# Patient Record
Sex: FEMALE | Race: WHITE | URBAN - METROPOLITAN AREA
[De-identification: names, ages, dates, MRNs, and addresses within clinical notes are randomized per-mention and may not be internally consistent; named-entity substitution may affect disease eponyms.]

---

## 2017-05-15 ENCOUNTER — EMERGENCY (EMERGENCY)
Facility: HOSPITAL | Age: 77
LOS: 0 days | Discharge: HOME | End: 2017-05-15
Attending: EMERGENCY MEDICINE | Admitting: EMERGENCY MEDICINE
Payer: MEDICARE

## 2017-05-15 VITALS
DIASTOLIC BLOOD PRESSURE: 81 MMHG | TEMPERATURE: 98 F | WEIGHT: 132.06 LBS | SYSTOLIC BLOOD PRESSURE: 143 MMHG | RESPIRATION RATE: 18 BRPM | OXYGEN SATURATION: 98 % | HEART RATE: 110 BPM

## 2017-05-15 VITALS
RESPIRATION RATE: 19 BRPM | DIASTOLIC BLOOD PRESSURE: 86 MMHG | OXYGEN SATURATION: 99 % | HEART RATE: 78 BPM | SYSTOLIC BLOOD PRESSURE: 127 MMHG

## 2017-05-15 DIAGNOSIS — M48.00 SPINAL STENOSIS, SITE UNSPECIFIED: ICD-10-CM

## 2017-05-15 DIAGNOSIS — R51 HEADACHE: ICD-10-CM

## 2017-05-15 DIAGNOSIS — K14.6 GLOSSODYNIA: ICD-10-CM

## 2017-05-15 DIAGNOSIS — G50.0 TRIGEMINAL NEURALGIA: ICD-10-CM

## 2017-05-15 DIAGNOSIS — E78.5 HYPERLIPIDEMIA, UNSPECIFIED: ICD-10-CM

## 2017-05-15 LAB — ERYTHROCYTE [SEDIMENTATION RATE] IN BLOOD: 12 MM/HR — SIGNIFICANT CHANGE UP (ref 0–20)

## 2017-05-15 PROCEDURE — 99284 EMERGENCY DEPT VISIT MOD MDM: CPT | Mod: 25

## 2017-05-15 PROCEDURE — 70450 CT HEAD/BRAIN W/O DYE: CPT | Mod: 26

## 2017-05-15 RX ORDER — HYDROMORPHONE HYDROCHLORIDE 2 MG/ML
1 INJECTION INTRAMUSCULAR; INTRAVENOUS; SUBCUTANEOUS ONCE
Qty: 0 | Refills: 0 | Status: DISCONTINUED | OUTPATIENT
Start: 2017-05-15 | End: 2017-05-15

## 2017-05-15 RX ORDER — OXYCODONE HYDROCHLORIDE 5 MG/1
1 TABLET ORAL
Qty: 9 | Refills: 0 | OUTPATIENT
Start: 2017-05-15

## 2017-05-15 RX ORDER — HYDROMORPHONE HYDROCHLORIDE 2 MG/ML
0.5 INJECTION INTRAMUSCULAR; INTRAVENOUS; SUBCUTANEOUS ONCE
Qty: 0 | Refills: 0 | Status: DISCONTINUED | OUTPATIENT
Start: 2017-05-15 | End: 2017-05-15

## 2017-05-15 RX ORDER — DEXAMETHASONE 0.5 MG/5ML
10 ELIXIR ORAL ONCE
Qty: 0 | Refills: 0 | Status: COMPLETED | OUTPATIENT
Start: 2017-05-15 | End: 2017-05-15

## 2017-05-15 RX ADMIN — Medication 10 MILLIGRAM(S): at 04:53

## 2017-05-15 RX ADMIN — HYDROMORPHONE HYDROCHLORIDE 0.5 MILLIGRAM(S): 2 INJECTION INTRAMUSCULAR; INTRAVENOUS; SUBCUTANEOUS at 05:23

## 2017-05-15 RX ADMIN — HYDROMORPHONE HYDROCHLORIDE 1 MILLIGRAM(S): 2 INJECTION INTRAMUSCULAR; INTRAVENOUS; SUBCUTANEOUS at 02:45

## 2017-05-15 RX ADMIN — HYDROMORPHONE HYDROCHLORIDE 0.5 MILLIGRAM(S): 2 INJECTION INTRAMUSCULAR; INTRAVENOUS; SUBCUTANEOUS at 04:53

## 2017-05-15 RX ADMIN — HYDROMORPHONE HYDROCHLORIDE 1 MILLIGRAM(S): 2 INJECTION INTRAMUSCULAR; INTRAVENOUS; SUBCUTANEOUS at 03:15

## 2017-05-15 NOTE — ED PROVIDER NOTE - OBJECTIVE STATEMENT
77 y/o female with PMHx of trigeminal neuralgia (on Tryptanol) spinal stenosis, HLD, burning mouth syndrome s/p AICD presents to the ED c/o sudden onset left sided HA x1 hour. Woke pt up from sleep. Denies vision changes. Always on left side, does not radiate to face. Describes the pain as a "shock", 11/10. On Gabapentin, Klonopin. Takes tramadol PRN for back, did not take it tonight.  states pt used to have this issue intermittently years ago, have become more frequent. Saw a neurologist who diagnosed her with trigeminal neuralgia and prescribed tryptanol, which relieved sx for 1 week. No recent CT scan. Is awaiting MRI. Allergic to PCN, Latex, sulfa.

## 2017-05-15 NOTE — ED PROVIDER NOTE - HEAD, MLM
Head is atraumatic. Head shape is symmetrical. Head is atraumatic. Head shape is symmetrical.  Non tender temporal artery

## 2017-05-15 NOTE — ED ADULT NURSE NOTE - OBJECTIVE STATEMENT
76 year old female presenting to the ED complaining of a 10/10 headache. Patient reports that she woke up this am with a sharp, shooting pain to the left temporal aspect of her skull. Patient states that she has a history of trigeminal neuralgia, but that this is the "worst headache of my life."   Negative: change in mentation/LOC, dizziness, weakness, visual changes, auditory issues, numbness/loss of sensation to the face, pain upon mastication, facial droop, arm drift, or difficultly ambulating.

## 2017-05-15 NOTE — ED ADULT NURSE NOTE - CHPI ED SYMPTOMS NEG
no change in level of consciousness/no dizziness/no vomiting/no confusion/no blurred vision/no weakness/no nausea/no numbness/no loss of consciousness/no fever

## 2017-05-15 NOTE — ED PROVIDER NOTE - DETAILS:
Rosa Koch MD - The scribe's documentation has been prepared under my direction and personally reviewed by me in its entirety. I confirm that the note above accurately reflects all work, treatment, procedures, and medical decision making performed by me.

## 2017-05-15 NOTE — ED PROVIDER NOTE - CONSTITUTIONAL, MLM
normal... Well appearing, well nourished, awake, alert, oriented to person, place, time/situation and in no apparent distress. Well appearing, well nourished, awake, alert, oriented to person, place, time/situation; crying in pain

## 2017-05-15 NOTE — ED PROVIDER NOTE - MEDICAL DECISION MAKING DETAILS
77 yo with h/o trigeminal neuralgia with exacerbation of symptoms, will check head CT, ESR and offer symptomatic relief

## 2017-05-15 NOTE — ED PROVIDER NOTE - NS ED MD SCRIBE ATTENDING SCRIBE SECTIONS
VITAL SIGNS( Pullset)/PROGRESS NOTE/HISTORY OF PRESENT ILLNESS/PHYSICAL EXAM/PAST MEDICAL/SURGICAL/SOCIAL HISTORY/REVIEW OF SYSTEMS/RESULTS/DISPOSITION

## 2017-08-30 ENCOUNTER — EMERGENCY (EMERGENCY)
Facility: HOSPITAL | Age: 77
LOS: 0 days | Discharge: HOME | End: 2017-08-30
Attending: EMERGENCY MEDICINE | Admitting: EMERGENCY MEDICINE
Payer: MEDICARE

## 2017-08-30 VITALS
TEMPERATURE: 98 F | SYSTOLIC BLOOD PRESSURE: 122 MMHG | OXYGEN SATURATION: 99 % | DIASTOLIC BLOOD PRESSURE: 75 MMHG | HEART RATE: 78 BPM | RESPIRATION RATE: 16 BRPM

## 2017-08-30 VITALS
DIASTOLIC BLOOD PRESSURE: 72 MMHG | HEART RATE: 105 BPM | RESPIRATION RATE: 18 BRPM | WEIGHT: 134.92 LBS | SYSTOLIC BLOOD PRESSURE: 122 MMHG | TEMPERATURE: 98 F | OXYGEN SATURATION: 99 %

## 2017-08-30 DIAGNOSIS — Z95.0 PRESENCE OF CARDIAC PACEMAKER: ICD-10-CM

## 2017-08-30 DIAGNOSIS — E78.5 HYPERLIPIDEMIA, UNSPECIFIED: ICD-10-CM

## 2017-08-30 DIAGNOSIS — S09.90XA UNSPECIFIED INJURY OF HEAD, INITIAL ENCOUNTER: ICD-10-CM
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
## 2017-08-30 DIAGNOSIS — S42.035A NONDISPLACED FRACTURE OF LATERAL END OF LEFT CLAVICLE, INITIAL ENCOUNTER FOR CLOSED FRACTURE: ICD-10-CM
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
## 2017-08-30 PROCEDURE — 99285 EMERGENCY DEPT VISIT HI MDM: CPT

## 2017-08-30 PROCEDURE — 73030 X-RAY EXAM OF SHOULDER: CPT | Mod: 26,LT

## 2017-08-30 PROCEDURE — 70450 CT HEAD/BRAIN W/O DYE: CPT | Mod: 26

## 2017-08-30 PROCEDURE — 71010: CPT | Mod: 26

## 2017-08-30 PROCEDURE — 73060 X-RAY EXAM OF HUMERUS: CPT | Mod: 26,LT

## 2017-08-30 PROCEDURE — 93010 ELECTROCARDIOGRAM REPORT: CPT

## 2017-08-30 PROCEDURE — 72190 X-RAY EXAM OF PELVIS: CPT | Mod: 26

## 2017-08-30 RX ORDER — OXYCODONE AND ACETAMINOPHEN 5; 325 MG/1; MG/1
1 TABLET ORAL ONCE
Qty: 0 | Refills: 0 | Status: DISCONTINUED | OUTPATIENT
Start: 2017-08-30 | End: 2017-08-30

## 2017-08-30 RX ADMIN — OXYCODONE AND ACETAMINOPHEN 1 TABLET(S): 5; 325 TABLET ORAL at 16:48

## 2017-08-30 RX ADMIN — OXYCODONE AND ACETAMINOPHEN 1 TABLET(S): 5; 325 TABLET ORAL at 15:32

## 2017-08-30 RX ADMIN — OXYCODONE AND ACETAMINOPHEN 1 TABLET(S): 5; 325 TABLET ORAL at 16:02

## 2017-08-30 NOTE — ED PROVIDER NOTE - CARE PLAN
Principal Discharge DX:	Closed nondisplaced fracture of acromial end of left clavicle, initial encounter

## 2017-08-30 NOTE — ED ADULT TRIAGE NOTE - CHIEF COMPLAINT QUOTE
pt states she was wa;lomg up the steps and fell pt c/op head pain and left shoulder pain, pt on ASA 81mg, no LOC

## 2017-08-30 NOTE — ED ADULT NURSE NOTE - CHPI ED SYMPTOMS NEG
no fever/no numbness/no abrasion/no loss of consciousness/no vomiting/no weakness/no tingling/no bleeding

## 2017-08-30 NOTE — ED ADULT NURSE NOTE - CAS EDN DISCHARGE ASSESSMENT
No adverse reaction to first time med in ED/Neuro vascular intact post splinting/Alert and oriented to person, place and time/Symptoms improved/Patient baseline mental status/Awake

## 2017-08-30 NOTE — ED PROVIDER NOTE - OBJECTIVE STATEMENT
75 y/o F w/ PMHx of pacemaker, presents to ED c/o head pain and left shoulder pain. pt was walking up steps when she fell, head struck. Pt denies LOC, blood thinners, HA, n/v, or any other acute complaints. Pt is on ASA 81 mg.

## 2018-08-22 ENCOUNTER — EMERGENCY (EMERGENCY)
Facility: HOSPITAL | Age: 78
LOS: 0 days | Discharge: HOME | End: 2018-08-22
Attending: EMERGENCY MEDICINE | Admitting: EMERGENCY MEDICINE
Payer: COMMERCIAL

## 2018-08-22 VITALS
OXYGEN SATURATION: 97 % | SYSTOLIC BLOOD PRESSURE: 132 MMHG | HEIGHT: 64 IN | WEIGHT: 126.1 LBS | HEART RATE: 81 BPM | TEMPERATURE: 98 F | DIASTOLIC BLOOD PRESSURE: 74 MMHG | RESPIRATION RATE: 18 BRPM

## 2018-08-22 DIAGNOSIS — R07.9 CHEST PAIN, UNSPECIFIED: ICD-10-CM

## 2018-08-22 DIAGNOSIS — M48.00 SPINAL STENOSIS, SITE UNSPECIFIED: ICD-10-CM
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
## 2018-08-22 DIAGNOSIS — S22.42XA MULTIPLE FRACTURES OF RIBS, LEFT SIDE, INITIAL ENCOUNTER FOR CLOSED FRACTURE: ICD-10-CM

## 2018-08-22 DIAGNOSIS — G50.0 TRIGEMINAL NEURALGIA: ICD-10-CM

## 2018-08-22 PROBLEM — K14.6 GLOSSODYNIA: Chronic | Status: ACTIVE | Noted: 2017-05-15

## 2018-08-22 PROBLEM — E78.5 HYPERLIPIDEMIA, UNSPECIFIED: Chronic | Status: ACTIVE | Noted: 2017-05-15

## 2018-08-22 PROCEDURE — 71250 CT THORAX DX C-: CPT | Mod: 26

## 2018-08-22 PROCEDURE — 93010 ELECTROCARDIOGRAM REPORT: CPT

## 2018-08-22 PROCEDURE — 70486 CT MAXILLOFACIAL W/O DYE: CPT | Mod: 26

## 2018-08-22 PROCEDURE — 72125 CT NECK SPINE W/O DYE: CPT | Mod: 26

## 2018-08-22 PROCEDURE — 99284 EMERGENCY DEPT VISIT MOD MDM: CPT | Mod: 25

## 2018-08-22 PROCEDURE — 70450 CT HEAD/BRAIN W/O DYE: CPT | Mod: 26

## 2018-08-22 PROCEDURE — 76377 3D RENDER W/INTRP POSTPROCES: CPT | Mod: 26

## 2018-08-22 RX ORDER — OXYCODONE AND ACETAMINOPHEN 5; 325 MG/1; MG/1
1 TABLET ORAL ONCE
Qty: 0 | Refills: 0 | Status: DISCONTINUED | OUTPATIENT
Start: 2018-08-22 | End: 2018-08-22

## 2018-08-22 RX ORDER — OXYCODONE HYDROCHLORIDE 5 MG/1
1 TABLET ORAL
Qty: 9 | Refills: 0
Start: 2018-08-22

## 2018-08-22 RX ADMIN — OXYCODONE AND ACETAMINOPHEN 1 TABLET(S): 5; 325 TABLET ORAL at 05:07

## 2018-08-22 NOTE — ED ADULT TRIAGE NOTE - CHIEF COMPLAINT QUOTE
tripped over and fell. Hit head on wall. denies loc. on asa daily. c/o left sided headache, left rib pain. Trauma alert called at 0330

## 2018-08-22 NOTE — ED PROVIDER NOTE - OBJECTIVE STATEMENT
76 yo female bib  from home s/p mechanical fall while getting up to use the bathroom tonight. no loc. pt c/o left sided rib pain.

## 2018-08-22 NOTE — ED ADULT NURSE NOTE - NSIMPLEMENTINTERV_GEN_ALL_ED
Implemented All Fall with Harm Risk Interventions:  [place] to call system. Call bell, personal items and telephone within reach. Instruct patient to call for assistance. Room bathroom lighting operational. Non-slip footwear when patient is off stretcher. Physically safe environment: no spills, clutter or unnecessary equipment. Stretcher in lowest position, wheels locked, appropriate side rails in place. Provide visual cue, wrist band, yellow gown, etc. Monitor gait and stability. Monitor for mental status changes and reorient to person, place, and time. Review medications for side effects contributing to fall risk. Reinforce activity limits and safety measures with patient and family. Provide visual clues: red socks.

## 2021-05-22 NOTE — ED PROVIDER NOTE - TEMPLATE, MLM
[facility]  Urology  [address] [place] [address]  Phone: [phone]  Fax: [phone]  Follow Up Time: 1-3 Days

## 2021-06-11 ENCOUNTER — INPATIENT (INPATIENT)
Facility: HOSPITAL | Age: 81
LOS: 6 days | Discharge: NURSING, REHAB OR LONG-TERM CARE | DRG: 884 | End: 2021-06-18
Attending: HOSPITALIST | Admitting: FAMILY MEDICINE
Payer: MEDICARE

## 2021-06-11 VITALS
TEMPERATURE: 98 F | HEART RATE: 79 BPM | RESPIRATION RATE: 18 BRPM | DIASTOLIC BLOOD PRESSURE: 55 MMHG | OXYGEN SATURATION: 100 % | SYSTOLIC BLOOD PRESSURE: 122 MMHG

## 2021-06-11 DIAGNOSIS — R26.81 UNSTEADINESS ON FEET: ICD-10-CM

## 2021-06-11 LAB
ALBUMIN SERPL ELPH-MCNC: 3.4 G/DL — SIGNIFICANT CHANGE UP (ref 3.3–5)
ALP SERPL-CCNC: 104 U/L — SIGNIFICANT CHANGE UP (ref 40–120)
ALT FLD-CCNC: 35 U/L — SIGNIFICANT CHANGE UP (ref 12–78)
ANION GAP SERPL CALC-SCNC: 7 MMOL/L — SIGNIFICANT CHANGE UP (ref 5–17)
APTT BLD: 26.9 SEC — LOW (ref 27.5–35.5)
AST SERPL-CCNC: 44 U/L — HIGH (ref 15–37)
BASOPHILS # BLD AUTO: 0.04 K/UL — SIGNIFICANT CHANGE UP (ref 0–0.2)
BASOPHILS NFR BLD AUTO: 0.5 % — SIGNIFICANT CHANGE UP (ref 0–2)
BILIRUB SERPL-MCNC: 0.5 MG/DL — SIGNIFICANT CHANGE UP (ref 0.2–1.2)
BUN SERPL-MCNC: 20 MG/DL — SIGNIFICANT CHANGE UP (ref 7–23)
CALCIUM SERPL-MCNC: 9.2 MG/DL — SIGNIFICANT CHANGE UP (ref 8.5–10.1)
CHLORIDE SERPL-SCNC: 108 MMOL/L — SIGNIFICANT CHANGE UP (ref 96–108)
CO2 SERPL-SCNC: 25 MMOL/L — SIGNIFICANT CHANGE UP (ref 22–31)
CREAT SERPL-MCNC: 0.94 MG/DL — SIGNIFICANT CHANGE UP (ref 0.5–1.3)
EOSINOPHIL # BLD AUTO: 0.1 K/UL — SIGNIFICANT CHANGE UP (ref 0–0.5)
EOSINOPHIL NFR BLD AUTO: 1.3 % — SIGNIFICANT CHANGE UP (ref 0–6)
GLUCOSE SERPL-MCNC: 77 MG/DL — SIGNIFICANT CHANGE UP (ref 70–99)
HCT VFR BLD CALC: 38.8 % — SIGNIFICANT CHANGE UP (ref 34.5–45)
HGB BLD-MCNC: 12.8 G/DL — SIGNIFICANT CHANGE UP (ref 11.5–15.5)
IMM GRANULOCYTES NFR BLD AUTO: 0.3 % — SIGNIFICANT CHANGE UP (ref 0–1.5)
INR BLD: 1.03 RATIO — SIGNIFICANT CHANGE UP (ref 0.88–1.16)
LYMPHOCYTES # BLD AUTO: 2 K/UL — SIGNIFICANT CHANGE UP (ref 1–3.3)
LYMPHOCYTES # BLD AUTO: 26.6 % — SIGNIFICANT CHANGE UP (ref 13–44)
MCHC RBC-ENTMCNC: 31.5 PG — SIGNIFICANT CHANGE UP (ref 27–34)
MCHC RBC-ENTMCNC: 33 GM/DL — SIGNIFICANT CHANGE UP (ref 32–36)
MCV RBC AUTO: 95.6 FL — SIGNIFICANT CHANGE UP (ref 80–100)
MONOCYTES # BLD AUTO: 0.63 K/UL — SIGNIFICANT CHANGE UP (ref 0–0.9)
MONOCYTES NFR BLD AUTO: 8.4 % — SIGNIFICANT CHANGE UP (ref 2–14)
NEUTROPHILS # BLD AUTO: 4.73 K/UL — SIGNIFICANT CHANGE UP (ref 1.8–7.4)
NEUTROPHILS NFR BLD AUTO: 62.9 % — SIGNIFICANT CHANGE UP (ref 43–77)
PLATELET # BLD AUTO: 190 K/UL — SIGNIFICANT CHANGE UP (ref 150–400)
POTASSIUM SERPL-MCNC: 4.3 MMOL/L — SIGNIFICANT CHANGE UP (ref 3.5–5.3)
POTASSIUM SERPL-SCNC: 4.3 MMOL/L — SIGNIFICANT CHANGE UP (ref 3.5–5.3)
PROT SERPL-MCNC: 6.7 GM/DL — SIGNIFICANT CHANGE UP (ref 6–8.3)
PROTHROM AB SERPL-ACNC: 11.9 SEC — SIGNIFICANT CHANGE UP (ref 10.6–13.6)
RAPID RVP RESULT: SIGNIFICANT CHANGE UP
RBC # BLD: 4.06 M/UL — SIGNIFICANT CHANGE UP (ref 3.8–5.2)
RBC # FLD: 13.6 % — SIGNIFICANT CHANGE UP (ref 10.3–14.5)
SARS-COV-2 RNA SPEC QL NAA+PROBE: SIGNIFICANT CHANGE UP
SODIUM SERPL-SCNC: 140 MMOL/L — SIGNIFICANT CHANGE UP (ref 135–145)
TROPONIN I SERPL-MCNC: <0.015 NG/ML — SIGNIFICANT CHANGE UP (ref 0.01–0.04)
WBC # BLD: 7.52 K/UL — SIGNIFICANT CHANGE UP (ref 3.8–10.5)
WBC # FLD AUTO: 7.52 K/UL — SIGNIFICANT CHANGE UP (ref 3.8–10.5)

## 2021-06-11 PROCEDURE — U0005: CPT

## 2021-06-11 PROCEDURE — G1004: CPT

## 2021-06-11 PROCEDURE — 97163 PT EVAL HIGH COMPLEX 45 MIN: CPT | Mod: GP

## 2021-06-11 PROCEDURE — 93010 ELECTROCARDIOGRAM REPORT: CPT

## 2021-06-11 PROCEDURE — 72192 CT PELVIS W/O DYE: CPT

## 2021-06-11 PROCEDURE — 74177 CT ABD & PELVIS W/CONTRAST: CPT | Mod: 26,ME

## 2021-06-11 PROCEDURE — 72125 CT NECK SPINE W/O DYE: CPT | Mod: 26,ME

## 2021-06-11 PROCEDURE — 73501 X-RAY EXAM HIP UNI 1 VIEW: CPT | Mod: RT

## 2021-06-11 PROCEDURE — 86769 SARS-COV-2 COVID-19 ANTIBODY: CPT

## 2021-06-11 PROCEDURE — 71260 CT THORAX DX C+: CPT | Mod: 26,MG

## 2021-06-11 PROCEDURE — U0003: CPT

## 2021-06-11 PROCEDURE — 36415 COLL VENOUS BLD VENIPUNCTURE: CPT

## 2021-06-11 PROCEDURE — 81001 URINALYSIS AUTO W/SCOPE: CPT

## 2021-06-11 PROCEDURE — 97116 GAIT TRAINING THERAPY: CPT | Mod: GP

## 2021-06-11 PROCEDURE — 70450 CT HEAD/BRAIN W/O DYE: CPT | Mod: 26,ME

## 2021-06-11 PROCEDURE — 76376 3D RENDER W/INTRP POSTPROCES: CPT

## 2021-06-11 PROCEDURE — 85027 COMPLETE CBC AUTOMATED: CPT

## 2021-06-11 PROCEDURE — 99285 EMERGENCY DEPT VISIT HI MDM: CPT

## 2021-06-11 PROCEDURE — 0225U NFCT DS DNA&RNA 21 SARSCOV2: CPT

## 2021-06-11 PROCEDURE — 80048 BASIC METABOLIC PNL TOTAL CA: CPT

## 2021-06-11 PROCEDURE — 97530 THERAPEUTIC ACTIVITIES: CPT | Mod: GP

## 2021-06-11 RX ORDER — TRAMADOL HYDROCHLORIDE 50 MG/1
25 TABLET ORAL ONCE
Refills: 0 | Status: DISCONTINUED | OUTPATIENT
Start: 2021-06-11 | End: 2021-06-11

## 2021-06-11 RX ORDER — DULOXETINE HYDROCHLORIDE 30 MG/1
0 CAPSULE, DELAYED RELEASE ORAL
Qty: 0 | Refills: 0 | DISCHARGE

## 2021-06-11 RX ORDER — CLONAZEPAM 1 MG
0 TABLET ORAL
Qty: 0 | Refills: 0 | DISCHARGE

## 2021-06-11 RX ORDER — TRAMADOL HYDROCHLORIDE 50 MG/1
0 TABLET ORAL
Qty: 0 | Refills: 0 | DISCHARGE

## 2021-06-11 RX ORDER — SODIUM CHLORIDE 9 MG/ML
1000 INJECTION INTRAMUSCULAR; INTRAVENOUS; SUBCUTANEOUS ONCE
Refills: 0 | Status: COMPLETED | OUTPATIENT
Start: 2021-06-11 | End: 2021-06-11

## 2021-06-11 RX ORDER — GABAPENTIN 400 MG/1
0 CAPSULE ORAL
Qty: 0 | Refills: 0 | DISCHARGE

## 2021-06-11 RX ADMIN — TRAMADOL HYDROCHLORIDE 25 MILLIGRAM(S): 50 TABLET ORAL at 23:29

## 2021-06-11 RX ADMIN — SODIUM CHLORIDE 1000 MILLILITER(S): 9 INJECTION INTRAMUSCULAR; INTRAVENOUS; SUBCUTANEOUS at 14:15

## 2021-06-11 NOTE — ED ADULT NURSE NOTE - OBJECTIVE STATEMENT
Pt c/o fall two days ago. denies LOC. per pts daughter Pt c/o fall two days ago. denies LOC. per pts daughter pt has a bruise to right ribs.

## 2021-06-11 NOTE — ED PROVIDER NOTE - CARE PLAN
Principal Discharge DX:	Unsteady gait  Secondary Diagnosis:	Frequent falls  Secondary Diagnosis:	Weakness

## 2021-06-11 NOTE — ED PROVIDER NOTE - OBJECTIVE STATEMENT
79 yo female with a PMH of HLD, trigeminal neuralgia, dementia presents with back pain s/p fall. Pt is a poor historian so history was provided by daughter. PE daughter, 2 days ago apt had 2 witnessed fall, the first after standing up and hit her head and the 2nd fallign backwards against a coffee table. Pt has been c/o of soreness to the R back since the fall. Daughter states, pt has been progressively getting weaker, decreased appetite. Was taken to urgent care who suggested she come to the ER 81 yo female with a PMH of HLD, trigeminal neuralgia, dementia presents with back pain s/p fall. Pt is a poor historian so history was provided by daughter. PE daughter, 2 days ago apt had 2 witnessed fall, the first after standing up and hit her head and the 2nd fallign backwards against a coffee table. Pt has been c/o of soreness to the R back since the fall. Daughter states, pt has been progressively getting weaker, decreased appetite. Was taken to urgent care who suggested she come to the ER.  PMD= Arabic

## 2021-06-11 NOTE — ED PROVIDER NOTE - CLINICAL SUMMARY MEDICAL DECISION MAKING FREE TEXT BOX
81 yo female presents with R sided back pain s/p fall. Will check CTs, labs, UA, SW consult due to weakness and multiple falls. Reeval. -Tyrel Bronson PA-C

## 2021-06-11 NOTE — ED PROVIDER NOTE - ATTENDING CONTRIBUTION TO CARE
I, Luda Tatum DO,  performed the initial face to face bedside interview with this patient regarding history of present illness, review of symptoms and relevant past medical, social and family history.  I completed an independent physical examination.  I was the initial provider who evaluated this patient. I have signed out the follow up of any pending tests (i.e. labs, radiological studies) to the ACP.  I have communicated the patient’s plan of care and disposition with the ACP.  The history, relevant review of systems, past medical and surgical history, medical decision making, and physical examination was documented by the scribe in my presence and I attest to the accuracy of the documentation.

## 2021-06-11 NOTE — ED PROVIDER NOTE - PROGRESS NOTE DETAILS
Pt progressively weker and lives alone with . Daughter would like to speak with SW. KRISS pritchard. -Tyrel Bronson PA-C Rc DO: 81 yo female s/p mechanical fall 2 days ago, unwitnessed; with back pain; generalized weakness; dec po intake; brought in by daughter; ct scans ordered r/o traumatic injury; labs wnl at this time; ua pending; endorsed to Dr. Alanis pending CT scans and likely admission.

## 2021-06-12 LAB
APPEARANCE UR: ABNORMAL
BILIRUB UR-MCNC: NEGATIVE — SIGNIFICANT CHANGE UP
COLOR SPEC: YELLOW — SIGNIFICANT CHANGE UP
COVID-19 SPIKE DOMAIN AB INTERP: POSITIVE
COVID-19 SPIKE DOMAIN ANTIBODY RESULT: >250 U/ML — HIGH
DIFF PNL FLD: ABNORMAL
GLUCOSE UR QL: NEGATIVE MG/DL — SIGNIFICANT CHANGE UP
KETONES UR-MCNC: ABNORMAL
LEUKOCYTE ESTERASE UR-ACNC: ABNORMAL
NITRITE UR-MCNC: NEGATIVE — SIGNIFICANT CHANGE UP
PH UR: 5 — SIGNIFICANT CHANGE UP (ref 5–8)
PROT UR-MCNC: 100 MG/DL
SARS-COV-2 IGG+IGM SERPL QL IA: >250 U/ML — HIGH
SARS-COV-2 IGG+IGM SERPL QL IA: POSITIVE
SP GR SPEC: 1.01 — SIGNIFICANT CHANGE UP (ref 1.01–1.02)
UROBILINOGEN FLD QL: NEGATIVE MG/DL — SIGNIFICANT CHANGE UP

## 2021-06-12 PROCEDURE — 12345: CPT | Mod: NC

## 2021-06-12 PROCEDURE — 99222 1ST HOSP IP/OBS MODERATE 55: CPT

## 2021-06-12 RX ORDER — TRAMADOL HYDROCHLORIDE 50 MG/1
50 TABLET ORAL EVERY 6 HOURS
Refills: 0 | Status: DISCONTINUED | OUTPATIENT
Start: 2021-06-12 | End: 2021-06-18

## 2021-06-12 RX ORDER — CLONAZEPAM 1 MG
0.25 TABLET ORAL THREE TIMES A DAY
Refills: 0 | Status: DISCONTINUED | OUTPATIENT
Start: 2021-06-12 | End: 2021-06-18

## 2021-06-12 RX ORDER — DULOXETINE HYDROCHLORIDE 30 MG/1
40 CAPSULE, DELAYED RELEASE ORAL DAILY
Refills: 0 | Status: DISCONTINUED | OUTPATIENT
Start: 2021-06-12 | End: 2021-06-18

## 2021-06-12 RX ORDER — GABAPENTIN 400 MG/1
300 CAPSULE ORAL THREE TIMES A DAY
Refills: 0 | Status: DISCONTINUED | OUTPATIENT
Start: 2021-06-12 | End: 2021-06-18

## 2021-06-12 RX ORDER — QUETIAPINE FUMARATE 200 MG/1
50 TABLET, FILM COATED ORAL AT BEDTIME
Refills: 0 | Status: DISCONTINUED | OUTPATIENT
Start: 2021-06-12 | End: 2021-06-18

## 2021-06-12 RX ORDER — DONEPEZIL HYDROCHLORIDE 10 MG/1
10 TABLET, FILM COATED ORAL AT BEDTIME
Refills: 0 | Status: DISCONTINUED | OUTPATIENT
Start: 2021-06-12 | End: 2021-06-18

## 2021-06-12 RX ADMIN — Medication 0.5 MILLIGRAM(S): at 02:52

## 2021-06-12 RX ADMIN — Medication 10 MILLIGRAM(S): at 16:05

## 2021-06-12 RX ADMIN — QUETIAPINE FUMARATE 50 MILLIGRAM(S): 200 TABLET, FILM COATED ORAL at 21:48

## 2021-06-12 RX ADMIN — Medication 10 MILLIGRAM(S): at 08:26

## 2021-06-12 RX ADMIN — Medication 0.5 MILLIGRAM(S): at 13:34

## 2021-06-12 RX ADMIN — GABAPENTIN 300 MILLIGRAM(S): 400 CAPSULE ORAL at 13:33

## 2021-06-12 RX ADMIN — DULOXETINE HYDROCHLORIDE 40 MILLIGRAM(S): 30 CAPSULE, DELAYED RELEASE ORAL at 08:26

## 2021-06-12 RX ADMIN — GABAPENTIN 300 MILLIGRAM(S): 400 CAPSULE ORAL at 06:22

## 2021-06-12 RX ADMIN — DONEPEZIL HYDROCHLORIDE 10 MILLIGRAM(S): 10 TABLET, FILM COATED ORAL at 21:48

## 2021-06-12 RX ADMIN — Medication 0.5 MILLIGRAM(S): at 21:48

## 2021-06-12 RX ADMIN — GABAPENTIN 300 MILLIGRAM(S): 400 CAPSULE ORAL at 21:48

## 2021-06-12 NOTE — DIETITIAN INITIAL EVALUATION ADULT. - OTHER INFO
79yo female with PMH significant for HLD, trigeminal neuralgia, dementia p/w back pain s/p fall.  Pt admitted with unsteady gait.  full code.

## 2021-06-12 NOTE — H&P ADULT - ASSESSMENT
A/P:    1.  Unsteady Gait  Frequent Falls  -keep on fall precaution  -follow PT eval  -pain meds as needed  -may need Rehab placement     2.  Dementia  -continue Klonopin, Seroquel, Cymbalta    3.  SCD for DVT ppx    4.  Code status: Patient is in Full code status.

## 2021-06-12 NOTE — H&P ADULT - NSICDXPASTMEDICALHX_GEN_ALL_CORE_FT
PAST MEDICAL HISTORY:  Burning mouth syndrome     HLD (hyperlipidemia)     Spinal stenosis     Trigeminal neuralgia

## 2021-06-12 NOTE — DIETITIAN INITIAL EVALUATION ADULT. - ORAL INTAKE PTA/DIET HISTORY
pt minimally eating x 3 wks; 1 meal and ensure per day; meeting <75% of estimated nutr needs; per husbands report.

## 2021-06-12 NOTE — DIETITIAN INITIAL EVALUATION ADULT. - ADD RECOMMEND
1) consider adding appetite stimulant 2) add ensure enlive vanilla TID 3) add MVI with minerals daily to ensure 100% RDI met 4) consider checking vitamin D level and supplement prn 5) daily wt checks to track/trend changes 1) consider adding appetite stimulant 2) liberalize diet to regular and add ensure enlive vanilla TID 3) add MVI with minerals daily to ensure 100% RDI met 4) consider checking vitamin D level and supplement prn 5) daily wt checks to track/trend changes

## 2021-06-12 NOTE — H&P ADULT - NSHPPHYSICALEXAM_GEN_ALL_CORE
Vital Signs Last 24 Hrs  T(C): 36.4 (11 Jun 2021 22:38), Max: 37 (11 Jun 2021 21:35)  T(F): 97.6 (11 Jun 2021 22:38), Max: 98.6 (11 Jun 2021 21:35)  HR: 99 (11 Jun 2021 22:38) (79 - 99)  BP: 158/72 (11 Jun 2021 22:38) (122/55 - 158/72)  RR: 18 (11 Jun 2021 22:38) (18 - 18)  SpO2: 100% (11 Jun 2021 22:38) (100% - 100%)

## 2021-06-12 NOTE — DIETITIAN INITIAL EVALUATION ADULT. - PERTINENT MEDS FT
MEDICATIONS  (STANDING):  clonazePAM  Tablet 0.5 milliGRAM(s) Oral three times a day  dicyclomine 10 milliGRAM(s) Oral two times a day before meals  donepezil 10 milliGRAM(s) Oral at bedtime  DULoxetine 40 milliGRAM(s) Oral daily  gabapentin 300 milliGRAM(s) Oral three times a day  QUEtiapine 50 milliGRAM(s) Oral at bedtime    MEDICATIONS  (PRN):  traMADol 50 milliGRAM(s) Oral every 6 hours PRN for moderate pain

## 2021-06-12 NOTE — DIETITIAN INITIAL EVALUATION ADULT. - PERTINENT LABORATORY DATA
06-11    140  |  108  |  20  ----------------------------<  77  4.3   |  25  |  0.94    Ca    9.2      11 Jun 2021 14:06    TPro  6.7  /  Alb  3.4  /  TBili  0.5  /  DBili  x   /  AST  44<H>  /  ALT  35  /  AlkPhos  104  06-11

## 2021-06-12 NOTE — H&P ADULT - HISTORY OF PRESENT ILLNESS
81 yo Female with a PMH of HLD, trigeminal neuralgia, dementia presents with back pain after fall. Patient is a poor historian so history was provided by daughter. Per daughter, 2 days ago patient had 2 witnessed fall, the first after standing up and hit her head and the 2nd falling backwards against a coffee table. Patient has been complaining of soreness to the R back since the fall. Daughter states, patient has been progressively getting weaker, decreased appetite. Was taken to urgent care who suggested she come to the ER. 81 yo Female with a PMH of HLD, trigeminal neuralgia, dementia presents with back pain after fall. Patient is a poor historian so history was provided by daughter. Per daughter, 2 days ago patient had 2 witnessed fall, the first after standing up and hit her head and the 2nd falling backwards against a coffee table. Patient has been complaining of soreness to the R back since the fall. Daughter states, patient has been progressively getting weaker, decreased appetite. Was taken to urgent care who suggested she come to the ER. Patient is a poor historian due to Dementia.     Family Hx:  Unable to get detail family history due to patient's dementia,

## 2021-06-12 NOTE — DIETITIAN INITIAL EVALUATION ADULT. - MALNUTRITION
severe malnutrition severe malnutrition in acute on chronic illness r/t decreased PO intake 2/2 dementia and increased needs (PU) AEB 7.7% wt loss x 2 wks; meeting <50% of ENN x 1 mo severe malnutrition in acute on chronic illness

## 2021-06-13 PROCEDURE — 99232 SBSQ HOSP IP/OBS MODERATE 35: CPT

## 2021-06-13 RX ORDER — QUETIAPINE FUMARATE 200 MG/1
12.5 TABLET, FILM COATED ORAL ONCE
Refills: 0 | Status: COMPLETED | OUTPATIENT
Start: 2021-06-13 | End: 2021-06-13

## 2021-06-13 RX ADMIN — Medication 0.5 MILLIGRAM(S): at 13:22

## 2021-06-13 RX ADMIN — Medication 0.5 MILLIGRAM(S): at 21:32

## 2021-06-13 RX ADMIN — QUETIAPINE FUMARATE 50 MILLIGRAM(S): 200 TABLET, FILM COATED ORAL at 21:33

## 2021-06-13 RX ADMIN — DONEPEZIL HYDROCHLORIDE 10 MILLIGRAM(S): 10 TABLET, FILM COATED ORAL at 21:33

## 2021-06-13 RX ADMIN — GABAPENTIN 300 MILLIGRAM(S): 400 CAPSULE ORAL at 05:31

## 2021-06-13 RX ADMIN — Medication 10 MILLIGRAM(S): at 17:47

## 2021-06-13 RX ADMIN — GABAPENTIN 300 MILLIGRAM(S): 400 CAPSULE ORAL at 21:33

## 2021-06-13 RX ADMIN — DULOXETINE HYDROCHLORIDE 40 MILLIGRAM(S): 30 CAPSULE, DELAYED RELEASE ORAL at 11:27

## 2021-06-13 RX ADMIN — QUETIAPINE FUMARATE 12.5 MILLIGRAM(S): 200 TABLET, FILM COATED ORAL at 17:47

## 2021-06-13 RX ADMIN — GABAPENTIN 300 MILLIGRAM(S): 400 CAPSULE ORAL at 13:22

## 2021-06-13 RX ADMIN — Medication 0.5 MILLIGRAM(S): at 05:31

## 2021-06-14 PROCEDURE — 99232 SBSQ HOSP IP/OBS MODERATE 35: CPT

## 2021-06-14 RX ORDER — CEFTRIAXONE 500 MG/1
1000 INJECTION, POWDER, FOR SOLUTION INTRAMUSCULAR; INTRAVENOUS EVERY 24 HOURS
Refills: 0 | Status: COMPLETED | OUTPATIENT
Start: 2021-06-14 | End: 2021-06-16

## 2021-06-14 RX ORDER — CEFTRIAXONE 500 MG/1
1000 INJECTION, POWDER, FOR SOLUTION INTRAMUSCULAR; INTRAVENOUS EVERY 24 HOURS
Refills: 0 | Status: DISCONTINUED | OUTPATIENT
Start: 2021-06-14 | End: 2021-06-14

## 2021-06-14 RX ORDER — HEPARIN SODIUM 5000 [USP'U]/ML
5000 INJECTION INTRAVENOUS; SUBCUTANEOUS EVERY 12 HOURS
Refills: 0 | Status: DISCONTINUED | OUTPATIENT
Start: 2021-06-14 | End: 2021-06-18

## 2021-06-14 RX ADMIN — Medication 0.5 MILLIGRAM(S): at 05:54

## 2021-06-14 RX ADMIN — DONEPEZIL HYDROCHLORIDE 10 MILLIGRAM(S): 10 TABLET, FILM COATED ORAL at 21:48

## 2021-06-14 RX ADMIN — DULOXETINE HYDROCHLORIDE 40 MILLIGRAM(S): 30 CAPSULE, DELAYED RELEASE ORAL at 08:51

## 2021-06-14 RX ADMIN — HEPARIN SODIUM 5000 UNIT(S): 5000 INJECTION INTRAVENOUS; SUBCUTANEOUS at 21:48

## 2021-06-14 RX ADMIN — GABAPENTIN 300 MILLIGRAM(S): 400 CAPSULE ORAL at 05:54

## 2021-06-14 RX ADMIN — QUETIAPINE FUMARATE 50 MILLIGRAM(S): 200 TABLET, FILM COATED ORAL at 21:48

## 2021-06-14 RX ADMIN — TRAMADOL HYDROCHLORIDE 50 MILLIGRAM(S): 50 TABLET ORAL at 11:29

## 2021-06-14 RX ADMIN — Medication 0.25 MILLIGRAM(S): at 13:36

## 2021-06-14 RX ADMIN — GABAPENTIN 300 MILLIGRAM(S): 400 CAPSULE ORAL at 13:36

## 2021-06-14 RX ADMIN — CEFTRIAXONE 1000 MILLIGRAM(S): 500 INJECTION, POWDER, FOR SOLUTION INTRAMUSCULAR; INTRAVENOUS at 19:39

## 2021-06-14 RX ADMIN — TRAMADOL HYDROCHLORIDE 50 MILLIGRAM(S): 50 TABLET ORAL at 11:22

## 2021-06-14 RX ADMIN — GABAPENTIN 300 MILLIGRAM(S): 400 CAPSULE ORAL at 21:48

## 2021-06-14 RX ADMIN — Medication 0.25 MILLIGRAM(S): at 21:48

## 2021-06-14 RX ADMIN — Medication 10 MILLIGRAM(S): at 08:51

## 2021-06-14 NOTE — PHARMACOTHERAPY INTERVENTION NOTE - COMMENTS
.  Patient admitted s/p fall at home, pending ANSELMO placement  Recommended changing Bentyl from BID standing (Home dose) to BID PRN abdominal pain (Irritable bowel syndrome-associated abdominal pain)  Bentyl is only recommended for a short duration and is on the [employer]'s Criteria List for highly anticholinergic properties   May cause cognitive impairment, delirium, and falls

## 2021-06-14 NOTE — PHYSICAL THERAPY INITIAL EVALUATION ADULT - DISCHARGE DISPOSITION, PT EVAL
Recommend continued skilled PT and ANSELMO when medically stable for discharge./rehabilitation facility

## 2021-06-14 NOTE — PHYSICAL THERAPY INITIAL EVALUATION ADULT - ADDITIONAL COMMENTS
per , pt amb independent without any AD prior to admission. was independent with dressing/bathing/toileting. per , pt amb independent without any AD prior to admission. was independent with dressing/bathing/toileting, however she has been becoming progressively weaker over the past couple of weeks.

## 2021-06-15 LAB
ANION GAP SERPL CALC-SCNC: 5 MMOL/L — SIGNIFICANT CHANGE UP (ref 5–17)
BUN SERPL-MCNC: 28 MG/DL — HIGH (ref 7–23)
CALCIUM SERPL-MCNC: 9.1 MG/DL — SIGNIFICANT CHANGE UP (ref 8.5–10.1)
CHLORIDE SERPL-SCNC: 105 MMOL/L — SIGNIFICANT CHANGE UP (ref 96–108)
CO2 SERPL-SCNC: 29 MMOL/L — SIGNIFICANT CHANGE UP (ref 22–31)
CREAT SERPL-MCNC: 0.76 MG/DL — SIGNIFICANT CHANGE UP (ref 0.5–1.3)
GLUCOSE SERPL-MCNC: 89 MG/DL — SIGNIFICANT CHANGE UP (ref 70–99)
HCT VFR BLD CALC: 34.5 % — SIGNIFICANT CHANGE UP (ref 34.5–45)
HGB BLD-MCNC: 11.4 G/DL — LOW (ref 11.5–15.5)
MCHC RBC-ENTMCNC: 32 PG — SIGNIFICANT CHANGE UP (ref 27–34)
MCHC RBC-ENTMCNC: 33 GM/DL — SIGNIFICANT CHANGE UP (ref 32–36)
MCV RBC AUTO: 96.9 FL — SIGNIFICANT CHANGE UP (ref 80–100)
PLATELET # BLD AUTO: 175 K/UL — SIGNIFICANT CHANGE UP (ref 150–400)
POTASSIUM SERPL-MCNC: 3.7 MMOL/L — SIGNIFICANT CHANGE UP (ref 3.5–5.3)
POTASSIUM SERPL-SCNC: 3.7 MMOL/L — SIGNIFICANT CHANGE UP (ref 3.5–5.3)
RBC # BLD: 3.56 M/UL — LOW (ref 3.8–5.2)
RBC # FLD: 13.6 % — SIGNIFICANT CHANGE UP (ref 10.3–14.5)
SODIUM SERPL-SCNC: 139 MMOL/L — SIGNIFICANT CHANGE UP (ref 135–145)
WBC # BLD: 7.03 K/UL — SIGNIFICANT CHANGE UP (ref 3.8–10.5)
WBC # FLD AUTO: 7.03 K/UL — SIGNIFICANT CHANGE UP (ref 3.8–10.5)

## 2021-06-15 PROCEDURE — 99232 SBSQ HOSP IP/OBS MODERATE 35: CPT

## 2021-06-15 RX ADMIN — Medication 0.25 MILLIGRAM(S): at 21:01

## 2021-06-15 RX ADMIN — Medication 0.25 MILLIGRAM(S): at 12:01

## 2021-06-15 RX ADMIN — QUETIAPINE FUMARATE 50 MILLIGRAM(S): 200 TABLET, FILM COATED ORAL at 21:00

## 2021-06-15 RX ADMIN — GABAPENTIN 300 MILLIGRAM(S): 400 CAPSULE ORAL at 15:09

## 2021-06-15 RX ADMIN — HEPARIN SODIUM 5000 UNIT(S): 5000 INJECTION INTRAVENOUS; SUBCUTANEOUS at 21:00

## 2021-06-15 RX ADMIN — GABAPENTIN 300 MILLIGRAM(S): 400 CAPSULE ORAL at 21:00

## 2021-06-15 RX ADMIN — Medication 0.25 MILLIGRAM(S): at 17:28

## 2021-06-15 RX ADMIN — DONEPEZIL HYDROCHLORIDE 10 MILLIGRAM(S): 10 TABLET, FILM COATED ORAL at 21:01

## 2021-06-15 RX ADMIN — HEPARIN SODIUM 5000 UNIT(S): 5000 INJECTION INTRAVENOUS; SUBCUTANEOUS at 12:02

## 2021-06-15 RX ADMIN — DULOXETINE HYDROCHLORIDE 40 MILLIGRAM(S): 30 CAPSULE, DELAYED RELEASE ORAL at 12:02

## 2021-06-15 RX ADMIN — CEFTRIAXONE 1000 MILLIGRAM(S): 500 INJECTION, POWDER, FOR SOLUTION INTRAMUSCULAR; INTRAVENOUS at 17:28

## 2021-06-16 LAB — SARS-COV-2 RNA SPEC QL NAA+PROBE: SIGNIFICANT CHANGE UP

## 2021-06-16 PROCEDURE — 99232 SBSQ HOSP IP/OBS MODERATE 35: CPT

## 2021-06-16 RX ADMIN — HEPARIN SODIUM 5000 UNIT(S): 5000 INJECTION INTRAVENOUS; SUBCUTANEOUS at 10:08

## 2021-06-16 RX ADMIN — Medication 0.25 MILLIGRAM(S): at 06:07

## 2021-06-16 RX ADMIN — GABAPENTIN 300 MILLIGRAM(S): 400 CAPSULE ORAL at 06:07

## 2021-06-16 RX ADMIN — CEFTRIAXONE 1000 MILLIGRAM(S): 500 INJECTION, POWDER, FOR SOLUTION INTRAMUSCULAR; INTRAVENOUS at 18:30

## 2021-06-16 RX ADMIN — GABAPENTIN 300 MILLIGRAM(S): 400 CAPSULE ORAL at 13:50

## 2021-06-16 RX ADMIN — DULOXETINE HYDROCHLORIDE 40 MILLIGRAM(S): 30 CAPSULE, DELAYED RELEASE ORAL at 10:08

## 2021-06-16 RX ADMIN — Medication 0.25 MILLIGRAM(S): at 14:04

## 2021-06-16 RX ADMIN — HEPARIN SODIUM 5000 UNIT(S): 5000 INJECTION INTRAVENOUS; SUBCUTANEOUS at 21:14

## 2021-06-17 ENCOUNTER — TRANSCRIPTION ENCOUNTER (OUTPATIENT)
Age: 81
End: 2021-06-17

## 2021-06-17 LAB
-  AMIKACIN: SIGNIFICANT CHANGE UP
-  AMOXICILLIN/CLAVULANIC ACID: SIGNIFICANT CHANGE UP
-  AMPICILLIN/SULBACTAM: SIGNIFICANT CHANGE UP
-  AMPICILLIN: SIGNIFICANT CHANGE UP
-  AZTREONAM: SIGNIFICANT CHANGE UP
-  CEFAZOLIN: SIGNIFICANT CHANGE UP
-  CEFEPIME: SIGNIFICANT CHANGE UP
-  CEFOTAXIME: SIGNIFICANT CHANGE UP
-  CEFOXITIN: SIGNIFICANT CHANGE UP
-  CEFTAZIDIME: SIGNIFICANT CHANGE UP
-  CEFTRIAXONE: SIGNIFICANT CHANGE UP
-  CEFUROXIME: SIGNIFICANT CHANGE UP
-  CIPROFLOXACIN: SIGNIFICANT CHANGE UP
-  ERTAPENEM: SIGNIFICANT CHANGE UP
-  GENTAMICIN: SIGNIFICANT CHANGE UP
-  LEVOFLOXACIN: SIGNIFICANT CHANGE UP
-  MEROPENEM: SIGNIFICANT CHANGE UP
-  MINOCYCLINE: SIGNIFICANT CHANGE UP
-  NITROFURANTOIN: SIGNIFICANT CHANGE UP
-  PIPERACILLIN/TAZOBACTAM: SIGNIFICANT CHANGE UP
-  TIGECYCLINE: SIGNIFICANT CHANGE UP
-  TOBRAMYCIN: SIGNIFICANT CHANGE UP
-  TRIMETHOPRIM/SULFAMETHOXAZOLE: SIGNIFICANT CHANGE UP
ANION GAP SERPL CALC-SCNC: 5 MMOL/L — SIGNIFICANT CHANGE UP (ref 5–17)
BUN SERPL-MCNC: 28 MG/DL — HIGH (ref 7–23)
CALCIUM SERPL-MCNC: 9.4 MG/DL — SIGNIFICANT CHANGE UP (ref 8.5–10.1)
CHLORIDE SERPL-SCNC: 105 MMOL/L — SIGNIFICANT CHANGE UP (ref 96–108)
CO2 SERPL-SCNC: 29 MMOL/L — SIGNIFICANT CHANGE UP (ref 22–31)
CREAT SERPL-MCNC: 0.68 MG/DL — SIGNIFICANT CHANGE UP (ref 0.5–1.3)
CULTURE RESULTS: SIGNIFICANT CHANGE UP
GLUCOSE SERPL-MCNC: 91 MG/DL — SIGNIFICANT CHANGE UP (ref 70–99)
HCT VFR BLD CALC: 36.4 % — SIGNIFICANT CHANGE UP (ref 34.5–45)
HGB BLD-MCNC: 12.1 G/DL — SIGNIFICANT CHANGE UP (ref 11.5–15.5)
MCHC RBC-ENTMCNC: 32.3 PG — SIGNIFICANT CHANGE UP (ref 27–34)
MCHC RBC-ENTMCNC: 33.2 GM/DL — SIGNIFICANT CHANGE UP (ref 32–36)
MCV RBC AUTO: 97.1 FL — SIGNIFICANT CHANGE UP (ref 80–100)
METHOD TYPE: SIGNIFICANT CHANGE UP
ORGANISM # SPEC MICROSCOPIC CNT: SIGNIFICANT CHANGE UP
ORGANISM # SPEC MICROSCOPIC CNT: SIGNIFICANT CHANGE UP
PLATELET # BLD AUTO: 192 K/UL — SIGNIFICANT CHANGE UP (ref 150–400)
POTASSIUM SERPL-MCNC: 4.2 MMOL/L — SIGNIFICANT CHANGE UP (ref 3.5–5.3)
POTASSIUM SERPL-SCNC: 4.2 MMOL/L — SIGNIFICANT CHANGE UP (ref 3.5–5.3)
RBC # BLD: 3.75 M/UL — LOW (ref 3.8–5.2)
RBC # FLD: 13.4 % — SIGNIFICANT CHANGE UP (ref 10.3–14.5)
SODIUM SERPL-SCNC: 139 MMOL/L — SIGNIFICANT CHANGE UP (ref 135–145)
SPECIMEN SOURCE: SIGNIFICANT CHANGE UP
WBC # BLD: 7.38 K/UL — SIGNIFICANT CHANGE UP (ref 3.8–10.5)
WBC # FLD AUTO: 7.38 K/UL — SIGNIFICANT CHANGE UP (ref 3.8–10.5)

## 2021-06-17 PROCEDURE — 99239 HOSP IP/OBS DSCHRG MGMT >30: CPT

## 2021-06-17 PROCEDURE — 73501 X-RAY EXAM HIP UNI 1 VIEW: CPT | Mod: 26,RT

## 2021-06-17 RX ADMIN — Medication 0.25 MILLIGRAM(S): at 21:00

## 2021-06-17 RX ADMIN — HEPARIN SODIUM 5000 UNIT(S): 5000 INJECTION INTRAVENOUS; SUBCUTANEOUS at 09:39

## 2021-06-17 RX ADMIN — GABAPENTIN 300 MILLIGRAM(S): 400 CAPSULE ORAL at 21:01

## 2021-06-17 RX ADMIN — GABAPENTIN 300 MILLIGRAM(S): 400 CAPSULE ORAL at 12:53

## 2021-06-17 RX ADMIN — DULOXETINE HYDROCHLORIDE 40 MILLIGRAM(S): 30 CAPSULE, DELAYED RELEASE ORAL at 09:39

## 2021-06-17 RX ADMIN — HEPARIN SODIUM 5000 UNIT(S): 5000 INJECTION INTRAVENOUS; SUBCUTANEOUS at 21:01

## 2021-06-17 RX ADMIN — DONEPEZIL HYDROCHLORIDE 10 MILLIGRAM(S): 10 TABLET, FILM COATED ORAL at 21:01

## 2021-06-17 RX ADMIN — TRAMADOL HYDROCHLORIDE 50 MILLIGRAM(S): 50 TABLET ORAL at 12:53

## 2021-06-17 RX ADMIN — QUETIAPINE FUMARATE 50 MILLIGRAM(S): 200 TABLET, FILM COATED ORAL at 21:01

## 2021-06-17 NOTE — DISCHARGE NOTE PROVIDER - NSDCMRMEDTOKEN_GEN_ALL_CORE_FT
acetaminophen 325 mg oral tablet: 2 tab(s) orally every 4 hours, As Needed - for moderate pain  Aricept 10 mg oral tablet: 1 tab(s) orally once a day (at bedtime)  Cymbalta 20 mg oral delayed release capsule: 2 cap(s) orally once a day  dicyclomine 10 mg oral capsule: 1 cap(s) orally 2 times a day  gabapentin 800 mg oral tablet: 1 tab(s) orally 3 times a day  KlonoPIN 0.5 mg oral tablet: 0.5 tab(s) orally 3 times a day  Nuplazid 10 mg oral tablet: 1 tab(s) orally once a day  QUEtiapine 25 mg oral tablet: 2 tab(s) orally once a day (at bedtime)  traMADol 50 mg oral tablet: 1 tab(s) orally every 6 hours, As Needed - for moderate pain

## 2021-06-17 NOTE — DISCHARGE NOTE PROVIDER - CARE PROVIDER_API CALL
Skyler Hawk)  Hematology; Internal Medicine  [address] [place] [address]  Phone: [phone]  Fax: [phone]  Follow Up Time:

## 2021-06-17 NOTE — DISCHARGE NOTE PROVIDER - HOSPITAL COURSE
Patient is a 80y old  Female who presents with a chief complaint of Fall (16 Jun 2021 16:25)      SUBJECTIVE:   HPI:  79 yo Female with a PMH of HLD, trigeminal neuralgia, dementia presents with back pain after fall. Patient is a poor historian so history was provided by daughter. Per daughter, 2 days ago patient had 2 witnessed fall, the first after standing up and hit her head and the 2nd falling backwards against a coffee table. Patient has been complaining of soreness to the R back since the fall. Daughter states, patient has been progressively getting weaker, decreased appetite. Was taken to urgent care who suggested she come to the ER. Patient is a poor historian due to Dementia.     Family Hx:  Unable to get detail family history due to patient's dementia,  (12 Jun 2021 02:09)      REVIEW OF SYSTEMS:    All other review of systems is negative unless indicated above        Vital Signs Last 24 Hrs  T(C): 36.8 (17 Jun 2021 08:12), Max: 36.8 (17 Jun 2021 08:12)  T(F): 98.2 (17 Jun 2021 08:12), Max: 98.2 (17 Jun 2021 08:12)  HR: 79 (17 Jun 2021 08:12) (77 - 79)  BP: 112/49 (17 Jun 2021 08:12) (105/53 - 112/49)  BP(mean): --  RR: 17 (17 Jun 2021 08:12) (16 - 18)  SpO2: 95% (17 Jun 2021 08:12) (95% - 97%)    I&O's Summary    17 Jun 2021 07:01  -  17 Jun 2021 12:28  --------------------------------------------------------  IN: 290 mL / OUT: 0 mL / NET: 290 mL        PHYSICAL EXAM:    Constitutional: NAD, awake and alert, well-developed  HEENT: PERR, EOMI, Normal Hearing, MMM  Neck: Soft and supple, No LAD, No JVD  Respiratory: Breath sounds are clear bilaterally, No wheezing, rales or rhonchi  Cardiovascular: S1 and S2, regular rate and rhythm, no Murmurs, gallops or rubs  Gastrointestinal: Bowel Sounds present, soft, nontender, nondistended, no guarding, no rebound  Extremities: No peripheral edema  Vascular: 2+ peripheral pulses  Neurological: A/O x 3, no focal deficits  Musculoskeletal: 5/5 strength b/l upper and lower extremities  Skin: No rashes    LABS: All Labs Reviewed:               Assessment and Plan:   · Assessment    UTI.  - UCx:  100K GNR.  f/u speciation and sensitivities.  - ceftriaxone 1g iv qd (d3 of 3).    unsteady gate.   hx frequent falls.  - B12.  - fall precautions.  - 06/14 PT:  rehabilitation facility; recommend continued skilled PT and ANSELMO when medically stable for discharge.    R adnexal cyst.  - GYN f/u outptient, re:  1.3 cm right adnexal cyst can be further evaluated on nonurgent ultrasound.    DVT prophylaxis.  - UFH sq.    advanced directive.  - no limits set at current time.    disposition.  - 5S.  - ANSELMO.    dc time: 48 min. Awaiting authorization

## 2021-06-17 NOTE — DISCHARGE NOTE PROVIDER - NSDCCPCAREPLAN_GEN_ALL_CORE_FT
PRINCIPAL DISCHARGE DIAGNOSIS  Diagnosis: Unsteady gait  Assessment and Plan of Treatment:       SECONDARY DISCHARGE DIAGNOSES  Diagnosis: Frequent falls  Assessment and Plan of Treatment:     Diagnosis: Weakness  Assessment and Plan of Treatment:

## 2021-06-17 NOTE — DISCHARGE NOTE PROVIDER - DETAILS OF MALNUTRITION DIAGNOSIS/DIAGNOSES
This patient has been assessed with a concern for Malnutrition and was treated during this hospitalization for the following Nutrition diagnosis/diagnoses:     -  06/12/2021: Severe protein-calorie malnutrition

## 2021-06-18 ENCOUNTER — TRANSCRIPTION ENCOUNTER (OUTPATIENT)
Age: 81
End: 2021-06-18

## 2021-06-18 VITALS
RESPIRATION RATE: 18 BRPM | TEMPERATURE: 98 F | DIASTOLIC BLOOD PRESSURE: 42 MMHG | SYSTOLIC BLOOD PRESSURE: 93 MMHG | HEART RATE: 76 BPM | OXYGEN SATURATION: 94 %

## 2021-06-18 PROCEDURE — 99232 SBSQ HOSP IP/OBS MODERATE 35: CPT

## 2021-06-18 PROCEDURE — 72192 CT PELVIS W/O DYE: CPT | Mod: 26

## 2021-06-18 PROCEDURE — 76376 3D RENDER W/INTRP POSTPROCES: CPT | Mod: 26

## 2021-06-18 RX ADMIN — GABAPENTIN 300 MILLIGRAM(S): 400 CAPSULE ORAL at 05:58

## 2021-06-18 RX ADMIN — Medication 0.25 MILLIGRAM(S): at 05:58

## 2021-06-18 RX ADMIN — HEPARIN SODIUM 5000 UNIT(S): 5000 INJECTION INTRAVENOUS; SUBCUTANEOUS at 09:26

## 2021-06-18 NOTE — PROGRESS NOTE ADULT - ASSESSMENT
A/P:    1.  Unsteady Gait  Frequent Falls  -keep on fall precaution  -follow PT eval  -pain meds as needed  -may need Rehab placement     2.  Dementia  -continue Klonopin, Seroquel, Cymbalta    1.3 cm right adnexal cyst can be further evaluated on nonurgent ultrasound.    3.  SCD for DVT ppx    4.  Code status: Patient is in Full code status.     
A/P:    1.  Unsteady Gait  Frequent Falls  -keep on fall precaution  -follow PT eval  -pain meds as needed  -may need Rehab placement     2.  Dementia  -continue Klonopin, Seroquel, Cymbalta    1.3 cm right adnexal cyst can be further evaluated on nonurgent ultrasound.    3.  SCD for DVT ppx    4.  Code status: Patient is in Full code status.     
UTI.  - UCx:  100K GNR.  f/u speciation and sensitivities.  - ceftriaxone 1g iv qd.    unsteady gate.   hx frequent falls.  - B12.  - fall precautions.  - 06/14 PT:  rehabilitation facility; recommend continued skilled PT and ANSELMO when medically stable for discharge.    R adnexal cyst.  - GYN f/u outptient, re:  1.3 cm right adnexal cyst can be further evaluated on nonurgent ultrasound.    DVT prophylaxis.  - UFH sq.    advanced directive.  - no limits set at current time.    disposition.  - 5S.  - ANSELMO.    communication.  - 5S RN.  - 06/12, 06/13, 06/14 patient's .    
UTI.  - UCx:  100K GNR.  f/u speciation and sensitivities.  - ceftriaxone 1g iv qd.    unsteady gate.   hx frequent falls.  - B12.  - fall precautions.  - 06/14 PT:  rehabilitation facility; recommend continued skilled PT and ANSELMO when medically stable for discharge.    R adnexal cyst.  - GYN f/u outptient, re:  1.3 cm right adnexal cyst can be further evaluated on nonurgent ultrasound.    DVT prophylaxis.  - UFH sq.    advanced directive.  - no limits set at current time.    disposition.  - 5S.  - ANSELMO.    communication.  - 5S RN.  - 06/12, 06/13, 06/14 patient's .    
UTI.  - UCx:  100K GNR.  f/u speciation and sensitivities.  - ceftriaxone 1g iv qd (d2 of 3).    unsteady gate.   hx frequent falls. Subacute to chronic R ischial tuberosity fracture  - given time and nature of fracture, to be managed with pain control and PT  - Family kept apprised of condition  - pain control  - 06/14 PT:  rehabilitation facility; recommend continued skilled PT and ANSELMO when medically stable for discharge.    R adnexal cyst.  - GYN f/u outptient, re:  1.3 cm right adnexal cyst can be further evaluated on nonurgent ultrasound.    DVT prophylaxis.  - UFH sq.    advanced directive.  - no limits set at current time.    disposition.  - 5S.  - ANSELMO.    
UTI.  - UCx:  100K GNR.  f/u speciation and sensitivities.  - ceftriaxone 1g iv qd (d2 of 3).    unsteady gate.   hx frequent falls.  - B12.  - fall precautions.  - 06/14 PT:  rehabilitation facility; recommend continued skilled PT and ANSELMO when medically stable for discharge.    R adnexal cyst.  - GYN f/u outptient, re:  1.3 cm right adnexal cyst can be further evaluated on nonurgent ultrasound.    DVT prophylaxis.  - UFH sq.    advanced directive.  - no limits set at current time.    disposition.  - 5S.  - ANSELMO.    communication.  - 5S RN.  - 06/12, 06/13, 06/14, 06/15:  patient's .  update given.

## 2021-06-18 NOTE — DISCHARGE NOTE NURSING/CASE MANAGEMENT/SOCIAL WORK - PATIENT PORTAL LINK FT
You can access the FollowMyHealth Patient Portal offered by [facility] by registering at the following website: [email or web address]. By joining [employer]’s FollowMyHealth portal, you will also be able to view your health information using other applications (apps) compatible with our system.

## 2021-06-18 NOTE — PROGRESS NOTE ADULT - NUTRITIONAL ASSESSMENT
This patient has been assessed with a concern for Malnutrition and has been determined to have a diagnosis/diagnoses of Severe protein-calorie malnutrition.    This patient is being managed with:   Diet DASH/TLC-  Sodium & Cholesterol Restricted  Entered: Jun 11 2021  9:20PM    The following pending diet order is being considered for treatment of Severe protein-calorie malnutrition:  Diet Regular-  Supplement Feeding Modality:  Oral  Ensure Enlive Cans or Servings Per Day:  1       Frequency:  Three Times a day  Entered: Jun 12 2021 12:32PM  

## 2021-06-18 NOTE — PROGRESS NOTE ADULT - SUBJECTIVE AND OBJECTIVE BOX
CC:  Patient is a 80y old  Female who presents with a chief complaint of Fall (12 Jun 2021 02:09)    SUBJECTIVE:     -no new complaints or issues at current time.    ROS:  all other review of systems are negative unless indicated above.    clonazePAM  Tablet 0.5 milliGRAM(s) Oral three times a day  dicyclomine 10 milliGRAM(s) Oral two times a day before meals  donepezil 10 milliGRAM(s) Oral at bedtime  DULoxetine 40 milliGRAM(s) Oral daily  gabapentin 300 milliGRAM(s) Oral three times a day  QUEtiapine 50 milliGRAM(s) Oral at bedtime  traMADol 50 milliGRAM(s) Oral every 6 hours PRN    T(C): 36.4 (06-12-21 @ 07:58), Max: 37 (06-11-21 @ 21:35)  HR: 80 (06-12-21 @ 07:58) (79 - 99)  BP: 117/61 (06-12-21 @ 07:58) (117/61 - 158/72)  RR: 18 (06-12-21 @ 07:58) (18 - 18)  SpO2: 98% (06-12-21 @ 07:58) (98% - 100%)    Constitutional: NAD.   HEENT: PERRL, EOMI, MMM.  Neck: Soft and supple, No carotid bruit, No JVD  Respiratory: Breath sounds are clear bilaterally, No wheezing, rales or rhonchi  Cardiovascular: S1 and S2, regular rate and rhythm, no murmur, rub or gallop.  Gastrointestinal: Bowel Sounds present, soft, nontender, nondistended, no guarding, no rebound, no mass.  Extremities: No peripheral edema  Vascular: 2+ peripheral pulses  Neurological: A/O x , no focal deficits  Musculoskeletal: 5/5 strength b/l upper and lower extremities  Skin:  no visible rashes.                         12.8   7.52  )-----------( 190      ( 11 Jun 2021 14:06 )             38.8     PT/INR - ( 11 Jun 2021 14:06 )   PT: 11.9 sec;   INR: 1.03 ratio         PTT - ( 11 Jun 2021 14:06 )  PTT:26.9 sec  06-11    140  |  108  |  20  ----------------------------<  77  4.3   |  25  |  0.94    Ca    9.2      11 Jun 2021 14:06    TPro  6.7  /  Alb  3.4  /  TBili  0.5  /  DBili  x   /  AST  44<H>  /  ALT  35  /  AlkPhos  104  06-11    < from: CT Chest w/ IV Cont (06.11.21 @ 15:40) >  IMPRESSION:  No evidence of acute traumatic injury.    1.3 cm right adnexal cyst can be further evaluated on nonurgent ultrasound.    < end of copied text >  < from: CT Cervical Spine No Cont (06.11.21 @ 15:34) >  IMPRESSION:    No acute fracture.  Multilevel degenerative changes of the cervical spine as above.    < end of copied text >  
CC:  Patient is a 80y old  Female who presents with a chief complaint of Fall (14 Jun 2021 17:33)    SUBJECTIVE:     -no new complaints or issues at current time.    ROS:  all other review of systems are negative unless indicated above.    cefTRIAXone Injectable. 1000 milliGRAM(s) IV Push every 24 hours  clonazePAM  Tablet 0.25 milliGRAM(s) Oral three times a day  dicyclomine 10 milliGRAM(s) Oral two times a day before meals PRN  donepezil 10 milliGRAM(s) Oral at bedtime  DULoxetine 40 milliGRAM(s) Oral daily  gabapentin 300 milliGRAM(s) Oral three times a day  heparin   Injectable 5000 Unit(s) SubCutaneous every 12 hours  QUEtiapine 50 milliGRAM(s) Oral at bedtime  traMADol 50 milliGRAM(s) Oral every 6 hours PRN    T(C): 36.3 (06-15-21 @ 16:58), Max: 36.3 (06-15-21 @ 16:58)  HR: 97 (06-15-21 @ 16:58) (79 - 97)  BP: 103/42 (06-15-21 @ 16:58) (101/49 - 115/48)  RR: 18 (06-15-21 @ 16:58) (18 - 18)  SpO2: 95% (06-15-21 @ 16:58) (95% - 98%)    Constitutional: NAD.   HEENT: PERRL, EOMI, MMM.  Neck: Soft and supple, No carotid bruit, No JVD  Respiratory: Breath sounds are clear bilaterally, No wheezing, rales or rhonchi  Cardiovascular: S1 and S2, regular rate and rhythm, no murmur, rub or gallop.  Gastrointestinal: Bowel Sounds present, soft, nontender, nondistended, no guarding, no rebound, no mass.  Extremities: No peripheral edema  Vascular: 2+ peripheral pulses  Neurological: A/O x , no focal deficits  Musculoskeletal: 5/5 strength b/l upper and lower extremities  Skin:  no visible rashes.                         11.4   7.03  )-----------( 175      ( 15 Abbe 2021 08:11 )             34.5       06-15    139  |  105  |  28<H>  ----------------------------<  89  3.7   |  29  |  0.76    Ca    9.1      15 Abbe 2021 08:11    
CC:  Patient is a 80y old  Female who presents with a chief complaint of Fall (13 Jun 2021 17:54)    SUBJECTIVE:     -no new complaints or issues at current time.    ROS:  all other review of systems are negative unless indicated above.    cefTRIAXone   IVPB 1000 milliGRAM(s) IV Intermittent every 24 hours  clonazePAM  Tablet 0.25 milliGRAM(s) Oral three times a day  dicyclomine 10 milliGRAM(s) Oral two times a day before meals PRN  donepezil 10 milliGRAM(s) Oral at bedtime  DULoxetine 40 milliGRAM(s) Oral daily  gabapentin 300 milliGRAM(s) Oral three times a day  heparin   Injectable 5000 Unit(s) SubCutaneous every 12 hours  QUEtiapine 50 milliGRAM(s) Oral at bedtime  traMADol 50 milliGRAM(s) Oral every 6 hours PRN    T(C): 36.5 (06-14-21 @ 15:15), Max: 36.7 (06-13-21 @ 20:19)  HR: 83 (06-14-21 @ 15:15) (71 - 83)  BP: 105/50 (06-14-21 @ 15:15) (105/50 - 108/60)  RR: 18 (06-14-21 @ 15:15) (18 - 18)  SpO2: 96% (06-14-21 @ 15:15) (96% - 98%)    Constitutional: NAD.   HEENT: PERRL, EOMI, MMM.  Neck: Soft and supple, No carotid bruit, No JVD  Respiratory: Breath sounds are clear bilaterally, No wheezing, rales or rhonchi  Cardiovascular: S1 and S2, regular rate and rhythm, no murmur, rub or gallop.  Gastrointestinal: Bowel Sounds present, soft, nontender, nondistended, no guarding, no rebound, no mass.  Extremities: No peripheral edema  Vascular: 2+ peripheral pulses  Neurological: A/O x , no focal deficits  Musculoskeletal: 5/5 strength b/l upper and lower extremities  Skin:  no visible rashes.       Urine Microscopic-Add On (NC) (06.12.21 @ 11:34)   Red Blood Cell - Urine: 0-2 /HPF   White Blood Cell - Urine: 26-50   Bacteria: Many   Epithelial Cells: Many     Culture - Urine (06.11.21 @ 17:35)   Specimen Source: .Urine Clean Catch (Midstream)   Culture Results:   >100,000 CFU/ml Gram Negative Rods   <10,000 CFU/ml Normal Urogenital sherry present 
CC:  Patient is a 80y old  Female who presents with a chief complaint of Fall (15 Abbe 2021 17:56)    SUBJECTIVE:     -no new complaints or issues at current time.    ROS:  all other review of systems are negative unless indicated above.    cefTRIAXone Injectable. 1000 milliGRAM(s) IV Push every 24 hours  clonazePAM  Tablet 0.25 milliGRAM(s) Oral three times a day  dicyclomine 10 milliGRAM(s) Oral two times a day before meals PRN  donepezil 10 milliGRAM(s) Oral at bedtime  DULoxetine 40 milliGRAM(s) Oral daily  gabapentin 300 milliGRAM(s) Oral three times a day  heparin   Injectable 5000 Unit(s) SubCutaneous every 12 hours  QUEtiapine 50 milliGRAM(s) Oral at bedtime  traMADol 50 milliGRAM(s) Oral every 6 hours PRN    ICU Vital Signs Last 24 Hrs  T(C): 36.7 (18 Jun 2021 08:26), Max: 36.7 (18 Jun 2021 08:26)  T(F): 98 (18 Jun 2021 08:26), Max: 98 (18 Jun 2021 08:26)  HR: 98 (18 Jun 2021 08:26) (90 - 103)  BP: 105/49 (18 Jun 2021 08:26) (102/81 - 114/48)  BP(mean): --  ABP: --  ABP(mean): --  RR: 18 (18 Jun 2021 08:26) (17 - 18)  SpO2: 98% (18 Jun 2021 08:26) (96% - 98%)    Constitutional: NAD.   HEENT: PERRL, EOMI, MMM.  Neck: Soft and supple, No carotid bruit, No JVD  Respiratory: Breath sounds are clear bilaterally, No wheezing, rales or rhonchi  Cardiovascular: S1 and S2, regular rate and rhythm, no murmur, rub or gallop.  Gastrointestinal: Bowel Sounds present, soft, nontender, nondistended, no guarding, no rebound, no mass.  Extremities: No peripheral edema  Vascular: 2+ peripheral pulses  Neurological: A/O x1 , no focal deficits  Musculoskeletal: 5/5 strength b/l upper and lower extremities  Skin:  no visible rashes.                         11.4   7.03  )-----------( 175      ( 15 Abbe 2021 08:11 )             34.5       06-15    139  |  105  |  28<H>  ----------------------------<  89  3.7   |  29  |  0.76    Ca    9.1      15 Abbe 2021 08:11  < from: CT Pelvis Bony Only No Cont (06.18.21 @ 08:42) >    IMPRESSION:  1. No hip fractures are seen.  2. Subacute to chronic appearing presumed fracture of the right ischial tuberosity is similar to the prior.  3. Subacute to chronic sacral insufficiency fracture.  4. Consider follow-up CT to assess stability and interval healing of these changes.      < end of copied text >    
CC:  Patient is a 80y old  Female who presents with a chief complaint of Fall (12 Jun 2021 11:58)    SUBJECTIVE:     -no new complaints or issues at current time.    ROS:  all other review of systems are negative unless indicated above.    clonazePAM  Tablet 0.5 milliGRAM(s) Oral three times a day  dicyclomine 10 milliGRAM(s) Oral two times a day before meals  donepezil 10 milliGRAM(s) Oral at bedtime  DULoxetine 40 milliGRAM(s) Oral daily  gabapentin 300 milliGRAM(s) Oral three times a day  QUEtiapine 50 milliGRAM(s) Oral at bedtime  traMADol 50 milliGRAM(s) Oral every 6 hours PRN    T(C): 36.4 (06-13-21 @ 08:21), Max: 36.4 (06-13-21 @ 08:21)  HR: 75 (06-13-21 @ 08:21) (71 - 77)  BP: 111/55 (06-13-21 @ 08:21) (96/47 - 111/55)  RR: 18 (06-13-21 @ 08:21) (18 - 18)  SpO2: 98% (06-13-21 @ 08:21) (98% - 98%)    Constitutional: NAD.   HEENT: PERRL, EOMI, MMM.  Neck: Soft and supple, No carotid bruit, No JVD  Respiratory: Breath sounds are clear bilaterally, No wheezing, rales or rhonchi  Cardiovascular: S1 and S2, regular rate and rhythm, no murmur, rub or gallop.  Gastrointestinal: Bowel Sounds present, soft, nontender, nondistended, no guarding, no rebound, no mass.  Extremities: No peripheral edema  Vascular: 2+ peripheral pulses  Neurological: A/O x , no focal deficits  Musculoskeletal: 5/5 strength b/l upper and lower extremities  Skin:  no visible rashes.           
CC:  Patient is a 80y old  Female who presents with a chief complaint of Fall (15 Abbe 2021 17:56)    SUBJECTIVE:     -no new complaints or issues at current time.    ROS:  all other review of systems are negative unless indicated above.    cefTRIAXone Injectable. 1000 milliGRAM(s) IV Push every 24 hours  clonazePAM  Tablet 0.25 milliGRAM(s) Oral three times a day  dicyclomine 10 milliGRAM(s) Oral two times a day before meals PRN  donepezil 10 milliGRAM(s) Oral at bedtime  DULoxetine 40 milliGRAM(s) Oral daily  gabapentin 300 milliGRAM(s) Oral three times a day  heparin   Injectable 5000 Unit(s) SubCutaneous every 12 hours  QUEtiapine 50 milliGRAM(s) Oral at bedtime  traMADol 50 milliGRAM(s) Oral every 6 hours PRN    T(C): 36.4 (06-16-21 @ 08:36), Max: 37.5 (06-15-21 @ 22:52)  HR: 70 (06-16-21 @ 08:36) (70 - 97)  BP: 90/35 (06-16-21 @ 08:36) (90/35 - 105/49)  RR: 16 (06-16-21 @ 08:36) (16 - 20)  SpO2: 96% (06-16-21 @ 08:36) (95% - 96%)    Constitutional: NAD.   HEENT: PERRL, EOMI, MMM.  Neck: Soft and supple, No carotid bruit, No JVD  Respiratory: Breath sounds are clear bilaterally, No wheezing, rales or rhonchi  Cardiovascular: S1 and S2, regular rate and rhythm, no murmur, rub or gallop.  Gastrointestinal: Bowel Sounds present, soft, nontender, nondistended, no guarding, no rebound, no mass.  Extremities: No peripheral edema  Vascular: 2+ peripheral pulses  Neurological: A/O x , no focal deficits  Musculoskeletal: 5/5 strength b/l upper and lower extremities  Skin:  no visible rashes.                         11.4   7.03  )-----------( 175      ( 15 Abbe 2021 08:11 )             34.5       06-15    139  |  105  |  28<H>  ----------------------------<  89  3.7   |  29  |  0.76    Ca    9.1      15 Abbe 2021 08:11

## 2021-06-30 NOTE — CDI QUERY NOTE - NSCDIOTHERTXTBX_GEN_ALL_CORE_HH
Please clarify the cause of weakness and falls?  A) Weakness and falls due to Subacute to chronic appearing presumed fracture of the right ischial tuberosity   B) Weakness and falls due to spinal stenosis  C) Weakness and falls due to old age debility  D) Unable to determine  E) Other ( Please specify)  F) Not clinicaly significant    CHART DOCUMENTATION:    < from: CT 3D Reconstruct w/o Workstation (06.18.21 @ 08:43) >    IMPRESSION:  1. No hip fractures are seen.  2. Subacute to chronic appearing presumed fracture of the right ischial tuberosity is similar to the prior.  3. Subacute to chronic sacral insufficiency fracture.  4. Consider follow-up CT to assess stability and interval healing of these changes.    History and physical on 6/12/2021:   81 yo Female with a PMH of HLD, trigeminal neuralgia, dementia presents with back pain after fall. Patient is a poor historian so history was provided by daughter. Per daughter, 2 days ago patient had 2 witnessed fall, the first after standing up and hit her head and the 2nd falling backwards against a coffee table. Patient has been complaining of soreness to the R back since the fall. Daughter states, patient has been progressively getting weaker, decreased appetite. Was taken to urgent care who suggested she come to the ER. Patient is a poor historian due to Dementia.     Patient History:   Past Medical, Past Surgical, and Family History:  Spinal stenosis     Unsteady Gait  Frequent Falls  -keep on fall precaution  -follow PT eval  -pain meds as needed  -may need Rehab placement     Hospitalist progress note on 6/18/2021:  unsteady gate.   hx frequent falls. Subacute to chronic R ischial tuberosity fracture  - given time and nature of fracture, to be managed with pain control and PT  - Family kept apprised of condition  - pain control  - 06/14 PT:  rehabilitation facility; recommend continued skilled PT and ANSELMO when medically stable for discharge.

## 2021-07-02 DIAGNOSIS — E43 UNSPECIFIED SEVERE PROTEIN-CALORIE MALNUTRITION: ICD-10-CM

## 2021-07-02 DIAGNOSIS — Z91.040 LATEX ALLERGY STATUS: ICD-10-CM

## 2021-07-02 DIAGNOSIS — B96.89 OTHER SPECIFIED BACTERIAL AGENTS AS THE CAUSE OF DISEASES CLASSIFIED ELSEWHERE: ICD-10-CM

## 2021-07-02 DIAGNOSIS — R53.1 WEAKNESS: ICD-10-CM

## 2021-07-02 DIAGNOSIS — N83.8 OTHER NONINFLAMMATORY DISORDERS OF OVARY, FALLOPIAN TUBE AND BROAD LIGAMENT: ICD-10-CM

## 2021-07-02 DIAGNOSIS — R26.81 UNSTEADINESS ON FEET: ICD-10-CM

## 2021-07-02 DIAGNOSIS — R29.6 REPEATED FALLS: ICD-10-CM

## 2021-07-02 DIAGNOSIS — E78.5 HYPERLIPIDEMIA, UNSPECIFIED: ICD-10-CM

## 2021-07-02 DIAGNOSIS — Z88.2 ALLERGY STATUS TO SULFONAMIDES: ICD-10-CM

## 2021-07-02 DIAGNOSIS — F03.90 UNSPECIFIED DEMENTIA WITHOUT BEHAVIORAL DISTURBANCE: ICD-10-CM

## 2021-07-02 DIAGNOSIS — S32.601A UNSPECIFIED FRACTURE OF RIGHT ISCHIUM, INITIAL ENCOUNTER FOR CLOSED FRACTURE: ICD-10-CM

## 2021-07-02 DIAGNOSIS — Z88.0 ALLERGY STATUS TO PENICILLIN: ICD-10-CM
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
## 2021-07-02 DIAGNOSIS — G50.0 TRIGEMINAL NEURALGIA: ICD-10-CM

## 2021-07-02 DIAGNOSIS — R54 AGE-RELATED PHYSICAL DEBILITY: ICD-10-CM
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
## 2021-07-02 DIAGNOSIS — N39.0 URINARY TRACT INFECTION, SITE NOT SPECIFIED: ICD-10-CM

## 2021-07-02 DIAGNOSIS — M54.9 DORSALGIA, UNSPECIFIED: ICD-10-CM

## 2022-01-01 NOTE — ED PROVIDER NOTE - CPE EDP HEAD NORM PED
2 day history of \"feeling warm\" - did not take temperature.  Gave tylenol tonight and then had emesis  Brother had febrile illness last weel for 1 day  Cough noted.    Pt is awake, alert, interactive  
Head atraumatic, normal cephalic shape.

## 2022-09-13 NOTE — DISCHARGE NOTE NURSING/CASE MANAGEMENT/SOCIAL WORK - NSTRANSFERBELONGINGSDISPO_GEN_A_NUR
not applicable Paramedian Forehead Flap Division And Inset Text: Division and inset of the paramedian forehead flap was performed to achieve optimal aesthetic result, restore normal anatomic appearance and avoid distortion of normal anatomy, expedite and facilitate wound healing, achieve optimal functional result and because linear closure either not possible or would produce suboptimal result. The patient was prepped and draped in the usual manner. The pedicle was infiltrated with local anesthesia. The pedicle was sectioned with a #15 blade. The pedicle was de-bulked and trimmed to match the shape of the defect. Hemostasis was achieved. The flap donor site and free margin of the flap were secured with deep buried sutures and the wound edges were re-approximated.

## 2023-11-25 ENCOUNTER — EMERGENCY (EMERGENCY)
Facility: HOSPITAL | Age: 83
LOS: 0 days | Discharge: NURSING, REHAB OR LONG-TERM CARE | End: 2023-11-25
Attending: STUDENT IN AN ORGANIZED HEALTH CARE EDUCATION/TRAINING PROGRAM
Payer: MEDICARE

## 2023-11-25 VITALS
TEMPERATURE: 97 F | RESPIRATION RATE: 18 BRPM | OXYGEN SATURATION: 96 % | HEART RATE: 80 BPM | DIASTOLIC BLOOD PRESSURE: 98 MMHG | SYSTOLIC BLOOD PRESSURE: 152 MMHG

## 2023-11-25 VITALS
HEART RATE: 83 BPM | TEMPERATURE: 98 F | OXYGEN SATURATION: 98 % | SYSTOLIC BLOOD PRESSURE: 128 MMHG | DIASTOLIC BLOOD PRESSURE: 81 MMHG | RESPIRATION RATE: 18 BRPM

## 2023-11-25 DIAGNOSIS — Z86.69 PERSONAL HISTORY OF OTHER DISEASES OF THE NERVOUS SYSTEM AND SENSE ORGANS: ICD-10-CM
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
## 2023-11-25 DIAGNOSIS — Z88.0 ALLERGY STATUS TO PENICILLIN: ICD-10-CM
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
## 2023-11-25 DIAGNOSIS — Z91.040 LATEX ALLERGY STATUS: ICD-10-CM

## 2023-11-25 DIAGNOSIS — Z13.9 ENCOUNTER FOR SCREENING, UNSPECIFIED: ICD-10-CM

## 2023-11-25 DIAGNOSIS — Z04.3 ENCOUNTER FOR EXAMINATION AND OBSERVATION FOLLOWING OTHER ACCIDENT: ICD-10-CM

## 2023-11-25 DIAGNOSIS — Z88.1 ALLERGY STATUS TO OTHER ANTIBIOTIC AGENTS STATUS: ICD-10-CM

## 2023-11-25 DIAGNOSIS — Z88.2 ALLERGY STATUS TO SULFONAMIDES: ICD-10-CM

## 2023-11-25 PROCEDURE — 99284 EMERGENCY DEPT VISIT MOD MDM: CPT

## 2023-11-25 PROCEDURE — 99284 EMERGENCY DEPT VISIT MOD MDM: CPT | Mod: 25

## 2023-11-25 PROCEDURE — G1004: CPT

## 2023-11-25 PROCEDURE — 70450 CT HEAD/BRAIN W/O DYE: CPT | Mod: MG

## 2023-11-25 PROCEDURE — 70450 CT HEAD/BRAIN W/O DYE: CPT | Mod: 26,MG
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
## 2023-11-25 NOTE — ED PROVIDER NOTE - PROGRESS NOTE DETAILS
Patient at baseline mental status. Head ct negative for acute pathology. Ambulating independently in ED. Tolerating PO. Stable for dc. Patient given return precautions and f/u.

## 2023-11-25 NOTE — ED PROVIDER NOTE - PATIENT PORTAL LINK FT
You can access the FollowMyHealth Patient Portal offered by [facility] by registering at the following website: [email or web address]. By joining "[employer]’s FollowMyHealth portal, you will also be able to view your health information using other applications (apps) compatible with our system.

## 2023-11-25 NOTE — ED PROVIDER NOTE - PHYSICAL EXAMINATION
Const: Well appearing, NAD  Eyes: PERRL, EOM intact  CV: RRR, no murmurs, no chest wall tenderness, distal pulses intact  Resp: CTAB, normal resp effort  GI: soft, nondistended, nontender  MSK: Full ROM, no muscle or bony deformity or tenderness  Neuro: AOx1, GCS 15, No focal deficits  Skin: No rash, laceration or abrasion  Psych: calm, cooperative, No SI, HI, hallucination. Const: Well appearing, NAD  Eyes: PERRL, EOM intact  CV: RRR, no murmurs, no chest wall tenderness, distal pulses intact  Resp: CTAB, normal resp effort  GI: soft, nondistended, nontender  MSK: Full ROM, no muscle or bony deformity or tenderness  Neuro: AOx1, GCS 14, No focal deficits  Skin: No rash, laceration or abrasion  Psych: calm, cooperative, No SI, HI, hallucination.
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
## 2023-11-25 NOTE — ED PROVIDER NOTE - NSICDXPASTMEDICALHX_GEN_ALL_CORE_FT
PAST MEDICAL HISTORY:  Burning mouth syndrome     HLD (hyperlipidemia)     Spinal stenosis     Trigeminal neuralgia       Parkinson disease

## 2023-11-25 NOTE — ED ADULT NURSE NOTE - OBJECTIVE STATEMENT
83y female presents to the ED c/o fall. Pt is A/Ox1, to self, poor historian. Pt denies pain at this time, no injuries/deformities noted.
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
## 2023-11-25 NOTE — ED PROVIDER NOTE - CLINICAL SUMMARY MEDICAL DECISION MAKING FREE TEXT BOX
Pt at neurologic baseline, has no tenderness, no signs of trauma. Plan for CT head and discharge back to facility. Will contact [facility] for a complete history.

## 2023-12-09 ENCOUNTER — INPATIENT (INPATIENT)
Facility: HOSPITAL | Age: 83
LOS: 2 days | Discharge: NURSING, REHAB OR LONG-TERM CARE | DRG: 493 | End: 2023-12-12
Attending: INTERNAL MEDICINE | Admitting: STUDENT IN AN ORGANIZED HEALTH CARE EDUCATION/TRAINING PROGRAM
Payer: MEDICARE

## 2023-12-09 VITALS
SYSTOLIC BLOOD PRESSURE: 121 MMHG | OXYGEN SATURATION: 99 % | RESPIRATION RATE: 16 BRPM | HEART RATE: 90 BPM | TEMPERATURE: 98 F | DIASTOLIC BLOOD PRESSURE: 66 MMHG

## 2023-12-09 DIAGNOSIS — E78.5 HYPERLIPIDEMIA, UNSPECIFIED: ICD-10-CM

## 2023-12-09 DIAGNOSIS — E03.9 HYPOTHYROIDISM, UNSPECIFIED: ICD-10-CM

## 2023-12-09 DIAGNOSIS — G20.A1 PARKINSON'S DISEASE WITHOUT DYSKINESIA, WITHOUT MENTION OF FLUCTUATIONS: ICD-10-CM

## 2023-12-09 DIAGNOSIS — Z79.899 OTHER LONG TERM (CURRENT) DRUG THERAPY: ICD-10-CM

## 2023-12-09 DIAGNOSIS — N39.0 URINARY TRACT INFECTION, SITE NOT SPECIFIED: ICD-10-CM

## 2023-12-09 DIAGNOSIS — Z88.0 ALLERGY STATUS TO PENICILLIN: ICD-10-CM

## 2023-12-09 DIAGNOSIS — Z95.0 PRESENCE OF CARDIAC PACEMAKER: ICD-10-CM

## 2023-12-09 DIAGNOSIS — F02.80 DEMENTIA IN OTHER DISEASES CLASSIFIED ELSEWHERE, UNSPECIFIED SEVERITY, WITHOUT BEHAVIORAL DISTURBANCE, PSYCHOTIC DISTURBANCE, MOOD DISTURBANCE, AND ANXIETY: ICD-10-CM

## 2023-12-09 DIAGNOSIS — G30.9 ALZHEIMER'S DISEASE, UNSPECIFIED: ICD-10-CM

## 2023-12-09 DIAGNOSIS — G50.0 TRIGEMINAL NEURALGIA: ICD-10-CM
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
## 2023-12-09 DIAGNOSIS — S93.491A SPRAIN OF OTHER LIGAMENT OF RIGHT ANKLE, INITIAL ENCOUNTER: ICD-10-CM

## 2023-12-09 DIAGNOSIS — Z01.818 ENCOUNTER FOR OTHER PREPROCEDURAL EXAMINATION: ICD-10-CM

## 2023-12-09 DIAGNOSIS — S82.851A DISPLACED TRIMALLEOLAR FRACTURE OF RIGHT LOWER LEG, INITIAL ENCOUNTER FOR CLOSED FRACTURE: ICD-10-CM

## 2023-12-09 DIAGNOSIS — M48.00 SPINAL STENOSIS, SITE UNSPECIFIED: ICD-10-CM

## 2023-12-09 LAB
ADD ON TEST-SPECIMEN IN LAB: SIGNIFICANT CHANGE UP
ADD ON TEST-SPECIMEN IN LAB: SIGNIFICANT CHANGE UP
ALBUMIN SERPL ELPH-MCNC: 3 G/DL — LOW (ref 3.3–5)
ALBUMIN SERPL ELPH-MCNC: 3 G/DL — LOW (ref 3.3–5)
ALP SERPL-CCNC: 168 U/L — HIGH (ref 40–120)
ALP SERPL-CCNC: 168 U/L — HIGH (ref 40–120)
ALT FLD-CCNC: 37 U/L — SIGNIFICANT CHANGE UP (ref 12–78)
ALT FLD-CCNC: 37 U/L — SIGNIFICANT CHANGE UP (ref 12–78)
ANION GAP SERPL CALC-SCNC: 3 MMOL/L — LOW (ref 5–17)
ANION GAP SERPL CALC-SCNC: 3 MMOL/L — LOW (ref 5–17)
ANION GAP SERPL CALC-SCNC: 4 MMOL/L — LOW (ref 5–17)
ANION GAP SERPL CALC-SCNC: 4 MMOL/L — LOW (ref 5–17)
APTT BLD: 33.1 SEC — SIGNIFICANT CHANGE UP (ref 24.5–35.6)
APTT BLD: 33.1 SEC — SIGNIFICANT CHANGE UP (ref 24.5–35.6)
APTT BLD: 36 SEC — HIGH (ref 24.5–35.6)
APTT BLD: 36 SEC — HIGH (ref 24.5–35.6)
AST SERPL-CCNC: 40 U/L — HIGH (ref 15–37)
AST SERPL-CCNC: 40 U/L — HIGH (ref 15–37)
BASOPHILS # BLD AUTO: 0.03 K/UL — SIGNIFICANT CHANGE UP (ref 0–0.2)
BASOPHILS # BLD AUTO: 0.03 K/UL — SIGNIFICANT CHANGE UP (ref 0–0.2)
BASOPHILS NFR BLD AUTO: 0.3 % — SIGNIFICANT CHANGE UP (ref 0–2)
BASOPHILS NFR BLD AUTO: 0.3 % — SIGNIFICANT CHANGE UP (ref 0–2)
BILIRUB SERPL-MCNC: 0.2 MG/DL — SIGNIFICANT CHANGE UP (ref 0.2–1.2)
BILIRUB SERPL-MCNC: 0.2 MG/DL — SIGNIFICANT CHANGE UP (ref 0.2–1.2)
BUN SERPL-MCNC: 30 MG/DL — HIGH (ref 7–23)
CALCIUM SERPL-MCNC: 8.6 MG/DL — SIGNIFICANT CHANGE UP (ref 8.5–10.1)
CALCIUM SERPL-MCNC: 8.6 MG/DL — SIGNIFICANT CHANGE UP (ref 8.5–10.1)
CALCIUM SERPL-MCNC: 9.2 MG/DL — SIGNIFICANT CHANGE UP (ref 8.5–10.1)
CALCIUM SERPL-MCNC: 9.2 MG/DL — SIGNIFICANT CHANGE UP (ref 8.5–10.1)
CHLORIDE SERPL-SCNC: 110 MMOL/L — HIGH (ref 96–108)
CHLORIDE SERPL-SCNC: 110 MMOL/L — HIGH (ref 96–108)
CHLORIDE SERPL-SCNC: 112 MMOL/L — HIGH (ref 96–108)
CHLORIDE SERPL-SCNC: 112 MMOL/L — HIGH (ref 96–108)
CO2 SERPL-SCNC: 27 MMOL/L — SIGNIFICANT CHANGE UP (ref 22–31)
CO2 SERPL-SCNC: 27 MMOL/L — SIGNIFICANT CHANGE UP (ref 22–31)
CO2 SERPL-SCNC: 30 MMOL/L — SIGNIFICANT CHANGE UP (ref 22–31)
CO2 SERPL-SCNC: 30 MMOL/L — SIGNIFICANT CHANGE UP (ref 22–31)
CREAT SERPL-MCNC: 1.08 MG/DL — SIGNIFICANT CHANGE UP (ref 0.5–1.3)
CREAT SERPL-MCNC: 1.08 MG/DL — SIGNIFICANT CHANGE UP (ref 0.5–1.3)
CREAT SERPL-MCNC: 1.14 MG/DL — SIGNIFICANT CHANGE UP (ref 0.5–1.3)
CREAT SERPL-MCNC: 1.14 MG/DL — SIGNIFICANT CHANGE UP (ref 0.5–1.3)
EGFR: 47 ML/MIN/1.73M2 — LOW
EGFR: 47 ML/MIN/1.73M2 — LOW
EGFR: 51 ML/MIN/1.73M2 — LOW
EGFR: 51 ML/MIN/1.73M2 — LOW
EOSINOPHIL # BLD AUTO: 0.1 K/UL — SIGNIFICANT CHANGE UP (ref 0–0.5)
EOSINOPHIL # BLD AUTO: 0.1 K/UL — SIGNIFICANT CHANGE UP (ref 0–0.5)
EOSINOPHIL NFR BLD AUTO: 1.1 % — SIGNIFICANT CHANGE UP (ref 0–6)
EOSINOPHIL NFR BLD AUTO: 1.1 % — SIGNIFICANT CHANGE UP (ref 0–6)
ETHANOL SERPL-MCNC: <10 MG/DL — SIGNIFICANT CHANGE UP (ref 0–10)
ETHANOL SERPL-MCNC: <10 MG/DL — SIGNIFICANT CHANGE UP (ref 0–10)
GLUCOSE SERPL-MCNC: 101 MG/DL — HIGH (ref 70–99)
GLUCOSE SERPL-MCNC: 101 MG/DL — HIGH (ref 70–99)
GLUCOSE SERPL-MCNC: 92 MG/DL — SIGNIFICANT CHANGE UP (ref 70–99)
GLUCOSE SERPL-MCNC: 92 MG/DL — SIGNIFICANT CHANGE UP (ref 70–99)
HCT VFR BLD CALC: 37.6 % — SIGNIFICANT CHANGE UP (ref 34.5–45)
HCT VFR BLD CALC: 37.6 % — SIGNIFICANT CHANGE UP (ref 34.5–45)
HCT VFR BLD CALC: 38.6 % — SIGNIFICANT CHANGE UP (ref 34.5–45)
HCT VFR BLD CALC: 38.6 % — SIGNIFICANT CHANGE UP (ref 34.5–45)
HGB BLD-MCNC: 12 G/DL — SIGNIFICANT CHANGE UP (ref 11.5–15.5)
HGB BLD-MCNC: 12 G/DL — SIGNIFICANT CHANGE UP (ref 11.5–15.5)
HGB BLD-MCNC: 12.3 G/DL — SIGNIFICANT CHANGE UP (ref 11.5–15.5)
HGB BLD-MCNC: 12.3 G/DL — SIGNIFICANT CHANGE UP (ref 11.5–15.5)
IMM GRANULOCYTES NFR BLD AUTO: 0.6 % — SIGNIFICANT CHANGE UP (ref 0–0.9)
IMM GRANULOCYTES NFR BLD AUTO: 0.6 % — SIGNIFICANT CHANGE UP (ref 0–0.9)
INR BLD: 0.97 RATIO — SIGNIFICANT CHANGE UP (ref 0.85–1.18)
INR BLD: 0.97 RATIO — SIGNIFICANT CHANGE UP (ref 0.85–1.18)
INR BLD: 0.98 RATIO — SIGNIFICANT CHANGE UP (ref 0.85–1.18)
INR BLD: 0.98 RATIO — SIGNIFICANT CHANGE UP (ref 0.85–1.18)
LIDOCAIN IGE QN: 474 U/L — HIGH (ref 13–75)
LIDOCAIN IGE QN: 474 U/L — HIGH (ref 13–75)
LYMPHOCYTES # BLD AUTO: 1.25 K/UL — SIGNIFICANT CHANGE UP (ref 1–3.3)
LYMPHOCYTES # BLD AUTO: 1.25 K/UL — SIGNIFICANT CHANGE UP (ref 1–3.3)
LYMPHOCYTES # BLD AUTO: 14.2 % — SIGNIFICANT CHANGE UP (ref 13–44)
LYMPHOCYTES # BLD AUTO: 14.2 % — SIGNIFICANT CHANGE UP (ref 13–44)
MCHC RBC-ENTMCNC: 31.1 PG — SIGNIFICANT CHANGE UP (ref 27–34)
MCHC RBC-ENTMCNC: 31.1 PG — SIGNIFICANT CHANGE UP (ref 27–34)
MCHC RBC-ENTMCNC: 31.3 PG — SIGNIFICANT CHANGE UP (ref 27–34)
MCHC RBC-ENTMCNC: 31.3 PG — SIGNIFICANT CHANGE UP (ref 27–34)
MCHC RBC-ENTMCNC: 31.9 GM/DL — LOW (ref 32–36)
MCV RBC AUTO: 97.7 FL — SIGNIFICANT CHANGE UP (ref 80–100)
MCV RBC AUTO: 97.7 FL — SIGNIFICANT CHANGE UP (ref 80–100)
MCV RBC AUTO: 97.9 FL — SIGNIFICANT CHANGE UP (ref 80–100)
MCV RBC AUTO: 97.9 FL — SIGNIFICANT CHANGE UP (ref 80–100)
MONOCYTES # BLD AUTO: 0.69 K/UL — SIGNIFICANT CHANGE UP (ref 0–0.9)
MONOCYTES # BLD AUTO: 0.69 K/UL — SIGNIFICANT CHANGE UP (ref 0–0.9)
MONOCYTES NFR BLD AUTO: 7.8 % — SIGNIFICANT CHANGE UP (ref 2–14)
MONOCYTES NFR BLD AUTO: 7.8 % — SIGNIFICANT CHANGE UP (ref 2–14)
NEUTROPHILS # BLD AUTO: 6.71 K/UL — SIGNIFICANT CHANGE UP (ref 1.8–7.4)
NEUTROPHILS # BLD AUTO: 6.71 K/UL — SIGNIFICANT CHANGE UP (ref 1.8–7.4)
NEUTROPHILS NFR BLD AUTO: 76 % — SIGNIFICANT CHANGE UP (ref 43–77)
NEUTROPHILS NFR BLD AUTO: 76 % — SIGNIFICANT CHANGE UP (ref 43–77)
PLATELET # BLD AUTO: 153 K/UL — SIGNIFICANT CHANGE UP (ref 150–400)
PLATELET # BLD AUTO: 153 K/UL — SIGNIFICANT CHANGE UP (ref 150–400)
PLATELET # BLD AUTO: 167 K/UL — SIGNIFICANT CHANGE UP (ref 150–400)
PLATELET # BLD AUTO: 167 K/UL — SIGNIFICANT CHANGE UP (ref 150–400)
POTASSIUM SERPL-MCNC: 4 MMOL/L — SIGNIFICANT CHANGE UP (ref 3.5–5.3)
POTASSIUM SERPL-MCNC: 4 MMOL/L — SIGNIFICANT CHANGE UP (ref 3.5–5.3)
POTASSIUM SERPL-MCNC: 4.4 MMOL/L — SIGNIFICANT CHANGE UP (ref 3.5–5.3)
POTASSIUM SERPL-MCNC: 4.4 MMOL/L — SIGNIFICANT CHANGE UP (ref 3.5–5.3)
POTASSIUM SERPL-SCNC: 4 MMOL/L — SIGNIFICANT CHANGE UP (ref 3.5–5.3)
POTASSIUM SERPL-SCNC: 4 MMOL/L — SIGNIFICANT CHANGE UP (ref 3.5–5.3)
POTASSIUM SERPL-SCNC: 4.4 MMOL/L — SIGNIFICANT CHANGE UP (ref 3.5–5.3)
POTASSIUM SERPL-SCNC: 4.4 MMOL/L — SIGNIFICANT CHANGE UP (ref 3.5–5.3)
PROT SERPL-MCNC: 7.1 GM/DL — SIGNIFICANT CHANGE UP (ref 6–8.3)
PROT SERPL-MCNC: 7.1 GM/DL — SIGNIFICANT CHANGE UP (ref 6–8.3)
PROTHROM AB SERPL-ACNC: 11 SEC — SIGNIFICANT CHANGE UP (ref 9.5–13)
PROTHROM AB SERPL-ACNC: 11 SEC — SIGNIFICANT CHANGE UP (ref 9.5–13)
PROTHROM AB SERPL-ACNC: 11.1 SEC — SIGNIFICANT CHANGE UP (ref 9.5–13)
PROTHROM AB SERPL-ACNC: 11.1 SEC — SIGNIFICANT CHANGE UP (ref 9.5–13)
RBC # BLD: 3.84 M/UL — SIGNIFICANT CHANGE UP (ref 3.8–5.2)
RBC # BLD: 3.84 M/UL — SIGNIFICANT CHANGE UP (ref 3.8–5.2)
RBC # BLD: 3.95 M/UL — SIGNIFICANT CHANGE UP (ref 3.8–5.2)
RBC # BLD: 3.95 M/UL — SIGNIFICANT CHANGE UP (ref 3.8–5.2)
RBC # FLD: 13.2 % — SIGNIFICANT CHANGE UP (ref 10.3–14.5)
SODIUM SERPL-SCNC: 141 MMOL/L — SIGNIFICANT CHANGE UP (ref 135–145)
SODIUM SERPL-SCNC: 141 MMOL/L — SIGNIFICANT CHANGE UP (ref 135–145)
SODIUM SERPL-SCNC: 145 MMOL/L — SIGNIFICANT CHANGE UP (ref 135–145)
SODIUM SERPL-SCNC: 145 MMOL/L — SIGNIFICANT CHANGE UP (ref 135–145)
TSH SERPL-MCNC: 3.57 UU/ML — SIGNIFICANT CHANGE UP (ref 0.34–4.82)
TSH SERPL-MCNC: 3.57 UU/ML — SIGNIFICANT CHANGE UP (ref 0.34–4.82)
WBC # BLD: 12.36 K/UL — HIGH (ref 3.8–10.5)
WBC # BLD: 12.36 K/UL — HIGH (ref 3.8–10.5)
WBC # BLD: 8.83 K/UL — SIGNIFICANT CHANGE UP (ref 3.8–10.5)
WBC # BLD: 8.83 K/UL — SIGNIFICANT CHANGE UP (ref 3.8–10.5)
WBC # FLD AUTO: 12.36 K/UL — HIGH (ref 3.8–10.5)
WBC # FLD AUTO: 12.36 K/UL — HIGH (ref 3.8–10.5)
WBC # FLD AUTO: 8.83 K/UL — SIGNIFICANT CHANGE UP (ref 3.8–10.5)
WBC # FLD AUTO: 8.83 K/UL — SIGNIFICANT CHANGE UP (ref 3.8–10.5)

## 2023-12-09 PROCEDURE — 85610 PROTHROMBIN TIME: CPT

## 2023-12-09 PROCEDURE — 86901 BLOOD TYPING SEROLOGIC RH(D): CPT

## 2023-12-09 PROCEDURE — 99223 1ST HOSP IP/OBS HIGH 75: CPT

## 2023-12-09 PROCEDURE — C9399: CPT

## 2023-12-09 PROCEDURE — 72125 CT NECK SPINE W/O DYE: CPT | Mod: 26,MA

## 2023-12-09 PROCEDURE — 97116 GAIT TRAINING THERAPY: CPT | Mod: GP

## 2023-12-09 PROCEDURE — 27818 TREATMENT OF ANKLE FRACTURE: CPT | Mod: 54,RT

## 2023-12-09 PROCEDURE — C1713: CPT

## 2023-12-09 PROCEDURE — 85027 COMPLETE CBC AUTOMATED: CPT

## 2023-12-09 PROCEDURE — 71260 CT THORAX DX C+: CPT | Mod: 26,MA

## 2023-12-09 PROCEDURE — 93010 ELECTROCARDIOGRAM REPORT: CPT | Mod: 76

## 2023-12-09 PROCEDURE — 80048 BASIC METABOLIC PNL TOTAL CA: CPT

## 2023-12-09 PROCEDURE — 86900 BLOOD TYPING SEROLOGIC ABO: CPT

## 2023-12-09 PROCEDURE — 87635 SARS-COV-2 COVID-19 AMP PRB: CPT

## 2023-12-09 PROCEDURE — 70450 CT HEAD/BRAIN W/O DYE: CPT | Mod: 26,MA

## 2023-12-09 PROCEDURE — 73610 X-RAY EXAM OF ANKLE: CPT | Mod: 26,RT

## 2023-12-09 PROCEDURE — 99223 1ST HOSP IP/OBS HIGH 75: CPT | Mod: AI

## 2023-12-09 PROCEDURE — 86850 RBC ANTIBODY SCREEN: CPT

## 2023-12-09 PROCEDURE — 97530 THERAPEUTIC ACTIVITIES: CPT | Mod: GP

## 2023-12-09 PROCEDURE — 93280 PM DEVICE PROGR EVAL DUAL: CPT | Mod: 26

## 2023-12-09 PROCEDURE — 76000 FLUOROSCOPY <1 HR PHYS/QHP: CPT

## 2023-12-09 PROCEDURE — 99285 EMERGENCY DEPT VISIT HI MDM: CPT | Mod: 57

## 2023-12-09 PROCEDURE — 97166 OT EVAL MOD COMPLEX 45 MIN: CPT | Mod: GO

## 2023-12-09 PROCEDURE — 73600 X-RAY EXAM OF ANKLE: CPT | Mod: 26,XE,RT

## 2023-12-09 PROCEDURE — 73700 CT LOWER EXTREMITY W/O DYE: CPT | Mod: 26,RT,MA

## 2023-12-09 PROCEDURE — C1889: CPT

## 2023-12-09 PROCEDURE — 85730 THROMBOPLASTIN TIME PARTIAL: CPT

## 2023-12-09 PROCEDURE — 74177 CT ABD & PELVIS W/CONTRAST: CPT | Mod: 26,MA

## 2023-12-09 PROCEDURE — 97162 PT EVAL MOD COMPLEX 30 MIN: CPT | Mod: GP

## 2023-12-09 PROCEDURE — 36415 COLL VENOUS BLD VENIPUNCTURE: CPT

## 2023-12-09 PROCEDURE — 73590 X-RAY EXAM OF LOWER LEG: CPT | Mod: 26,RT,76

## 2023-12-09 PROCEDURE — 76376 3D RENDER W/INTRP POSTPROCES: CPT | Mod: 26

## 2023-12-09 PROCEDURE — 73630 X-RAY EXAM OF FOOT: CPT | Mod: 26,RT

## 2023-12-09 PROCEDURE — 87040 BLOOD CULTURE FOR BACTERIA: CPT

## 2023-12-09 PROCEDURE — 71045 X-RAY EXAM CHEST 1 VIEW: CPT | Mod: 26

## 2023-12-09 RX ORDER — GABAPENTIN 400 MG/1
400 CAPSULE ORAL
Refills: 0 | Status: DISCONTINUED | OUTPATIENT
Start: 2023-12-09 | End: 2023-12-12

## 2023-12-09 RX ORDER — HEPARIN SODIUM 5000 [USP'U]/ML
5000 INJECTION INTRAVENOUS; SUBCUTANEOUS ONCE
Refills: 0 | Status: COMPLETED | OUTPATIENT
Start: 2023-12-09 | End: 2023-12-09

## 2023-12-09 RX ORDER — CLONAZEPAM 1 MG
0.5 TABLET ORAL
Qty: 0 | Refills: 0 | DISCHARGE

## 2023-12-09 RX ORDER — ACETAMINOPHEN 500 MG
650 TABLET ORAL EVERY 6 HOURS
Refills: 0 | Status: DISCONTINUED | OUTPATIENT
Start: 2023-12-09 | End: 2023-12-12

## 2023-12-09 RX ORDER — ACETAMINOPHEN 500 MG
2 TABLET ORAL
Qty: 0 | Refills: 0 | DISCHARGE

## 2023-12-09 RX ORDER — MORPHINE SULFATE 50 MG/1
4 CAPSULE, EXTENDED RELEASE ORAL ONCE
Refills: 0 | Status: DISCONTINUED | OUTPATIENT
Start: 2023-12-09 | End: 2023-12-09

## 2023-12-09 RX ORDER — LEVOTHYROXINE SODIUM 125 MCG
25 TABLET ORAL DAILY
Refills: 0 | Status: DISCONTINUED | OUTPATIENT
Start: 2023-12-09 | End: 2023-12-12

## 2023-12-09 RX ORDER — QUETIAPINE FUMARATE 200 MG/1
2 TABLET, FILM COATED ORAL
Qty: 0 | Refills: 0 | DISCHARGE

## 2023-12-09 RX ORDER — CEFAZOLIN SODIUM 1 G
2000 VIAL (EA) INJECTION ONCE
Refills: 0 | Status: DISCONTINUED | OUTPATIENT
Start: 2023-12-09 | End: 2023-12-09

## 2023-12-09 RX ORDER — TRAMADOL HYDROCHLORIDE 50 MG/1
1 TABLET ORAL
Qty: 0 | Refills: 0 | DISCHARGE

## 2023-12-09 RX ORDER — SODIUM CHLORIDE 9 MG/ML
1000 INJECTION INTRAMUSCULAR; INTRAVENOUS; SUBCUTANEOUS
Refills: 0 | Status: DISCONTINUED | OUTPATIENT
Start: 2023-12-09 | End: 2023-12-11

## 2023-12-09 RX ORDER — SENNA PLUS 8.6 MG/1
2 TABLET ORAL AT BEDTIME
Refills: 0 | Status: DISCONTINUED | OUTPATIENT
Start: 2023-12-09 | End: 2023-12-12

## 2023-12-09 RX ORDER — DONEPEZIL HYDROCHLORIDE 10 MG/1
10 TABLET, FILM COATED ORAL AT BEDTIME
Refills: 0 | Status: DISCONTINUED | OUTPATIENT
Start: 2023-12-09 | End: 2023-12-12

## 2023-12-09 RX ORDER — DULOXETINE HYDROCHLORIDE 30 MG/1
60 CAPSULE, DELAYED RELEASE ORAL DAILY
Refills: 0 | Status: DISCONTINUED | OUTPATIENT
Start: 2023-12-09 | End: 2023-12-12

## 2023-12-09 RX ORDER — CEFAZOLIN SODIUM 1 G
2000 VIAL (EA) INJECTION ONCE
Refills: 0 | Status: COMPLETED | OUTPATIENT
Start: 2023-12-09 | End: 2023-12-09

## 2023-12-09 RX ORDER — ONDANSETRON 8 MG/1
4 TABLET, FILM COATED ORAL EVERY 6 HOURS
Refills: 0 | Status: DISCONTINUED | OUTPATIENT
Start: 2023-12-09 | End: 2023-12-12

## 2023-12-09 RX ORDER — SODIUM CHLORIDE 9 MG/ML
250 INJECTION INTRAMUSCULAR; INTRAVENOUS; SUBCUTANEOUS ONCE
Refills: 0 | Status: COMPLETED | OUTPATIENT
Start: 2023-12-09 | End: 2023-12-09

## 2023-12-09 RX ORDER — MORPHINE SULFATE 50 MG/1
2 CAPSULE, EXTENDED RELEASE ORAL EVERY 4 HOURS
Refills: 0 | Status: DISCONTINUED | OUTPATIENT
Start: 2023-12-09 | End: 2023-12-10

## 2023-12-09 RX ORDER — GABAPENTIN 400 MG/1
1 CAPSULE ORAL
Qty: 0 | Refills: 0 | DISCHARGE

## 2023-12-09 RX ORDER — CEFAZOLIN SODIUM 1 G
2000 VIAL (EA) INJECTION EVERY 8 HOURS
Refills: 0 | Status: DISCONTINUED | OUTPATIENT
Start: 2023-12-09 | End: 2023-12-10

## 2023-12-09 RX ORDER — DULOXETINE HYDROCHLORIDE 30 MG/1
2 CAPSULE, DELAYED RELEASE ORAL
Qty: 0 | Refills: 0 | DISCHARGE

## 2023-12-09 RX ORDER — PIMAVANSERIN TARTRATE 10 MG/1
34 TABLET, COATED ORAL DAILY
Refills: 0 | Status: DISCONTINUED | OUTPATIENT
Start: 2023-12-09 | End: 2023-12-12

## 2023-12-09 RX ORDER — TETANUS TOXOID, REDUCED DIPHTHERIA TOXOID AND ACELLULAR PERTUSSIS VACCINE, ADSORBED 5; 2.5; 8; 8; 2.5 [IU]/.5ML; [IU]/.5ML; UG/.5ML; UG/.5ML; UG/.5ML
0.5 SUSPENSION INTRAMUSCULAR ONCE
Refills: 0 | Status: COMPLETED | OUTPATIENT
Start: 2023-12-09 | End: 2023-12-09

## 2023-12-09 RX ORDER — PIMAVANSERIN TARTRATE 10 MG/1
1 TABLET, COATED ORAL
Qty: 0 | Refills: 0 | DISCHARGE

## 2023-12-09 RX ORDER — POLYETHYLENE GLYCOL 3350 17 G/17G
17 POWDER, FOR SOLUTION ORAL DAILY
Refills: 0 | Status: DISCONTINUED | OUTPATIENT
Start: 2023-12-09 | End: 2023-12-12

## 2023-12-09 RX ORDER — TRAMADOL HYDROCHLORIDE 50 MG/1
25 TABLET ORAL EVERY 6 HOURS
Refills: 0 | Status: DISCONTINUED | OUTPATIENT
Start: 2023-12-09 | End: 2023-12-10

## 2023-12-09 RX ORDER — CEFAZOLIN SODIUM 1 G
2000 VIAL (EA) INJECTION EVERY 8 HOURS
Refills: 0 | Status: DISCONTINUED | OUTPATIENT
Start: 2023-12-09 | End: 2023-12-09

## 2023-12-09 RX ORDER — ONDANSETRON 8 MG/1
4 TABLET, FILM COATED ORAL ONCE
Refills: 0 | Status: COMPLETED | OUTPATIENT
Start: 2023-12-09 | End: 2023-12-09

## 2023-12-09 RX ADMIN — TRAMADOL HYDROCHLORIDE 25 MILLIGRAM(S): 50 TABLET ORAL at 23:05

## 2023-12-09 RX ADMIN — GABAPENTIN 400 MILLIGRAM(S): 400 CAPSULE ORAL at 22:20

## 2023-12-09 RX ADMIN — MORPHINE SULFATE 4 MILLIGRAM(S): 50 CAPSULE, EXTENDED RELEASE ORAL at 09:37

## 2023-12-09 RX ADMIN — DONEPEZIL HYDROCHLORIDE 10 MILLIGRAM(S): 10 TABLET, FILM COATED ORAL at 22:21

## 2023-12-09 RX ADMIN — TRAMADOL HYDROCHLORIDE 25 MILLIGRAM(S): 50 TABLET ORAL at 22:29

## 2023-12-09 RX ADMIN — Medication 2000 MILLIGRAM(S): at 09:37

## 2023-12-09 RX ADMIN — ONDANSETRON 4 MILLIGRAM(S): 8 TABLET, FILM COATED ORAL at 09:37

## 2023-12-09 RX ADMIN — PIMAVANSERIN TARTRATE 34 MILLIGRAM(S): 10 TABLET, COATED ORAL at 18:05

## 2023-12-09 RX ADMIN — SODIUM CHLORIDE 250 MILLILITER(S): 9 INJECTION INTRAMUSCULAR; INTRAVENOUS; SUBCUTANEOUS at 09:49

## 2023-12-09 RX ADMIN — Medication 2000 MILLIGRAM(S): at 18:05

## 2023-12-09 RX ADMIN — Medication 10 MILLIGRAM(S): at 18:05

## 2023-12-09 RX ADMIN — TETANUS TOXOID, REDUCED DIPHTHERIA TOXOID AND ACELLULAR PERTUSSIS VACCINE, ADSORBED 0.5 MILLILITER(S): 5; 2.5; 8; 8; 2.5 SUSPENSION INTRAMUSCULAR at 09:29

## 2023-12-09 RX ADMIN — HEPARIN SODIUM 5000 UNIT(S): 5000 INJECTION INTRAVENOUS; SUBCUTANEOUS at 22:22

## 2023-12-09 RX ADMIN — SENNA PLUS 2 TABLET(S): 8.6 TABLET ORAL at 22:21
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
## 2023-12-09 NOTE — H&P ADULT - NSHPPHYSICALEXAM_GEN_ALL_CORE
VITALS:   T(C): 34.9 (12-09-23 @ 14:17), Max: 36.9 (12-09-23 @ 09:07)  HR: 71 (12-09-23 @ 14:17) (71 - 102)  BP: 107/86 (12-09-23 @ 14:17) (107/86 - 134/69)  RR: 18 (12-09-23 @ 14:17) (12 - 19)  SpO2: 98% (12-09-23 @ 14:17) (89% - 100%)    PHYSICAL EXAM:  GENERAL: Laying comfortably, non-toxic   HEAD:  Atraumatic, Normocephalic  EYES: EOMI, PERRLA, conjunctiva and sclera clear  ENMT: No tonsillar erythema, exudates, or enlargement; MMM  NECK: Supple, No JVD  HEART: Regular rate and rhythm; No murmurs, rubs, or gallops  RESPIRATORY: Unlabored respirations. CTA B/L, No W/R/R  ABDOMEN: Soft, Nontender, Nondistended; Bowel sounds present  EXTREMITIES:  2+ Peripheral Pulses, No clubbing, cyanosis, or edema. R lower leg in splint   SKIN: warm, dry, normal color, no rash or abnormal lesions  NEUROLOGY: A&Ox1, opens eyes to name (baseline)

## 2023-12-09 NOTE — PATIENT PROFILE ADULT - PRO INTERPRETER NEED 2
Patient called to reschedule his 3mth follow up and request a refill of the following medication:    FLUoxetine (PROzac) 40 MG capsule   lisinopril (ZESTRIL) 10 mg tablet     [facility] [id number] IN [employer], [id number], [place]    Patient is aware that he may only receive a one mth supply of these mediations until the follow up is completed  Thank you! English

## 2023-12-09 NOTE — CONSULT NOTE ADULT - SUBJECTIVE AND OBJECTIVE BOX
HPI:    Per ortho notes:  83yFemale p/w R ankle pain/deformity and inability to bear weight s/p mechanical fall at [facility] living [facility] this morning, unwitnessed. Patient was found on the floor around 7Am during morning rounds. Patient has significant Alzheimer's dementia and is unable to provide full history. Daughter, Latisha, at bedside. [place] facility contacted as well. Indeterminate headstrike/LOC. Denies numbness/tingling in the feet/toes. No other bone or joint complaints.     called for preop clearance.  Daughter at bedside  pt unable to provide hx 2/2 alzheimers.  Daughter any denies any reports of dyspnea, chest pain, palpitations,  did not pass out as far as we know when she fell.  functional capacity is limited - walks w/ walker around [facility].    per daughter had PM implanted in [place] several yrs ago  after syncopal event does not recall when it was last checked.    PAST MEDICAL AND SURGICAL HISTORY:  PAST MEDICAL & SURGICAL HISTORY:  Trigeminal neuralgia      HLD (hyperlipidemia)      Spinal stenosis      Burning mouth syndrome      Parkinson disease      No significant past surgical history          ALLERGIES:  Allergies    Cleocin HCl (Unknown)  sulfa drugs (Unknown)  latex (Unknown)  penicillins (Unknown)    Intolerances        SOCIAL HISTORY:  Social History:      FAMILY  HISTORY:  FAMILY HISTORY:      MEDICATIONS:  OUTPATIENT:  Home Medications:  acetaminophen 325 mg oral tablet: 2 tab(s) orally every 4 hours, As Needed - for moderate pain (11 Jun 2021 21:15)  Aricept 10 mg oral tablet: 1 tab(s) orally once a day (at bedtime) (11 Jun 2021 21:15)  Cymbalta 20 mg oral delayed release capsule: 2 cap(s) orally once a day (11 Jun 2021 21:15)  dicyclomine 10 mg oral capsule: 1 cap(s) orally 2 times a day (11 Jun 2021 21:15)  gabapentin 800 mg oral tablet: 1 tab(s) orally 3 times a day (11 Jun 2021 21:15)  KlonoPIN 0.5 mg oral tablet: 0.5 tab(s) orally 3 times a day (11 Jun 2021 21:15)  Nuplazid 10 mg oral tablet: 1 tab(s) orally once a day (11 Jun 2021 21:15)  QUEtiapine 25 mg oral tablet: 2 tab(s) orally once a day (at bedtime) (11 Jun 2021 21:15)  traMADol 50 mg oral tablet: 1 tab(s) orally every 6 hours, As Needed - for moderate pain (11 Jun 2021 21:15)      INPATIENT:  MEDICATIONS  (STANDING):  ceFAZolin  Injectable. 2000 milliGRAM(s) IV Push every 8 hours    MEDICATIONS  (PRN):    MEDICATIONS  (PRN):      REVIEW OF SYSTEMS:  ===============================  ===============================    Vital Signs Last 24 Hrs  T(C): 36.9 (09 Dec 2023 11:13), Max: 36.9 (09 Dec 2023 09:07)  T(F): 98.4 (09 Dec 2023 11:13), Max: 98.4 (09 Dec 2023 09:07)  HR: 90 (09 Dec 2023 11:13) (80 - 102)  BP: 122/7 (09 Dec 2023 11:13) (115/52 - 134/69)  BP(mean): 89 (09 Dec 2023 10:34) (68 - 89)  RR: 19 (09 Dec 2023 11:13) (12 - 19)  SpO2: 96% (09 Dec 2023 11:13) (89% - 100%)    Parameters below as of 09 Dec 2023 11:13  Patient On (Oxygen Delivery Method): room air        I&O's Summary      I&O's Detail      PHYSICAL EXAM:    Constitutional: NAD, awake  HEENT: PERR, EOMI,  No oral cyananosis.  Neck:  supple,  No JVD  Respiratory: Breath sounds are clear bilaterally, No wheezing, rales or rhonchi  Cardiovascular: S1 and S2, regular rate and rhythm, no Murmurs, gallops or rubs  Gastrointestinal: Bowel Sounds present, soft, nontender.   Extremities: No peripheral edema. No clubbing or cyanosis.  Vascular: 2+ peripheral pulses  Neurological: A/O x 3, no focal deficits  Musculoskeletal: no calf tenderness.  Skin: No rashes.    ===============================  ===============================  LABS:                         12.0   12.36 )-----------( 153      ( 09 Dec 2023 10:59 )             37.6                         12.3   8.83  )-----------( 167      ( 09 Dec 2023 09:27 )             38.6     09 Dec 2023 10:59    141    |  110    |  30     ----------------------------<  92     4.0     |  27     |  1.14   09 Dec 2023 09:27    145    |  112    |  30     ----------------------------<  101    4.4     |  30     |  1.08     Ca    8.6        09 Dec 2023 10:59  Ca    9.2        09 Dec 2023 09:27    TPro  7.1    /  Alb  3.0    /  TBili  0.2    /  DBili  x      /  AST  40     /  ALT  37     /  AlkPhos  168    09 Dec 2023 09:27    PT/INR - ( 09 Dec 2023 10:59 )   PT: 11.1 sec;   INR: 0.98 ratio         PTT - ( 09 Dec 2023 10:59 )  PTT:36.0 sec    THYROID STUDIES:    ===============================  ===============================  CARDIAC BIOMARKERS:  -------  -BNP VALUES:    -------  -TROPONIN VALUES:   Troponin I, Serum: <0.015 ng/mL [0.015 - 0.045] (06-11-21 @ 14:06)      ===============================  ===============================  EKG:  ECG Jun '21: NSR no ischemic changes  no ECG yet for today.       HPI:    Per ortho notes:  83yFemale p/w R ankle pain/deformity and inability to bear weight s/p mechanical fall at [facility] living [facility] this morning, unwitnessed. Patient was found on the floor around 7Am during morning rounds. Patient has significant Alzheimer's dementia and is unable to provide full history. Daughter, Latisha, at bedside. [place] facility contacted as well. Indeterminate headstrike/LOC. Denies numbness/tingling in the feet/toes. No other bone or joint complaints.     called for preop clearance.  Daughter at bedside  pt unable to provide hx 2/2 alzheimers.  Daughter any denies any reports of dyspnea, chest pain, palpitations,  did not pass out as far as we know when she fell.  functional capacity is limited - walks w/ walker around [facility].    per daughter had PM implanted in [place] several yrs ago  after syncopal event does not recall when it was last checked.    PAST MEDICAL AND SURGICAL HISTORY:  PAST MEDICAL & SURGICAL HISTORY:  Trigeminal neuralgia      HLD (hyperlipidemia)      Spinal stenosis      Burning mouth syndrome      Parkinson disease      No significant past surgical history          ALLERGIES:  Allergies    Cleocin HCl (Unknown)  sulfa drugs (Unknown)  latex (Unknown)  penicillins (Unknown)    Intolerances        SOCIAL HISTORY:  Social History:      FAMILY  HISTORY:  FAMILY HISTORY:      MEDICATIONS:  OUTPATIENT:  Home Medications:  acetaminophen 325 mg oral tablet: 2 tab(s) orally every 4 hours, As Needed - for moderate pain (11 Jun 2021 21:15)  Aricept 10 mg oral tablet: 1 tab(s) orally once a day (at bedtime) (11 Jun 2021 21:15)  Cymbalta 20 mg oral delayed release capsule: 2 cap(s) orally once a day (11 Jun 2021 21:15)  dicyclomine 10 mg oral capsule: 1 cap(s) orally 2 times a day (11 Jun 2021 21:15)  gabapentin 800 mg oral tablet: 1 tab(s) orally 3 times a day (11 Jun 2021 21:15)  KlonoPIN 0.5 mg oral tablet: 0.5 tab(s) orally 3 times a day (11 Jun 2021 21:15)  Nuplazid 10 mg oral tablet: 1 tab(s) orally once a day (11 Jun 2021 21:15)  QUEtiapine 25 mg oral tablet: 2 tab(s) orally once a day (at bedtime) (11 Jun 2021 21:15)  traMADol 50 mg oral tablet: 1 tab(s) orally every 6 hours, As Needed - for moderate pain (11 Jun 2021 21:15)      INPATIENT:  MEDICATIONS  (STANDING):  ceFAZolin  Injectable. 2000 milliGRAM(s) IV Push every 8 hours    MEDICATIONS  (PRN):    MEDICATIONS  (PRN):      REVIEW OF SYSTEMS:  ===============================  ===============================    Vital Signs Last 24 Hrs  T(C): 36.9 (09 Dec 2023 11:13), Max: 36.9 (09 Dec 2023 09:07)  T(F): 98.4 (09 Dec 2023 11:13), Max: 98.4 (09 Dec 2023 09:07)  HR: 90 (09 Dec 2023 11:13) (80 - 102)  BP: 122/7 (09 Dec 2023 11:13) (115/52 - 134/69)  BP(mean): 89 (09 Dec 2023 10:34) (68 - 89)  RR: 19 (09 Dec 2023 11:13) (12 - 19)  SpO2: 96% (09 Dec 2023 11:13) (89% - 100%)    Parameters below as of 09 Dec 2023 11:13  Patient On (Oxygen Delivery Method): room air        I&O's Summary      I&O's Detail      PHYSICAL EXAM:    Constitutional: NAD, awake  HEENT: PERR, EOMI,  No oral cyananosis.  Neck:  supple,  No JVD  Respiratory: Breath sounds are clear bilaterally, No wheezing, rales or rhonchi  Cardiovascular: S1 and S2, regular rate and rhythm, no Murmurs, gallops or rubs  Gastrointestinal: Bowel Sounds present, soft, nontender.   Extremities: No peripheral edema. No clubbing or cyanosis.  Vascular: 2+ peripheral pulses  Neurological: A/O x 3, no focal deficits  Musculoskeletal: no calf tenderness.  Skin: No rashes.    ===============================  ===============================  LABS:                         12.0   12.36 )-----------( 153      ( 09 Dec 2023 10:59 )             37.6                         12.3   8.83  )-----------( 167      ( 09 Dec 2023 09:27 )             38.6     09 Dec 2023 10:59    141    |  110    |  30     ----------------------------<  92     4.0     |  27     |  1.14   09 Dec 2023 09:27    145    |  112    |  30     ----------------------------<  101    4.4     |  30     |  1.08     Ca    8.6        09 Dec 2023 10:59  Ca    9.2        09 Dec 2023 09:27    TPro  7.1    /  Alb  3.0    /  TBili  0.2    /  DBili  x      /  AST  40     /  ALT  37     /  AlkPhos  168    09 Dec 2023 09:27    PT/INR - ( 09 Dec 2023 10:59 )   PT: 11.1 sec;   INR: 0.98 ratio         PTT - ( 09 Dec 2023 10:59 )  PTT:36.0 sec    THYROID STUDIES:    ===============================  ===============================  CARDIAC BIOMARKERS:  -------  -BNP VALUES:    -------  -TROPONIN VALUES:   Troponin I, Serum: <0.015 ng/mL [0.015 - 0.045] (06-11-21 @ 14:06)      ===============================  ===============================  EKG:  ECG Jun '21: NSR no ischemic changes  no ECG yet for today.

## 2023-12-09 NOTE — ED PROVIDER NOTE - OBJECTIVE STATEMENT
84 y/o female w/ a PMHx of Parkinson's, spinal stenosis, HLD, and trigeminal neuralgia presents to the ED via EMS s/p fall from standing. Pt noted to have swelling to right ankle. Pt unable to give circumstances of fall, hx limited as pt poor historian. Allergies: Penicillin, sulfa, Cleocin. PCP: Dr. Augustin. 82 y/o female w/ a PMHx of Parkinson's, spinal stenosis, HLD, and trigeminal neuralgia presents to the ED via EMS s/p fall from standing. Pt noted to have swelling to right ankle. Pt unable to give circumstances of fall, hx limited as pt poor historian. Allergies: Penicillin, sulfa, Cleocin. PCP: Dr. Augustin.

## 2023-12-09 NOTE — ED ADULT NURSE NOTE - CHIEF COMPLAINT QUOTE
Pt presents to ER s/p unwitnessed fall out of bed this morning. Pt hit back of head and RLE. Denies LOC/blood thinners. open wound to RLE, swelling and abrasion on back of head

## 2023-12-09 NOTE — ED ADULT NURSE NOTE - NSICDXPASTMEDICALHX_GEN_ALL_CORE_FT
PAST MEDICAL HISTORY:  Burning mouth syndrome     HLD (hyperlipidemia)     Parkinson disease     Spinal stenosis     Trigeminal neuralgia

## 2023-12-09 NOTE — ED ADULT TRIAGE NOTE - NS ED NURSE AMBULANCES
[facility]

## 2023-12-09 NOTE — PATIENT PROFILE ADULT - FALL HARM RISK - HARM RISK INTERVENTIONS
Assistance OOB with selected safe patient handling equipment/Communicate Risk of Fall with Harm to all staff/Discuss with provider need for PT consult/Monitor gait and stability/Provide patient with walking aids - walker, cane, crutches/Reinforce activity limits and safety measures with patient and family/Tailored Fall Risk Interventions/Visual Cue: Yellow wristband and red socks/Bed in lowest position, wheels locked, appropriate side rails in place/Call bell, personal items and telephone in reach/Instruct patient to call for assistance before getting out of bed or chair/Non-slip footwear when patient is out of bed/[place] to call system/Physically safe environment - no spills, clutter or unnecessary equipment/Purposeful Proactive Rounding/Room/bathroom lighting operational, light cord in reach Assistance OOB with selected safe patient handling equipment/Communicate Risk of Fall with Harm to all staff/Discuss with provider need for PT consult/Monitor gait and stability/Provide patient with walking aids - walker, cane, crutches/Reinforce activity limits and safety measures with patient and family/Tailored Fall Risk Interventions/Visual Cue: Yellow wristband and red socks/Bed in lowest position, wheels locked, appropriate side rails in place/Call bell, personal items and telephone in reach/Instruct patient to call for assistance before getting out of bed or chair/Non-slip footwear when patient is out of bed/[place] to call system/Physically safe environment - no spills, clutter or unnecessary equipment/Purposeful Proactive Rounding/Room/bathroom lighting operational, light cord in reach

## 2023-12-09 NOTE — ED ADULT TRIAGE NOTE - INTERNATIONAL TRAVEL
History/Exam/Medical decision making History/Exam/Medical decision making History/Exam/Medical decision making No

## 2023-12-09 NOTE — ED PROVIDER NOTE - PHYSICAL EXAMINATION
Constitutional: Elderly female laying in bed in mild distress, Awake, alert  Eyes: PERRLA EOMI  Head: Normocephalic atraumatic  Mouth: MMM  Cardiac: regular rate   Resp: Lungs CTAB  GI: Abd s/nt/nd, no rebound or guarding.  Neuro: awake, alert, moving all extremities, cranial nerves 2-12 intact, sensation intact, no dysmetria.  Skin: No rashes  MSK: Right ankle w/o pulse, deformed, w/ open fracture

## 2023-12-09 NOTE — CONSULT NOTE ADULT - ASSESSMENT
82 y/o female w/ a PMHx of Alzheimer's, Parkinson's, spinal stenosis, HLD, and trigeminal neuralgia presents to the ED via EMS s/p fall. Daughter on bedside, history taken by daughter due to patient's orientation status. Patient currently lives in the facility, was on her bed on 7AM, found down on the floor 10 mins to 8AM moaning of pain. Unknown head trauma as well as LOC. Complains of left ankle pain, s/p reduction. No other complaints    Recs:  - F/u Ortho plan for Lt ankle fracture  - Recommend medicine admission, in this particular case due to isolated below the knee injury to the left ankle  - Tertiary survey tomorrow  - Pain control PRN  - Rest of the care as per primary team    Plan will be discussed with Trauma & Surgical critical care surgery attending, Dr. Parr   83F s/p fall  Advanced Alzheimer's, poor historian, does not follow commands on baseline as per daughter (Ms. Good)  CT H/C-spine/C/A/P wnl  CT Rt ankle: Acute trimalleolar fracture of the right ankle, Acute obliquely oriented mildly displaced fracture of the head neck, junction of the fourth metatarsal. Acute nondisplaced acute fractures of the second and third metatarsal head neck junctions.      Recs:  - F/u Ortho plan for Rt ankle fracture  - Recommend medicine admission, in this particular case due to isolated below the knee injury to the right ankle  - Tertiary survey tomorrow  - Pain control PRN  - Rest of the care as per primary team    Plan will be discussed with Trauma & Surgical critical care surgery attending, Dr. Parr

## 2023-12-09 NOTE — PHARMACOTHERAPY INTERVENTION NOTE - COMMENTS
Medication history complete. Medications and allergies reviewed with list provided by Leticia and confirmed with .

## 2023-12-09 NOTE — ED PROVIDER NOTE - CLINICAL SUMMARY MEDICAL DECISION MAKING FREE TEXT BOX
84 y/o female w/ open fx of right foot. Will medicate for pain, reduce, fracture dislocation, obtain ortho consult, blood work, and reassess.

## 2023-12-09 NOTE — CONSULT NOTE ADULT - ATTENDING COMMENTS
Pt seen and examined   Closed trimalleolar fracture with syndesmotic disruption,  Closed reduction with manipulation performed with adequate alignement  Plan for ORIF right ankle  DIscussed with Daughter
A/P:  Pt with dementia, s/p unwitnessed fall at facility  Right ankle fracture, trimalleolar  Right 2-4th metatarsal fractures  Advise appropriate GI/DVT prophylaxis  Pain control  Pt stable and cleared from trauma surgical standpoint for any indicated orthopedic surgical intervention as required and warranted  Mgmt per primary service  No acute trauma surgical intervention required for pt  Advise appropriate outpt pulmonary follow up for incidentally noted ct findings of right lung nodule    75 minuted of time spent on pt examination, review of relevant labs and radiologic studies, assured stabilization of pt, discussion with relevant services/providers for coordination of pt care and services

## 2023-12-09 NOTE — CONSULT NOTE ADULT - SUBJECTIVE AND OBJECTIVE BOX
Orthopedic Surgery Consult Note    83yFemale p/w R ankle pain/deformity and inability to bear weight s/p mechanical fall at [facility] living [facility] this morning, unwitnessed. Patient was found on the floor around 7Am during morning rounds. Patient has significant Alzheimer's dementia and is unable to provide full history. Daughter, Latisha, at bedside. [place] facility contacted as well. Indeterminate headstrike/LOC. Denies numbness/tingling in the feet/toes. No other bone or joint complaints.     Vital Signs Last 24 Hrs  T(C): --  T(F): --  HR: 102 (12-09-23 @ 10:34) (102 - 102)  BP: 134/69 (12-09-23 @ 10:34) (134/69 - 134/69)  BP(mean): 89 (12-09-23 @ 10:34) (89 - 89)  RR: 19 (12-09-23 @ 10:34) (19 - 19)  SpO2: 96% (12-09-23 @ 10:34) (96% - 96%)    Physical Exam  Gen: Nad  RLE: Skin intact, +skin tenting medially +TTP medial/lateral malleolus  motor intact distally  SILT s/s/sp/dp/t  2+ DP    Imaging:  XR showing R trimalleolar ankle fracture    Procedure: Closed reduction performed followed by placement of a well padded trilam splint. Patient tolerated the procedure well. Post procedure imaging obtained and showed improved alignment. Post procedure the patient was NV intact.    A/P: 83yFemale with R ankle fracture s/p closed reduction and splinting  - Plan for OR tomorrow with Dr. Rodriguez for ORIF  - NPO after midnight except meds, provide IVF while NPO  - Admit to medicine, consult Cardiology and EP for PPM interrogation  - Document medical and cardiac optimization for tomorrow OR  - Pain control  - NWB on affected extremity in splint  - Keep splint clean, dry and intact   - Cane/crutches/walker as needed  - Ice/elevation  - Plan discussed with Dr. Rodriguez, who agrees with above Orthopedic Surgery Consult Note    83yFemale p/w R ankle pain/deformity and inability to bear weight s/p mechanical fall at [facility] living [facility] this morning, unwitnessed. Patient was found on the floor around 7Am during morning rounds. Patient has significant Alzheimer's dementia and is unable to provide full history. Daughter, Latisha, at bedside. [place] facility contacted as well. Indeterminate headstrike/LOC. Denies numbness/tingling in the feet/toes. No other bone or joint complaints.     Vital Signs Last 24 Hrs  T(C): --  T(F): --  HR: 102 (12-09-23 @ 10:34) (102 - 102)  BP: 134/69 (12-09-23 @ 10:34) (134/69 - 134/69)  BP(mean): 89 (12-09-23 @ 10:34) (89 - 89)  RR: 19 (12-09-23 @ 10:34) (19 - 19)  SpO2: 96% (12-09-23 @ 10:34) (96% - 96%)    Physical Exam  Gen: Nad  RLE: Skin intact, +skin tenting medially +TTP medial/lateral malleolus  motor intact distally  SILT s/s/sp/dp/t  2+ DP    Imaging:  XR showing R trimalleolar ankle fracture    Procedure: Closed reduction performed followed by placement of a well padded trilam splint. Patient tolerated the procedure well. Post procedure imaging obtained and showed improved alignment. Post procedure the patient was NV intact.    A/P: 83yFemale with R ankle fracture s/p closed reduction and splinting  - Plan for OR tomorrow with Dr. Rodriguez for ORIF  - NPO after midnight except meds, provide IVF while NPO  - Admit to medicine, consult Cardiology and EP for PPM interrogation  - Document medical and cardiac optimization for tomorrow OR  - Pain control  - NWB on affected extremity in splint  - Keep splint clean, dry and intact   - Cane/crutches/walker as needed  - Ice/elevation  - Plan discussed with Dr. Rodriguez, who agrees with above Orthopedic Surgery Consult Note    83yFemale p/w R ankle pain/deformity and inability to bear weight s/p mechanical fall at [facility] living [facility] this morning, unwitnessed. Patient was found on the floor around 7Am during morning rounds. Patient has significant Alzheimer's dementia and is unable to provide full history. Daughter, Latisha, at bedside. [place] facility contacted as well. Indeterminate headstrike/LOC. Denies numbness/tingling in the feet/toes. No other bone or joint complaints.     Vital Signs Last 24 Hrs  T(C): --  T(F): --  HR: 102 (12-09-23 @ 10:34) (102 - 102)  BP: 134/69 (12-09-23 @ 10:34) (134/69 - 134/69)  BP(mean): 89 (12-09-23 @ 10:34) (89 - 89)  RR: 19 (12-09-23 @ 10:34) (19 - 19)  SpO2: 96% (12-09-23 @ 10:34) (96% - 96%)    Physical Exam  Gen: Nad  RLE: Skin intact, +skin tenting medially +TTP medial/lateral malleolus  motor intact distally  SILT s/s/sp/dp/t  2+ DP    Imaging:  XR showing R trimalleolar ankle fracture    Procedure: Closed reduction performed followed by placement of a well padded trilam splint. Patient tolerated the procedure well. Post procedure imaging obtained and showed improved alignment. Post procedure the patient was NV intact.    A/P: 83yFemale with R ankle fracture s/p closed reduction and splinting  - Plan for OR tomorrow with Dr. Rodriguez for ORIF  - NPO after midnight except meds, provide IVF while NPO  - Admit to medicine, consult Cardiology and EP for PPM interrogation  - Document medical and cardiac optimization for tomorrow OR  - Pain control  - NWB on affected extremity in splint  - Keep splint clean, dry and intact   - Cane/crutches/walker as needed  - Ice/elevation  - One dose of heparin today and Hold after midnight for OR tomorrow  - Plan discussed with Dr. Rodriguez, who agrees with above Orthopedic Surgery Consult Note    83yFemale p/w R ankle pain/deformity and inability to bear weight s/p mechanical fall at [facility] living [facility] this morning, unwitnessed. Patient was found on the floor around 7Am during morning rounds. Patient has significant Alzheimer's dementia and is unable to provide full history. Daughter, Latisha, at bedside. [place] facility contacted as well. Indeterminate headstrike/LOC. Denies numbness/tingling in the feet/toes. No other bone or joint complaints.     Vital Signs Last 24 Hrs  T(C): --  T(F): --  HR: 102 (12-09-23 @ 10:34) (102 - 102)  BP: 134/69 (12-09-23 @ 10:34) (134/69 - 134/69)  BP(mean): 89 (12-09-23 @ 10:34) (89 - 89)  RR: 19 (12-09-23 @ 10:34) (19 - 19)  SpO2: 96% (12-09-23 @ 10:34) (96% - 96%)    Physical Exam  Gen: Nad  RLE: Skin intact, +skin tenting medially +TTP medial/lateral malleolus  motor intact distally  SILT s/s/sp/dp/t  2+ DP    Imaging:  XR showing R trimalleolar ankle fracture    Procedure: Closed reduction performed followed by placement of a well padded trilam splint. Patient tolerated the procedure well. Post procedure imaging obtained and showed improved alignment. Post procedure the patient was NV intact.    A/P: 83yFemale with R ankle fracture s/p closed reduction and splinting  - Plan for OR tomorrow with Dr. Rodriguez for ORIF  - NPO after midnight except meds, provide IVF while NPO  - Admit to medicine, consult Cardiology and EP for PPM interrogation  - Document medical and cardiac optimization for tomorrow OR  - Pain control  - NWB on affected extremity in splint  - Keep splint clean, dry and intact   - Cane/crutches/walker as needed  - Ice/elevation  - One dose of heparin today and Hold after midnight for OR tomorrow  - Plan discussed with Dr. Rodriguez, who agrees with above Orthopedic Surgery Consult Note    83yFemale p/w R ankle pain/deformity and inability to bear weight s/p mechanical fall at [facility] living [facility] this morning, unwitnessed. Patient was found on the floor around 7Am during morning rounds. Patient has significant Alzheimer's dementia and is unable to provide full history. Daughter, Latisha, at bedside. [place] facility contacted as well. Indeterminate headstrike/LOC. Denies numbness/tingling in the feet/toes. No other bone or joint complaints.     Daughter/HCP: Latisha Lopez [phone]      Vital Signs Last 24 Hrs  T(C): --  T(F): --  HR: 102 (12-09-23 @ 10:34) (102 - 102)  BP: 134/69 (12-09-23 @ 10:34) (134/69 - 134/69)  BP(mean): 89 (12-09-23 @ 10:34) (89 - 89)  RR: 19 (12-09-23 @ 10:34) (19 - 19)  SpO2: 96% (12-09-23 @ 10:34) (96% - 96%)    Physical Exam  Gen: Nad  RLE: Skin intact, +skin tenting medially +TTP medial/lateral malleolus  motor intact distally  SILT s/s/sp/dp/t  2+ DP    Imaging:  XR showing R trimalleolar ankle fracture    Procedure: Closed reduction performed followed by placement of a well padded trilam splint. Patient tolerated the procedure well. Post procedure imaging obtained and showed improved alignment. Post procedure the patient was NV intact.    A/P: 83y Female with R trimalleolar fracture s/p closed reduction and splinting, w/ abrasion to medial mal (no obvious open injury)      - Plan for OR tomorrow with Dr. Rodriguez for ORIF 0800  - NPO after midnight except meds, provide IVF while NPO  - Admit to medicine, consult Cardiology and EP for PPM interrogation  - Document medical and cardiac optimization for tomorrow OR  - Pain control  - NWB on affected extremity in splint  - Keep splint clean, dry and intact   - Cane/crutches/walker as needed  - Ice/elevation  - OK One dose of heparin today and Hold after midnight for OR tomorrow  - WIll plan to continue ancef with wound to medial malleolus as precaution for 24-48 hrs, however does not appear to be an open injury,   - Plan discussed with Dr. Rodriguez, who agrees with above Orthopedic Surgery Consult Note    83yFemale p/w R ankle pain/deformity and inability to bear weight s/p mechanical fall at [facility] living [facility] this morning, unwitnessed. Patient was found on the floor around 7Am during morning rounds. Patient has significant Alzheimer's dementia and is unable to provide full history. Daughter, Latisha, at bedside. [place] facility contacted as well. Indeterminate headstrike/LOC. Denies numbness/tingling in the feet/toes. No other bone or joint complaints.     Daughter/HCP: Latisha Lopez [phone]      Vital Signs Last 24 Hrs  T(C): --  T(F): --  HR: 102 (12-09-23 @ 10:34) (102 - 102)  BP: 134/69 (12-09-23 @ 10:34) (134/69 - 134/69)  BP(mean): 89 (12-09-23 @ 10:34) (89 - 89)  RR: 19 (12-09-23 @ 10:34) (19 - 19)  SpO2: 96% (12-09-23 @ 10:34) (96% - 96%)    Physical Exam  Gen: Nad  RLE: Skin intact, +skin tenting medially +TTP medial/lateral malleolus  motor intact distally  SILT s/s/sp/dp/t  2+ DP    Imaging:  XR showing R trimalleolar ankle fracture    Procedure: Closed reduction performed followed by placement of a well padded trilam splint. Patient tolerated the procedure well. Post procedure imaging obtained and showed improved alignment. Post procedure the patient was NV intact.    A/P: 83y Female with R trimalleolar fracture s/p closed reduction and splinting, w/ abrasion to medial mal (no obvious open injury)      - Plan for OR tomorrow with Dr. Rodriguez for ORIF 0800  - NPO after midnight except meds, provide IVF while NPO  - Admit to medicine, consult Cardiology and EP for PPM interrogation  - Document medical and cardiac optimization for tomorrow OR  - Pain control  - NWB on affected extremity in splint  - Keep splint clean, dry and intact   - Cane/crutches/walker as needed  - Ice/elevation  - OK One dose of heparin today and Hold after midnight for OR tomorrow  - WIll plan to continue ancef with wound to medial malleolus as precaution for 24-48 hrs, however does not appear to be an open injury,   - Plan discussed with Dr. Rodriguez, who agrees with above Orthopedic Surgery Consult Note    83yFemale p/w R ankle pain/deformity and inability to bear weight s/p mechanical fall at [facility] living [facility] this morning, unwitnessed. Patient was found on the floor around 7Am during morning rounds. Patient has significant Alzheimer's dementia and is unable to provide full history. Daughter, Latisha, at bedside. [place] facility contacted as well. Indeterminate headstrike/LOC. Denies numbness/tingling in the feet/toes. No other bone or joint complaints.     Daughter/HCP: Latisha Lopez [phone]      Vital Signs Last 24 Hrs  T(C): --  T(F): --  HR: 102 (12-09-23 @ 10:34) (102 - 102)  BP: 134/69 (12-09-23 @ 10:34) (134/69 - 134/69)  BP(mean): 89 (12-09-23 @ 10:34) (89 - 89)  RR: 19 (12-09-23 @ 10:34) (19 - 19)  SpO2: 96% (12-09-23 @ 10:34) (96% - 96%)    Physical Exam  Gen: Nad  RLE: Skin intact, +skin tenting medially +TTP medial/lateral malleolus  motor intact distally  SILT s/s/sp/dp/t  2+ DP    Imaging:  XR showing R trimalleolar ankle fracture    Procedure: Closed reduction performed followed by placement of a well padded trilam splint. Patient tolerated the procedure well. Post procedure imaging obtained and showed improved alignment. Post procedure the patient was NV intact.    A/P: 83y Female with R trimalleolar fracture s/p closed reduction and splinting, w/ abrasion to medial mal (no obvious open injury); R 2-4 met neck fx       - Plan for OR tomorrow with Dr. Rodriguez for ORIF 0800  - NPO after midnight except meds, provide IVF while NPO  - Admit to medicine, consult Cardiology and EP for PPM interrogation  - Document medical and cardiac optimization for tomorrow OR  - Pain control  - NWB on affected extremity in splint  - Keep splint clean, dry and intact   - Cane/crutches/walker as needed  - Ice/elevation  - OK One dose of heparin today and Hold after midnight for OR tomorrow  - WIll plan to continue ancef with wound to medial malleolus as precaution for 24-48 hrs, however does not appear to be an open injury,   - Plan discussed with Dr. Rodriguez, who agrees with above Orthopedic Surgery Consult Note    83yFemale p/w R ankle pain/deformity and inability to bear weight s/p mechanical fall at [facility] living [facility] this morning, unwitnessed. Patient was found on the floor around 7Am during morning rounds. Patient has significant Alzheimer's dementia and is unable to provide full history. Daughter, Latisha, at bedside. [place] facility contacted as well. Indeterminate headstrike/LOC. Denies numbness/tingling in the feet/toes. No other bone or joint complaints.     Daughter/HCP: Latisha Lopez [phone]      Vital Signs Last 24 Hrs  T(C): --  T(F): --  HR: 102 (12-09-23 @ 10:34) (102 - 102)  BP: 134/69 (12-09-23 @ 10:34) (134/69 - 134/69)  BP(mean): 89 (12-09-23 @ 10:34) (89 - 89)  RR: 19 (12-09-23 @ 10:34) (19 - 19)  SpO2: 96% (12-09-23 @ 10:34) (96% - 96%)    Physical Exam  Gen: Nad  RLE: Skin intact, +skin tenting medially +TTP medial/lateral malleolus  motor intact distally  SILT s/s/sp/dp/t  2+ DP    Imaging:  XR showing R trimalleolar ankle fracture    Procedure: Closed reduction performed followed by placement of a well padded trilam splint. Patient tolerated the procedure well. Post procedure imaging obtained and showed improved alignment. Post procedure the patient was NV intact.    A/P: 83y Female with R trimalleolar fracture s/p closed reduction and splinting, w/ abrasion to medial mal (no obvious open injury); R 2-4 met neck fx       - Plan for OR tomorrow with Dr. Rodriguez for ORIF 0800  - NPO after midnight except meds, provide IVF while NPO  - Admit to medicine, consult Cardiology and EP for PPM interrogation  - Document medical and cardiac optimization for tomorrow OR  - Pain control  - NWB on affected extremity in splint  - Keep splint clean, dry and intact   - Cane/crutches/walker as needed  - Ice/elevation  - OK One dose of heparin today and Hold after midnight for OR tomorrow  - WIll plan to continue ancef with wound to medial malleolus as precaution for 24-48 hrs, however does not appear to be an open injury,   - Plan discussed with Dr. Rodriguez, who agrees with above

## 2023-12-09 NOTE — ED ADULT NURSE NOTE - NSFALLHARMRISKINTERV_ED_ALL_ED

## 2023-12-09 NOTE — ED PROVIDER NOTE - PROGRESS NOTE DETAILS
Lili Dumont for attending Dr. Banegas:  Trauma hasn't yet come down, called surgery resident to make sure they're aware. Will come down to see pt.

## 2023-12-09 NOTE — CONSULT NOTE ADULT - PROBLEM SELECTOR RECOMMENDATION 9
-h/o PM no other cardiac history  -no ECG yet  -cannot provide history 2/2 dementia daughter states no cardiac symptoms noted.    -Check ECG  -Check PM has not been checked in several yrs.  -If above are normal, may proceed w/ surgery w/o further cardiac testing.

## 2023-12-09 NOTE — ED ADULT NURSE NOTE - NSICDXPASTSURGICALHX_GEN_ALL_CORE_FT
Principal Discharge DX:	Hypertension PAST SURGICAL HISTORY:  No significant past surgical history

## 2023-12-09 NOTE — H&P ADULT - NSHPLABSRESULTS_GEN_ALL_CORE
LABS:                          12.0   12.36 )-----------( 153      ( 09 Dec 2023 10:59 )             37.6     12-09    141  |  110<H>  |  30<H>  ----------------------------<  92  4.0   |  27  |  1.14    Ca    8.6      09 Dec 2023 10:59    TPro  7.1  /  Alb  3.0<L>  /  TBili  0.2  /  DBili  x   /  AST  40<H>  /  ALT  37  /  AlkPhos  168<H>  12-09    PT/INR - ( 09 Dec 2023 10:59 )   PT: 11.1 sec;   INR: 0.98 ratio      CT Abdomen and Pelvis w/ IV Cont (12.09.23 @ 10:24)     FINDINGS:  CHEST:  LUNGS AND LARGE AIRWAYS: Patent central airways. Linear atelectasis lung   bases.  PLEURA: No pleural effusion. No pneumothorax or pneumomediastinum.  VESSELS: Within normal limits.  HEART: Left-sided dual-lead cardiac pacer. Heart size is normal. No   pericardial effusion.  MEDIASTINUM AND AGGIE: No lymphadenopathy.  CHEST WALL AND LOWER NECK: Within normal limits.    ABDOMEN AND PELVIS:  LIVER: Within normal limits.  BILE DUCTS: Normal caliber.  GALLBLADDER: Within normal limits.  SPLEEN: Within normal limits.  PANCREAS: Within normal limits.  ADRENALS: Within normal limits.  KIDNEYS/URETERS: Mild bilateral renal cortical irregularity, compatible   with scarring. Small bilateral renal cysts. Small nonobstructing left   renal stones measuring up to 4 mm. No evidence of hydronephrosis, mass,   or perinephric stranding bilaterally.    BLADDER: Within normal limits.  REPRODUCTIVE ORGANS: 3.5 cm anterior superior uterine fibroid. For 2 mm   right ovarian cyst. The left adnexa is unremarkable by CT criteria.    BOWEL: Colonic diverticulosis. No bowel obstruction. Appendix is normal.  PERITONEUM: No ascites.  VESSELS: Within normal limits.  RETROPERITONEUM/LYMPH NODES: No lymphadenopathy.  ABDOMINAL WALL: Within normal limits.  BONES: Healed left lateral rib fractures. Chronic-appearing compression   deformities of T8, T11, T12, L1, L2, L3, and L5.    IMPRESSION: No evidence of acute visceral organ or bony injury.      CT Ankle No Cont, Right (12.09.23 @ 11:52)     IMPRESSION:    Acute trimalleolar fracture of the right ankle as outlined above.    Acute obliquely oriented mildly displaced fracture of the head neck   junction of the fourth metatarsal.    Acute nondisplaced acute fractures of the second and third metatarsal   head neck junctions.

## 2023-12-09 NOTE — H&P ADULT - ASSESSMENT
84 y/o female with PMH of Alzheimer's, Parkinson's, spinal stenosis, HLD, presents to  ED after an unwitnessed fall at [facility].     #Fall  #Right trimalleolar ankle fracture  - Unwitnessed fall at [facility]. Not on blood thinners   - s/p closed reduction performed by Ortho followed by placement of splint. Plan for ORIF tomorrow  - Seen by Cardiology for cardiac clearance   - EP for pacemaker interrogation  - EKG reviewed, NSR without ischemic changes  - Pain control prn, currently comfortable   - Continue with Ancef for 24-48 hrs per Ortho   - Preoperative Evaluation: Per daughter, patient had the PPM placed many years ago due to syncopal events. Further details unknown. Besides that, patient with no known active cardiac conditions, including unstable coronary syndrome, decompensated CHF, or severe valvular disease. Poor METs, functional capacity is limited, making her moderate risk for OR. Currently, patient is hypothermic to 95. No signs of infection but will obtain cultures, u/a. Chest imaging clear. Warming blanket placed. Once pacemaker is interrogated and normal, patient will be medically optimized to proceed to OR tomorrow for ORIF.    #Alzheimer's  #Parkinson's  - Continue home medications  - Daughter to bring in Nuplazid     #Hypothyroidism  - Will check TSH  - Continue Synthroid     #Elevated lipase  No abdominal pain. CT a/p normal     DVT ppx: Hold for OR tomorrow, SCD left leg  ADMIT TO 2N   CODE STATUS: FULL CODE 84 y/o female with PMH of Alzheimer's, Parkinson's, spinal stenosis, HLD, presents to  ED after an unwitnessed fall at [facility].     #Fall  #Right trimalleolar ankle fracture  - Unwitnessed fall at [facility]. Not on blood thinners   - s/p closed reduction performed by Ortho followed by placement of splint. Plan for ORIF tomorrow  - Seen by Cardiology for cardiac clearance   - EP for pacemaker interrogation  - EKG reviewed, NSR without ischemic changes  - Pain control prn, currently comfortable   - Continue with Ancef for 24-48 hrs per Ortho   - Preoperative Evaluation: Per daughter, patient had the PPM placed many years ago due to syncopal events. Further details unknown. Besides that, patient with no known active cardiac conditions, including unstable coronary syndrome, decompensated CHF, or severe valvular disease. Poor METs, functional capacity is limited, making her moderate risk for OR. Currently, patient is hypothermic to 95. No signs of infection but will obtain cultures, u/a. Chest imaging clear. Warming blanket placed. Once pacemaker is interrogated and normal, patient will be medically optimized to proceed to OR tomorrow for ORIF.    #Alzheimer's  #Parkinson's  - Continue home medications  - Daughter to bring in Nuplazid     #Hypothyroidism  - Will check TSH  - Continue Synthroid     #Elevated lipase  No abdominal pain. CT a/p normal     DVT ppx: Hold for OR tomorrow, SCD left leg  ADMIT TO 2N   CODE STATUS: FULL CODE

## 2023-12-09 NOTE — H&P ADULT - NSHPSOCIALHISTORY_GEN_ALL_CORE
Lives in [facility]  No toxic habits  Ambulates with walker Lives in [facility]  No toxic habits  Ambulates with walker

## 2023-12-09 NOTE — CONSULT NOTE ADULT - SUBJECTIVE AND OBJECTIVE BOX
82 y/o female w/ a PMHx of Alzheimer's, Parkinson's, spinal stenosis, HLD, and trigeminal neuralgia presents to the ED via EMS s/p fall from standing. Pt noted to have swelling to right ankle. Pt unable to give circumstances of fall, hx limited as pt poor historian. Allergies: Penicillin, sulfa, Cleocin. PCP: Dr. Augustin. 84 y/o female w/ a PMHx of Alzheimer's, Parkinson's, spinal stenosis, HLD, and trigeminal neuralgia presents to the ED via EMS s/p fall from standing. Pt noted to have swelling to right ankle. Pt unable to give circumstances of fall, hx limited as pt poor historian. Allergies: Penicillin, sulfa, Cleocin. PCP: Dr. Augustin. 84 y/o female w/ a PMHx of Alzheimer's, Parkinson's, spinal stenosis, HLD, and trigeminal neuralgia presents to the ED via EMS s/p fall. Daughter on bedside, history taken by daughter due to patient's orientation status. Patient currently lives in the facility, was on her bed on 7AM, found down on the floor 10 mins to 8AM moaning of pain. Unknown head trauma as well as LOC. Complains of left ankle pain, s/p reduction. No other complaints    PMHx: Alzheimer's, Parkinson's, spinal stenosis, HLD, and trigeminal neuralgia   PSHx: unknown  ALL: latex Cleocin, PCN, Sulfa drugs          Age: 83F  Mechanism of Injury/Findings at scene: fall      Primary Survey   Airway: [intact]   Breathing: [normal, breath sounds equal bilaterally]    Circulation: [skin warm, distal pulses 2+, capillary refill less than 2 seconds globally]   Disability   Pupils: [equal and reactive to light, _-_mm, brisk]   GCS: [14, E=4 V=5 M=5, Pt does not follow commands on baseline due to her advanced Alzheimer]    Motor Function: [move all extremities]    Sensory: [no deficits]   Exposure: mild skin bruises on right occipital scalp. left ankle reduced with splint    Secondary Survey   VS: wnl  GEN: [NAD]   HEAD: [mild skin bruises on right occipital scalp.]  EYES: [pupils round reactive to light, conjunctiva clear, extraocular movements intact, no raccoons eyes]  ENT: [no fluid in external acoustic canals, no hemotympanum, no cueto's sign, nares patent, oropharynx clear]  NECK: [no JVD, midline trachea, no cervical spine tenderness, C-collar in place]   HEART: [regular rate and rhythm]   LUNGS: [CTAB]   CHEST: [chest wall non-tender, no bruising/deformity]  ABD: [no Grey-Cope's or Toni's sign, soft, non tender, no rebound or guarding,]   PELVIS: [stable to rock]   BACK: [no step offs or deformities, T-L spine non tender]   : [no perineal hematoma, NO BLOOD AT MEATUS]   EXT: [left ankle splinted, couldn't assess pulses as well as motor functions due to patient's advanced dementia. Otherwise 2+ global pulses, moving all extremities well, +5/5 muscle strength globally]   NEURO: [CNII-XII grossly intact, no sensory deficits]          Vitals:  T(C): 34.9 (12-09 @ 14:17), Max: 36.9 (12-09 @ 09:07)  HR: 71 (12-09 @ 14:17) (71 - 102)  BP: 107/86 (12-09 @ 14:17) (107/86 - 134/69)  RR: 18 (12-09 @ 14:17) (12 - 19)  SpO2: 98% (12-09 @ 14:17) (89% - 100%)        12-09 @ 10:59                    12.0  CBC: 12.36>)-------(<153                     37.6                 141 | 110 | 30    CMP:  ----------------------< 92               4.0 | 27 | 1.14                      Ca:8.6  Phos:-  Mg:-               -|      |-        LFTs:  ------|-|-----             -|      |-  12-09 @ 09:27                    12.3  CBC: 8.83>)-------(<167                     38.6                 145 | 112 | 30    CMP:  ----------------------< 101               4.4 | 30 | 1.08                      Ca:9.2  Phos:-  Mg:-               0.2|      |40        LFTs:  ------|168|-----             -|      |-            Current Inpatient Medications:  ceFAZolin  Injectable. 2000 milliGRAM(s) IV Push every 8 hours      < from: CT Ankle No Cont, Right (12.09.23 @ 11:52) >  IMPRESSION:    Acute trimalleolar fracture of the right ankle as outlined above.    Acute obliquely oriented mildly displaced fracture of the head neck   junction of the fourth metatarsal.    Acute nondisplaced acute fractures of the second and third metatarsal   head neck junctions.    --- End of Report ---    < end of copied text >  < from: CT Cervical Spine No Cont (12.09.23 @ 10:23) >  IMPRESSION:    CT HEAD:  1.  No evidence of acuteintracranial hemorrhage or midline shift.  2.  Chronic small vessel disease. If there is continued concern for acute   neurologic compromise, recommend MRI of the brain for further evaluation.    CT CERVICAL SPINE:  1.  No evidence of acute osseous fracture or azul dislocation.  2.  Multilevel degenerative change of the cervical spine.  3.  Subcentimeter nodule in the right lung apex. Recommend follow-up   imaging as per Fleischner criteria.    --- End of Report ---    < end of copied text >  < from: CT Chest w/ IV Cont (12.09.23 @ 10:24) >  IMPRESSION: No evidence of acute visceral organ or bony injury.    < end of copied text >       82 y/o female w/ a PMHx of Alzheimer's, Parkinson's, spinal stenosis, HLD, and trigeminal neuralgia presents to the ED via EMS s/p fall. Daughter on bedside, history taken by daughter due to patient's orientation status. Patient currently lives in the facility, was on her bed on 7AM, found down on the floor 10 mins to 8AM moaning of pain. Unknown head trauma as well as LOC. Complains of right ankle pain, s/p reduction. No other complaints    PMHx: Alzheimer's, Parkinson's, spinal stenosis, HLD, and trigeminal neuralgia   PSHx: unknown  ALL: latex Cleocin, PCN, Sulfa drugs          Age: 83F  Mechanism of Injury/Findings at scene: fall      Primary Survey   Airway: [intact]   Breathing: [normal, breath sounds equal bilaterally]    Circulation: [skin warm, distal pulses 2+, capillary refill less than 2 seconds globally]   Disability   Pupils: [equal and reactive to light, _-_mm, brisk]   GCS: [14, E=4 V=5 M=5, Pt does not follow commands on baseline due to her advanced Alzheimer]    Motor Function: [move all extremities]    Sensory: [no deficits]   Exposure: mild skin bruises on right occipital scalp. left ankle reduced with splint    Secondary Survey   VS: wnl  GEN: [NAD]   HEAD: [mild skin bruises on right occipital scalp.]  EYES: [pupils round reactive to light, conjunctiva clear, extraocular movements intact, no raccoons eyes]  ENT: [no fluid in external acoustic canals, no hemotympanum, no cueto's sign, nares patent, oropharynx clear]  NECK: [no JVD, midline trachea, no cervical spine tenderness, C-collar in place]   HEART: [regular rate and rhythm]   LUNGS: [CTAB]   CHEST: [chest wall non-tender, no bruising/deformity]  ABD: [no Grey-Cope's or [place]'s sign, soft, non tender, no rebound or guarding,]   PELVIS: [stable to rock]   BACK: [no step offs or deformities, T-L spine non tender]   : [no perineal hematoma, NO BLOOD AT MEATUS]   EXT: [Right ankle splinted, couldn't assess pulses as well as motor functions due to patient's advanced dementia. Otherwise 2+ global pulses, moving all extremities well, +5/5 muscle strength globally]   NEURO: [CNII-XII grossly intact, no sensory deficits]          Vitals:  T(C): 34.9 (12-09 @ 14:17), Max: 36.9 (12-09 @ 09:07)  HR: 71 (12-09 @ 14:17) (71 - 102)  BP: 107/86 (12-09 @ 14:17) (107/86 - 134/69)  RR: 18 (12-09 @ 14:17) (12 - 19)  SpO2: 98% (12-09 @ 14:17) (89% - 100%)        12-09 @ 10:59                    12.0  CBC: 12.36>)-------(<153                     37.6                 141 | 110 | 30    CMP:  ----------------------< 92               4.0 | 27 | 1.14                      Ca:8.6  Phos:-  Mg:-               -|      |-        LFTs:  ------|-|-----             -|      |-  12-09 @ 09:27                    12.3  CBC: 8.83>)-------(<167                     38.6                 145 | 112 | 30    CMP:  ----------------------< 101               4.4 | 30 | 1.08                      Ca:9.2  Phos:-  Mg:-               0.2|      |40        LFTs:  ------|168|-----             -|      |-            Current Inpatient Medications:  ceFAZolin  Injectable. 2000 milliGRAM(s) IV Push every 8 hours      < from: CT Ankle No Cont, Right (12.09.23 @ 11:52) >  IMPRESSION:    Acute trimalleolar fracture of the right ankle as outlined above.    Acute obliquely oriented mildly displaced fracture of the head neck   junction of the fourth metatarsal.    Acute nondisplaced acute fractures of the second and third metatarsal   head neck junctions.    --- End of Report ---    < end of copied text >  < from: CT Cervical Spine No Cont (12.09.23 @ 10:23) >  IMPRESSION:    CT HEAD:  1.  No evidence of acuteintracranial hemorrhage or midline shift.  2.  Chronic small vessel disease. If there is continued concern for acute   neurologic compromise, recommend MRI of the brain for further evaluation.    CT CERVICAL SPINE:  1.  No evidence of acute osseous fracture or azul dislocation.  2.  Multilevel degenerative change of the cervical spine.  3.  Subcentimeter nodule in the right lung apex. Recommend follow-up   imaging as per Fleischner criteria.    --- End of Report ---    < end of copied text >  < from: CT Chest w/ IV Cont (12.09.23 @ 10:24) >  IMPRESSION: No evidence of acute visceral organ or bony injury.    < end of copied text >       82 y/o female w/ a PMHx of Alzheimer's, Parkinson's, spinal stenosis, HLD, and trigeminal neuralgia presents to the ED via EMS s/p fall. Daughter on bedside, history taken by daughter due to patient's orientation status. Patient currently lives in the facility, was on her bed on 7AM, found down on the floor 10 mins to 8AM moaning of pain. Unknown head trauma as well as LOC. Complains of right ankle pain, s/p reduction. No other complaints    PMHx: Alzheimer's, Parkinson's, spinal stenosis, HLD, and trigeminal neuralgia   PSHx: unknown  ALL: latex Cleocin, PCN, Sulfa drugs          Age: 83F  Mechanism of Injury/Findings at scene: fall      Primary Survey   Airway: [intact]   Breathing: [normal, breath sounds equal bilaterally]    Circulation: [skin warm, distal pulses 2+, capillary refill less than 2 seconds globally]   Disability   Pupils: [equal and reactive to light, _-_mm, brisk]   GCS: [14, E=4 V=5 M=5, Pt does not follow commands on baseline due to her advanced Alzheimer]    Motor Function: [move all extremities]    Sensory: [no deficits]   Exposure: mild skin bruises on right occipital scalp. left ankle reduced with splint    Secondary Survey   VS: wnl  GEN: [NAD]   HEAD: [mild skin bruises on right occipital scalp.]  EYES: [pupils round reactive to light, conjunctiva clear, extraocular movements intact, no raccoons eyes]  ENT: [no fluid in external acoustic canals, no hemotympanum, no cueto's sign, nares patent, oropharynx clear]  NECK: [no JVD, midline trachea, no cervical spine tenderness, C-collar in place]   HEART: [regular rate and rhythm]   LUNGS: [CTAB]   CHEST: [chest wall non-tender, no bruising/deformity]  ABD: [no Grey-Cope's or [place]'s sign, soft, non tender, no rebound or guarding,]   PELVIS: [stable to rock]   BACK: [no step offs or deformities, T-L spine non tender]   : [no perineal hematoma, NO BLOOD AT MEATUS]   EXT: [Right ankle splinted, couldn't assess pulses as well as motor functions due to patient's advanced dementia. Otherwise 2+ global pulses, moving all extremities well, +5/5 muscle strength globally]   NEURO: [CNII-XII grossly intact, no sensory deficits]          Vitals:  T(C): 34.9 (12-09 @ 14:17), Max: 36.9 (12-09 @ 09:07)  HR: 71 (12-09 @ 14:17) (71 - 102)  BP: 107/86 (12-09 @ 14:17) (107/86 - 134/69)  RR: 18 (12-09 @ 14:17) (12 - 19)  SpO2: 98% (12-09 @ 14:17) (89% - 100%)        12-09 @ 10:59                    12.0  CBC: 12.36>)-------(<153                     37.6                 141 | 110 | 30    CMP:  ----------------------< 92               4.0 | 27 | 1.14                      Ca:8.6  Phos:-  Mg:-               -|      |-        LFTs:  ------|-|-----             -|      |-  12-09 @ 09:27                    12.3  CBC: 8.83>)-------(<167                     38.6                 145 | 112 | 30    CMP:  ----------------------< 101               4.4 | 30 | 1.08                      Ca:9.2  Phos:-  Mg:-               0.2|      |40        LFTs:  ------|168|-----             -|      |-            Current Inpatient Medications:  ceFAZolin  Injectable. 2000 milliGRAM(s) IV Push every 8 hours      < from: CT Ankle No Cont, Right (12.09.23 @ 11:52) >  IMPRESSION:    Acute trimalleolar fracture of the right ankle as outlined above.    Acute obliquely oriented mildly displaced fracture of the head neck   junction of the fourth metatarsal.    Acute nondisplaced acute fractures of the second and third metatarsal   head neck junctions.    --- End of Report ---    < end of copied text >  < from: CT Cervical Spine No Cont (12.09.23 @ 10:23) >  IMPRESSION:    CT HEAD:  1.  No evidence of acuteintracranial hemorrhage or midline shift.  2.  Chronic small vessel disease. If there is continued concern for acute   neurologic compromise, recommend MRI of the brain for further evaluation.    CT CERVICAL SPINE:  1.  No evidence of acute osseous fracture or azul dislocation.  2.  Multilevel degenerative change of the cervical spine.  3.  Subcentimeter nodule in the right lung apex. Recommend follow-up   imaging as per Fleischner criteria.    --- End of Report ---    < end of copied text >  < from: CT Chest w/ IV Cont (12.09.23 @ 10:24) >  IMPRESSION: No evidence of acute visceral organ or bony injury.    < end of copied text >       Trauma Consult 12/9/23  CC: Patient is a 83y old  Female who presents with a chief complaint of Ankle fracture s/p fall (09 Dec 2023 14:58)    84 y/o female w/ a PMHx of Alzheimer's, Parkinson's, spinal stenosis, HLD, and trigeminal neuralgia presents to the ED via EMS s/p fall. Daughter on bedside, history taken by daughter due to patient's orientation status. Patient currently lives in the facility, was on her bed on 7AM, found down on the floor 10 mins to 8AM moaning of pain. Unknown head trauma as well as LOC. Complains of right ankle pain, s/p reduction. No other complaints    ROS: limited secondary to dementia    PMHx: Alzheimer's dementia, Parkinson's, spinal stenosis, HLD, and trigeminal neuralgia   PSHx: unknown  ALL: latex Cleocin, PCN, Sulfa drugs  SH: no reported etoh/tobacco/illicit drug use  FH: no reported relevant 1st degree h/o CAD        Age: 83F  Mechanism of Injury/Findings at scene: fall      Primary Survey   Airway: [intact]   Breathing: [normal, breath sounds equal bilaterally]    Circulation: [skin warm, distal pulses 2+, capillary refill less than 2 seconds globally]   Disability   Pupils: [equal and reactive to light, _-_mm, brisk]   GCS: [14, E=4 V=5 M=5, Pt does not follow commands on baseline due to her advanced Alzheimer]    Motor Function: [move all extremities]    Sensory: [no deficits]   Exposure: mild skin bruises on right occipital scalp. left ankle reduced with splint    Secondary Survey   VS: wnl  GEN: [NAD]   HEAD: [mild skin bruises on right occipital scalp.]  EYES: [pupils round reactive to light, conjunctiva clear, extraocular movements intact, no raccoons eyes]  ENT: [no fluid in external acoustic canals, no hemotympanum, no cueto's sign, nares patent, oropharynx clear]  NECK: [no JVD, midline trachea, no cervical spine tenderness, C-collar in place]   HEART: [regular rate and rhythm]   LUNGS: [CTAB]   CHEST: [chest wall non-tender, no bruising/deformity]  ABD: [no Grey-Cope's or Toni's sign, soft, non tender, no rebound or guarding,]   PELVIS: [stable to rock]   BACK: [no step offs or deformities, T-L spine non tender]   : [no perineal hematoma, NO BLOOD AT MEATUS]   EXT: [Right ankle splinted, couldn't assess pulses as well as motor functions due to patient's advanced dementia. Otherwise 2+ global pulses, moving all extremities well, +5/5 muscle strength globally]   NEURO: [CNII-XII grossly intact, no sensory deficits]  Psych: known dementia        Vitals:  T(C): 34.9 (12-09 @ 14:17), Max: 36.9 (12-09 @ 09:07)  HR: 71 (12-09 @ 14:17) (71 - 102)  BP: 107/86 (12-09 @ 14:17) (107/86 - 134/69)  RR: 18 (12-09 @ 14:17) (12 - 19)  SpO2: 98% (12-09 @ 14:17) (89% - 100%)        12-09 @ 10:59                    12.0  CBC: 12.36>)-------(<153                     37.6                 141 | 110 | 30    CMP:  ----------------------< 92               4.0 | 27 | 1.14                      Ca:8.6  Phos:-  Mg:-               -|      |-        LFTs:  ------|-|-----             -|      |-  12-09 @ 09:27                    12.3  CBC: 8.83>)-------(<167                     38.6                 145 | 112 | 30    CMP:  ----------------------< 101               4.4 | 30 | 1.08                      Ca:9.2  Phos:-  Mg:-               0.2|      |40        LFTs:  ------|168|-----             -|      |-            Current Inpatient Medications:  ceFAZolin  Injectable. 2000 milliGRAM(s) IV Push every 8 hours      < from: CT Ankle No Cont, Right (12.09.23 @ 11:52) >  IMPRESSION:    Acute trimalleolar fracture of the right ankle as outlined above.    Acute obliquely oriented mildly displaced fracture of the head neck   junction of the fourth metatarsal.    Acute nondisplaced acute fractures of the second and third metatarsal   head neck junctions.    --- End of Report ---    < end of copied text >  < from: CT Cervical Spine No Cont (12.09.23 @ 10:23) >  IMPRESSION:    CT HEAD:  1.  No evidence of acuteintracranial hemorrhage or midline shift.  2.  Chronic small vessel disease. If there is continued concern for acute   neurologic compromise, recommend MRI of the brain for further evaluation.    CT CERVICAL SPINE:  1.  No evidence of acute osseous fracture or azul dislocation.  2.  Multilevel degenerative change of the cervical spine.  3.  Subcentimeter nodule in the right lung apex. Recommend follow-up   imaging as per Fleischner criteria.    --- End of Report ---    < end of copied text >  < from: CT Chest w/ IV Cont (12.09.23 @ 10:24) >  IMPRESSION: No evidence of acute visceral organ or bony injury.    < end of copied text >    < from: CT Abdomen and Pelvis w/ IV Cont (12.09.23 @ 10:24) >  IMPRESSION: No evidence of acute visceral organ or bony injury.    --- End of Report ---    < end of copied text >     Trauma Consult 12/9/23  CC: Patient is a 83y old  Female who presents with a chief complaint of Ankle fracture s/p fall (09 Dec 2023 14:58)    82 y/o female w/ a PMHx of Alzheimer's, Parkinson's, spinal stenosis, HLD, and trigeminal neuralgia presents to the ED via EMS s/p fall. Daughter on bedside, history taken by daughter due to patient's orientation status. Patient currently lives in the facility, was on her bed on 7AM, found down on the floor 10 mins to 8AM moaning of pain. Unknown head trauma as well as LOC. Complains of right ankle pain, s/p reduction. No other complaints    ROS: limited secondary to dementia    PMHx: Alzheimer's dementia, Parkinson's, spinal stenosis, HLD, and trigeminal neuralgia   PSHx: unknown  ALL: latex Cleocin, PCN, Sulfa drugs  SH: no reported etoh/tobacco/illicit drug use  FH: no reported relevant 1st degree h/o CAD        Age: 83F  Mechanism of Injury/Findings at scene: fall      Primary Survey   Airway: [intact]   Breathing: [normal, breath sounds equal bilaterally]    Circulation: [skin warm, distal pulses 2+, capillary refill less than 2 seconds globally]   Disability   Pupils: [equal and reactive to light, _-_mm, brisk]   GCS: [14, E=4 V=5 M=5, Pt does not follow commands on baseline due to her advanced Alzheimer]    Motor Function: [move all extremities]    Sensory: [no deficits]   Exposure: mild skin bruises on right occipital scalp. left ankle reduced with splint    Secondary Survey   VS: wnl  GEN: [NAD]   HEAD: [mild skin bruises on right occipital scalp.]  EYES: [pupils round reactive to light, conjunctiva clear, extraocular movements intact, no raccoons eyes]  ENT: [no fluid in external acoustic canals, no hemotympanum, no cueto's sign, nares patent, oropharynx clear]  NECK: [no JVD, midline trachea, no cervical spine tenderness, C-collar in place]   HEART: [regular rate and rhythm]   LUNGS: [CTAB]   CHEST: [chest wall non-tender, no bruising/deformity]  ABD: [no Grey-Cope's or Toni's sign, soft, non tender, no rebound or guarding,]   PELVIS: [stable to rock]   BACK: [no step offs or deformities, T-L spine non tender]   : [no perineal hematoma, NO BLOOD AT MEATUS]   EXT: [Right ankle splinted, couldn't assess pulses as well as motor functions due to patient's advanced dementia. Otherwise 2+ global pulses, moving all extremities well, +5/5 muscle strength globally]   NEURO: [CNII-XII grossly intact, no sensory deficits]  Psych: known dementia        Vitals:  T(C): 34.9 (12-09 @ 14:17), Max: 36.9 (12-09 @ 09:07)  HR: 71 (12-09 @ 14:17) (71 - 102)  BP: 107/86 (12-09 @ 14:17) (107/86 - 134/69)  RR: 18 (12-09 @ 14:17) (12 - 19)  SpO2: 98% (12-09 @ 14:17) (89% - 100%)        12-09 @ 10:59                    12.0  CBC: 12.36>)-------(<153                     37.6                 141 | 110 | 30    CMP:  ----------------------< 92               4.0 | 27 | 1.14                      Ca:8.6  Phos:-  Mg:-               -|      |-        LFTs:  ------|-|-----             -|      |-  12-09 @ 09:27                    12.3  CBC: 8.83>)-------(<167                     38.6                 145 | 112 | 30    CMP:  ----------------------< 101               4.4 | 30 | 1.08                      Ca:9.2  Phos:-  Mg:-               0.2|      |40        LFTs:  ------|168|-----             -|      |-            Current Inpatient Medications:  ceFAZolin  Injectable. 2000 milliGRAM(s) IV Push every 8 hours      < from: CT Ankle No Cont, Right (12.09.23 @ 11:52) >  IMPRESSION:    Acute trimalleolar fracture of the right ankle as outlined above.    Acute obliquely oriented mildly displaced fracture of the head neck   junction of the fourth metatarsal.    Acute nondisplaced acute fractures of the second and third metatarsal   head neck junctions.    --- End of Report ---    < end of copied text >  < from: CT Cervical Spine No Cont (12.09.23 @ 10:23) >  IMPRESSION:    CT HEAD:  1.  No evidence of acuteintracranial hemorrhage or midline shift.  2.  Chronic small vessel disease. If there is continued concern for acute   neurologic compromise, recommend MRI of the brain for further evaluation.    CT CERVICAL SPINE:  1.  No evidence of acute osseous fracture or azul dislocation.  2.  Multilevel degenerative change of the cervical spine.  3.  Subcentimeter nodule in the right lung apex. Recommend follow-up   imaging as per Fleischner criteria.    --- End of Report ---    < end of copied text >  < from: CT Chest w/ IV Cont (12.09.23 @ 10:24) >  IMPRESSION: No evidence of acute visceral organ or bony injury.    < end of copied text >    < from: CT Abdomen and Pelvis w/ IV Cont (12.09.23 @ 10:24) >  IMPRESSION: No evidence of acute visceral organ or bony injury.    --- End of Report ---    < end of copied text >

## 2023-12-09 NOTE — H&P ADULT - HISTORY OF PRESENT ILLNESS
84 y/o female with PMH of Alzheimer's, Parkinson's, spinal stenosis, HLD, presents to  ED after an unwitnessed fall at [facility]. Unable to obtain history from patient due to severe dementia (baseline AOx0-1). Per daughter at bedside, she believes her mom might of gotten out of bed leading to the fall, however unable to say for sure since it was unwitnessed. She was found on the floor by her bed. Patient had a pacemaker placed in 2018; Daughter doesn't think she's seen an EP since. Patient's spouse was her primary care taker and knew her medical history well before he passed away this past year. Daughter doesn't think she's seen any doctors outside of [place]. Per daughter, patient normally is ambulatory with walker while at [facility].      In ED, XRay showed right trimalleolar ankle fracture. Closed reduction performed by Ortho followed by placement of splint. Plan for ORIF tomorrow  Vitals: Oral temp of 98, but rectal temp of 95. Warming blanket placed. BP stable. HR . 95% on room air  Labs: WBC 8 -> 12 s/p reduction, could be reactive. Lipase elevated to 474  Imaging: XRay showed right trimalleolar ankle fracture. CT c/a/p unremarkable.   EKG: NSR with no ischemic findings  Consults: EP consulted for pacemaker interrogation. Cardiology consulted for clearance. Ortho following for surgery.   Interventions: 250 cc bolus given. Cefazolin started after closed reduction by Ortho    84 y/o female with PMH of Alzheimer's, Parkinson's, spinal stenosis, HLD, presents to  ED after an unwitnessed fall at [facility]. Unable to obtain history from patient due to severe dementia (baseline AOx0-1). Per daughter at bedside, she believes her mom might of gotten out of bed leading to the fall, however unable to say for sure since it was unwitnessed. She was found on the floor by her bed. Patient had a pacemaker placed in 2018; Daughter doesn't think she's seen an EP since. Patient's spouse was her primary care taker and knew her medical history well before he passed away this past year. Daughter doesn't think she's seen any doctors outside of [place]. Per daughter, patient normally is ambulatory with walker while at [facility].      In ED, XRay showed right trimalleolar ankle fracture. Closed reduction performed by Ortho followed by placement of splint. Plan for ORIF tomorrow  Vitals: Oral temp of 98, but rectal temp of 95. Warming blanket placed. BP stable. HR . 95% on room air  Labs: WBC 8 -> 12 s/p reduction, could be reactive. Lipase elevated to 474  Imaging: XRay showed right trimalleolar ankle fracture. CT c/a/p unremarkable.   EKG: NSR with no ischemic findings  Consults: EP consulted for pacemaker interrogation. Cardiology consulted for clearance. Ortho following for surgery.   Interventions: 250 cc bolus given. Cefazolin started after closed reduction by Ortho    82 y/o female with PMH of Alzheimer's, Parkinson's, spinal stenosis, HLD, presents to  ED after an unwitnessed fall at [facility]. Unable to obtain history from patient due to severe dementia (baseline AOx0-1). Per daughter at bedside, she believes her mom might of gotten out of bed leading to the fall, however unable to say for sure since it was unwitnessed. She was found on the floor by her bed. Patient had a pacemaker placed in 2018; Daughter doesn't think she's seen an EP since. Patient's spouse was her primary care taker and knew her medical history well before he passed away this past year. Daughter doesn't think she's seen any doctors outside of [place]. Per daughter, patient normally is ambulatory with walker while at [facility].      In ED, XRay showed right trimalleolar ankle fracture. Closed reduction performed by Ortho followed by placement of splint. Plan for ORIF tomorrow  Vitals: Oral temp of 98, but rectal temp of 95. Warming blanket placed. BP stable. HR . 95% on room air  Labs: WBC 8 -> 12 s/p reduction, could be reactive. Lipase elevated to 474  Imaging: XRay showed right trimalleolar ankle fracture. CT c/a/p unremarkable.   EKG: NSR with no ischemic findings  Consults: EP consulted for pacemaker interrogation. Cardiology consulted for clearance. Ortho following for surgery.   Interventions: 250 cc bolus given. Cefazolin started after closed reduction by Ortho  82 y/o female with PMH of Alzheimer's, Parkinson's, spinal stenosis, HLD, presents to  ED after an unwitnessed fall at [facility]. Unable to obtain history from patient due to severe dementia (baseline AOx0-1). Per daughter at bedside, she believes her mom might of gotten out of bed leading to the fall, however unable to say for sure since it was unwitnessed. She was found on the floor by her bed. Patient had a pacemaker placed in 2018; Daughter doesn't think she's seen an EP since. Patient's spouse was her primary care taker and knew her medical history well before he passed away this past year. Daughter doesn't think she's seen any doctors outside of [place]. Per daughter, patient normally is ambulatory with walker while at [facility].      In ED, XRay showed right trimalleolar ankle fracture. Closed reduction performed by Ortho followed by placement of splint. Plan for ORIF tomorrow  Vitals: Oral temp of 98, but rectal temp of 95. Warming blanket placed. BP stable. HR . 95% on room air  Labs: WBC 8 -> 12 s/p reduction, could be reactive. Lipase elevated to 474  Imaging: XRay showed right trimalleolar ankle fracture. CT c/a/p unremarkable.   EKG: NSR with no ischemic findings  Consults: EP consulted for pacemaker interrogation. Cardiology consulted for clearance. Ortho following for surgery.   Interventions: 250 cc bolus given. Cefazolin started after closed reduction by Ortho

## 2023-12-09 NOTE — H&P ADULT - CONVERSATION DETAILS
INNA discussed with HCP/Daughter Latisha. She wishes for patient to be FULL code.  All questions answered.

## 2023-12-09 NOTE — PROCEDURE NOTE - ADDITIONAL PROCEDURE DETAILS
Normal functioning dual lead PPM implanted in 2015 in [facility]. Normal functioning dual lead PPM implanted in 2015 in [facility].

## 2023-12-10 ENCOUNTER — TRANSCRIPTION ENCOUNTER (OUTPATIENT)
Age: 83
End: 2023-12-10

## 2023-12-10 LAB
ANION GAP SERPL CALC-SCNC: 5 MMOL/L — SIGNIFICANT CHANGE UP (ref 5–17)
ANION GAP SERPL CALC-SCNC: 5 MMOL/L — SIGNIFICANT CHANGE UP (ref 5–17)
APTT BLD: 32.7 SEC — SIGNIFICANT CHANGE UP (ref 24.5–35.6)
APTT BLD: 32.7 SEC — SIGNIFICANT CHANGE UP (ref 24.5–35.6)
BLD GP AB SCN SERPL QL: SIGNIFICANT CHANGE UP
BLD GP AB SCN SERPL QL: SIGNIFICANT CHANGE UP
BUN SERPL-MCNC: 25 MG/DL — HIGH (ref 7–23)
BUN SERPL-MCNC: 25 MG/DL — HIGH (ref 7–23)
CALCIUM SERPL-MCNC: 8.7 MG/DL — SIGNIFICANT CHANGE UP (ref 8.5–10.1)
CALCIUM SERPL-MCNC: 8.7 MG/DL — SIGNIFICANT CHANGE UP (ref 8.5–10.1)
CHLORIDE SERPL-SCNC: 112 MMOL/L — HIGH (ref 96–108)
CHLORIDE SERPL-SCNC: 112 MMOL/L — HIGH (ref 96–108)
CO2 SERPL-SCNC: 26 MMOL/L — SIGNIFICANT CHANGE UP (ref 22–31)
CO2 SERPL-SCNC: 26 MMOL/L — SIGNIFICANT CHANGE UP (ref 22–31)
CREAT SERPL-MCNC: 1.02 MG/DL — SIGNIFICANT CHANGE UP (ref 0.5–1.3)
CREAT SERPL-MCNC: 1.02 MG/DL — SIGNIFICANT CHANGE UP (ref 0.5–1.3)
EGFR: 54 ML/MIN/1.73M2 — LOW
EGFR: 54 ML/MIN/1.73M2 — LOW
GLUCOSE SERPL-MCNC: 90 MG/DL — SIGNIFICANT CHANGE UP (ref 70–99)
GLUCOSE SERPL-MCNC: 90 MG/DL — SIGNIFICANT CHANGE UP (ref 70–99)
HCT VFR BLD CALC: 33.3 % — LOW (ref 34.5–45)
HCT VFR BLD CALC: 33.3 % — LOW (ref 34.5–45)
HCT VFR BLD CALC: 35 % — SIGNIFICANT CHANGE UP (ref 34.5–45)
HCT VFR BLD CALC: 35 % — SIGNIFICANT CHANGE UP (ref 34.5–45)
HGB BLD-MCNC: 10.6 G/DL — LOW (ref 11.5–15.5)
HGB BLD-MCNC: 10.6 G/DL — LOW (ref 11.5–15.5)
HGB BLD-MCNC: 11.1 G/DL — LOW (ref 11.5–15.5)
HGB BLD-MCNC: 11.1 G/DL — LOW (ref 11.5–15.5)
INR BLD: 1 RATIO — SIGNIFICANT CHANGE UP (ref 0.85–1.18)
INR BLD: 1 RATIO — SIGNIFICANT CHANGE UP (ref 0.85–1.18)
MCHC RBC-ENTMCNC: 30.8 PG — SIGNIFICANT CHANGE UP (ref 27–34)
MCHC RBC-ENTMCNC: 30.8 PG — SIGNIFICANT CHANGE UP (ref 27–34)
MCHC RBC-ENTMCNC: 30.9 PG — SIGNIFICANT CHANGE UP (ref 27–34)
MCHC RBC-ENTMCNC: 30.9 PG — SIGNIFICANT CHANGE UP (ref 27–34)
MCHC RBC-ENTMCNC: 31.7 GM/DL — LOW (ref 32–36)
MCHC RBC-ENTMCNC: 31.7 GM/DL — LOW (ref 32–36)
MCHC RBC-ENTMCNC: 31.8 GM/DL — LOW (ref 32–36)
MCHC RBC-ENTMCNC: 31.8 GM/DL — LOW (ref 32–36)
MCV RBC AUTO: 97.1 FL — SIGNIFICANT CHANGE UP (ref 80–100)
MCV RBC AUTO: 97.1 FL — SIGNIFICANT CHANGE UP (ref 80–100)
MCV RBC AUTO: 97.2 FL — SIGNIFICANT CHANGE UP (ref 80–100)
MCV RBC AUTO: 97.2 FL — SIGNIFICANT CHANGE UP (ref 80–100)
PLATELET # BLD AUTO: 149 K/UL — LOW (ref 150–400)
PLATELET # BLD AUTO: 149 K/UL — LOW (ref 150–400)
PLATELET # BLD AUTO: 164 K/UL — SIGNIFICANT CHANGE UP (ref 150–400)
PLATELET # BLD AUTO: 164 K/UL — SIGNIFICANT CHANGE UP (ref 150–400)
POTASSIUM SERPL-MCNC: 4 MMOL/L — SIGNIFICANT CHANGE UP (ref 3.5–5.3)
POTASSIUM SERPL-MCNC: 4 MMOL/L — SIGNIFICANT CHANGE UP (ref 3.5–5.3)
POTASSIUM SERPL-SCNC: 4 MMOL/L — SIGNIFICANT CHANGE UP (ref 3.5–5.3)
POTASSIUM SERPL-SCNC: 4 MMOL/L — SIGNIFICANT CHANGE UP (ref 3.5–5.3)
PROTHROM AB SERPL-ACNC: 11.3 SEC — SIGNIFICANT CHANGE UP (ref 9.5–13)
PROTHROM AB SERPL-ACNC: 11.3 SEC — SIGNIFICANT CHANGE UP (ref 9.5–13)
RBC # BLD: 3.43 M/UL — LOW (ref 3.8–5.2)
RBC # BLD: 3.43 M/UL — LOW (ref 3.8–5.2)
RBC # BLD: 3.6 M/UL — LOW (ref 3.8–5.2)
RBC # BLD: 3.6 M/UL — LOW (ref 3.8–5.2)
RBC # FLD: 13.4 % — SIGNIFICANT CHANGE UP (ref 10.3–14.5)
RBC # FLD: 13.4 % — SIGNIFICANT CHANGE UP (ref 10.3–14.5)
RBC # FLD: 13.5 % — SIGNIFICANT CHANGE UP (ref 10.3–14.5)
RBC # FLD: 13.5 % — SIGNIFICANT CHANGE UP (ref 10.3–14.5)
SODIUM SERPL-SCNC: 143 MMOL/L — SIGNIFICANT CHANGE UP (ref 135–145)
SODIUM SERPL-SCNC: 143 MMOL/L — SIGNIFICANT CHANGE UP (ref 135–145)
WBC # BLD: 6.4 K/UL — SIGNIFICANT CHANGE UP (ref 3.8–10.5)
WBC # BLD: 6.4 K/UL — SIGNIFICANT CHANGE UP (ref 3.8–10.5)
WBC # BLD: 8.72 K/UL — SIGNIFICANT CHANGE UP (ref 3.8–10.5)
WBC # BLD: 8.72 K/UL — SIGNIFICANT CHANGE UP (ref 3.8–10.5)
WBC # FLD AUTO: 6.4 K/UL — SIGNIFICANT CHANGE UP (ref 3.8–10.5)
WBC # FLD AUTO: 6.4 K/UL — SIGNIFICANT CHANGE UP (ref 3.8–10.5)
WBC # FLD AUTO: 8.72 K/UL — SIGNIFICANT CHANGE UP (ref 3.8–10.5)
WBC # FLD AUTO: 8.72 K/UL — SIGNIFICANT CHANGE UP (ref 3.8–10.5)

## 2023-12-10 PROCEDURE — 99232 SBSQ HOSP IP/OBS MODERATE 35: CPT

## 2023-12-10 RX ORDER — TRAMADOL HYDROCHLORIDE 50 MG/1
25 TABLET ORAL EVERY 6 HOURS
Refills: 0 | Status: DISCONTINUED | OUTPATIENT
Start: 2023-12-10 | End: 2023-12-12

## 2023-12-10 RX ORDER — ASCORBIC ACID 60 MG
500 TABLET,CHEWABLE ORAL
Refills: 0 | Status: DISCONTINUED | OUTPATIENT
Start: 2023-12-10 | End: 2023-12-12

## 2023-12-10 RX ORDER — DULOXETINE HYDROCHLORIDE 30 MG/1
60 CAPSULE, DELAYED RELEASE ORAL DAILY
Refills: 0 | Status: DISCONTINUED | OUTPATIENT
Start: 2023-12-10 | End: 2023-12-10

## 2023-12-10 RX ORDER — OXYCODONE HYDROCHLORIDE 5 MG/1
2.5 TABLET ORAL EVERY 4 HOURS
Refills: 0 | Status: DISCONTINUED | OUTPATIENT
Start: 2023-12-10 | End: 2023-12-12

## 2023-12-10 RX ORDER — ONDANSETRON 8 MG/1
4 TABLET, FILM COATED ORAL ONCE
Refills: 0 | Status: DISCONTINUED | OUTPATIENT
Start: 2023-12-10 | End: 2023-12-10

## 2023-12-10 RX ORDER — FENTANYL CITRATE 50 UG/ML
25 INJECTION INTRAVENOUS
Refills: 0 | Status: DISCONTINUED | OUTPATIENT
Start: 2023-12-10 | End: 2023-12-10

## 2023-12-10 RX ORDER — PANTOPRAZOLE SODIUM 20 MG/1
40 TABLET, DELAYED RELEASE ORAL
Refills: 0 | Status: DISCONTINUED | OUTPATIENT
Start: 2023-12-10 | End: 2023-12-12

## 2023-12-10 RX ORDER — RIVAROXABAN 15 MG-20MG
10 KIT ORAL DAILY
Refills: 0 | Status: DISCONTINUED | OUTPATIENT
Start: 2023-12-11 | End: 2023-12-11

## 2023-12-10 RX ORDER — ACETAMINOPHEN 500 MG
1000 TABLET ORAL ONCE
Refills: 0 | Status: COMPLETED | OUTPATIENT
Start: 2023-12-10 | End: 2023-12-11

## 2023-12-10 RX ORDER — CEFAZOLIN SODIUM 1 G
2000 VIAL (EA) INJECTION EVERY 8 HOURS
Refills: 0 | Status: DISCONTINUED | OUTPATIENT
Start: 2023-12-10 | End: 2023-12-10

## 2023-12-10 RX ORDER — ACETAMINOPHEN 500 MG
1000 TABLET ORAL EVERY 8 HOURS
Refills: 0 | Status: DISCONTINUED | OUTPATIENT
Start: 2023-12-10 | End: 2023-12-12

## 2023-12-10 RX ORDER — GABAPENTIN 400 MG/1
400 CAPSULE ORAL
Refills: 0 | Status: DISCONTINUED | OUTPATIENT
Start: 2023-12-10 | End: 2023-12-10

## 2023-12-10 RX ORDER — HYDROMORPHONE HYDROCHLORIDE 2 MG/ML
0.2 INJECTION INTRAMUSCULAR; INTRAVENOUS; SUBCUTANEOUS ONCE
Refills: 0 | Status: DISCONTINUED | OUTPATIENT
Start: 2023-12-10 | End: 2023-12-12

## 2023-12-10 RX ORDER — CEFAZOLIN SODIUM 1 G
2000 VIAL (EA) INJECTION EVERY 8 HOURS
Refills: 0 | Status: COMPLETED | OUTPATIENT
Start: 2023-12-10 | End: 2023-12-11

## 2023-12-10 RX ORDER — OXYCODONE HYDROCHLORIDE 5 MG/1
5 TABLET ORAL EVERY 4 HOURS
Refills: 0 | Status: DISCONTINUED | OUTPATIENT
Start: 2023-12-10 | End: 2023-12-12

## 2023-12-10 RX ORDER — POLYETHYLENE GLYCOL 3350 17 G/17G
17 POWDER, FOR SOLUTION ORAL AT BEDTIME
Refills: 0 | Status: DISCONTINUED | OUTPATIENT
Start: 2023-12-10 | End: 2023-12-10

## 2023-12-10 RX ORDER — SODIUM CHLORIDE 9 MG/ML
1000 INJECTION, SOLUTION INTRAVENOUS
Refills: 0 | Status: DISCONTINUED | OUTPATIENT
Start: 2023-12-10 | End: 2023-12-10

## 2023-12-10 RX ORDER — DONEPEZIL HYDROCHLORIDE 10 MG/1
10 TABLET, FILM COATED ORAL AT BEDTIME
Refills: 0 | Status: DISCONTINUED | OUTPATIENT
Start: 2023-12-10 | End: 2023-12-10

## 2023-12-10 RX ORDER — LEVOTHYROXINE SODIUM 125 MCG
25 TABLET ORAL DAILY
Refills: 0 | Status: DISCONTINUED | OUTPATIENT
Start: 2023-12-10 | End: 2023-12-10

## 2023-12-10 RX ORDER — SENNA PLUS 8.6 MG/1
2 TABLET ORAL AT BEDTIME
Refills: 0 | Status: DISCONTINUED | OUTPATIENT
Start: 2023-12-10 | End: 2023-12-10

## 2023-12-10 RX ORDER — ONDANSETRON 8 MG/1
4 TABLET, FILM COATED ORAL EVERY 6 HOURS
Refills: 0 | Status: DISCONTINUED | OUTPATIENT
Start: 2023-12-10 | End: 2023-12-10

## 2023-12-10 RX ORDER — MAGNESIUM HYDROXIDE 400 MG/1
30 TABLET, CHEWABLE ORAL DAILY
Refills: 0 | Status: DISCONTINUED | OUTPATIENT
Start: 2023-12-10 | End: 2023-12-12

## 2023-12-10 RX ADMIN — OXYCODONE HYDROCHLORIDE 5 MILLIGRAM(S): 5 TABLET ORAL at 11:15

## 2023-12-10 RX ADMIN — Medication 2000 MILLIGRAM(S): at 08:41

## 2023-12-10 RX ADMIN — Medication 2000 MILLIGRAM(S): at 17:41

## 2023-12-10 RX ADMIN — GABAPENTIN 400 MILLIGRAM(S): 400 CAPSULE ORAL at 22:04

## 2023-12-10 RX ADMIN — Medication 2000 MILLIGRAM(S): at 01:58

## 2023-12-10 RX ADMIN — PIMAVANSERIN TARTRATE 34 MILLIGRAM(S): 10 TABLET, COATED ORAL at 14:49

## 2023-12-10 RX ADMIN — Medication 1000 MILLIGRAM(S): at 23:31

## 2023-12-10 RX ADMIN — FENTANYL CITRATE 25 MICROGRAM(S): 50 INJECTION INTRAVENOUS at 10:50

## 2023-12-10 RX ADMIN — FENTANYL CITRATE 25 MICROGRAM(S): 50 INJECTION INTRAVENOUS at 11:15

## 2023-12-10 NOTE — DISCHARGE NOTE PROVIDER - NSDCCAREPROVSEEN_GEN_ALL_CORE_FT
Myah, Denise Parr, Arturo Ruiz, Goldy Lipscomb, Meena Thomas, Patricio Rodriguez, Jon Wood, Ubaldo Catalan, Bon Joy, Magalis Johnson, Jenae Hutchins, Chio Hoover, Rosalie Wang, Sammy Vázquez, Gerry Valles, Ara Munoz, Gayla Lyles, Dereck Monterroso, Ester COLLADO

## 2023-12-10 NOTE — PROGRESS NOTE ADULT - ATTENDING COMMENTS
A/P:  Pt with dementia, s/p unwitnessed fall at facility  Right ankle fracture, trimalleolar  Right 2-4th metatarsal fractures  Advise appropriate GI/DVT prophylaxis  Pain control  Pt stable and cleared from trauma surgical standpoint   Mgmt per primary service  No acute trauma surgical intervention required for pt  Reconsult prn  Advise appropriate outpt pulmonary follow up for incidentally noted ct findings of right lung nodule    35 minuted of time spent on pt examination, review of relevant labs and radiologic studies, assured stabilization of pt, discussion with relevant services/providers for coordination of pt care and services .

## 2023-12-10 NOTE — DISCHARGE NOTE PROVIDER - NSDCPNSUBOBJ_GEN_ALL_CORE
Vital Signs Last 24 Hrs  T(C): 36.6 (11 Dec 2023 07:53), Max: 36.6 (11 Dec 2023 07:53)  T(F): 97.9 (11 Dec 2023 07:53), Max: 97.9 (11 Dec 2023 07:53)  HR: 76 (11 Dec 2023 07:53) (76 - 97)  BP: 120/68 (11 Dec 2023 07:53) (107/40 - 145/74)  BP(mean): --  RR: 18 (11 Dec 2023 07:53) (11 - 18)  SpO2: 99% (11 Dec 2023 07:53) (92% - 100%)    Parameters below as of 11 Dec 2023 07:53  Patient On (Oxygen Delivery Method): room air                                  11.2   10.79 )-----------( 169      ( 11 Dec 2023 08:46 )             34.6     12-11    141  |  106  |  13  ----------------------------<  81  3.6   |  28  |  0.74    Ca    8.7      11 Dec 2023 08:46            PT/INR - ( 10 Dec 2023 04:04 )   PT: 11.3 sec;   INR: 1.00 ratio         PTT - ( 10 Dec 2023 04:04 )  PTT:32.7 sec  Urinalysis Basic - ( 11 Dec 2023 08:46 )    Color: x / Appearance: x / SG: x / pH: x  Gluc: 81 mg/dL / Ketone: x  / Bili: x / Urobili: x   Blood: x / Protein: x / Nitrite: x   Leuk Esterase: x / RBC: x / WBC x   Sq Epi: x / Non Sq Epi: x / Bacteria: x        PHYSICAL EXAM:    Constitutional: NAD, awake   HEENT: PERR, EOMI, Normal Hearing, MMM  Neck: Soft and supple, No LAD, No JVD  Respiratory: Breath sounds are clear bilaterally, No wheezing, rales or rhonchi  Cardiovascular: S1 and S2, regular rate and rhythm, no Murmurs, gallops or rubs  Gastrointestinal: Bowel Sounds present, soft, nontender, nondistended, no guarding, no rebound  Extremities: No peripheral edema, right foot, toes warm, well perfused   Vascular: 2+ peripheral pulses  Neurological: A/O x 1, no focal deficits  Musculoskeletal: 5/5 strength b/l upper and lower extremities  Skin: No rashes            PPM interrogated 12/9/23   · Battery	Adequate  · Voltage	2.95  · Underlying Rhythm	NSR-Sinus tachycardia  · Comments	No AF 0%, episodes of Sinus tach , -0.2% of time Not dependent AS/VS 88% of time AP 11.6  · Additional Procedure Details	Normal functioning dual lead PPM implanted in 2015 in [facility].   Vital Signs Last 24 Hrs  T(C): 36.6 (11 Dec 2023 07:53), Max: 36.6 (11 Dec 2023 07:53)  T(F): 97.9 (11 Dec 2023 07:53), Max: 97.9 (11 Dec 2023 07:53)  HR: 76 (11 Dec 2023 07:53) (76 - 97)  BP: 120/68 (11 Dec 2023 07:53) (107/40 - 145/74)  BP(mean): --  RR: 18 (11 Dec 2023 07:53) (11 - 18)  SpO2: 99% (11 Dec 2023 07:53) (92% - 100%)    Parameters below as of 11 Dec 2023 07:53  Patient On (Oxygen Delivery Method): room air                                  11.2   10.79 )-----------( 169      ( 11 Dec 2023 08:46 )             34.6     12-11    141  |  106  |  13  ----------------------------<  81  3.6   |  28  |  0.74    Ca    8.7      11 Dec 2023 08:46            PT/INR - ( 10 Dec 2023 04:04 )   PT: 11.3 sec;   INR: 1.00 ratio         PTT - ( 10 Dec 2023 04:04 )  PTT:32.7 sec  Urinalysis Basic - ( 11 Dec 2023 08:46 )    Color: x / Appearance: x / SG: x / pH: x  Gluc: 81 mg/dL / Ketone: x  / Bili: x / Urobili: x   Blood: x / Protein: x / Nitrite: x   Leuk Esterase: x / RBC: x / WBC x   Sq Epi: x / Non Sq Epi: x / Bacteria: x        PHYSICAL EXAM:    Constitutional: NAD, awake   HEENT: PERR, EOMI, Normal Hearing, MMM  Neck: Soft and supple, No LAD, No JVD  Respiratory: Breath sounds are clear bilaterally, No wheezing, rales or rhonchi  Cardiovascular: S1 and S2, regular rate and rhythm, no Murmurs, gallops or rubs  Gastrointestinal: Bowel Sounds present, soft, nontender, nondistended, no guarding, no rebound  Extremities: No peripheral edema, right foot, toes warm, well perfused   Vascular: 2+ peripheral pulses  Neurological: A/O x 1, no focal deficits  Musculoskeletal: 5/5 strength b/l upper and lower extremities  Skin: No rashes            PPM interrogated 12/9/23   · Battery	Adequate  · Voltage	2.95  · Underlying Rhythm	NSR-Sinus tachycardia  · Comments	No AF 0%, episodes of Sinus tach , -0.2% of time Not dependent AS/VS 88% of time AP 11.6  · Additional Procedure Details	Normal functioning dual lead PPM implanted in 2015 in [facility].

## 2023-12-10 NOTE — DISCHARGE NOTE PROVIDER - NSDCCPCAREPLAN_GEN_ALL_CORE_FT
PRINCIPAL DISCHARGE DIAGNOSIS  Diagnosis: Closed right trimalleolar fracture  Assessment and Plan of Treatment:      PRINCIPAL DISCHARGE DIAGNOSIS  Diagnosis: Closed right trimalleolar fracture  Assessment and Plan of Treatment: A malleolar fracture is a break in 1 or more of the bones in your ankle.   you had surgery to help stablize the ankle. Please do not walk on your ankle until you are cleared by your surgeon.   ***Monitor for the follow signs/symptoms and contact your primary care provider if they occur  or go the the Emergency Room if they occur  •You feel lightheaded, short of breath, and have chest pain.  •You cough up blood.  •Your leg feels warm, tender, and painful. It may look swollen and red.  •Blood soaks through your bandage.  •You have severe pain in your ankle.  •Your cast feels too tight  •Your foot or toes are cold or numb.  •Your foot or toenails turn blue or gray.  •Your splint feels too tight.  •Your swelling has increased or returned.  •You have a fever.   ~_~_~ Please continue with Lovenox injections 2 times day until you are fully walking on your leg~*~*~

## 2023-12-10 NOTE — DISCHARGE NOTE PROVIDER - NSDCFUADDAPPT_GEN_ALL_CORE_FT
Discharge Instructions for ORIF Right Ankle and Right 2-4 Metatarsal fractures:    1. Pain Control.  2. Non-Weight Bearing Right Lower Extremity with trilam splint and assistive device/rolling walker.  3. Elevate and ICE the extremity as much as possible  4. Keep bandage/Splint Clean and dry. Do not get it wet. Do not walk or put any body weight on splint because it will break.  5. Continue DVT/PE Prophylaxis per primary team. See Med Rec.   6. Out of Bed Daily, try to keep moving.  7. Dressing changed will be provided by Orthopedic Surgeon.  8. MD will need to Remove staples/sutures in office POD14 (12/24/23).  9. Follow up with Orthopedic Surgeon Dr. Rodriguez in 10-14 Days. Call Office For Appointment.

## 2023-12-10 NOTE — DISCHARGE NOTE PROVIDER - NSDCFUADDINST_GEN_ALL_CORE_FT
ORIF DC Instructions:    1.	Analgesia  2.	Non-Weight Bearing right lower Extremity, with assistive device/rolling walker  3.	Continue DVT/PE Prophylaxis. See Med Rec.   4.	PT daily  5.	Follow up with Orthopedic Surgeon Dr. Rodriguez in 10-14 Days after Discharge from the Hospital. Call Office For Appointment.  6.	Sutures to be removed Post-Op Day 14, and repeat x-rays in office.  7.	Elevate the extremity as much as possible  8.	Keep bandage/Splint Clean and dry. Do not get it wet. Do not walk or put any body weight on splint because it will break.

## 2023-12-10 NOTE — DISCHARGE NOTE PROVIDER - CARE PROVIDER_API CALL
Jon Rodriguez  Orthopaedic Surgery  [address] [place] [id number]  Phone: [phone]  Fax: [phone]  Follow Up Time: 2 weeks   Jon Rodriguez  Orthopaedic Surgery  [address] [place] [id number]  Phone: [phone]  Fax: [phone]  Follow Up Time: 2 weeks

## 2023-12-10 NOTE — PROGRESS NOTE ADULT - SUBJECTIVE AND OBJECTIVE BOX
CC: Patient is a 83y old  Female who presents with a chief complaint of Ankle fracture s/p fall (09 Dec 2023 14:58)      Patient seen and examined by surgical team this morning.   No acute overnight events, patient underwent Open reduction and internal fixation of bimalleolar fracture of right ankle this morning. Pain well controlled  Denies fever or chills  Tolerating diet well without nausea or vomiting.       Tertiary Survey   VS: wnl  GEN: [NAD]   HEAD: [mild skin bruises on right occipital scalp.]  EYES: [pupils round reactive to light, conjunctiva clear, extraocular movements intact, no raccoons eyes]  ENT: [no fluid in external acoustic canals, no hemotympanum, no cueto's sign, nares patent, oropharynx clear]  NECK: [no JVD, midline trachea, no cervical spine tenderness, C-collar in place]   HEART: [regular rate and rhythm]   LUNGS: [CTAB]   CHEST: [chest wall non-tender, no bruising/deformity]  ABD: [no Grey-Cope's or Toni's sign, soft, non tender, no rebound or guarding,]   PELVIS: [stable to rock]   BACK: [no step offs or deformities, T-L spine non tender]   : [no perineal hematoma, NO BLOOD AT MEATUS]   EXT: [Right ankle dressing c/d/i, couldn't assess pulses as well as motor functions due to patient's advanced dementia. Otherwise 2+ global pulses, moving all extremities well, +5/5 muscle strength globally]   NEURO: [CNII-XII grossly intact, no sensory deficits]  Psych: known dementia        Chief complaint:      PMHx:  Trigeminal neuralgia    HLD (hyperlipidemia)    Spinal stenosis    Burning mouth syndrome    Parkinson disease        PSHx:  No significant past surgical history        FHx:  No significant family history        Vitals:  T(C): 36.7 (12-10 @ 04:00), Max: 36.9 (12-09 @ 11:13)  HR: 92 (12-10 @ 04:00) (71 - 102)  BP: 126/37 (12-10 @ 04:00) (97/45 - 134/69)  RR: 18 (12-10 @ 04:00) (18 - 19)  SpO2: 93% (12-10 @ 04:00) (93% - 99%)      I&Os    .    Labs:  12-10 @ 03:55                    11.1  CBC: 8.72>)-------(<164                     35.0                 143 | 112 | 25    CMP:  ----------------------< 90               4.0 | 26 | 1.02                      Ca:8.7  Phos:-  Mg:-               -|      |-        LFTs:  ------|-|-----             -|      |-  12-09 @ 10:59                    12.0  CBC: 12.36>)-------(<153                     37.6                 141 | 110 | 30    CMP:  ----------------------< 92               4.0 | 27 | 1.14                      Ca:8.6  Phos:-  Mg:-               -|      |-        LFTs:  ------|-|-----             -|      |-  12-09 @ 09:27                    12.3  CBC: 8.83>)-------(<167                     38.6                 145 | 112 | 30    CMP:  ----------------------< 101               4.4 | 30 | 1.08                      Ca:9.2  Phos:-  Mg:-               0.2|      |40        LFTs:  ------|168|-----             -|      |-        Cultures:        Current Inpatient Medications:  acetaminophen     Tablet .. 650 milliGRAM(s) Oral every 6 hours PRN  ceFAZolin  Injectable. 2000 milliGRAM(s) IV Push every 8 hours  dicyclomine 10 milliGRAM(s) Oral two times a day before meals  donepezil 10 milliGRAM(s) Oral at bedtime  DULoxetine 60 milliGRAM(s) Oral daily  gabapentin 400 milliGRAM(s) Oral two times a day  levothyroxine 25 MICROGram(s) Oral daily  morphine  - Injectable 2 milliGRAM(s) IV Push every 4 hours PRN  ondansetron Injectable 4 milliGRAM(s) IV Push every 6 hours PRN  pimavanserin Capsule 34 milliGRAM(s) Oral daily  polyethylene glycol 3350 17 Gram(s) Oral daily  senna 2 Tablet(s) Oral at bedtime  sodium chloride 0.9%. 1000 milliLiter(s) (75 mL/Hr) IV Continuous <Continuous>  traMADol 25 milliGRAM(s) Oral every 6 hours PRN          < from: CT Ankle No Cont, Right (12.09.23 @ 11:52) >  IMPRESSION:    Acute trimalleolar fracture of the right ankle as outlined above.    Acute obliquely oriented mildly displaced fracture of the head neck   junction of the fourth metatarsal.    Acute nondisplaced acute fractures of the second and third metatarsal   head neck junctions.    --- End of Report ---    < end of copied text >  < from: CT Cervical Spine No Cont (12.09.23 @ 10:23) >  IMPRESSION:    CT HEAD:  1.  No evidence of acuteintracranial hemorrhage or midline shift.  2.  Chronic small vessel disease. If there is continued concern for acute   neurologic compromise, recommend MRI of the brain for further evaluation.    CT CERVICAL SPINE:  1.  No evidence of acute osseous fracture or azul dislocation.  2.  Multilevel degenerative change of the cervical spine.  3.  Subcentimeter nodule in the right lung apex. Recommend follow-up   imaging as per Fleischner criteria.    --- End of Report ---    < end of copied text >  < from: CT Chest w/ IV Cont (12.09.23 @ 10:24) >  IMPRESSION: No evidence of acute visceral organ or bony injury.    < end of copied text >    < from: CT Abdomen and Pelvis w/ IV Cont (12.09.23 @ 10:24) >  IMPRESSION: No evidence of acute visceral organ or bony injury.    --- End of Report ---    < end of copied text >     CC: Patient is a 83y old  Female who presents with a chief complaint of Ankle fracture s/p fall (09 Dec 2023 14:58)      Patient seen and examined by surgical team this morning.   No acute overnight events, patient underwent Open reduction and internal fixation of bimalleolar fracture of right ankle this morning. Pain well controlled  Denies fever or chills  Tolerating diet well without nausea or vomiting.     ROS: as above otherwise negative     Tertiary Survey   VS: wnl  GEN: [NAD]   HEAD: [mild skin bruises on right occipital scalp.]  EYES: [pupils round reactive to light, conjunctiva clear, extraocular movements intact, no raccoons eyes]  ENT: [no fluid in external acoustic canals, no hemotympanum, no cueto's sign, nares patent, oropharynx clear]  NECK: [no JVD, midline trachea, no cervical spine tenderness, C-collar in place]   HEART: [regular rate and rhythm]   LUNGS: [CTAB]   CHEST: [chest wall non-tender, no bruising/deformity]  ABD: [no Grey-Cope's or Monroe's sign, soft, non tender, no rebound or guarding,]   PELVIS: [stable to rock]   BACK: [no step offs or deformities, T-L spine non tender]   : [no perineal hematoma, NO BLOOD AT MEATUS]   EXT: [Right ankle dressing c/d/i, couldn't assess pulses as well as motor functions due to patient's advanced dementia. Otherwise 2+ global pulses, moving all extremities well, +5/5 muscle strength globally]   NEURO: [CNII-XII grossly intact, no sensory deficits]  Psych: known dementia        Chief complaint:      PMHx:  Trigeminal neuralgia    HLD (hyperlipidemia)    Spinal stenosis    Burning mouth syndrome    Parkinson disease        PSHx:  No significant past surgical history        FHx:  No significant family history        Vitals:  T(C): 36.7 (12-10 @ 04:00), Max: 36.9 (12-09 @ 11:13)  HR: 92 (12-10 @ 04:00) (71 - 102)  BP: 126/37 (12-10 @ 04:00) (97/45 - 134/69)  RR: 18 (12-10 @ 04:00) (18 - 19)  SpO2: 93% (12-10 @ 04:00) (93% - 99%)      I&Os    .    Labs:  12-10 @ 03:55                    11.1  CBC: 8.72>)-------(<164                     35.0                 143 | 112 | 25    CMP:  ----------------------< 90               4.0 | 26 | 1.02                      Ca:8.7  Phos:-  Mg:-               -|      |-        LFTs:  ------|-|-----             -|      |-  12-09 @ 10:59                    12.0  CBC: 12.36>)-------(<153                     37.6                 141 | 110 | 30    CMP:  ----------------------< 92               4.0 | 27 | 1.14                      Ca:8.6  Phos:-  Mg:-               -|      |-        LFTs:  ------|-|-----             -|      |-  12-09 @ 09:27                    12.3  CBC: 8.83>)-------(<167                     38.6                 145 | 112 | 30    CMP:  ----------------------< 101               4.4 | 30 | 1.08                      Ca:9.2  Phos:-  Mg:-               0.2|      |40        LFTs:  ------|168|-----             -|      |-        Cultures:        Current Inpatient Medications:  acetaminophen     Tablet .. 650 milliGRAM(s) Oral every 6 hours PRN  ceFAZolin  Injectable. 2000 milliGRAM(s) IV Push every 8 hours  dicyclomine 10 milliGRAM(s) Oral two times a day before meals  donepezil 10 milliGRAM(s) Oral at bedtime  DULoxetine 60 milliGRAM(s) Oral daily  gabapentin 400 milliGRAM(s) Oral two times a day  levothyroxine 25 MICROGram(s) Oral daily  morphine  - Injectable 2 milliGRAM(s) IV Push every 4 hours PRN  ondansetron Injectable 4 milliGRAM(s) IV Push every 6 hours PRN  pimavanserin Capsule 34 milliGRAM(s) Oral daily  polyethylene glycol 3350 17 Gram(s) Oral daily  senna 2 Tablet(s) Oral at bedtime  sodium chloride 0.9%. 1000 milliLiter(s) (75 mL/Hr) IV Continuous <Continuous>  traMADol 25 milliGRAM(s) Oral every 6 hours PRN          < from: CT Ankle No Cont, Right (12.09.23 @ 11:52) >  IMPRESSION:    Acute trimalleolar fracture of the right ankle as outlined above.    Acute obliquely oriented mildly displaced fracture of the head neck   junction of the fourth metatarsal.    Acute nondisplaced acute fractures of the second and third metatarsal   head neck junctions.    --- End of Report ---    < end of copied text >  < from: CT Cervical Spine No Cont (12.09.23 @ 10:23) >  IMPRESSION:    CT HEAD:  1.  No evidence of acuteintracranial hemorrhage or midline shift.  2.  Chronic small vessel disease. If there is continued concern for acute   neurologic compromise, recommend MRI of the brain for further evaluation.    CT CERVICAL SPINE:  1.  No evidence of acute osseous fracture or azul dislocation.  2.  Multilevel degenerative change of the cervical spine.  3.  Subcentimeter nodule in the right lung apex. Recommend follow-up   imaging as per Fleischner criteria.    --- End of Report ---    < end of copied text >  < from: CT Chest w/ IV Cont (12.09.23 @ 10:24) >  IMPRESSION: No evidence of acute visceral organ or bony injury.    < end of copied text >    < from: CT Abdomen and Pelvis w/ IV Cont (12.09.23 @ 10:24) >  IMPRESSION: No evidence of acute visceral organ or bony injury.    --- End of Report ---    < end of copied text >     CC: Patient is a 83y old  Female who presents with a chief complaint of Ankle fracture s/p fall (09 Dec 2023 14:58)      Patient seen and examined by surgical team this morning.   No acute overnight events, patient underwent Open reduction and internal fixation of bimalleolar fracture of right ankle this morning. Pain well controlled  Denies fever or chills  Tolerating diet well without nausea or vomiting.     ROS: as above otherwise negative     Tertiary Survey   VS: wnl  GEN: [NAD]   HEAD: [mild skin bruises on right occipital scalp.]  EYES: [pupils round reactive to light, conjunctiva clear, extraocular movements intact, no raccoons eyes]  ENT: [no fluid in external acoustic canals, no hemotympanum, no cueto's sign, nares patent, oropharynx clear]  NECK: [no JVD, midline trachea, no cervical spine tenderness, C-collar in place]   HEART: [regular rate and rhythm]   LUNGS: [CTAB]   CHEST: [chest wall non-tender, no bruising/deformity]  ABD: [no Grey-Cope's or Mobile's sign, soft, non tender, no rebound or guarding,]   PELVIS: [stable to rock]   BACK: [no step offs or deformities, T-L spine non tender]   : [no perineal hematoma, NO BLOOD AT MEATUS]   EXT: [Right ankle dressing c/d/i, couldn't assess pulses as well as motor functions due to patient's advanced dementia. Otherwise 2+ global pulses, moving all extremities well, +5/5 muscle strength globally]   NEURO: [CNII-XII grossly intact, no sensory deficits]  Psych: known dementia        Chief complaint:      PMHx:  Trigeminal neuralgia    HLD (hyperlipidemia)    Spinal stenosis    Burning mouth syndrome    Parkinson disease        PSHx:  No significant past surgical history        FHx:  No significant family history        Vitals:  T(C): 36.7 (12-10 @ 04:00), Max: 36.9 (12-09 @ 11:13)  HR: 92 (12-10 @ 04:00) (71 - 102)  BP: 126/37 (12-10 @ 04:00) (97/45 - 134/69)  RR: 18 (12-10 @ 04:00) (18 - 19)  SpO2: 93% (12-10 @ 04:00) (93% - 99%)      I&Os    .    Labs:  12-10 @ 03:55                    11.1  CBC: 8.72>)-------(<164                     35.0                 143 | 112 | 25    CMP:  ----------------------< 90               4.0 | 26 | 1.02                      Ca:8.7  Phos:-  Mg:-               -|      |-        LFTs:  ------|-|-----             -|      |-  12-09 @ 10:59                    12.0  CBC: 12.36>)-------(<153                     37.6                 141 | 110 | 30    CMP:  ----------------------< 92               4.0 | 27 | 1.14                      Ca:8.6  Phos:-  Mg:-               -|      |-        LFTs:  ------|-|-----             -|      |-  12-09 @ 09:27                    12.3  CBC: 8.83>)-------(<167                     38.6                 145 | 112 | 30    CMP:  ----------------------< 101               4.4 | 30 | 1.08                      Ca:9.2  Phos:-  Mg:-               0.2|      |40        LFTs:  ------|168|-----             -|      |-        Cultures:        Current Inpatient Medications:  acetaminophen     Tablet .. 650 milliGRAM(s) Oral every 6 hours PRN  ceFAZolin  Injectable. 2000 milliGRAM(s) IV Push every 8 hours  dicyclomine 10 milliGRAM(s) Oral two times a day before meals  donepezil 10 milliGRAM(s) Oral at bedtime  DULoxetine 60 milliGRAM(s) Oral daily  gabapentin 400 milliGRAM(s) Oral two times a day  levothyroxine 25 MICROGram(s) Oral daily  morphine  - Injectable 2 milliGRAM(s) IV Push every 4 hours PRN  ondansetron Injectable 4 milliGRAM(s) IV Push every 6 hours PRN  pimavanserin Capsule 34 milliGRAM(s) Oral daily  polyethylene glycol 3350 17 Gram(s) Oral daily  senna 2 Tablet(s) Oral at bedtime  sodium chloride 0.9%. 1000 milliLiter(s) (75 mL/Hr) IV Continuous <Continuous>  traMADol 25 milliGRAM(s) Oral every 6 hours PRN          < from: CT Ankle No Cont, Right (12.09.23 @ 11:52) >  IMPRESSION:    Acute trimalleolar fracture of the right ankle as outlined above.    Acute obliquely oriented mildly displaced fracture of the head neck   junction of the fourth metatarsal.    Acute nondisplaced acute fractures of the second and third metatarsal   head neck junctions.    --- End of Report ---    < end of copied text >  < from: CT Cervical Spine No Cont (12.09.23 @ 10:23) >  IMPRESSION:    CT HEAD:  1.  No evidence of acuteintracranial hemorrhage or midline shift.  2.  Chronic small vessel disease. If there is continued concern for acute   neurologic compromise, recommend MRI of the brain for further evaluation.    CT CERVICAL SPINE:  1.  No evidence of acute osseous fracture or azul dislocation.  2.  Multilevel degenerative change of the cervical spine.  3.  Subcentimeter nodule in the right lung apex. Recommend follow-up   imaging as per Fleischner criteria.    --- End of Report ---    < end of copied text >  < from: CT Chest w/ IV Cont (12.09.23 @ 10:24) >  IMPRESSION: No evidence of acute visceral organ or bony injury.    < end of copied text >    < from: CT Abdomen and Pelvis w/ IV Cont (12.09.23 @ 10:24) >  IMPRESSION: No evidence of acute visceral organ or bony injury.    --- End of Report ---    < end of copied text >

## 2023-12-10 NOTE — DISCHARGE NOTE PROVIDER - HOSPITAL COURSE
· Assessment	  84 y/o female with PMH of Alzheimer's, Parkinson's, spinal stenosis, HLD, presents to  ED after an unwitnessed fall at [facility]. pt with trimalleolar ankle fracture which was reduced and went to OR for ORIF with bimalleolar repair. pt had PPM interrogated, results below. pt lipase was mildy elevated, no abd pain, CT scan was unremarkable,     #Mechanical Fall  #Right trimalleolar ankle fracture  - Unwitnessed fall at [facility]. Not on blood thinners   - s/p closed reduction performed by Ortho followed ORIF of bimalleolar fracture POD #1   - pain control and bowel regimen while on narcotics   - DVT ppx hold until POD1  - completd perioperative abx x 24 hours  - PT/OT --- dc to [facility]   - stable H/H  - EP for pacemaker interrogation - results attached,   - EKG reviewed, NSR without ischemic changes  - completed  Ancef for 24      #Alzheimer's  #Parkinson's  - Continue home medications  - Daughter to bring in Nuplazid     #Elevated lipase  No abdominal pain. CT a/p normal, no interventions     DVT ppx: started on Lovenox 40mg BID and continue until she is fully weight bearing.   plan of care discussed with dr. roy · Assessment	  84 y/o female with PMH of Alzheimer's, Parkinson's, spinal stenosis, HLD, presents to  ED after an unwitnessed fall at [facility]. pt with trimalleolar ankle fracture which was reduced and went to OR for ORIF with bimalleolar repair. pt had PPM interrogated, results below. pt lipase was mildy elevated, no abd pain, CT scan was unremarkable,     #Mechanical Fall  #Right trimalleolar ankle fracture  - Unwitnessed fall at [facility]. Not on blood thinners   - s/p closed reduction performed by Ortho followed ORIF of bimalleolar fracture POD #1   - pain control and bowel regimen while on narcotics   - DVT ppx hold until POD1  - completd perioperative abx x 24 hours  - PT/OT --- dc to [facility]   - stable H/H  - EP for pacemaker interrogation - results attached,   - EKG reviewed, NSR without ischemic changes  - completed  Ancef for 24      #Alzheimer's  #Parkinson's  - Continue home medications  - Daughter to bring in Nuplazid     #Elevated lipase  No abdominal pain. CT a/p normal, no interventions     DVT ppx: started on Lovenox 40mg BID and continue until she is fully weight bearing.   plan of care discussed with dr. roy · Assessment	  82 y/o female with PMH of Alzheimer's, Parkinson's, spinal stenosis, HLD, presents to  ED after an unwitnessed fall at [facility]. pt with trimalleolar ankle fracture which was reduced and went to OR for ORIF with bimalleolar repair. pt had PPM interrogated, results below. pt lipase was mildy elevated, no abd pain, CT scan was unremarkable,     #Mechanical Fall  #Right trimalleolar ankle fracture  - Unwitnessed fall at [facility]. Not on blood thinners   - s/p closed reduction performed by Ortho followed ORIF of bimalleolar fracture   - pain control and bowel regimen while on narcotics   - DVT ppx hold until POD1  - PT/OT --- dc to [facility]   - stable H/H  - EP for pacemaker interrogation - results attached,   - EKG reviewed, NSR without ischemic changes  - completed  Ancef for 24    #Alzheimer's  #Parkinson's  - Continue home medications  - Daughter to bring in Nuplazid     #Elevated lipase  No abdominal pain. CT a/p normal, no interventions     DVT ppx: started on Lovenox 40mg daily and continue until she is fully weight bearing.   plan of care discussed with dr. roy · Assessment	  84 y/o female with PMH of Alzheimer's, Parkinson's, spinal stenosis, HLD, presents to  ED after an unwitnessed fall at [facility]. pt with trimalleolar ankle fracture which was reduced and went to OR for ORIF with bimalleolar repair. pt had PPM interrogated, results below. pt lipase was mildy elevated, no abd pain, CT scan was unremarkable,     #Mechanical Fall  #Right trimalleolar ankle fracture  - Unwitnessed fall at [facility]. Not on blood thinners   - s/p closed reduction performed by Ortho followed ORIF of bimalleolar fracture   - pain control and bowel regimen while on narcotics   - DVT ppx hold until POD1  - PT/OT --- dc to [facility]   - stable H/H  - EP for pacemaker interrogation - results attached,   - EKG reviewed, NSR without ischemic changes  - completed  Ancef for 24    #Alzheimer's  #Parkinson's  - Continue home medications  - Daughter to bring in Nuplazid     #Elevated lipase  No abdominal pain. CT a/p normal, no interventions     DVT ppx: started on Lovenox 40mg daily and continue until she is fully weight bearing.   plan of care discussed with dr. roy

## 2023-12-10 NOTE — DISCHARGE NOTE PROVIDER - ATTENDING DISCHARGE PHYSICAL EXAMINATION:
Patient is seen and examined at bedside. UP in a chair. Confused, but appears comfortable. S/p RT ankle ORIF- NWB to RLE. Hemodynamically stable. ANSELMO today

## 2023-12-10 NOTE — BRIEF OPERATIVE NOTE - NSICDXBRIEFPROCEDURE_GEN_ALL_CORE_FT
PROCEDURES:  Open reduction and internal fixation (ORIF) of bimalleolar fracture of right ankle 10-Dec-2023 09:54:40  Bon Catalan

## 2023-12-10 NOTE — PROGRESS NOTE ADULT - SUBJECTIVE AND OBJECTIVE BOX
Patient is a 83y old  Female who presents with a chief complaint of Ankle fracture s/p fall (10 Dec 2023 12:21)      SUBJECTIVE:   HPI:  82 y/o female with PMH of Alzheimer's, Parkinson's, spinal stenosis, HLD, presents to  ED after an unwitnessed fall at [facility]. Unable to obtain history from patient due to severe dementia (baseline AOx0-1). Per daughter at bedside, she believes her mom might of gotten out of bed leading to the fall, however unable to say for sure since it was unwitnessed. She was found on the floor by her bed. Patient had a pacemaker placed in 2018; Daughter doesn't think she's seen an EP since. Patient's spouse was her primary care taker and knew her medical history well before he passed away this past year. Daughter doesn't think she's seen any doctors outside of [place]. Per daughter, patient normally is ambulatory with walker while at [place] [facility].      In ED, XRay showed right trimalleolar ankle fracture. Closed reduction performed by Ortho followed by placement of splint. Plan for ORIF tomorrow  Vitals: Oral temp of 98, but rectal temp of 95. Warming blanket placed. BP stable. HR . 95% on room air  Labs: WBC 8 -> 12 s/p reduction, could be reactive. Lipase elevated to 474  Imaging: XRay showed right trimalleolar ankle fracture. CT c/a/p unremarkable.   EKG: NSR with no ischemic findings  Consults: EP consulted for pacemaker interrogation. Cardiology consulted for clearance. Ortho following for surgery.   Interventions: 250 cc bolus given. Cefazolin started after closed reduction by Ortho        sub: pt seen prior to OR, dementia precludes history. VSS              Vital Signs Last 24 Hrs  T(C): 36.5 (10 Dec 2023 11:45), Max: 36.9 (10 Dec 2023 00:00)  T(F): 97.7 (10 Dec 2023 11:45), Max: 98.4 (10 Dec 2023 00:00)  HR: 76 (10 Dec 2023 12:00) (63 - 92)  BP: 107/40 (10 Dec 2023 12:00) (97/45 - 141/57)  BP(mean): 94 (09 Dec 2023 14:17) (94 - 94)  RR: 11 (10 Dec 2023 12:00) (10 - 20)  SpO2: 100% (10 Dec 2023 12:00) (93% - 100%)    Parameters below as of 10 Dec 2023 12:00  Patient On (Oxygen Delivery Method): nasal cannula  O2 Flow (L/min): 2      I&O's Summary    10 Dec 2023 07:01  -  10 Dec 2023 13:35  --------------------------------------------------------  IN: 800 mL / OUT: 20 mL / NET: 780 mL        CAPILLARY BLOOD GLUCOSE          PHYSICAL EXAM:    Constitutional: NAD, awake   HEENT: PERR, EOMI, Normal Hearing, MMM  Neck: Soft and supple, No LAD, No JVD  Respiratory: Breath sounds are clear bilaterally, No wheezing, rales or rhonchi  Cardiovascular: S1 and S2, regular rate and rhythm, no Murmurs, gallops or rubs  Gastrointestinal: Bowel Sounds present, soft, nontender, nondistended, no guarding, no rebound  Extremities: No peripheral edema  Vascular: 2+ peripheral pulses  Neurological: A/O x 1, no focal deficits  Musculoskeletal: 5/5 strength b/l upper and lower extremities  Skin: No rashes    MEDICATIONS:  MEDICATIONS  (STANDING):  ceFAZolin  Injectable. 2000 milliGRAM(s) IV Push every 8 hours  dicyclomine 10 milliGRAM(s) Oral two times a day before meals  donepezil 10 milliGRAM(s) Oral at bedtime  DULoxetine 60 milliGRAM(s) Oral daily  gabapentin 400 milliGRAM(s) Oral two times a day  lactated ringers. 1000 milliLiter(s) (75 mL/Hr) IV Continuous <Continuous>  levothyroxine 25 MICROGram(s) Oral daily  pimavanserin Capsule 34 milliGRAM(s) Oral daily  polyethylene glycol 3350 17 Gram(s) Oral daily  senna 2 Tablet(s) Oral at bedtime  sodium chloride 0.9%. 1000 milliLiter(s) (75 mL/Hr) IV Continuous <Continuous>      LABS: All Labs Reviewed:                        10.6   6.40  )-----------( 149      ( 10 Dec 2023 10:56 )             33.3     12-10    143  |  112<H>  |  25<H>  ----------------------------<  90  4.0   |  26  |  1.02    Ca    8.7      10 Dec 2023 03:55    TPro  7.1  /  Alb  3.0<L>  /  TBili  0.2  /  DBili  x   /  AST  40<H>  /  ALT  37  /  AlkPhos  168<H>  12-09    PT/INR - ( 10 Dec 2023 04:04 )   PT: 11.3 sec;   INR: 1.00 ratio         PTT - ( 10 Dec 2023 04:04 )  PTT:32.7 sec          Blood Culture:     RADIOLOGY/EKG: reviewed         Patient is a 83y old  Female who presents with a chief complaint of Ankle fracture s/p fall (10 Dec 2023 12:21)      SUBJECTIVE:   HPI:  82 y/o female with PMH of Alzheimer's, Parkinson's, spinal stenosis, HLD, presents to  ED after an unwitnessed fall at [facility]. Unable to obtain history from patient due to severe dementia (baseline AOx0-1). Per daughter at bedside, she believes her mom might of gotten out of bed leading to the fall, however unable to say for sure since it was unwitnessed. She was found on the floor by her bed. Patient had a pacemaker placed in 2018; Daughter doesn't think she's seen an EP since. Patient's spouse was her primary care taker and knew her medical history well before he passed away this past year. Daughter doesn't think she's seen any doctors outside of [place]. Per daughter, patient normally is ambulatory with walker while at [place] [facility].      In ED, XRay showed right trimalleolar ankle fracture. Closed reduction performed by Ortho followed by placement of splint. Plan for ORIF tomorrow  Vitals: Oral temp of 98, but rectal temp of 95. Warming blanket placed. BP stable. HR . 95% on room air  Labs: WBC 8 -> 12 s/p reduction, could be reactive. Lipase elevated to 474  Imaging: XRay showed right trimalleolar ankle fracture. CT c/a/p unremarkable.   EKG: NSR with no ischemic findings  Consults: EP consulted for pacemaker interrogation. Cardiology consulted for clearance. Ortho following for surgery.   Interventions: 250 cc bolus given. Cefazolin started after closed reduction by Ortho        sub: pt seen prior to OR, dementia precludes history. VSS              Vital Signs Last 24 Hrs  T(C): 36.5 (10 Dec 2023 11:45), Max: 36.9 (10 Dec 2023 00:00)  T(F): 97.7 (10 Dec 2023 11:45), Max: 98.4 (10 Dec 2023 00:00)  HR: 76 (10 Dec 2023 12:00) (63 - 92)  BP: 107/40 (10 Dec 2023 12:00) (97/45 - 141/57)  BP(mean): 94 (09 Dec 2023 14:17) (94 - 94)  RR: 11 (10 Dec 2023 12:00) (10 - 20)  SpO2: 100% (10 Dec 2023 12:00) (93% - 100%)    Parameters below as of 10 Dec 2023 12:00  Patient On (Oxygen Delivery Method): nasal cannula  O2 Flow (L/min): 2      I&O's Summary    10 Dec 2023 07:01  -  10 Dec 2023 13:35  --------------------------------------------------------  IN: 800 mL / OUT: 20 mL / NET: 780 mL        CAPILLARY BLOOD GLUCOSE          PHYSICAL EXAM:    Constitutional: NAD, awake   HEENT: PERR, EOMI, Normal Hearing, MMM  Neck: Soft and supple, No LAD, No JVD  Respiratory: Breath sounds are clear bilaterally, No wheezing, rales or rhonchi  Cardiovascular: S1 and S2, regular rate and rhythm, no Murmurs, gallops or rubs  Gastrointestinal: Bowel Sounds present, soft, nontender, nondistended, no guarding, no rebound  Extremities: No peripheral edema  Vascular: 2+ peripheral pulses  Neurological: A/O x 1, no focal deficits  Musculoskeletal: 5/5 strength b/l upper and lower extremities  Skin: No rashes    MEDICATIONS:  MEDICATIONS  (STANDING):  ceFAZolin  Injectable. 2000 milliGRAM(s) IV Push every 8 hours  dicyclomine 10 milliGRAM(s) Oral two times a day before meals  donepezil 10 milliGRAM(s) Oral at bedtime  DULoxetine 60 milliGRAM(s) Oral daily  gabapentin 400 milliGRAM(s) Oral two times a day  lactated ringers. 1000 milliLiter(s) (75 mL/Hr) IV Continuous <Continuous>  levothyroxine 25 MICROGram(s) Oral daily  pimavanserin Capsule 34 milliGRAM(s) Oral daily  polyethylene glycol 3350 17 Gram(s) Oral daily  senna 2 Tablet(s) Oral at bedtime  sodium chloride 0.9%. 1000 milliLiter(s) (75 mL/Hr) IV Continuous <Continuous>      LABS: All Labs Reviewed:                        10.6   6.40  )-----------( 149      ( 10 Dec 2023 10:56 )             33.3     12-10    143  |  112<H>  |  25<H>  ----------------------------<  90  4.0   |  26  |  1.02    Ca    8.7      10 Dec 2023 03:55    TPro  7.1  /  Alb  3.0<L>  /  TBili  0.2  /  DBili  x   /  AST  40<H>  /  ALT  37  /  AlkPhos  168<H>  12-09    PT/INR - ( 10 Dec 2023 04:04 )   PT: 11.3 sec;   INR: 1.00 ratio         PTT - ( 10 Dec 2023 04:04 )  PTT:32.7 sec          Blood Culture:     RADIOLOGY/EKG: reviewed

## 2023-12-10 NOTE — BRIEF OPERATIVE NOTE - NSICDXBRIEFPREOP_GEN_ALL_CORE_FT
PRE-OP DIAGNOSIS:  Trimalleolar fracture of right ankle 10-Dec-2023 09:55:02  Bon Catalan   PRE-OP DIAGNOSIS:  Trimalleolar fracture of right ankle 10-Dec-2023 09:55:02  Bno Catalan

## 2023-12-10 NOTE — PROGRESS NOTE ADULT - ASSESSMENT
84 y/o female with PMH of Alzheimer's, Parkinson's, spinal stenosis, HLD, presents to  ED after an unwitnessed fall at [facility].     #MechanicalFall  #Right trimalleolar ankle fracture  - Unwitnessed fall at [facility]. Not on blood thinners   - s/p closed reduction performed by Ortho followed ORIF of bimalleolar fracture POD #0  - pain control  - DVT ppx hold until POD1  - Continue perioperative abx x 24 hours  - PT/OT  - monitor H/H  - EP for pacemaker interrogation  - EKG reviewed, NSR without ischemic changes  - Continue with Ancef for 24      #Alzheimer's  #Parkinson's  - Continue home medications  - Daughter to bring in Nuplazid     #Elevated lipase  No abdominal pain. CT a/p normal     DVT ppx: hold chemical prophylaxis until 12/11  ADMIT TO 2N   CODE STATUS: FULL CODE 82 y/o female with PMH of Alzheimer's, Parkinson's, spinal stenosis, HLD, presents to  ED after an unwitnessed fall at [facility].     #MechanicalFall  #Right trimalleolar ankle fracture  - Unwitnessed fall at [facility]. Not on blood thinners   - s/p closed reduction performed by Ortho followed ORIF of bimalleolar fracture POD #0  - pain control  - DVT ppx hold until POD1  - Continue perioperative abx x 24 hours  - PT/OT  - monitor H/H  - EP for pacemaker interrogation  - EKG reviewed, NSR without ischemic changes  - Continue with Ancef for 24      #Alzheimer's  #Parkinson's  - Continue home medications  - Daughter to bring in Nuplazid     #Elevated lipase  No abdominal pain. CT a/p normal     DVT ppx: hold chemical prophylaxis until 12/11  ADMIT TO 2N   CODE STATUS: FULL CODE

## 2023-12-10 NOTE — PROGRESS NOTE ADULT - SUBJECTIVE AND OBJECTIVE BOX
Postop Check    Patient tolerated the procedure well. Patient seen and examined at bedside.  No acute complaints at this time. Pain well controlled. Denies chest pain, shortness of breath, nausea or vomiting.     PE:  Vital Signs Last 24 Hrs  T(C): 36.5 (12-10-23 @ 11:45), Max: 36.9 (12-10-23 @ 00:00)  T(F): 97.7 (12-10-23 @ 11:45), Max: 98.4 (12-10-23 @ 00:00)  HR: 76 (12-10-23 @ 12:00) (63 - 92)  BP: 107/40 (12-10-23 @ 12:00) (97/45 - 141/57)  BP(mean): 94 (12-09-23 @ 14:17) (94 - 94)  RR: 11 (12-10-23 @ 12:00) (10 - 20)  SpO2: 100% (12-10-23 @ 12:00) (93% - 100%)    General: NAD, resting comfortably in bed  RLE:   Dressing in place C/D/I  SCD in place bilaterally  No calf tenderness   Motor: + EHL/FHL/TA/GSC  +SILT: SPN/DPN/Khadar/Saph/Tib  DP/PT 2+      A/P:  83y f s/p R Ankle ORIF POD 0  -PT/OT daily  -NWB on the RLE  -Pain Control  -DVT ppx hold until POD1  -Continue perioperative abx x 24 hours  -FU AM Labs  -Rest, ice, compress and elevate the extremity as we needed  -Incentive Spirometry  -Medical management appreciated  -Dispo pending PT eval  -Discussed above with Dr. Rodriguez, who agrees with plan

## 2023-12-10 NOTE — PROGRESS NOTE ADULT - ASSESSMENT
83F s/p fall  Advanced Alzheimer's, poor historian, does not follow commands on baseline as per daughter (Ms. Good)  CT H/C-spine/C/A/P wnl  CT Rt ankle: Acute trimalleolar fracture of the right ankle, Acute obliquely oriented mildly displaced fracture of the head neck, junction of the fourth metatarsal. Acute nondisplaced acute fractures of the second and third metatarsal head neck junctions.    Now POD0 from patient underwent Open reduction and internal fixation of bimalleolar fracture of right ankle this morning.    Recs:  - F/u Ortho plan for PT  - Pain control PRN  - No urgent surgical intervention needed in trauma surgical standpoint at this time  - Rest of the care as per primary team    Plan will be discussed with Trauma & Surgical critical care surgery attending, Dr. Parr

## 2023-12-10 NOTE — PROGRESS NOTE ADULT - SUBJECTIVE AND OBJECTIVE BOX
Patient seen and examined at bedside this AM.  No acute complaints at this time. Pain well controlled. Denies chest pain, shortness of breath, nausea or vomiting. Exam limited 2/2 dementia.     PE:  Vital Signs Last 24 Hrs  T(C): 36.9 (12-10-23 @ 00:00), Max: 36.9 (12-09-23 @ 09:07)  T(F): 98.4 (12-10-23 @ 00:00), Max: 98.4 (12-09-23 @ 09:07)  HR: 86 (12-10-23 @ 00:00) (71 - 102)  BP: 97/45 (12-10-23 @ 00:00) (97/45 - 134/69)  BP(mean): 94 (12-09-23 @ 14:17) (68 - 94)  RR: 18 (12-10-23 @ 00:00) (12 - 19)  SpO2: 93% (12-10-23 @ 00:00) (89% - 100%)    General: NAD, resting comfortably in bed  RLE:   AO splint in place  Compartments soft and compressible  No calf tenderness bilaterally  +EHL/FHL  GSc, TA unable to be assessed 2/2 splint  SILT L3-S1  + DP                            11.1   8.72  )-----------( 164      ( 10 Dec 2023 03:55 )             35.0     12-09    141  |  110<H>  |  30<H>  ----------------------------<  92  4.0   |  27  |  1.14    Ca    8.6      09 Dec 2023 10:59    TPro  7.1  /  Alb  3.0<L>  /  TBili  0.2  /  DBili  x   /  AST  40<H>  /  ALT  37  /  AlkPhos  168<H>  12-09    PT/INR - ( 09 Dec 2023 10:59 )   PT: 11.1 sec;   INR: 0.98 ratio         PTT - ( 09 Dec 2023 10:59 )  PTT:36.0 sec    A/P: 83y Female with R trimalleolar fracture s/p closed reduction and splinting, w/ abrasion to medial mal (no obvious open injury); R 2-4 met neck fx       - Plan for OR with Dr. Rodriguez for ORIF 0800  - NPO after midnight except meds, provide IVF while NPO  - Admit to medicine, consult Cardiology and EP for PPM interrogation  - Medical and cardiac optimization for OR documented  - PPM interrog in chart  - Pain control  - NWB on affected extremity in splint  - Keep splint clean, dry and intact   - Cane/crutches/walker as needed  - Ice/elevation  - OK One dose of heparin today and Hold after midnight for OR tomorrow  - WIll plan to continue ancef with wound to medial malleolus as precaution for 24-48 hrs, however does not appear to be an obvious open injury  - Plan discussed with Dr. Rodriguez, who agrees with above

## 2023-12-10 NOTE — DISCHARGE NOTE PROVIDER - NSDCMRMEDTOKEN_GEN_ALL_CORE_FT
acetaminophen 500 mg oral tablet: 2 tab(s) orally 4 times a day as needed for  Aricept 10 mg oral tablet: 1 tab(s) orally once a day (at bedtime)  dicyclomine 10 mg oral capsule: 1 cap(s) orally 2 times a day  DULoxetine 60 mg oral delayed release capsule: 1 cap(s) orally once a day  guaiFENesin 50 mg/5 mL oral solution: 10 milliliter(s) orally 2 times a day for 5 days  Levaquin 500 mg oral tablet: 1 tab(s) orally once a day for 5 days ***course not complete***  levothyroxine 25 mcg (0.025 mg) oral tablet: 1 tab(s) orally once a day  Neurontin 400 mg oral capsule: 1 cap(s) orally 2 times a day  Nuplazid 34 mg oral capsule: 1 cap(s) orally once a day  nystatin 100,000 units/g topical powder: Apply topically to affected area once a day as needed for   acetaminophen 500 mg oral tablet: 2 tab(s) orally every 8 hours  Aricept 10 mg oral tablet: 1 tab(s) orally once a day (at bedtime)  ascorbic acid 500 mg oral tablet: 1 tab(s) orally 2 times a day  calcium-vitamin D 500 mg-5 mcg (200 intl units) oral tablet: 1 tab(s) orally 3 times a day  dicyclomine 10 mg oral capsule: 1 cap(s) orally 2 times a day  DULoxetine 60 mg oral delayed release capsule: 1 cap(s) orally once a day  levothyroxine 25 mcg (0.025 mg) oral tablet: 1 tab(s) orally once a day  Lovenox 40 mg/0.4 mL injectable solution: 40 milligram(s) subcutaneously 2 times a day Administer twice a day until you are able to ambulate on your right foot.  Multiple Vitamins oral tablet: 1 tab(s) orally once a day  Neurontin 400 mg oral capsule: 1 cap(s) orally 2 times a day  Nuplazid 34 mg oral capsule: 1 cap(s) orally once a day  nystatin 100,000 units/g topical powder: Apply topically to affected area once a day as needed for  pantoprazole 40 mg oral delayed release tablet: 1 tab(s) orally once a day (before a meal)  polyethylene glycol 3350 oral powder for reconstitution: 17 gram(s) orally once a day  senna leaf extract oral tablet: 2 tab(s) orally once a day (at bedtime)   acetaminophen 500 mg oral tablet: 2 tab(s) orally every 8 hours  Aricept 10 mg oral tablet: 1 tab(s) orally once a day (at bedtime)  ascorbic acid 500 mg oral tablet: 1 tab(s) orally 2 times a day  calcium-vitamin D 500 mg-5 mcg (200 intl units) oral tablet: 1 tab(s) orally 3 times a day  dicyclomine 10 mg oral capsule: 1 cap(s) orally 2 times a day  DULoxetine 60 mg oral delayed release capsule: 1 cap(s) orally once a day  levothyroxine 25 mcg (0.025 mg) oral tablet: 1 tab(s) orally once a day  Lovenox 40 mg/0.4 mL injectable solution: 40 milligram(s) subcutaneously once a day until fully ambulatory  Multiple Vitamins oral tablet: 1 tab(s) orally once a day  Neurontin 400 mg oral capsule: 1 cap(s) orally 2 times a day  Nuplazid 34 mg oral capsule: 1 cap(s) orally once a day  nystatin 100,000 units/g topical powder: Apply topically to affected area once a day as needed for  pantoprazole 40 mg oral delayed release tablet: 1 tab(s) orally once a day (before a meal)  polyethylene glycol 3350 oral powder for reconstitution: 17 gram(s) orally once a day  senna leaf extract oral tablet: 2 tab(s) orally once a day (at bedtime)

## 2023-12-10 NOTE — DISCHARGE NOTE PROVIDER - NSDCCPTREATMENT_GEN_ALL_CORE_FT
PRINCIPAL PROCEDURE  Procedure: Open reduction and internal fixation (ORIF) of bimalleolar fracture of right ankle  Findings and Treatment: trimal

## 2023-12-11 DIAGNOSIS — Z95.0 PRESENCE OF CARDIAC PACEMAKER: ICD-10-CM

## 2023-12-11 LAB
ANION GAP SERPL CALC-SCNC: 7 MMOL/L — SIGNIFICANT CHANGE UP (ref 5–17)
ANION GAP SERPL CALC-SCNC: 7 MMOL/L — SIGNIFICANT CHANGE UP (ref 5–17)
BUN SERPL-MCNC: 13 MG/DL — SIGNIFICANT CHANGE UP (ref 7–23)
BUN SERPL-MCNC: 13 MG/DL — SIGNIFICANT CHANGE UP (ref 7–23)
CALCIUM SERPL-MCNC: 8.7 MG/DL — SIGNIFICANT CHANGE UP (ref 8.5–10.1)
CALCIUM SERPL-MCNC: 8.7 MG/DL — SIGNIFICANT CHANGE UP (ref 8.5–10.1)
CHLORIDE SERPL-SCNC: 106 MMOL/L — SIGNIFICANT CHANGE UP (ref 96–108)
CHLORIDE SERPL-SCNC: 106 MMOL/L — SIGNIFICANT CHANGE UP (ref 96–108)
CO2 SERPL-SCNC: 28 MMOL/L — SIGNIFICANT CHANGE UP (ref 22–31)
CO2 SERPL-SCNC: 28 MMOL/L — SIGNIFICANT CHANGE UP (ref 22–31)
CREAT SERPL-MCNC: 0.74 MG/DL — SIGNIFICANT CHANGE UP (ref 0.5–1.3)
CREAT SERPL-MCNC: 0.74 MG/DL — SIGNIFICANT CHANGE UP (ref 0.5–1.3)
EGFR: 80 ML/MIN/1.73M2 — SIGNIFICANT CHANGE UP
EGFR: 80 ML/MIN/1.73M2 — SIGNIFICANT CHANGE UP
GLUCOSE SERPL-MCNC: 81 MG/DL — SIGNIFICANT CHANGE UP (ref 70–99)
GLUCOSE SERPL-MCNC: 81 MG/DL — SIGNIFICANT CHANGE UP (ref 70–99)
HCT VFR BLD CALC: 34.6 % — SIGNIFICANT CHANGE UP (ref 34.5–45)
HCT VFR BLD CALC: 34.6 % — SIGNIFICANT CHANGE UP (ref 34.5–45)
HGB BLD-MCNC: 11.2 G/DL — LOW (ref 11.5–15.5)
HGB BLD-MCNC: 11.2 G/DL — LOW (ref 11.5–15.5)
MCHC RBC-ENTMCNC: 30.9 PG — SIGNIFICANT CHANGE UP (ref 27–34)
MCHC RBC-ENTMCNC: 30.9 PG — SIGNIFICANT CHANGE UP (ref 27–34)
MCHC RBC-ENTMCNC: 32.4 GM/DL — SIGNIFICANT CHANGE UP (ref 32–36)
MCHC RBC-ENTMCNC: 32.4 GM/DL — SIGNIFICANT CHANGE UP (ref 32–36)
MCV RBC AUTO: 95.3 FL — SIGNIFICANT CHANGE UP (ref 80–100)
MCV RBC AUTO: 95.3 FL — SIGNIFICANT CHANGE UP (ref 80–100)
PLATELET # BLD AUTO: 169 K/UL — SIGNIFICANT CHANGE UP (ref 150–400)
PLATELET # BLD AUTO: 169 K/UL — SIGNIFICANT CHANGE UP (ref 150–400)
POTASSIUM SERPL-MCNC: 3.6 MMOL/L — SIGNIFICANT CHANGE UP (ref 3.5–5.3)
POTASSIUM SERPL-MCNC: 3.6 MMOL/L — SIGNIFICANT CHANGE UP (ref 3.5–5.3)
POTASSIUM SERPL-SCNC: 3.6 MMOL/L — SIGNIFICANT CHANGE UP (ref 3.5–5.3)
POTASSIUM SERPL-SCNC: 3.6 MMOL/L — SIGNIFICANT CHANGE UP (ref 3.5–5.3)
RBC # BLD: 3.63 M/UL — LOW (ref 3.8–5.2)
RBC # BLD: 3.63 M/UL — LOW (ref 3.8–5.2)
RBC # FLD: 13.1 % — SIGNIFICANT CHANGE UP (ref 10.3–14.5)
RBC # FLD: 13.1 % — SIGNIFICANT CHANGE UP (ref 10.3–14.5)
SARS-COV-2 RNA SPEC QL NAA+PROBE: SIGNIFICANT CHANGE UP
SARS-COV-2 RNA SPEC QL NAA+PROBE: SIGNIFICANT CHANGE UP
SODIUM SERPL-SCNC: 141 MMOL/L — SIGNIFICANT CHANGE UP (ref 135–145)
SODIUM SERPL-SCNC: 141 MMOL/L — SIGNIFICANT CHANGE UP (ref 135–145)
WBC # BLD: 10.79 K/UL — HIGH (ref 3.8–10.5)
WBC # BLD: 10.79 K/UL — HIGH (ref 3.8–10.5)
WBC # FLD AUTO: 10.79 K/UL — HIGH (ref 3.8–10.5)
WBC # FLD AUTO: 10.79 K/UL — HIGH (ref 3.8–10.5)

## 2023-12-11 PROCEDURE — 99232 SBSQ HOSP IP/OBS MODERATE 35: CPT

## 2023-12-11 PROCEDURE — 99233 SBSQ HOSP IP/OBS HIGH 50: CPT

## 2023-12-11 RX ORDER — ACETAMINOPHEN 500 MG
1 TABLET ORAL
Refills: 0 | DISCHARGE
Start: 2023-12-11

## 2023-12-11 RX ORDER — ENOXAPARIN SODIUM 100 MG/ML
40 INJECTION SUBCUTANEOUS
Qty: 30 | Refills: 0
Start: 2023-12-11 | End: 2024-01-09

## 2023-12-11 RX ORDER — ENOXAPARIN SODIUM 100 MG/ML
40 INJECTION SUBCUTANEOUS EVERY 24 HOURS
Refills: 0 | Status: DISCONTINUED | OUTPATIENT
Start: 2023-12-11 | End: 2023-12-12

## 2023-12-11 RX ORDER — PANTOPRAZOLE SODIUM 20 MG/1
1 TABLET, DELAYED RELEASE ORAL
Qty: 0 | Refills: 0 | DISCHARGE
Start: 2023-12-11

## 2023-12-11 RX ORDER — POLYETHYLENE GLYCOL 3350 17 G/17G
17 POWDER, FOR SOLUTION ORAL
Qty: 0 | Refills: 0 | DISCHARGE
Start: 2023-12-11

## 2023-12-11 RX ORDER — SENNA PLUS 8.6 MG/1
2 TABLET ORAL
Qty: 0 | Refills: 0 | DISCHARGE
Start: 2023-12-11

## 2023-12-11 RX ORDER — ACETAMINOPHEN 500 MG
2 TABLET ORAL
Refills: 0 | DISCHARGE

## 2023-12-11 RX ORDER — ASCORBIC ACID 60 MG
1 TABLET,CHEWABLE ORAL
Qty: 0 | Refills: 0 | DISCHARGE
Start: 2023-12-11

## 2023-12-11 RX ORDER — RIVAROXABAN 15 MG-20MG
1 KIT ORAL
Qty: 30 | Refills: 0
Start: 2023-12-11 | End: 2024-01-09

## 2023-12-11 RX ORDER — LEVOFLOXACIN 5 MG/ML
1 INJECTION, SOLUTION INTRAVENOUS
Refills: 0 | DISCHARGE

## 2023-12-11 RX ADMIN — Medication 1 TABLET(S): at 22:48

## 2023-12-11 RX ADMIN — Medication 1000 MILLIGRAM(S): at 22:49

## 2023-12-11 RX ADMIN — DULOXETINE HYDROCHLORIDE 60 MILLIGRAM(S): 30 CAPSULE, DELAYED RELEASE ORAL at 09:43

## 2023-12-11 RX ADMIN — Medication 500 MILLIGRAM(S): at 22:47

## 2023-12-11 RX ADMIN — SENNA PLUS 2 TABLET(S): 8.6 TABLET ORAL at 22:48

## 2023-12-11 RX ADMIN — Medication 10 MILLIGRAM(S): at 09:45

## 2023-12-11 RX ADMIN — Medication 1000 MILLIGRAM(S): at 10:31

## 2023-12-11 RX ADMIN — PIMAVANSERIN TARTRATE 34 MILLIGRAM(S): 10 TABLET, COATED ORAL at 09:46

## 2023-12-11 RX ADMIN — DONEPEZIL HYDROCHLORIDE 10 MILLIGRAM(S): 10 TABLET, FILM COATED ORAL at 22:48

## 2023-12-11 RX ADMIN — Medication 10 MILLIGRAM(S): at 15:18

## 2023-12-11 RX ADMIN — Medication 1 TABLET(S): at 15:17

## 2023-12-11 RX ADMIN — Medication 500 MILLIGRAM(S): at 10:02

## 2023-12-11 RX ADMIN — Medication 1 TABLET(S): at 10:02

## 2023-12-11 RX ADMIN — Medication 400 MILLIGRAM(S): at 00:13

## 2023-12-11 RX ADMIN — GABAPENTIN 400 MILLIGRAM(S): 400 CAPSULE ORAL at 22:48

## 2023-12-11 RX ADMIN — RIVAROXABAN 10 MILLIGRAM(S): KIT at 09:45

## 2023-12-11 RX ADMIN — Medication 1000 MILLIGRAM(S): at 00:30

## 2023-12-11 RX ADMIN — GABAPENTIN 400 MILLIGRAM(S): 400 CAPSULE ORAL at 09:44

## 2023-12-11 RX ADMIN — Medication 1000 MILLIGRAM(S): at 10:01

## 2023-12-11 RX ADMIN — Medication 2000 MILLIGRAM(S): at 00:04

## 2023-12-11 NOTE — PHYSICAL THERAPY INITIAL EVALUATION ADULT - PHYSICAL ASSIST/NONPHYSICAL ASSIST: SUPINE/SIT, REHAB EVAL
Sepideh Stager  2003    S:  23 y.o. female with c/o vaginal pain x 2-3 weeks. Sx are waxing and waning, worse after intercourse. Described as feeling raw, swollen, irritated, and itchy. No discharge. Urine was cloudy a few weeks ago, now normal. No PCB, perceived dryness, or pelvic pain. Denies dysuria, frequency, or urgency. Sx first started as she completed an antibiotic course for H. Pylori (she stopped this prematurely), with sx worsening now after she completed the recommended 3 week course. Reports recent neg STD testing. Still with same partner and denies concerns. They use condoms consistently for pregnancy prevention. Review of Systems   Constitutional: Negative   Gastrointestinal: Negative  Genitourinary: + vaginal itching and irritation . Negative for PCB, dryness, pelvic pain, dysuria, frequency, or urgency. O:  /80 (BP Location: Left arm, Patient Position: Sitting, Cuff Size: Standard)   Wt 58.4 kg (128 lb 12.8 oz)   LMP 07/21/2023   BMI 24.34 kg/m²     Recent Results (from the past 1 hour(s))   POCT wet mount    Collection Time: 08/17/23 12:56 PM   Result Value Ref Range    Yeast, Wet Prep Positive     pH 4     Whiff Test Neg     Clue Cells Neg     Trich, Wet Prep Neg        She appears well and is in no distress  Normocephalic, atraumatic. Normal respiratory effort  Abdomen is soft and nontender  External genitals are mildly erythematous without lesions  Vagina is mildly erythematous without discharge or bleeding. Cervix is without lesions or discharge. No CMT. Uterus is nontender, no masses  Adnexa are nontender, no pelvic masses appreciated  No focal neurological deficits. Normal mood, affect, and behavior. A/P:  Diagnoses and all orders for this visit:    Yeast vaginitis  -     fluconazole (DIFLUCAN) 150 mg tablet; Take one tablet now, then one tablet in 3 days. -     nystatin-triamcinolone (MYCOLOG-II) ointment;  Apply topically 2 (two) times a day As needed for itching and irritation      Reviewed findings of yeast on wet mount  • Diflucan and mycolog sent to pharmacy on file. Use reviewed. • Reviewed vulvar hygiene   RTO in 1 week if sx persist, sooner as needed. used bed pad to assist/2 person assist

## 2023-12-11 NOTE — PROGRESS NOTE ADULT - SUBJECTIVE AND OBJECTIVE BOX
Patient is a 83y old  Female who presents with a chief complaint of Ankle fracture s/p fall (10 Dec 2023 12:21)      SUBJECTIVE:   HPI:  84 y/o female with PMH of Alzheimer's, Parkinson's, spinal stenosis, HLD, presents to  ED after an unwitnessed fall at [facility]. Unable to obtain history from patient due to severe dementia (baseline AOx0-1). Per daughter at bedside, she believes her mom might of gotten out of bed leading to the fall, however unable to say for sure since it was unwitnessed. She was found on the floor by her bed. Patient had a pacemaker placed in 2018; Daughter doesn't think she's seen an EP since. Patient's spouse was her primary care taker and knew her medical history well before he passed away this past year. Daughter doesn't think she's seen any doctors outside of [place]. Per daughter, patient normally is ambulatory with walker while at [facility] [facility].      In ED, XRay showed right trimalleolar ankle fracture. Closed reduction performed by Ortho followed by placement of splint. Plan for ORIF tomorrow  Vitals: Oral temp of 98, but rectal temp of 95. Warming blanket placed. BP stable. HR . 95% on room air  Labs: WBC 8 -> 12 s/p reduction, could be reactive. Lipase elevated to 474  Imaging: XRay showed right trimalleolar ankle fracture. CT c/a/p unremarkable.   EKG: NSR with no ischemic findings  Consults: EP consulted for pacemaker interrogation. Cardiology consulted for clearance. Ortho following for surgery.   Interventions: 250 cc bolus given. Cefazolin started after closed reduction by Ortho      12/11/23- up in a chair, comfortable. Confused    Vital Signs Last 24 Hrs  T(C): 36.6 (11 Dec 2023 07:53), Max: 36.6 (11 Dec 2023 07:53)  T(F): 97.9 (11 Dec 2023 07:53), Max: 97.9 (11 Dec 2023 07:53)  HR: 76 (11 Dec 2023 07:53) (76 - 97)  BP: 120/68 (11 Dec 2023 07:53) (120/68 - 145/74)  BP(mean): --  RR: 18 (11 Dec 2023 07:53) (18 - 18)  SpO2: 99% (11 Dec 2023 07:53) (92% - 99%)    Parameters below as of 11 Dec 2023 07:53  Patient On (Oxygen Delivery Method): room air    PHYSICAL EXAM:  Constitutional: NAD, awake   HEENT: PERR, EOMI, Normal Hearing, MMM  Neck: Soft and supple, No LAD, No JVD  Respiratory: Breath sounds are clear bilaterally, No wheezing, rales or rhonchi  Cardiovascular: S1 and S2, regular rate and rhythm, no Murmurs, gallops or rubs  Gastrointestinal: Bowel Sounds present, soft, nontender, nondistended, no guarding, no rebound  Extremities: No peripheral edema  Vascular: 2+ peripheral pulses  Neurological: awake, confused  Musculoskeletal: RLE in splint  Skin: No rashes    LABS:                                 11.2   10.79 )-----------( 169      ( 11 Dec 2023 08:46 )             34.6     11 Dec 2023 08:46    141    |  106    |  13     ----------------------------<  81     3.6     |  28     |  0.74     Ca    8.7        11 Dec 2023 08:46    PT/INR - ( 10 Dec 2023 04:04 )   PT: 11.3 sec;   INR: 1.00 ratio       PTT - ( 10 Dec 2023 04:04 )  PTT:32.7 sec    Urinalysis Basic - ( 11 Dec 2023 08:46 )  Color: x / Appearance: x / SG: x / pH: x  Gluc: 81 mg/dL / Ketone: x  / Bili: x / Urobili: x   Blood: x / Protein: x / Nitrite: x   Leuk Esterase: x / RBC: x / WBC x   Sq Epi: x / Non Sq Epi: x / Bacteria: x      MEDICATIONS  (STANDING):  acetaminophen     Tablet .. 1000 milliGRAM(s) Oral every 8 hours  ascorbic acid 500 milliGRAM(s) Oral two times a day  calcium carbonate 1250 mG  + Vitamin D (OsCal 500 + D) 1 Tablet(s) Oral three times a day  dicyclomine 10 milliGRAM(s) Oral two times a day before meals  donepezil 10 milliGRAM(s) Oral at bedtime  DULoxetine 60 milliGRAM(s) Oral daily  gabapentin 400 milliGRAM(s) Oral two times a day  HYDROmorphone  Injectable 0.2 milliGRAM(s) IV Push once  levothyroxine 25 MICROGram(s) Oral daily  multivitamin 1 Tablet(s) Oral daily  pantoprazole    Tablet 40 milliGRAM(s) Oral before breakfast  pimavanserin Capsule 34 milliGRAM(s) Oral daily  polyethylene glycol 3350 17 Gram(s) Oral daily  rivaroxaban 10 milliGRAM(s) Oral daily  senna 2 Tablet(s) Oral at bedtime  sodium chloride 0.9%. 1000 milliLiter(s) (75 mL/Hr) IV Continuous <Continuous>    MEDICATIONS  (PRN):  acetaminophen     Tablet .. 650 milliGRAM(s) Oral every 6 hours PRN Mild Pain (1 - 3)  aluminum hydroxide/magnesium hydroxide/simethicone Suspension 30 milliLiter(s) Oral four times a day PRN Indigestion  magnesium hydroxide Suspension 30 milliLiter(s) Oral daily PRN Constipation  ondansetron Injectable 4 milliGRAM(s) IV Push every 6 hours PRN Nausea and/or Vomiting  oxyCODONE    IR 2.5 milliGRAM(s) Oral every 4 hours PRN Moderate Pain (4 - 6)  oxyCODONE    IR 5 milliGRAM(s) Oral every 4 hours PRN Severe Pain (7 - 10)  traMADol 25 milliGRAM(s) Oral every 6 hours PRN Mild Pain (1 - 3)         Patient is a 83y old  Female who presents with a chief complaint of Ankle fracture s/p fall (10 Dec 2023 12:21)      SUBJECTIVE:   HPI:  84 y/o female with PMH of Alzheimer's, Parkinson's, spinal stenosis, HLD, presents to  ED after an unwitnessed fall at [facility]. Unable to obtain history from patient due to severe dementia (baseline AOx0-1). Per daughter at bedside, she believes her mom might of gotten out of bed leading to the fall, however unable to say for sure since it was unwitnessed. She was found on the floor by her bed. Patient had a pacemaker placed in 2018; Daughter doesn't think she's seen an EP since. Patient's spouse was her primary care taker and knew her medical history well before he passed away this past year. Daughter doesn't think she's seen any doctors outside of [place]. Per daughter, patient normally is ambulatory with walker while at [facility].      In ED, XRay showed right trimalleolar ankle fracture. Closed reduction performed by Ortho followed by placement of splint. Plan for ORIF tomorrow  Vitals: Oral temp of 98, but rectal temp of 95. Warming blanket placed. BP stable. HR . 95% on room air  Labs: WBC 8 -> 12 s/p reduction, could be reactive. Lipase elevated to 474  Imaging: XRay showed right trimalleolar ankle fracture. CT c/a/p unremarkable.   EKG: NSR with no ischemic findings  Consults: EP consulted for pacemaker interrogation. Cardiology consulted for clearance. Ortho following for surgery.   Interventions: 250 cc bolus given. Cefazolin started after closed reduction by Ortho      12/11/23- up in a chair, comfortable. Confused    Vital Signs Last 24 Hrs  T(C): 36.6 (11 Dec 2023 07:53), Max: 36.6 (11 Dec 2023 07:53)  T(F): 97.9 (11 Dec 2023 07:53), Max: 97.9 (11 Dec 2023 07:53)  HR: 76 (11 Dec 2023 07:53) (76 - 97)  BP: 120/68 (11 Dec 2023 07:53) (120/68 - 145/74)  BP(mean): --  RR: 18 (11 Dec 2023 07:53) (18 - 18)  SpO2: 99% (11 Dec 2023 07:53) (92% - 99%)    Parameters below as of 11 Dec 2023 07:53  Patient On (Oxygen Delivery Method): room air    PHYSICAL EXAM:  Constitutional: NAD, awake   HEENT: PERR, EOMI, Normal Hearing, MMM  Neck: Soft and supple, No LAD, No JVD  Respiratory: Breath sounds are clear bilaterally, No wheezing, rales or rhonchi  Cardiovascular: S1 and S2, regular rate and rhythm, no Murmurs, gallops or rubs  Gastrointestinal: Bowel Sounds present, soft, nontender, nondistended, no guarding, no rebound  Extremities: No peripheral edema  Vascular: 2+ peripheral pulses  Neurological: awake, confused  Musculoskeletal: RLE in splint  Skin: No rashes    LABS:                                 11.2   10.79 )-----------( 169      ( 11 Dec 2023 08:46 )             34.6     11 Dec 2023 08:46    141    |  106    |  13     ----------------------------<  81     3.6     |  28     |  0.74     Ca    8.7        11 Dec 2023 08:46    PT/INR - ( 10 Dec 2023 04:04 )   PT: 11.3 sec;   INR: 1.00 ratio       PTT - ( 10 Dec 2023 04:04 )  PTT:32.7 sec    Urinalysis Basic - ( 11 Dec 2023 08:46 )  Color: x / Appearance: x / SG: x / pH: x  Gluc: 81 mg/dL / Ketone: x  / Bili: x / Urobili: x   Blood: x / Protein: x / Nitrite: x   Leuk Esterase: x / RBC: x / WBC x   Sq Epi: x / Non Sq Epi: x / Bacteria: x      MEDICATIONS  (STANDING):  acetaminophen     Tablet .. 1000 milliGRAM(s) Oral every 8 hours  ascorbic acid 500 milliGRAM(s) Oral two times a day  calcium carbonate 1250 mG  + Vitamin D (OsCal 500 + D) 1 Tablet(s) Oral three times a day  dicyclomine 10 milliGRAM(s) Oral two times a day before meals  donepezil 10 milliGRAM(s) Oral at bedtime  DULoxetine 60 milliGRAM(s) Oral daily  gabapentin 400 milliGRAM(s) Oral two times a day  HYDROmorphone  Injectable 0.2 milliGRAM(s) IV Push once  levothyroxine 25 MICROGram(s) Oral daily  multivitamin 1 Tablet(s) Oral daily  pantoprazole    Tablet 40 milliGRAM(s) Oral before breakfast  pimavanserin Capsule 34 milliGRAM(s) Oral daily  polyethylene glycol 3350 17 Gram(s) Oral daily  rivaroxaban 10 milliGRAM(s) Oral daily  senna 2 Tablet(s) Oral at bedtime  sodium chloride 0.9%. 1000 milliLiter(s) (75 mL/Hr) IV Continuous <Continuous>    MEDICATIONS  (PRN):  acetaminophen     Tablet .. 650 milliGRAM(s) Oral every 6 hours PRN Mild Pain (1 - 3)  aluminum hydroxide/magnesium hydroxide/simethicone Suspension 30 milliLiter(s) Oral four times a day PRN Indigestion  magnesium hydroxide Suspension 30 milliLiter(s) Oral daily PRN Constipation  ondansetron Injectable 4 milliGRAM(s) IV Push every 6 hours PRN Nausea and/or Vomiting  oxyCODONE    IR 2.5 milliGRAM(s) Oral every 4 hours PRN Moderate Pain (4 - 6)  oxyCODONE    IR 5 milliGRAM(s) Oral every 4 hours PRN Severe Pain (7 - 10)  traMADol 25 milliGRAM(s) Oral every 6 hours PRN Mild Pain (1 - 3)

## 2023-12-11 NOTE — OCCUPATIONAL THERAPY INITIAL EVALUATION ADULT - PLANNED THERAPY INTERVENTIONS, OT EVAL
ADL retraining/cognitive, visual perceptual/transfer training ADL retraining/balance training/cognitive, visual perceptual/transfer training

## 2023-12-11 NOTE — PHYSICAL THERAPY INITIAL EVALUATION ADULT - LEVEL OF INDEPENDENCE: GAIT, REHAB EVAL
Stand pivot performed w/ PT assist to maintain NWB R LE (pt unable to maintain on her own at this session)/maximum assist (25% patients effort)

## 2023-12-11 NOTE — OCCUPATIONAL THERAPY INITIAL EVALUATION ADULT - BALANCE TRAINING, PT EVAL
Pt will demonstrate improved static/dynamic balance by 1/2 grade order to increase safety and independence in ADLs within 2 weeks

## 2023-12-11 NOTE — OCCUPATIONAL THERAPY INITIAL EVALUATION ADULT - WEIGHT-BEARING RESTRICTIONS: SIT/STAND, REHAB EVAL
[Chaperone Declined] : Patient declined chaperone [Appropriately responsive] : appropriately responsive [Alert] : alert [No Acute Distress] : no acute distress [No Lymphadenopathy] : no lymphadenopathy [Regular Rate Rhythm] : regular rate rhythm [No Murmurs] : no murmurs [Clear to Auscultation B/L] : clear to auscultation bilaterally [Soft] : soft [Non-tender] : non-tender [Non-distended] : non-distended [No HSM] : No HSM [No Lesions] : no lesions [No Mass] : no mass [Oriented x3] : oriented x3 [Examination Of The Breasts] : a normal appearance [No Masses] : no breast masses were palpable [Labia Majora] : normal [Labia Minora] : normal [Atrophy] : atrophy [Normal] : normal [Uterine Adnexae] : normal RLE/nonweight-bearing

## 2023-12-11 NOTE — PROGRESS NOTE ADULT - ASSESSMENT
82 y/o female with PMH of Alzheimer's, Parkinson's, spinal stenosis, HLD, presents to  ED after an unwitnessed fall at [facility].     #MechanicalFall  #Right trimalleolar ankle fracture s/p ORIF  Ortho following  NWB to RLE/ PT  Pain meds prn  Incentive spirometry  Bowel regimen    #Alzheimer's  #Parkinson's  - Continue home medications  - Daughter to bring in Nuplazid     #Elevated lipase  No abdominal pain. CT a/p normal     CODE STATUS: FULL CODE    #DVT proph- Lovenox 40mg SC daily until fully ambulatory    #Dispo- DC to [facility] when get auth. DC time 45 mins 84 y/o female with PMH of Alzheimer's, Parkinson's, spinal stenosis, HLD, presents to  ED after an unwitnessed fall at [facility].     #MechanicalFall  #Right trimalleolar ankle fracture s/p ORIF  Ortho following  NWB to RLE/ PT  Pain meds prn  Incentive spirometry  Bowel regimen    #Alzheimer's  #Parkinson's  - Continue home medications  - Daughter to bring in Nuplazid     #Elevated lipase  No abdominal pain. CT a/p normal     CODE STATUS: FULL CODE    #DVT proph- Lovenox 40mg SC daily until fully ambulatory    #Dispo- DC to [facility] when get auth. DC time 45 mins

## 2023-12-11 NOTE — PROGRESS NOTE ADULT - SUBJECTIVE AND OBJECTIVE BOX
Patient seen and examined at bedside. Interview limited 2/2 dementia. No acute complaints at this time. Pain appears well controlled. Required soft wrist restraints overnight to prevent line pulling.       LABS:                        10.6   6.40  )-----------( 149      ( 10 Dec 2023 10:56 )             33.3     12-10    143  |  112<H>  |  25<H>  ----------------------------<  90  4.0   |  26  |  1.02    Ca    8.7      10 Dec 2023 03:55    TPro  7.1  /  Alb  3.0<L>  /  TBili  0.2  /  DBili  x   /  AST  40<H>  /  ALT  37  /  AlkPhos  168<H>  12-09    PT/INR - ( 10 Dec 2023 04:04 )   PT: 11.3 sec;   INR: 1.00 ratio         PTT - ( 10 Dec 2023 04:04 )  PTT:32.7 sec  Urinalysis Basic - ( 10 Dec 2023 03:55 )    Color: x / Appearance: x / SG: x / pH: x  Gluc: 90 mg/dL / Ketone: x  / Bili: x / Urobili: x   Blood: x / Protein: x / Nitrite: x   Leuk Esterase: x / RBC: x / WBC x   Sq Epi: x / Non Sq Epi: x / Bacteria: x        VITAL SIGNS:  T(C): 36.4 (12-11-23 @ 00:00), Max: 36.5 (12-10-23 @ 11:45)  HR: 94 (12-11-23 @ 00:00) (63 - 97)  BP: 135/60 (12-11-23 @ 00:00) (107/40 - 145/74)  RR: 18 (12-11-23 @ 00:00) (10 - 20)  SpO2: 93% (12-11-23 @ 00:00) (92% - 100%)      PE:    General: NAD, resting comfortably in bed  RLE:   splint in place C/D/I  No calf grimace to squeeze bilaterally  compartments soft and compressible where accessible through splint   Motor: + EHL/FHL/TA/GSC  +SILT: SPN/DPN/Khadar/Saph/Tib  palpable DP  toes wwp      A/P:  83y F s/p R Ankle ORIF POD 1, Right 2-4 metatarsal neck fxs  -PT/OT daily  -NWB on the RLE in splint  -Pain Control  -DVT ppx: xarelto  -Continue perioperative abx x 24 hours  -rocephin for UTI tx per medicine  -FU AM Labs  -strict ice/elevation  -Incentive Spirometry  -Medical comanagement appreciated  -Dispo pending PT eval  -Discussed above with Dr. Rodriguez, who agrees with plan

## 2023-12-11 NOTE — OCCUPATIONAL THERAPY INITIAL EVALUATION ADULT - LEVEL OF INDEPENDENCE: DRESS UPPER BODY, OT EVAL
to don/[facility] gown/dependent (less than 25% patients effort) to don/[facility] gown/dependent (less than 25% patients effort)

## 2023-12-11 NOTE — PHYSICAL THERAPY INITIAL EVALUATION ADULT - PERTINENT HX OF CURRENT PROBLEM, REHAB EVAL
Reason for Admission: Ankle fracture s/p fall  PMH: 82 y/o female with PMH of Alzheimer's, Parkinson's, spinal stenosis, HLD  CT ankle: Acute trimalleolar fracture of the right ankle. Acute obliquely oriented mildly displaced fracture of the head neck junction of the fourth metatarsal.  Acute nondisplaced acute fractures of the second and third metatarsal head neck junctions.  Pt now POD#1 (12/11/23) Reason for Admission: Ankle fracture s/p fall  PMH: 82 y/o female with PMH of Alzheimer's, Parkinson's, spinal stenosis, HLD  CT ankle: Acute trimalleolar fracture of the right ankle. Acute obliquely oriented mildly displaced fracture of the head neck junction of the fourth metatarsal.  Acute nondisplaced acute fractures of the second and third metatarsal head neck junctions.  Pt now POD#1 (12/11/23)- ORIF of bimalleolar fracture of right ankle Reason for Admission: Ankle fracture s/p fall  PMH: 84 y/o female with PMH of Alzheimer's, Parkinson's, spinal stenosis, HLD  CT ankle: Acute trimalleolar fracture of the right ankle. Acute obliquely oriented mildly displaced fracture of the head neck junction of the fourth metatarsal.  Acute nondisplaced acute fractures of the second and third metatarsal head neck junctions.  Pt now POD#1 (12/11/23)- ORIF of bimalleolar fracture of right ankle

## 2023-12-11 NOTE — OCCUPATIONAL THERAPY INITIAL EVALUATION ADULT - ADDITIONAL COMMENTS
Per EMR patient is a resident at [facility] in the memory care unit, walks with RW, pt is a poor historian and other PLOF/history is unknown to this writer at this time. Per EMR patient is a resident at [facility] in the memory care unit, walks with RW, pt is a poor historian and other PLOF/history is unknown to this writer at this time.

## 2023-12-11 NOTE — PROGRESS NOTE ADULT - SUBJECTIVE AND OBJECTIVE BOX
HPI:    Per ortho notes:  83yFemale p/w R ankle pain/deformity and inability to bear weight s/p mechanical fall at [place] [facility] this morning, unwitnessed. Patient was found on the floor around 7Am during morning rounds. Patient has significant Alzheimer's dementia and is unable to provide full history. Daughter, Latisha, at bedside. [place] facility contacted as well. Indeterminate headstrike/LOC. Denies numbness/tingling in the feet/toes. No other bone or joint complaints.     called for preop clearance.  Daughter at bedside  pt unable to provide hx 2/2 alzheimers.  Daughter any denies any reports of dyspnea, chest pain, palpitations,  did not pass out as far as we know when she fell.  functional capacity is limited - walks w/ walker around [facility].    per daughter had PM implanted in [place] several yrs ago  after syncopal event does not recall when it was last checked.    12/11/'23: No complaints postop.      MEDICATIONS:  OUTPATIENT  Home Medications:  acetaminophen 500 mg oral tablet: 2 tab(s) orally every 8 hours (11 Dec 2023 11:38)  Aricept 10 mg oral tablet: 1 tab(s) orally once a day (at bedtime) (09 Dec 2023 13:29)  ascorbic acid 500 mg oral tablet: 1 tab(s) orally 2 times a day (11 Dec 2023 11:38)  calcium-vitamin D 500 mg-5 mcg (200 intl units) oral tablet: 1 tab(s) orally 3 times a day (11 Dec 2023 11:38)  dicyclomine 10 mg oral capsule: 1 cap(s) orally 2 times a day (09 Dec 2023 13:29)  DULoxetine 60 mg oral delayed release capsule: 1 cap(s) orally once a day (09 Dec 2023 14:44)  levothyroxine 25 mcg (0.025 mg) oral tablet: 1 tab(s) orally once a day (09 Dec 2023 14:44)  Lovenox 40 mg/0.4 mL injectable solution: 40 milligram(s) subcutaneously once a day until fully ambulatory (11 Dec 2023 15:28)  Multiple Vitamins oral tablet: 1 tab(s) orally once a day (11 Dec 2023 11:38)  Neurontin 400 mg oral capsule: 1 cap(s) orally 2 times a day (09 Dec 2023 14:44)  Nuplazid 34 mg oral capsule: 1 cap(s) orally once a day (09 Dec 2023 14:44)  nystatin 100,000 units/g topical powder: Apply topically to affected area once a day as needed for (09 Dec 2023 14:44)  pantoprazole 40 mg oral delayed release tablet: 1 tab(s) orally once a day (before a meal) (11 Dec 2023 11:38)  polyethylene glycol 3350 oral powder for reconstitution: 17 gram(s) orally once a day (11 Dec 2023 11:38)  senna leaf extract oral tablet: 2 tab(s) orally once a day (at bedtime) (11 Dec 2023 11:38)      INPATIENT  MEDICATIONS  (STANDING):  acetaminophen     Tablet .. 1000 milliGRAM(s) Oral every 8 hours  ascorbic acid 500 milliGRAM(s) Oral two times a day  calcium carbonate 1250 mG  + Vitamin D (OsCal 500 + D) 1 Tablet(s) Oral three times a day  dicyclomine 10 milliGRAM(s) Oral two times a day before meals  donepezil 10 milliGRAM(s) Oral at bedtime  DULoxetine 60 milliGRAM(s) Oral daily  enoxaparin Injectable 40 milliGRAM(s) SubCutaneous every 24 hours  gabapentin 400 milliGRAM(s) Oral two times a day  HYDROmorphone  Injectable 0.2 milliGRAM(s) IV Push once  levothyroxine 25 MICROGram(s) Oral daily  multivitamin 1 Tablet(s) Oral daily  pantoprazole    Tablet 40 milliGRAM(s) Oral before breakfast  pimavanserin Capsule 34 milliGRAM(s) Oral daily  polyethylene glycol 3350 17 Gram(s) Oral daily  senna 2 Tablet(s) Oral at bedtime    MEDICATIONS  (PRN):  acetaminophen     Tablet .. 650 milliGRAM(s) Oral every 6 hours PRN Mild Pain (1 - 3)  aluminum hydroxide/magnesium hydroxide/simethicone Suspension 30 milliLiter(s) Oral four times a day PRN Indigestion  magnesium hydroxide Suspension 30 milliLiter(s) Oral daily PRN Constipation  ondansetron Injectable 4 milliGRAM(s) IV Push every 6 hours PRN Nausea and/or Vomiting  oxyCODONE    IR 2.5 milliGRAM(s) Oral every 4 hours PRN Moderate Pain (4 - 6)  oxyCODONE    IR 5 milliGRAM(s) Oral every 4 hours PRN Severe Pain (7 - 10)  traMADol 25 milliGRAM(s) Oral every 6 hours PRN Mild Pain (1 - 3)          Vital Signs Last 24 Hrs  T(C): 36.3 (11 Dec 2023 16:00), Max: 36.6 (11 Dec 2023 07:53)  T(F): 97.3 (11 Dec 2023 16:00), Max: 97.9 (11 Dec 2023 07:53)  HR: 64 (11 Dec 2023 16:00) (64 - 94)  BP: 99/43 (11 Dec 2023 16:00) (99/43 - 135/60)  BP(mean): 57 (11 Dec 2023 16:00) (57 - 57)  RR: 17 (11 Dec 2023 16:00) (17 - 18)  SpO2: 98% (11 Dec 2023 16:00) (93% - 99%)    Parameters below as of 11 Dec 2023 16:00  Patient On (Oxygen Delivery Method): room air    Daily     Daily I&O's Summary    10 Dec 2023 07:01  -  11 Dec 2023 07:00  --------------------------------------------------------  IN: 800 mL / OUT: 20 mL / NET: 780 mL    11 Dec 2023 07:01  -  11 Dec 2023 20:34  --------------------------------------------------------  IN: 120 mL / OUT: 0 mL / NET: 120 mL        I&O's Detail    10 Dec 2023 07:01  -  11 Dec 2023 07:00  --------------------------------------------------------  IN:    Other (mL): 800 mL  Total IN: 800 mL    OUT:    Other (mL): 20 mL  Total OUT: 20 mL    Total NET: 780 mL      11 Dec 2023 07:01  -  11 Dec 2023 20:34  --------------------------------------------------------  IN:    Oral Fluid: 120 mL  Total IN: 120 mL    OUT:  Total OUT: 0 mL    Total NET: 120 mL          I&O's Summary    10 Dec 2023 07:01  -  11 Dec 2023 07:00  --------------------------------------------------------  IN: 800 mL / OUT: 20 mL / NET: 780 mL    11 Dec 2023 07:01  -  11 Dec 2023 20:34  --------------------------------------------------------  IN: 120 mL / OUT: 0 mL / NET: 120 mL        PHYSICAL EXAM:    Constitutional: NAD, awake  HEENT: PERR, EOMI,  No oral cyananosis.  Neck:  supple,  No JVD  Respiratory: Breath sounds are clear bilaterally, No wheezing, rales or rhonchi  Cardiovascular: S1 and S2, regular rate and rhythm, no Murmurs, gallops or rubs  Gastrointestinal: Bowel Sounds present, soft, nontender.   Extremities: No peripheral edema. No clubbing or cyanosis.  Vascular: 2+ peripheral pulses  Neurological: A/O x 3, no focal deficits  Musculoskeletal: no calf tenderness.  Skin: No rashes.    ===============================  ===============================  LABS:                         11.2   10.79 )-----------( 169      ( 11 Dec 2023 08:46 )             34.6                         10.6   6.40  )-----------( 149      ( 10 Dec 2023 10:56 )             33.3                         11.1   8.72  )-----------( 164      ( 10 Dec 2023 03:55 )             35.0     11 Dec 2023 08:46    141    |  106    |  13     ----------------------------<  81     3.6     |  28     |  0.74   10 Dec 2023 03:55    143    |  112    |  25     ----------------------------<  90     4.0     |  26     |  1.02   09 Dec 2023 10:59    141    |  110    |  30     ----------------------------<  92     4.0     |  27     |  1.14     Ca    8.7        11 Dec 2023 08:46  Ca    8.7        10 Dec 2023 03:55  Ca    8.6        09 Dec 2023 10:59    TPro  7.1    /  Alb  3.0    /  TBili  0.2    /  DBili  x      /  AST  40     /  ALT  37     /  AlkPhos  168    09 Dec 2023 09:27    PT/INR - ( 10 Dec 2023 04:04 )   PT: 11.3 sec;   INR: 1.00 ratio         PTT - ( 10 Dec 2023 04:04 )  PTT:32.7 sec  ===============================  ===============================  CARDIAC BIOMARKERS:  BNP      TROPONIN  Troponin I, Serum: <0.015 ng/mL [0.015 - 0.045] (06-11-21 @ 14:06)    ===============================  ===============================  BLOOD CULTURES:    Blood Culture: Organism --  Gram Stain Blood -- Gram Stain --  Specimen Source .Blood None  Culture-Blood --        12-09 @ 10:59  TSH: 3.57    ===============================  ===============================  EKG:  ECG NSR no ischemic changes            ===============================    Sammy Wang M.D.  Cardiology, [facility]  Cell: [phone]  Offices:    ([facility] Office)  [phone] ([facility] Office)    HPI:    Per ortho notes:  83yFemale p/w R ankle pain/deformity and inability to bear weight s/p mechanical fall at [place] [facility] this morning, unwitnessed. Patient was found on the floor around 7Am during morning rounds. Patient has significant Alzheimer's dementia and is unable to provide full history. Daughter, Latisha, at bedside. [place] facility contacted as well. Indeterminate headstrike/LOC. Denies numbness/tingling in the feet/toes. No other bone or joint complaints.     called for preop clearance.  Daughter at bedside  pt unable to provide hx 2/2 alzheimers.  Daughter any denies any reports of dyspnea, chest pain, palpitations,  did not pass out as far as we know when she fell.  functional capacity is limited - walks w/ walker around [facility].    per daughter had PM implanted in [place] several yrs ago  after syncopal event does not recall when it was last checked.    12/11/'23: No complaints postop.      MEDICATIONS:  OUTPATIENT  Home Medications:  acetaminophen 500 mg oral tablet: 2 tab(s) orally every 8 hours (11 Dec 2023 11:38)  Aricept 10 mg oral tablet: 1 tab(s) orally once a day (at bedtime) (09 Dec 2023 13:29)  ascorbic acid 500 mg oral tablet: 1 tab(s) orally 2 times a day (11 Dec 2023 11:38)  calcium-vitamin D 500 mg-5 mcg (200 intl units) oral tablet: 1 tab(s) orally 3 times a day (11 Dec 2023 11:38)  dicyclomine 10 mg oral capsule: 1 cap(s) orally 2 times a day (09 Dec 2023 13:29)  DULoxetine 60 mg oral delayed release capsule: 1 cap(s) orally once a day (09 Dec 2023 14:44)  levothyroxine 25 mcg (0.025 mg) oral tablet: 1 tab(s) orally once a day (09 Dec 2023 14:44)  Lovenox 40 mg/0.4 mL injectable solution: 40 milligram(s) subcutaneously once a day until fully ambulatory (11 Dec 2023 15:28)  Multiple Vitamins oral tablet: 1 tab(s) orally once a day (11 Dec 2023 11:38)  Neurontin 400 mg oral capsule: 1 cap(s) orally 2 times a day (09 Dec 2023 14:44)  Nuplazid 34 mg oral capsule: 1 cap(s) orally once a day (09 Dec 2023 14:44)  nystatin 100,000 units/g topical powder: Apply topically to affected area once a day as needed for (09 Dec 2023 14:44)  pantoprazole 40 mg oral delayed release tablet: 1 tab(s) orally once a day (before a meal) (11 Dec 2023 11:38)  polyethylene glycol 3350 oral powder for reconstitution: 17 gram(s) orally once a day (11 Dec 2023 11:38)  senna leaf extract oral tablet: 2 tab(s) orally once a day (at bedtime) (11 Dec 2023 11:38)      INPATIENT  MEDICATIONS  (STANDING):  acetaminophen     Tablet .. 1000 milliGRAM(s) Oral every 8 hours  ascorbic acid 500 milliGRAM(s) Oral two times a day  calcium carbonate 1250 mG  + Vitamin D (OsCal 500 + D) 1 Tablet(s) Oral three times a day  dicyclomine 10 milliGRAM(s) Oral two times a day before meals  donepezil 10 milliGRAM(s) Oral at bedtime  DULoxetine 60 milliGRAM(s) Oral daily  enoxaparin Injectable 40 milliGRAM(s) SubCutaneous every 24 hours  gabapentin 400 milliGRAM(s) Oral two times a day  HYDROmorphone  Injectable 0.2 milliGRAM(s) IV Push once  levothyroxine 25 MICROGram(s) Oral daily  multivitamin 1 Tablet(s) Oral daily  pantoprazole    Tablet 40 milliGRAM(s) Oral before breakfast  pimavanserin Capsule 34 milliGRAM(s) Oral daily  polyethylene glycol 3350 17 Gram(s) Oral daily  senna 2 Tablet(s) Oral at bedtime    MEDICATIONS  (PRN):  acetaminophen     Tablet .. 650 milliGRAM(s) Oral every 6 hours PRN Mild Pain (1 - 3)  aluminum hydroxide/magnesium hydroxide/simethicone Suspension 30 milliLiter(s) Oral four times a day PRN Indigestion  magnesium hydroxide Suspension 30 milliLiter(s) Oral daily PRN Constipation  ondansetron Injectable 4 milliGRAM(s) IV Push every 6 hours PRN Nausea and/or Vomiting  oxyCODONE    IR 2.5 milliGRAM(s) Oral every 4 hours PRN Moderate Pain (4 - 6)  oxyCODONE    IR 5 milliGRAM(s) Oral every 4 hours PRN Severe Pain (7 - 10)  traMADol 25 milliGRAM(s) Oral every 6 hours PRN Mild Pain (1 - 3)          Vital Signs Last 24 Hrs  T(C): 36.3 (11 Dec 2023 16:00), Max: 36.6 (11 Dec 2023 07:53)  T(F): 97.3 (11 Dec 2023 16:00), Max: 97.9 (11 Dec 2023 07:53)  HR: 64 (11 Dec 2023 16:00) (64 - 94)  BP: 99/43 (11 Dec 2023 16:00) (99/43 - 135/60)  BP(mean): 57 (11 Dec 2023 16:00) (57 - 57)  RR: 17 (11 Dec 2023 16:00) (17 - 18)  SpO2: 98% (11 Dec 2023 16:00) (93% - 99%)    Parameters below as of 11 Dec 2023 16:00  Patient On (Oxygen Delivery Method): room air    Daily     Daily I&O's Summary    10 Dec 2023 07:01  -  11 Dec 2023 07:00  --------------------------------------------------------  IN: 800 mL / OUT: 20 mL / NET: 780 mL    11 Dec 2023 07:01  -  11 Dec 2023 20:34  --------------------------------------------------------  IN: 120 mL / OUT: 0 mL / NET: 120 mL        I&O's Detail    10 Dec 2023 07:01  -  11 Dec 2023 07:00  --------------------------------------------------------  IN:    Other (mL): 800 mL  Total IN: 800 mL    OUT:    Other (mL): 20 mL  Total OUT: 20 mL    Total NET: 780 mL      11 Dec 2023 07:01  -  11 Dec 2023 20:34  --------------------------------------------------------  IN:    Oral Fluid: 120 mL  Total IN: 120 mL    OUT:  Total OUT: 0 mL    Total NET: 120 mL          I&O's Summary    10 Dec 2023 07:01  -  11 Dec 2023 07:00  --------------------------------------------------------  IN: 800 mL / OUT: 20 mL / NET: 780 mL    11 Dec 2023 07:01  -  11 Dec 2023 20:34  --------------------------------------------------------  IN: 120 mL / OUT: 0 mL / NET: 120 mL        PHYSICAL EXAM:    Constitutional: NAD, awake  HEENT: PERR, EOMI,  No oral cyananosis.  Neck:  supple,  No JVD  Respiratory: Breath sounds are clear bilaterally, No wheezing, rales or rhonchi  Cardiovascular: S1 and S2, regular rate and rhythm, no Murmurs, gallops or rubs  Gastrointestinal: Bowel Sounds present, soft, nontender.   Extremities: No peripheral edema. No clubbing or cyanosis.  Vascular: 2+ peripheral pulses  Neurological: A/O x 3, no focal deficits  Musculoskeletal: no calf tenderness.  Skin: No rashes.    ===============================  ===============================  LABS:                         11.2   10.79 )-----------( 169      ( 11 Dec 2023 08:46 )             34.6                         10.6   6.40  )-----------( 149      ( 10 Dec 2023 10:56 )             33.3                         11.1   8.72  )-----------( 164      ( 10 Dec 2023 03:55 )             35.0     11 Dec 2023 08:46    141    |  106    |  13     ----------------------------<  81     3.6     |  28     |  0.74   10 Dec 2023 03:55    143    |  112    |  25     ----------------------------<  90     4.0     |  26     |  1.02   09 Dec 2023 10:59    141    |  110    |  30     ----------------------------<  92     4.0     |  27     |  1.14     Ca    8.7        11 Dec 2023 08:46  Ca    8.7        10 Dec 2023 03:55  Ca    8.6        09 Dec 2023 10:59    TPro  7.1    /  Alb  3.0    /  TBili  0.2    /  DBili  x      /  AST  40     /  ALT  37     /  AlkPhos  168    09 Dec 2023 09:27    PT/INR - ( 10 Dec 2023 04:04 )   PT: 11.3 sec;   INR: 1.00 ratio         PTT - ( 10 Dec 2023 04:04 )  PTT:32.7 sec  ===============================  ===============================  CARDIAC BIOMARKERS:  BNP      TROPONIN  Troponin I, Serum: <0.015 ng/mL [0.015 - 0.045] (06-11-21 @ 14:06)    ===============================  ===============================  BLOOD CULTURES:    Blood Culture: Organism --  Gram Stain Blood -- Gram Stain --  Specimen Source .Blood None  Culture-Blood --        12-09 @ 10:59  TSH: 3.57    ===============================  ===============================  EKG:  ECG NSR no ischemic changes            ===============================    Sammy Wang M.D.  Cardiology, [facility]  Cell: [phone]  Offices:    ([facility] Office)  [phone] ([facility] Office)

## 2023-12-11 NOTE — OCCUPATIONAL THERAPY INITIAL EVALUATION ADULT - PERTINENT HX OF CURRENT PROBLEM, REHAB EVAL
Per H&P pt is a 84 y/o female with PMHx of Alzheimer's, Parkinson's, spinal stenosis, HLD, presents to  ED after an unwitnessed fall at [facility]. Unable to obtain history from patient due to severe dementia (baseline AOx0-1). Per daughter at bedside, she believes her mom might of gotten out of bed leading to the fall, however unable to say for sure since it was unwitnessed. She was found on the floor by her bed. Patient had a pacemaker placed in 2018; Daughter doesn't think she's seen an EP since. Patient's spouse was her primary care taker and knew her medical history well before he passed away this past year. Daughter doesn't think she's seen any doctors outside of [place]. Per daughter, patient normally is ambulatory with walker while at [facility]. Pt is now s/p R ORIF.     -CT ankle: Acute trimalleolar fracture of the right ankle. Acute obliquely oriented mildly displaced fracture of the head neck junction of the fourth metatarsal.  Acute nondisplaced acute fractures of the second and third metatarsal head neck junctions  -XRay: showed right trimalleolar ankle fracture. Closed reduction performed by Ortho followed by placement of splint. Per H&P pt is a 84 y/o female with PMHx of Alzheimer's, Parkinson's, spinal stenosis, HLD, presents to  ED after an unwitnessed fall at [facility]. Unable to obtain history from patient due to severe dementia (baseline AOx0-1). Per daughter at bedside, she believes her mom might of gotten out of bed leading to the fall, however unable to say for sure since it was unwitnessed. She was found on the floor by her bed. Patient had a pacemaker placed in 2018; Daughter doesn't think she's seen an EP since. Patient's spouse was her primary care taker and knew her medical history well before he passed away this past year. Daughter doesn't think she's seen any doctors outside of [place]. Per daughter, patient normally is ambulatory with walker while at [facility]. Pt is now s/p R ORIF.     -CT ankle: Acute trimalleolar fracture of the right ankle. Acute obliquely oriented mildly displaced fracture of the head neck junction of the fourth metatarsal.  Acute nondisplaced acute fractures of the second and third metatarsal head neck junctions  -XRay: showed right trimalleolar ankle fracture. Closed reduction performed by Ortho followed by placement of splint.

## 2023-12-11 NOTE — OCCUPATIONAL THERAPY INITIAL EVALUATION ADULT - MD ORDER
"OT Evaluate and Treat"- MD orders received. Chart reviewed, contents noted, conferred with RN "OT Evaluate and Treat"- MD orders received. Chart reviewed, contents noted

## 2023-12-11 NOTE — OCCUPATIONAL THERAPY INITIAL EVALUATION ADULT - MODALITIES TREATMENT COMMENTS
Pt left seated in bedside ej chair, chair alarmed, PCA aware, lines intact, CB/TT/phone IR, needs met.

## 2023-12-11 NOTE — PHYSICAL THERAPY INITIAL EVALUATION ADULT - MODALITIES TREATMENT COMMENTS
Pt able to transfer to chair w/ max A x 2 NWB R LE maintained by PT assist at thigh, Pt resting in [place] chair +alarm, In front of nursing station for safety. . Pt able to transfer to chair w/ max A x 2 NWB R LE maintained by PT assist at thigh, Pt resting in [place] chair +alarm, In front of nursing station for safety. .

## 2023-12-11 NOTE — OCCUPATIONAL THERAPY INITIAL EVALUATION ADULT - COGNITIVE, VISUAL PERCEPTUAL, OT EVAL
Patient will improve following simple one step commands with 50% accuracy in 2 weeks   to enhance safety with functional transfers.

## 2023-12-11 NOTE — PHYSICAL THERAPY INITIAL EVALUATION ADULT - GENERAL OBSERVATIONS, REHAB EVAL
Pt supine in bed in HW for safety due to confusing. R LE splint wrapped in ace s/d/i, Pt apprehensive and needed coaxing and calming to cooperate.

## 2023-12-11 NOTE — OCCUPATIONAL THERAPY INITIAL EVALUATION ADULT - NSACTIVITYREC_GEN_A_OT
Pt presents with impaired balance, cognition, safety, and muscle strength that will benefit from skilled OT to improve independence in ADLs, reduce fall risk and chance for readmission. Pt educated on NWB precautions for RLE- pt with h/o Alzheimer's with poor understanding of precautions, needs assistance for adherence during functional tasks and transfers.

## 2023-12-11 NOTE — PHYSICAL THERAPY INITIAL EVALUATION ADULT - WEIGHT-BEARING RESTRICTIONS: SIT/STAND, REHAB EVAL
Used WT bearing through R thigh w/ PT hand assist due to Pt not understanding NWB R LE. PT had to hold R LE off floor/nonweight-bearing

## 2023-12-12 ENCOUNTER — TRANSCRIPTION ENCOUNTER (OUTPATIENT)
Age: 83
End: 2023-12-12

## 2023-12-12 VITALS
OXYGEN SATURATION: 99 % | DIASTOLIC BLOOD PRESSURE: 53 MMHG | TEMPERATURE: 97 F | HEART RATE: 75 BPM | RESPIRATION RATE: 16 BRPM | SYSTOLIC BLOOD PRESSURE: 125 MMHG

## 2023-12-12 LAB
ANION GAP SERPL CALC-SCNC: 6 MMOL/L — SIGNIFICANT CHANGE UP (ref 5–17)
ANION GAP SERPL CALC-SCNC: 6 MMOL/L — SIGNIFICANT CHANGE UP (ref 5–17)
BUN SERPL-MCNC: 17 MG/DL — SIGNIFICANT CHANGE UP (ref 7–23)
BUN SERPL-MCNC: 17 MG/DL — SIGNIFICANT CHANGE UP (ref 7–23)
CALCIUM SERPL-MCNC: 8.9 MG/DL — SIGNIFICANT CHANGE UP (ref 8.5–10.1)
CALCIUM SERPL-MCNC: 8.9 MG/DL — SIGNIFICANT CHANGE UP (ref 8.5–10.1)
CHLORIDE SERPL-SCNC: 106 MMOL/L — SIGNIFICANT CHANGE UP (ref 96–108)
CHLORIDE SERPL-SCNC: 106 MMOL/L — SIGNIFICANT CHANGE UP (ref 96–108)
CO2 SERPL-SCNC: 26 MMOL/L — SIGNIFICANT CHANGE UP (ref 22–31)
CO2 SERPL-SCNC: 26 MMOL/L — SIGNIFICANT CHANGE UP (ref 22–31)
CREAT SERPL-MCNC: 0.8 MG/DL — SIGNIFICANT CHANGE UP (ref 0.5–1.3)
CREAT SERPL-MCNC: 0.8 MG/DL — SIGNIFICANT CHANGE UP (ref 0.5–1.3)
EGFR: 73 ML/MIN/1.73M2 — SIGNIFICANT CHANGE UP
EGFR: 73 ML/MIN/1.73M2 — SIGNIFICANT CHANGE UP
GLUCOSE SERPL-MCNC: 78 MG/DL — SIGNIFICANT CHANGE UP (ref 70–99)
GLUCOSE SERPL-MCNC: 78 MG/DL — SIGNIFICANT CHANGE UP (ref 70–99)
HCT VFR BLD CALC: 34.7 % — SIGNIFICANT CHANGE UP (ref 34.5–45)
HCT VFR BLD CALC: 34.7 % — SIGNIFICANT CHANGE UP (ref 34.5–45)
HGB BLD-MCNC: 11 G/DL — LOW (ref 11.5–15.5)
HGB BLD-MCNC: 11 G/DL — LOW (ref 11.5–15.5)
MCHC RBC-ENTMCNC: 30.9 PG — SIGNIFICANT CHANGE UP (ref 27–34)
MCHC RBC-ENTMCNC: 30.9 PG — SIGNIFICANT CHANGE UP (ref 27–34)
MCHC RBC-ENTMCNC: 31.7 GM/DL — LOW (ref 32–36)
MCHC RBC-ENTMCNC: 31.7 GM/DL — LOW (ref 32–36)
MCV RBC AUTO: 97.5 FL — SIGNIFICANT CHANGE UP (ref 80–100)
MCV RBC AUTO: 97.5 FL — SIGNIFICANT CHANGE UP (ref 80–100)
PLATELET # BLD AUTO: 163 K/UL — SIGNIFICANT CHANGE UP (ref 150–400)
PLATELET # BLD AUTO: 163 K/UL — SIGNIFICANT CHANGE UP (ref 150–400)
POTASSIUM SERPL-MCNC: 3.8 MMOL/L — SIGNIFICANT CHANGE UP (ref 3.5–5.3)
POTASSIUM SERPL-MCNC: 3.8 MMOL/L — SIGNIFICANT CHANGE UP (ref 3.5–5.3)
POTASSIUM SERPL-SCNC: 3.8 MMOL/L — SIGNIFICANT CHANGE UP (ref 3.5–5.3)
POTASSIUM SERPL-SCNC: 3.8 MMOL/L — SIGNIFICANT CHANGE UP (ref 3.5–5.3)
RBC # BLD: 3.56 M/UL — LOW (ref 3.8–5.2)
RBC # BLD: 3.56 M/UL — LOW (ref 3.8–5.2)
RBC # FLD: 13.2 % — SIGNIFICANT CHANGE UP (ref 10.3–14.5)
RBC # FLD: 13.2 % — SIGNIFICANT CHANGE UP (ref 10.3–14.5)
SARS-COV-2 RNA SPEC QL NAA+PROBE: SIGNIFICANT CHANGE UP
SARS-COV-2 RNA SPEC QL NAA+PROBE: SIGNIFICANT CHANGE UP
SODIUM SERPL-SCNC: 138 MMOL/L — SIGNIFICANT CHANGE UP (ref 135–145)
SODIUM SERPL-SCNC: 138 MMOL/L — SIGNIFICANT CHANGE UP (ref 135–145)
WBC # BLD: 7.84 K/UL — SIGNIFICANT CHANGE UP (ref 3.8–10.5)
WBC # BLD: 7.84 K/UL — SIGNIFICANT CHANGE UP (ref 3.8–10.5)
WBC # FLD AUTO: 7.84 K/UL — SIGNIFICANT CHANGE UP (ref 3.8–10.5)
WBC # FLD AUTO: 7.84 K/UL — SIGNIFICANT CHANGE UP (ref 3.8–10.5)

## 2023-12-12 PROCEDURE — 99239 HOSP IP/OBS DSCHRG MGMT >30: CPT

## 2023-12-12 RX ADMIN — Medication 1000 MILLIGRAM(S): at 13:36

## 2023-12-12 RX ADMIN — Medication 1000 MILLIGRAM(S): at 05:40

## 2023-12-12 RX ADMIN — DULOXETINE HYDROCHLORIDE 60 MILLIGRAM(S): 30 CAPSULE, DELAYED RELEASE ORAL at 10:44

## 2023-12-12 RX ADMIN — ENOXAPARIN SODIUM 40 MILLIGRAM(S): 100 INJECTION SUBCUTANEOUS at 05:40

## 2023-12-12 RX ADMIN — Medication 1 TABLET(S): at 05:39

## 2023-12-12 RX ADMIN — PANTOPRAZOLE SODIUM 40 MILLIGRAM(S): 20 TABLET, DELAYED RELEASE ORAL at 05:39

## 2023-12-12 RX ADMIN — Medication 25 MICROGRAM(S): at 05:38

## 2023-12-12 RX ADMIN — Medication 1 TABLET(S): at 13:38

## 2023-12-12 RX ADMIN — GABAPENTIN 400 MILLIGRAM(S): 400 CAPSULE ORAL at 10:45

## 2023-12-12 RX ADMIN — PIMAVANSERIN TARTRATE 34 MILLIGRAM(S): 10 TABLET, COATED ORAL at 10:44

## 2023-12-12 RX ADMIN — Medication 10 MILLIGRAM(S): at 06:43

## 2023-12-12 NOTE — PROGRESS NOTE ADULT - SUBJECTIVE AND OBJECTIVE BOX
Patient is a 83y old  Female who presents with a chief complaint of Ankle fracture s/p fall (10 Dec 2023 12:21)      SUBJECTIVE:   HPI:  84 y/o female with PMH of Alzheimer's, Parkinson's, spinal stenosis, HLD, presents to  ED after an unwitnessed fall at [facility]. Unable to obtain history from patient due to severe dementia (baseline AOx0-1). Per daughter at bedside, she believes her mom might of gotten out of bed leading to the fall, however unable to say for sure since it was unwitnessed. She was found on the floor by her bed. Patient had a pacemaker placed in 2018; Daughter doesn't think she's seen an EP since. Patient's spouse was her primary care taker and knew her medical history well before he passed away this past year. Daughter doesn't think she's seen any doctors outside of [place]. Per daughter, patient normally is ambulatory with walker while at [place] [facility].      In ED, XRay showed right trimalleolar ankle fracture. Closed reduction performed by Ortho followed by placement of splint. Plan for ORIF tomorrow  Vitals: Oral temp of 98, but rectal temp of 95. Warming blanket placed. BP stable. HR . 95% on room air  Labs: WBC 8 -> 12 s/p reduction, could be reactive. Lipase elevated to 474  Imaging: XRay showed right trimalleolar ankle fracture. CT c/a/p unremarkable.   EKG: NSR with no ischemic findings  Consults: EP consulted for pacemaker interrogation. Cardiology consulted for clearance. Ortho following for surgery.   Interventions: 250 cc bolus given. Cefazolin started after closed reduction by Ortho      12/11/23- up in a chair, comfortable. Confused  12/12/23- laying in bed. confused. no discomfort noted. awaiting dc to [facility]     Vital Signs Last 24 Hrs  T(C): 36.2 (12 Dec 2023 00:00), Max: 36.3 (11 Dec 2023 16:00)  T(F): 97.2 (12 Dec 2023 00:00), Max: 97.3 (11 Dec 2023 16:00)  HR: 71 (12 Dec 2023 00:00) (64 - 71)  BP: 100/52 (12 Dec 2023 00:00) (99/43 - 100/52)  BP(mean): 57 (11 Dec 2023 16:00) (57 - 57)  RR: 16 (12 Dec 2023 00:00) (16 - 17)  SpO2: 98% (12 Dec 2023 00:00) (98% - 98%) room air        PHYSICAL EXAM:  Constitutional: NAD, awake   HEENT: PERR, EOMI, Normal Hearing, MMM  Neck: Soft and supple, No LAD, No JVD  Respiratory: Breath sounds are clear bilaterally, No wheezing, rales or rhonchi  Cardiovascular: S1 and S2, regular rate and rhythm, no Murmurs, gallops or rubs  Gastrointestinal: Bowel Sounds present, soft, nontender, nondistended, no guarding, no rebound  Extremities: No peripheral edema  Vascular: 2+ peripheral pulses  Neurological: awake, confused  Musculoskeletal: RLE in splint  Skin: No rashes    LABS:                                 11.2   10.79 )-----------( 169      ( 11 Dec 2023 08:46 )             34.6     11 Dec 2023 08:46    141    |  106    |  13     ----------------------------<  81     3.6     |  28     |  0.74     Ca    8.7        11 Dec 2023 08:46    PT/INR - ( 10 Dec 2023 04:04 )   PT: 11.3 sec;   INR: 1.00 ratio       PTT - ( 10 Dec 2023 04:04 )  PTT:32.7 sec    Urinalysis Basic - ( 11 Dec 2023 08:46 )  Color: x / Appearance: x / SG: x / pH: x  Gluc: 81 mg/dL / Ketone: x  / Bili: x / Urobili: x   Blood: x / Protein: x / Nitrite: x   Leuk Esterase: x / RBC: x / WBC x   Sq Epi: x / Non Sq Epi: x / Bacteria: x      MEDICATIONS  (STANDING):  acetaminophen     Tablet .. 1000 milliGRAM(s) Oral every 8 hours  ascorbic acid 500 milliGRAM(s) Oral two times a day  calcium carbonate 1250 mG  + Vitamin D (OsCal 500 + D) 1 Tablet(s) Oral three times a day  dicyclomine 10 milliGRAM(s) Oral two times a day before meals  donepezil 10 milliGRAM(s) Oral at bedtime  DULoxetine 60 milliGRAM(s) Oral daily  gabapentin 400 milliGRAM(s) Oral two times a day  HYDROmorphone  Injectable 0.2 milliGRAM(s) IV Push once  levothyroxine 25 MICROGram(s) Oral daily  multivitamin 1 Tablet(s) Oral daily  pantoprazole    Tablet 40 milliGRAM(s) Oral before breakfast  pimavanserin Capsule 34 milliGRAM(s) Oral daily  polyethylene glycol 3350 17 Gram(s) Oral daily  rivaroxaban 10 milliGRAM(s) Oral daily  senna 2 Tablet(s) Oral at bedtime  sodium chloride 0.9%. 1000 milliLiter(s) (75 mL/Hr) IV Continuous <Continuous>    MEDICATIONS  (PRN):  acetaminophen     Tablet .. 650 milliGRAM(s) Oral every 6 hours PRN Mild Pain (1 - 3)  aluminum hydroxide/magnesium hydroxide/simethicone Suspension 30 milliLiter(s) Oral four times a day PRN Indigestion  magnesium hydroxide Suspension 30 milliLiter(s) Oral daily PRN Constipation  ondansetron Injectable 4 milliGRAM(s) IV Push every 6 hours PRN Nausea and/or Vomiting  oxyCODONE    IR 2.5 milliGRAM(s) Oral every 4 hours PRN Moderate Pain (4 - 6)  oxyCODONE    IR 5 milliGRAM(s) Oral every 4 hours PRN Severe Pain (7 - 10)  traMADol 25 milliGRAM(s) Oral every 6 hours PRN Mild Pain (1 - 3)         Patient is a 83y old  Female who presents with a chief complaint of Ankle fracture s/p fall (10 Dec 2023 12:21)      SUBJECTIVE:   HPI:  84 y/o female with PMH of Alzheimer's, Parkinson's, spinal stenosis, HLD, presents to  ED after an unwitnessed fall at [facility]. Unable to obtain history from patient due to severe dementia (baseline AOx0-1). Per daughter at bedside, she believes her mom might of gotten out of bed leading to the fall, however unable to say for sure since it was unwitnessed. She was found on the floor by her bed. Patient had a pacemaker placed in 2018; Daughter doesn't think she's seen an EP since. Patient's spouse was her primary care taker and knew her medical history well before he passed away this past year. Daughter doesn't think she's seen any doctors outside of [place]. Per daughter, patient normally is ambulatory with walker while at [place] [facility].      In ED, XRay showed right trimalleolar ankle fracture. Closed reduction performed by Ortho followed by placement of splint. Plan for ORIF tomorrow  Vitals: Oral temp of 98, but rectal temp of 95. Warming blanket placed. BP stable. HR . 95% on room air  Labs: WBC 8 -> 12 s/p reduction, could be reactive. Lipase elevated to 474  Imaging: XRay showed right trimalleolar ankle fracture. CT c/a/p unremarkable.   EKG: NSR with no ischemic findings  Consults: EP consulted for pacemaker interrogation. Cardiology consulted for clearance. Ortho following for surgery.   Interventions: 250 cc bolus given. Cefazolin started after closed reduction by Ortho      12/11/23- up in a chair, comfortable. Confused  12/12/23- laying in bed. confused. no discomfort noted. awaiting dc to [facility]     Vital Signs Last 24 Hrs  T(C): 36.2 (12 Dec 2023 00:00), Max: 36.3 (11 Dec 2023 16:00)  T(F): 97.2 (12 Dec 2023 00:00), Max: 97.3 (11 Dec 2023 16:00)  HR: 71 (12 Dec 2023 00:00) (64 - 71)  BP: 100/52 (12 Dec 2023 00:00) (99/43 - 100/52)  BP(mean): 57 (11 Dec 2023 16:00) (57 - 57)  RR: 16 (12 Dec 2023 00:00) (16 - 17)  SpO2: 98% (12 Dec 2023 00:00) (98% - 98%) room air        PHYSICAL EXAM:  Constitutional: NAD, awake   HEENT: PERR, EOMI, Normal Hearing, MMM  Neck: Soft and supple, No LAD, No JVD  Respiratory: Breath sounds are clear bilaterally, No wheezing, rales or rhonchi  Cardiovascular: S1 and S2, regular rate and rhythm, no Murmurs, gallops or rubs  Gastrointestinal: Bowel Sounds present, soft, nontender, nondistended, no guarding, no rebound  Extremities: No peripheral edema  Vascular: 2+ peripheral pulses  Neurological: awake, confused  Musculoskeletal: RLE in splint  Skin: No rashes    LABS:                                 11.2   10.79 )-----------( 169      ( 11 Dec 2023 08:46 )             34.6     11 Dec 2023 08:46    141    |  106    |  13     ----------------------------<  81     3.6     |  28     |  0.74     Ca    8.7        11 Dec 2023 08:46    PT/INR - ( 10 Dec 2023 04:04 )   PT: 11.3 sec;   INR: 1.00 ratio       PTT - ( 10 Dec 2023 04:04 )  PTT:32.7 sec    Urinalysis Basic - ( 11 Dec 2023 08:46 )  Color: x / Appearance: x / SG: x / pH: x  Gluc: 81 mg/dL / Ketone: x  / Bili: x / Urobili: x   Blood: x / Protein: x / Nitrite: x   Leuk Esterase: x / RBC: x / WBC x   Sq Epi: x / Non Sq Epi: x / Bacteria: x      MEDICATIONS  (STANDING):  acetaminophen     Tablet .. 1000 milliGRAM(s) Oral every 8 hours  ascorbic acid 500 milliGRAM(s) Oral two times a day  calcium carbonate 1250 mG  + Vitamin D (OsCal 500 + D) 1 Tablet(s) Oral three times a day  dicyclomine 10 milliGRAM(s) Oral two times a day before meals  donepezil 10 milliGRAM(s) Oral at bedtime  DULoxetine 60 milliGRAM(s) Oral daily  gabapentin 400 milliGRAM(s) Oral two times a day  HYDROmorphone  Injectable 0.2 milliGRAM(s) IV Push once  levothyroxine 25 MICROGram(s) Oral daily  multivitamin 1 Tablet(s) Oral daily  pantoprazole    Tablet 40 milliGRAM(s) Oral before breakfast  pimavanserin Capsule 34 milliGRAM(s) Oral daily  polyethylene glycol 3350 17 Gram(s) Oral daily  rivaroxaban 10 milliGRAM(s) Oral daily  senna 2 Tablet(s) Oral at bedtime  sodium chloride 0.9%. 1000 milliLiter(s) (75 mL/Hr) IV Continuous <Continuous>    MEDICATIONS  (PRN):  acetaminophen     Tablet .. 650 milliGRAM(s) Oral every 6 hours PRN Mild Pain (1 - 3)  aluminum hydroxide/magnesium hydroxide/simethicone Suspension 30 milliLiter(s) Oral four times a day PRN Indigestion  magnesium hydroxide Suspension 30 milliLiter(s) Oral daily PRN Constipation  ondansetron Injectable 4 milliGRAM(s) IV Push every 6 hours PRN Nausea and/or Vomiting  oxyCODONE    IR 2.5 milliGRAM(s) Oral every 4 hours PRN Moderate Pain (4 - 6)  oxyCODONE    IR 5 milliGRAM(s) Oral every 4 hours PRN Severe Pain (7 - 10)  traMADol 25 milliGRAM(s) Oral every 6 hours PRN Mild Pain (1 - 3)

## 2023-12-12 NOTE — DISCHARGE NOTE NURSING/CASE MANAGEMENT/SOCIAL WORK - NSDCVIVACCINE_GEN_ALL_CORE_FT
Tdap; 09-Dec-2023 09:29; Hafsa Hughes (RN); [employer]; [id number] (Exp. Date: 23-Nov-2025); IntraMuscular; Deltoid Right.; 0.5 milliLiter(s); VIS (VIS Published: 09-May-2013, VIS Presented: 09-Dec-2023);    Tdap; 09-Dec-2023 09:29; Hafsa Hughes (RN); [employer]; [id number] (Exp. Date: 23-Nov-2025); IntraMuscular; Deltoid Right.; 0.5 milliLiter(s); VIS (VIS Published: 09-May-2013, VIS Presented: 09-Dec-2023);

## 2023-12-12 NOTE — PROGRESS NOTE ADULT - REASON FOR ADMISSION
Ankle fracture s/p fall

## 2023-12-12 NOTE — DISCHARGE NOTE NURSING/CASE MANAGEMENT/SOCIAL WORK - NSDCPEFALRISK_GEN_ALL_CORE
For information on Fall & Injury Prevention, visit: [email or web address] OR  [email or web address] OR  https://www.cdc.gov/steadi/patient.html For information on Fall & Injury Prevention, visit: [email or web address] OR  [email or web address]-[email or web address]-injury OR  https://www.cdc.gov/steadi/patient.html

## 2023-12-12 NOTE — PROGRESS NOTE ADULT - ASSESSMENT
84 y/o female with PMH of Alzheimer's, Parkinson's, spinal stenosis, HLD, presents to  ED after an unwitnessed fall at [facility].     #MechanicalFall  #Right trimalleolar ankle fracture s/p ORIF  Ortho following  NWB to RLE/ PT  Pain meds prn  Incentive spirometry  Bowel regimen    #Alzheimer's  #Parkinson's  - Continue home medications  - Daughter to bring in Nuplazid     #Elevated lipase  No abdominal pain. CT a/p normal     CODE STATUS: FULL CODE    #DVT proph- Lovenox 40mg SC daily until fully ambulatory    #Dispo- DC to [facility]  82 y/o female with PMH of Alzheimer's, Parkinson's, spinal stenosis, HLD, presents to  ED after an unwitnessed fall at [facility].     #MechanicalFall  #Right trimalleolar ankle fracture s/p ORIF  Ortho following  NWB to RLE/ PT  Pain meds prn  Incentive spirometry  Bowel regimen    #Alzheimer's  #Parkinson's  - Continue home medications  - Daughter to bring in Nuplazid     #Elevated lipase  No abdominal pain. CT a/p normal     CODE STATUS: FULL CODE    #DVT proph- Lovenox 40mg SC daily until fully ambulatory    #Dispo- DC to [facility]

## 2023-12-12 NOTE — PROGRESS NOTE ADULT - SUBJECTIVE AND OBJECTIVE BOX
Patient seen and examined at bedside. Interview limited 2/2 dementia. No acute complaints at this time. Pain appears well controlled. Required soft wrist restraints overnight to prevent line pulling.     Vital Signs (24 Hrs):  T(C): 36.2 (12-12-23 @ 00:00), Max: 36.6 (12-11-23 @ 07:53)  HR: 71 (12-12-23 @ 00:00) (64 - 76)  BP: 100/52 (12-12-23 @ 00:00) (99/43 - 120/68)  RR: 16 (12-12-23 @ 00:00) (16 - 18)  SpO2: 98% (12-12-23 @ 00:00) (98% - 99%)  Wt(kg): --    LABS:                          11.2   10.79 )-----------( 169      ( 11 Dec 2023 08:46 )             34.6     12-11    141  |  106  |  13  ----------------------------<  81  3.6   |  28  |  0.74    Ca    8.7      11 Dec 2023 08:46      PE:    General: NAD, resting comfortably in bed  RLE:   splint in place C/D/I  No calf grimace to squeeze bilaterally  compartments soft and compressible where accessible through splint   Motor: + EHL/FHL/TA/GSC  +SILT: SPN/DPN/Khadar/Saph/Tib  palpable DP  toes wwp      A/P:  83y F s/p R Ankle ORIF POD 2, Right 2-4 metatarsal neck fxs  -PT/OT daily  -NWB on the RLE in splint  -Pain Control  -DVT ppx: xarelto  -rocephin for UTI tx per medicine  -strict ice/elevation  -Incentive Spirometry  -Medical comanagement appreciated  -Dispo [facility] per PT eval  -Discussed above with Dr. Rodriguez, who agrees with plan

## 2023-12-12 NOTE — DISCHARGE NOTE NURSING/CASE MANAGEMENT/SOCIAL WORK - PATIENT PORTAL LINK FT
You can access the FollowMyHealth Patient Portal offered by [facility] by registering at the following website: [email or web address]. By joining [employer]’s FollowMyHealth portal, you will also be able to view your health information using other applications (apps) compatible with our system. You can access the FollowMyHealth Patient Portal offered by [facility] by registering at the following website: [email or web address]. By joining [employer]’s FollowMyHealth portal, you will also be able to view your health information using other applications (apps) compatible with our system.

## 2023-12-12 NOTE — PROGRESS NOTE ADULT - PROVIDER SPECIALTY LIST ADULT
Hospitalist
Orthopedics
Orthopedics
Hospitalist
Orthopedics
Hospitalist
Trauma Surgery
Orthopedics
Cardiology

## 2023-12-14 LAB
CULTURE RESULTS: SIGNIFICANT CHANGE UP
SPECIMEN SOURCE: SIGNIFICANT CHANGE UP

## 2024-01-08 ENCOUNTER — INPATIENT (INPATIENT)
Facility: HOSPITAL | Age: 84
LOS: 3 days | Discharge: OTHER INSTITUTION | DRG: 689 | End: 2024-01-12
Attending: INTERNAL MEDICINE | Admitting: INTERNAL MEDICINE
Payer: MEDICARE

## 2024-01-08 VITALS
HEART RATE: 82 BPM | DIASTOLIC BLOOD PRESSURE: 65 MMHG | OXYGEN SATURATION: 100 % | SYSTOLIC BLOOD PRESSURE: 142 MMHG | HEIGHT: 65 IN | RESPIRATION RATE: 16 BRPM

## 2024-01-08 DIAGNOSIS — N39.0 URINARY TRACT INFECTION, SITE NOT SPECIFIED: ICD-10-CM

## 2024-01-08 LAB
ALBUMIN SERPL ELPH-MCNC: 3 G/DL — LOW (ref 3.3–5)
ALBUMIN SERPL ELPH-MCNC: 3 G/DL — LOW (ref 3.3–5)
ALP SERPL-CCNC: 247 U/L — HIGH (ref 40–120)
ALP SERPL-CCNC: 247 U/L — HIGH (ref 40–120)
ALT FLD-CCNC: 111 U/L — HIGH (ref 12–78)
ALT FLD-CCNC: 111 U/L — HIGH (ref 12–78)
ANION GAP SERPL CALC-SCNC: 2 MMOL/L — LOW (ref 5–17)
ANION GAP SERPL CALC-SCNC: 2 MMOL/L — LOW (ref 5–17)
APPEARANCE UR: ABNORMAL
APPEARANCE UR: ABNORMAL
AST SERPL-CCNC: 86 U/L — HIGH (ref 15–37)
AST SERPL-CCNC: 86 U/L — HIGH (ref 15–37)
BACTERIA # UR AUTO: ABNORMAL /HPF
BACTERIA # UR AUTO: ABNORMAL /HPF
BASOPHILS # BLD AUTO: 0.03 K/UL — SIGNIFICANT CHANGE UP (ref 0–0.2)
BASOPHILS # BLD AUTO: 0.03 K/UL — SIGNIFICANT CHANGE UP (ref 0–0.2)
BASOPHILS NFR BLD AUTO: 0.4 % — SIGNIFICANT CHANGE UP (ref 0–2)
BASOPHILS NFR BLD AUTO: 0.4 % — SIGNIFICANT CHANGE UP (ref 0–2)
BILIRUB SERPL-MCNC: 0.3 MG/DL — SIGNIFICANT CHANGE UP (ref 0.2–1.2)
BILIRUB SERPL-MCNC: 0.3 MG/DL — SIGNIFICANT CHANGE UP (ref 0.2–1.2)
BILIRUB UR-MCNC: NEGATIVE — SIGNIFICANT CHANGE UP
BILIRUB UR-MCNC: NEGATIVE — SIGNIFICANT CHANGE UP
BUN SERPL-MCNC: 17 MG/DL — SIGNIFICANT CHANGE UP (ref 7–23)
BUN SERPL-MCNC: 17 MG/DL — SIGNIFICANT CHANGE UP (ref 7–23)
CALCIUM SERPL-MCNC: 9.2 MG/DL — SIGNIFICANT CHANGE UP (ref 8.5–10.1)
CALCIUM SERPL-MCNC: 9.2 MG/DL — SIGNIFICANT CHANGE UP (ref 8.5–10.1)
CHLORIDE SERPL-SCNC: 113 MMOL/L — HIGH (ref 96–108)
CHLORIDE SERPL-SCNC: 113 MMOL/L — HIGH (ref 96–108)
CO2 SERPL-SCNC: 30 MMOL/L — SIGNIFICANT CHANGE UP (ref 22–31)
CO2 SERPL-SCNC: 30 MMOL/L — SIGNIFICANT CHANGE UP (ref 22–31)
COLOR SPEC: SIGNIFICANT CHANGE UP
COLOR SPEC: SIGNIFICANT CHANGE UP
CREAT SERPL-MCNC: 0.72 MG/DL — SIGNIFICANT CHANGE UP (ref 0.5–1.3)
CREAT SERPL-MCNC: 0.72 MG/DL — SIGNIFICANT CHANGE UP (ref 0.5–1.3)
DIFF PNL FLD: NEGATIVE — SIGNIFICANT CHANGE UP
DIFF PNL FLD: NEGATIVE — SIGNIFICANT CHANGE UP
EGFR: 82 ML/MIN/1.73M2 — SIGNIFICANT CHANGE UP
EGFR: 82 ML/MIN/1.73M2 — SIGNIFICANT CHANGE UP
EOSINOPHIL # BLD AUTO: 0.09 K/UL — SIGNIFICANT CHANGE UP (ref 0–0.5)
EOSINOPHIL # BLD AUTO: 0.09 K/UL — SIGNIFICANT CHANGE UP (ref 0–0.5)
EOSINOPHIL NFR BLD AUTO: 1.2 % — SIGNIFICANT CHANGE UP (ref 0–6)
EOSINOPHIL NFR BLD AUTO: 1.2 % — SIGNIFICANT CHANGE UP (ref 0–6)
GLUCOSE SERPL-MCNC: 126 MG/DL — HIGH (ref 70–99)
GLUCOSE SERPL-MCNC: 126 MG/DL — HIGH (ref 70–99)
GLUCOSE UR QL: NEGATIVE MG/DL — SIGNIFICANT CHANGE UP
GLUCOSE UR QL: NEGATIVE MG/DL — SIGNIFICANT CHANGE UP
HCT VFR BLD CALC: 38 % — SIGNIFICANT CHANGE UP (ref 34.5–45)
HCT VFR BLD CALC: 38 % — SIGNIFICANT CHANGE UP (ref 34.5–45)
HGB BLD-MCNC: 12 G/DL — SIGNIFICANT CHANGE UP (ref 11.5–15.5)
HGB BLD-MCNC: 12 G/DL — SIGNIFICANT CHANGE UP (ref 11.5–15.5)
IMM GRANULOCYTES NFR BLD AUTO: 0.5 % — SIGNIFICANT CHANGE UP (ref 0–0.9)
IMM GRANULOCYTES NFR BLD AUTO: 0.5 % — SIGNIFICANT CHANGE UP (ref 0–0.9)
KETONES UR-MCNC: NEGATIVE MG/DL — SIGNIFICANT CHANGE UP
KETONES UR-MCNC: NEGATIVE MG/DL — SIGNIFICANT CHANGE UP
LEUKOCYTE ESTERASE UR-ACNC: ABNORMAL
LEUKOCYTE ESTERASE UR-ACNC: ABNORMAL
LYMPHOCYTES # BLD AUTO: 1.83 K/UL — SIGNIFICANT CHANGE UP (ref 1–3.3)
LYMPHOCYTES # BLD AUTO: 1.83 K/UL — SIGNIFICANT CHANGE UP (ref 1–3.3)
LYMPHOCYTES # BLD AUTO: 23.8 % — SIGNIFICANT CHANGE UP (ref 13–44)
LYMPHOCYTES # BLD AUTO: 23.8 % — SIGNIFICANT CHANGE UP (ref 13–44)
MCHC RBC-ENTMCNC: 30.8 PG — SIGNIFICANT CHANGE UP (ref 27–34)
MCHC RBC-ENTMCNC: 30.8 PG — SIGNIFICANT CHANGE UP (ref 27–34)
MCHC RBC-ENTMCNC: 31.6 GM/DL — LOW (ref 32–36)
MCHC RBC-ENTMCNC: 31.6 GM/DL — LOW (ref 32–36)
MCV RBC AUTO: 97.7 FL — SIGNIFICANT CHANGE UP (ref 80–100)
MCV RBC AUTO: 97.7 FL — SIGNIFICANT CHANGE UP (ref 80–100)
MONOCYTES # BLD AUTO: 0.87 K/UL — SIGNIFICANT CHANGE UP (ref 0–0.9)
MONOCYTES # BLD AUTO: 0.87 K/UL — SIGNIFICANT CHANGE UP (ref 0–0.9)
MONOCYTES NFR BLD AUTO: 11.3 % — SIGNIFICANT CHANGE UP (ref 2–14)
MONOCYTES NFR BLD AUTO: 11.3 % — SIGNIFICANT CHANGE UP (ref 2–14)
NEUTROPHILS # BLD AUTO: 4.82 K/UL — SIGNIFICANT CHANGE UP (ref 1.8–7.4)
NEUTROPHILS # BLD AUTO: 4.82 K/UL — SIGNIFICANT CHANGE UP (ref 1.8–7.4)
NEUTROPHILS NFR BLD AUTO: 62.8 % — SIGNIFICANT CHANGE UP (ref 43–77)
NEUTROPHILS NFR BLD AUTO: 62.8 % — SIGNIFICANT CHANGE UP (ref 43–77)
NITRITE UR-MCNC: POSITIVE
NITRITE UR-MCNC: POSITIVE
PH UR: 6 — SIGNIFICANT CHANGE UP (ref 5–8)
PH UR: 6 — SIGNIFICANT CHANGE UP (ref 5–8)
PLATELET # BLD AUTO: 228 K/UL — SIGNIFICANT CHANGE UP (ref 150–400)
PLATELET # BLD AUTO: 228 K/UL — SIGNIFICANT CHANGE UP (ref 150–400)
POTASSIUM SERPL-MCNC: 4.1 MMOL/L — SIGNIFICANT CHANGE UP (ref 3.5–5.3)
POTASSIUM SERPL-MCNC: 4.1 MMOL/L — SIGNIFICANT CHANGE UP (ref 3.5–5.3)
POTASSIUM SERPL-SCNC: 4.1 MMOL/L — SIGNIFICANT CHANGE UP (ref 3.5–5.3)
POTASSIUM SERPL-SCNC: 4.1 MMOL/L — SIGNIFICANT CHANGE UP (ref 3.5–5.3)
PROT SERPL-MCNC: 7.2 GM/DL — SIGNIFICANT CHANGE UP (ref 6–8.3)
PROT SERPL-MCNC: 7.2 GM/DL — SIGNIFICANT CHANGE UP (ref 6–8.3)
PROT UR-MCNC: SIGNIFICANT CHANGE UP MG/DL
PROT UR-MCNC: SIGNIFICANT CHANGE UP MG/DL
RBC # BLD: 3.89 M/UL — SIGNIFICANT CHANGE UP (ref 3.8–5.2)
RBC # BLD: 3.89 M/UL — SIGNIFICANT CHANGE UP (ref 3.8–5.2)
RBC # FLD: 13.2 % — SIGNIFICANT CHANGE UP (ref 10.3–14.5)
RBC # FLD: 13.2 % — SIGNIFICANT CHANGE UP (ref 10.3–14.5)
RBC CASTS # UR COMP ASSIST: 29 /HPF — HIGH (ref 0–4)
RBC CASTS # UR COMP ASSIST: 29 /HPF — HIGH (ref 0–4)
SODIUM SERPL-SCNC: 145 MMOL/L — SIGNIFICANT CHANGE UP (ref 135–145)
SODIUM SERPL-SCNC: 145 MMOL/L — SIGNIFICANT CHANGE UP (ref 135–145)
SP GR SPEC: 1.02 — SIGNIFICANT CHANGE UP (ref 1–1.03)
SP GR SPEC: 1.02 — SIGNIFICANT CHANGE UP (ref 1–1.03)
SQUAMOUS # UR AUTO: 1 /HPF — SIGNIFICANT CHANGE UP (ref 0–5)
SQUAMOUS # UR AUTO: 1 /HPF — SIGNIFICANT CHANGE UP (ref 0–5)
UROBILINOGEN FLD QL: 1 MG/DL — SIGNIFICANT CHANGE UP (ref 0.2–1)
UROBILINOGEN FLD QL: 1 MG/DL — SIGNIFICANT CHANGE UP (ref 0.2–1)
WBC # BLD: 7.68 K/UL — SIGNIFICANT CHANGE UP (ref 3.8–10.5)
WBC # BLD: 7.68 K/UL — SIGNIFICANT CHANGE UP (ref 3.8–10.5)
WBC # FLD AUTO: 7.68 K/UL — SIGNIFICANT CHANGE UP (ref 3.8–10.5)
WBC # FLD AUTO: 7.68 K/UL — SIGNIFICANT CHANGE UP (ref 3.8–10.5)
WBC UR QL: 10 /HPF — HIGH (ref 0–5)
WBC UR QL: 10 /HPF — HIGH (ref 0–5)

## 2024-01-08 PROCEDURE — 97116 GAIT TRAINING THERAPY: CPT | Mod: GP

## 2024-01-08 PROCEDURE — 80074 ACUTE HEPATITIS PANEL: CPT

## 2024-01-08 PROCEDURE — 99285 EMERGENCY DEPT VISIT HI MDM: CPT

## 2024-01-08 PROCEDURE — 80048 BASIC METABOLIC PNL TOTAL CA: CPT

## 2024-01-08 PROCEDURE — 80053 COMPREHEN METABOLIC PANEL: CPT

## 2024-01-08 PROCEDURE — G1004: CPT

## 2024-01-08 PROCEDURE — 70450 CT HEAD/BRAIN W/O DYE: CPT | Mod: 26,MG

## 2024-01-08 PROCEDURE — 85027 COMPLETE CBC AUTOMATED: CPT

## 2024-01-08 PROCEDURE — 71045 X-RAY EXAM CHEST 1 VIEW: CPT | Mod: 26

## 2024-01-08 PROCEDURE — 97163 PT EVAL HIGH COMPLEX 45 MIN: CPT | Mod: GP

## 2024-01-08 PROCEDURE — 97530 THERAPEUTIC ACTIVITIES: CPT | Mod: GP

## 2024-01-08 PROCEDURE — 36415 COLL VENOUS BLD VENIPUNCTURE: CPT

## 2024-01-08 RX ORDER — CEPHALEXIN 500 MG
500 CAPSULE ORAL ONCE
Refills: 0 | Status: DISCONTINUED | OUTPATIENT
Start: 2024-01-08 | End: 2024-01-08

## 2024-01-08 RX ORDER — SODIUM CHLORIDE 9 MG/ML
1000 INJECTION INTRAMUSCULAR; INTRAVENOUS; SUBCUTANEOUS ONCE
Refills: 0 | Status: COMPLETED | OUTPATIENT
Start: 2024-01-08 | End: 2024-01-08

## 2024-01-08 RX ORDER — CEFTRIAXONE 500 MG/1
1000 INJECTION, POWDER, FOR SOLUTION INTRAMUSCULAR; INTRAVENOUS ONCE
Refills: 0 | Status: DISCONTINUED | OUTPATIENT
Start: 2024-01-08 | End: 2024-01-08

## 2024-01-08 RX ORDER — CEFTRIAXONE 500 MG/1
1000 INJECTION, POWDER, FOR SOLUTION INTRAMUSCULAR; INTRAVENOUS ONCE
Refills: 0 | Status: COMPLETED | OUTPATIENT
Start: 2024-01-08 | End: 2024-01-08

## 2024-01-08 RX ADMIN — SODIUM CHLORIDE 1000 MILLILITER(S): 9 INJECTION INTRAMUSCULAR; INTRAVENOUS; SUBCUTANEOUS at 17:07

## 2024-01-08 RX ADMIN — CEFTRIAXONE 1000 MILLIGRAM(S): 500 INJECTION, POWDER, FOR SOLUTION INTRAMUSCULAR; INTRAVENOUS at 21:31

## 2024-01-08 NOTE — ED PROVIDER NOTE - CLINICAL SUMMARY MEDICAL DECISION MAKING FREE TEXT BOX
Patient presents to the ED from [facility] rehab for failure to thrive. As per sister, patient has baseline dementia and is at her baseline mental status however is new to the rehab and they are not sure how to handle her. As per sister, patient has no acute issues at this time, recently had fractured foot and does not ambulate at baseline. Will workup for acute delirium and reassess. Patient presents to the ED from [facility] rehab for failure to thrive. As per sister, patient has baseline dementia and is at her baseline mental status however is new to the rehab and they are not sure how to handle her. As per sister, patient has no acute issues at this time, recently had fractured foot and does not ambulate at baseline. Will workup for acute delirium and reassess.

## 2024-01-08 NOTE — ED PROVIDER NOTE - NSICDXPASTMEDICALHX_GEN_ALL_CORE_FT
PAST MEDICAL HISTORY:  Burning mouth syndrome     HLD (hyperlipidemia)     Parkinson disease     Spinal stenosis     Trigeminal neuralgia       Dementia

## 2024-01-08 NOTE — ED PROVIDER NOTE - OBJECTIVE STATEMENT
82 y/o female with PMHx of dementia, HLD, Parkinson's disease, trigeminal neuralgia, spinal stenosis [facility] the ED [facility]. Per sister, patient had previously resided at [place] where she was reportedly doing well. She recently fell out of bed and sustained a foot fracture, for which she was transferred to [facility] for subacute rehab and has stopped eating or hydrating, and was referred to the ED because [facility] staff were unable to provide adequate care for her needs. Sister believes failure to thrive may be patient's stubbornness manifesting rather than a physical issue. 84 y/o female with PMHx of dementia, HLD, Parkinson's disease, trigeminal neuralgia, spinal stenosis [facility] Per sister, patient had previously resided at [place] where she was reportedly doing well. She recently fell out of bed and sustained a foot fracture, for which she was transferred to [facility] for subacute rehab and has stopped eating or hydrating, and was referred to the ED because [facility] staff were unable to provide adequate care for her needs. Sister believes failure to thrive may be patient's stubbornness manifesting rather than a physical issue.

## 2024-01-08 NOTE — ED ADULT NURSE NOTE - NSFALLHARMRISKINTERV_ED_ALL_ED
Assistance OOB with selected safe patient handling equipment if applicable/Assistance with ambulation/Communicate risk of Fall with Harm to all staff, patient, and family/Monitor gait and stability/Monitor for mental status changes and reorient to person, place, and time, as needed/Provide visual cue: red socks, yellow wristband, yellow gown, etc/Reinforce activity limits and safety measures with patient and family/Toileting schedule using arm’s reach rule for commode and bathroom/Use of alarms - bed, stretcher, chair and/or video monitoring/Bed in lowest position, wheels locked, appropriate side rails in place/Call bell, personal items and telephone in reach/Instruct patient to call for assistance before getting out of bed/chair/stretcher/Non-slip footwear applied when patient is off stretcher/[place] to call system/Physically safe environment - no spills, clutter or unnecessary equipment/Purposeful Proactive Rounding/Room/bathroom lighting operational, light cord in reach Assistance OOB with selected safe patient handling equipment if applicable/Assistance with ambulation/Communicate risk of Fall with Harm to all staff, patient, and family/Monitor gait and stability/Monitor for mental status changes and reorient to person, place, and time, as needed/Provide visual cue: red socks, yellow wristband, yellow gown, etc/Reinforce activity limits and safety measures with patient and family/Toileting schedule using arm’s reach rule for commode and bathroom/Use of alarms - bed, stretcher, chair and/or video monitoring/Bed in lowest position, wheels locked, appropriate side rails in place/Call bell, personal items and telephone in reach/Instruct patient to call for assistance before getting out of bed/chair/stretcher/Non-slip footwear applied when patient is off stretcher/[place] to call system/Physically safe environment - no spills, clutter or unnecessary equipment/Purposeful Proactive Rounding/Room/bathroom lighting operational, light cord in reach

## 2024-01-08 NOTE — ED ADULT NURSE NOTE - OBJECTIVE STATEMENT
from [facility] where she is for rehab, S/P fall with R ankle fx. Sent for AMS, possible UTI. pt. confused, and agitated. DENISSE more info. IVF initiated. VSS. from [facility] where she is for rehab, S/P fall with R ankle fx. Sent for AMS, possible UTI. pt. confused, and agitated. DENISSE more info. IVF initiated. VSS.

## 2024-01-08 NOTE — ED PROVIDER NOTE - PHYSICAL EXAMINATION
Const: NAD  Eyes: PERRL, EOM intact  CV: RRR, no murmurs, no chest wall tenderness, distal pulses intact  Resp: CTAB, normal resp effort  GI: soft, nondistended, nontender  MSK: Full ROM, no muscle or bony deformity or tenderness  Neuro: AOx1, GCS 15, No focal deficits  Skin: No rash, laceration or abrasion  Psych: calm, cooperative, No SI, HI, hallucination.

## 2024-01-08 NOTE — PHARMACOTHERAPY INTERVENTION NOTE - COMMENTS
Medication reconciliation completed.  Reviewed Medication list and confirmed med allergies with list from Care Facility "[facility]"; confirmed with Dr. First MedHx.  Medication reconciliation completed.  Reviewed Medication list and confirmed med allergies with list from Care Facility "[facility]"; confirmed with Dr. First MedHx.
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
## 2024-01-09 DIAGNOSIS — F03.90 UNSPECIFIED DEMENTIA WITHOUT BEHAVIORAL DISTURBANCE: ICD-10-CM

## 2024-01-09 LAB
ALBUMIN SERPL ELPH-MCNC: 2.8 G/DL — LOW (ref 3.3–5)
ALBUMIN SERPL ELPH-MCNC: 2.8 G/DL — LOW (ref 3.3–5)
ALP SERPL-CCNC: 243 U/L — HIGH (ref 40–120)
ALP SERPL-CCNC: 243 U/L — HIGH (ref 40–120)
ALT FLD-CCNC: 95 U/L — HIGH (ref 12–78)
ALT FLD-CCNC: 95 U/L — HIGH (ref 12–78)
ANION GAP SERPL CALC-SCNC: 2 MMOL/L — LOW (ref 5–17)
ANION GAP SERPL CALC-SCNC: 2 MMOL/L — LOW (ref 5–17)
AST SERPL-CCNC: 68 U/L — HIGH (ref 15–37)
AST SERPL-CCNC: 68 U/L — HIGH (ref 15–37)
BILIRUB SERPL-MCNC: 0.3 MG/DL — SIGNIFICANT CHANGE UP (ref 0.2–1.2)
BILIRUB SERPL-MCNC: 0.3 MG/DL — SIGNIFICANT CHANGE UP (ref 0.2–1.2)
BUN SERPL-MCNC: 13 MG/DL — SIGNIFICANT CHANGE UP (ref 7–23)
BUN SERPL-MCNC: 13 MG/DL — SIGNIFICANT CHANGE UP (ref 7–23)
CALCIUM SERPL-MCNC: 8.7 MG/DL — SIGNIFICANT CHANGE UP (ref 8.5–10.1)
CALCIUM SERPL-MCNC: 8.7 MG/DL — SIGNIFICANT CHANGE UP (ref 8.5–10.1)
CHLORIDE SERPL-SCNC: 115 MMOL/L — HIGH (ref 96–108)
CHLORIDE SERPL-SCNC: 115 MMOL/L — HIGH (ref 96–108)
CO2 SERPL-SCNC: 29 MMOL/L — SIGNIFICANT CHANGE UP (ref 22–31)
CO2 SERPL-SCNC: 29 MMOL/L — SIGNIFICANT CHANGE UP (ref 22–31)
CREAT SERPL-MCNC: 0.65 MG/DL — SIGNIFICANT CHANGE UP (ref 0.5–1.3)
CREAT SERPL-MCNC: 0.65 MG/DL — SIGNIFICANT CHANGE UP (ref 0.5–1.3)
EGFR: 87 ML/MIN/1.73M2 — SIGNIFICANT CHANGE UP
EGFR: 87 ML/MIN/1.73M2 — SIGNIFICANT CHANGE UP
GLUCOSE SERPL-MCNC: 100 MG/DL — HIGH (ref 70–99)
GLUCOSE SERPL-MCNC: 100 MG/DL — HIGH (ref 70–99)
HCT VFR BLD CALC: 33.3 % — LOW (ref 34.5–45)
HCT VFR BLD CALC: 33.3 % — LOW (ref 34.5–45)
HGB BLD-MCNC: 10.9 G/DL — LOW (ref 11.5–15.5)
HGB BLD-MCNC: 10.9 G/DL — LOW (ref 11.5–15.5)
MCHC RBC-ENTMCNC: 31.2 PG — SIGNIFICANT CHANGE UP (ref 27–34)
MCHC RBC-ENTMCNC: 31.2 PG — SIGNIFICANT CHANGE UP (ref 27–34)
MCHC RBC-ENTMCNC: 32.7 GM/DL — SIGNIFICANT CHANGE UP (ref 32–36)
MCHC RBC-ENTMCNC: 32.7 GM/DL — SIGNIFICANT CHANGE UP (ref 32–36)
MCV RBC AUTO: 95.4 FL — SIGNIFICANT CHANGE UP (ref 80–100)
MCV RBC AUTO: 95.4 FL — SIGNIFICANT CHANGE UP (ref 80–100)
PLATELET # BLD AUTO: 208 K/UL — SIGNIFICANT CHANGE UP (ref 150–400)
PLATELET # BLD AUTO: 208 K/UL — SIGNIFICANT CHANGE UP (ref 150–400)
POTASSIUM SERPL-MCNC: 4 MMOL/L — SIGNIFICANT CHANGE UP (ref 3.5–5.3)
POTASSIUM SERPL-MCNC: 4 MMOL/L — SIGNIFICANT CHANGE UP (ref 3.5–5.3)
POTASSIUM SERPL-SCNC: 4 MMOL/L — SIGNIFICANT CHANGE UP (ref 3.5–5.3)
POTASSIUM SERPL-SCNC: 4 MMOL/L — SIGNIFICANT CHANGE UP (ref 3.5–5.3)
PROT SERPL-MCNC: 6.7 GM/DL — SIGNIFICANT CHANGE UP (ref 6–8.3)
PROT SERPL-MCNC: 6.7 GM/DL — SIGNIFICANT CHANGE UP (ref 6–8.3)
RBC # BLD: 3.49 M/UL — LOW (ref 3.8–5.2)
RBC # BLD: 3.49 M/UL — LOW (ref 3.8–5.2)
RBC # FLD: 13.2 % — SIGNIFICANT CHANGE UP (ref 10.3–14.5)
RBC # FLD: 13.2 % — SIGNIFICANT CHANGE UP (ref 10.3–14.5)
SODIUM SERPL-SCNC: 146 MMOL/L — HIGH (ref 135–145)
SODIUM SERPL-SCNC: 146 MMOL/L — HIGH (ref 135–145)
WBC # BLD: 6.05 K/UL — SIGNIFICANT CHANGE UP (ref 3.8–10.5)
WBC # BLD: 6.05 K/UL — SIGNIFICANT CHANGE UP (ref 3.8–10.5)
WBC # FLD AUTO: 6.05 K/UL — SIGNIFICANT CHANGE UP (ref 3.8–10.5)
WBC # FLD AUTO: 6.05 K/UL — SIGNIFICANT CHANGE UP (ref 3.8–10.5)

## 2024-01-09 PROCEDURE — 99223 1ST HOSP IP/OBS HIGH 75: CPT

## 2024-01-09 RX ORDER — SODIUM CHLORIDE 9 MG/ML
1000 INJECTION, SOLUTION INTRAVENOUS
Refills: 0 | Status: DISCONTINUED | OUTPATIENT
Start: 2024-01-09 | End: 2024-01-12

## 2024-01-09 RX ORDER — PANTOPRAZOLE SODIUM 20 MG/1
40 TABLET, DELAYED RELEASE ORAL
Refills: 0 | Status: DISCONTINUED | OUTPATIENT
Start: 2024-01-09 | End: 2024-01-12

## 2024-01-09 RX ORDER — PIMAVANSERIN TARTRATE 10 MG/1
34 TABLET, COATED ORAL DAILY
Refills: 0 | Status: DISCONTINUED | OUTPATIENT
Start: 2024-01-09 | End: 2024-01-12

## 2024-01-09 RX ORDER — ACETAMINOPHEN 500 MG
650 TABLET ORAL EVERY 6 HOURS
Refills: 0 | Status: DISCONTINUED | OUTPATIENT
Start: 2024-01-09 | End: 2024-01-12

## 2024-01-09 RX ORDER — LANOLIN ALCOHOL/MO/W.PET/CERES
3 CREAM (GRAM) TOPICAL AT BEDTIME
Refills: 0 | Status: DISCONTINUED | OUTPATIENT
Start: 2024-01-09 | End: 2024-01-12

## 2024-01-09 RX ORDER — DULOXETINE HYDROCHLORIDE 30 MG/1
60 CAPSULE, DELAYED RELEASE ORAL DAILY
Refills: 0 | Status: DISCONTINUED | OUTPATIENT
Start: 2024-01-09 | End: 2024-01-12

## 2024-01-09 RX ORDER — SODIUM CHLORIDE 9 MG/ML
1000 INJECTION INTRAMUSCULAR; INTRAVENOUS; SUBCUTANEOUS
Refills: 0 | Status: DISCONTINUED | OUTPATIENT
Start: 2024-01-09 | End: 2024-01-09

## 2024-01-09 RX ORDER — ENOXAPARIN SODIUM 100 MG/ML
40 INJECTION SUBCUTANEOUS EVERY 24 HOURS
Refills: 0 | Status: DISCONTINUED | OUTPATIENT
Start: 2024-01-09 | End: 2024-01-12

## 2024-01-09 RX ORDER — LEVOTHYROXINE SODIUM 125 MCG
25 TABLET ORAL DAILY
Refills: 0 | Status: DISCONTINUED | OUTPATIENT
Start: 2024-01-09 | End: 2024-01-12

## 2024-01-09 RX ORDER — ONDANSETRON 8 MG/1
4 TABLET, FILM COATED ORAL EVERY 8 HOURS
Refills: 0 | Status: DISCONTINUED | OUTPATIENT
Start: 2024-01-09 | End: 2024-01-12

## 2024-01-09 RX ORDER — DONEPEZIL HYDROCHLORIDE 10 MG/1
10 TABLET, FILM COATED ORAL AT BEDTIME
Refills: 0 | Status: DISCONTINUED | OUTPATIENT
Start: 2024-01-09 | End: 2024-01-12

## 2024-01-09 RX ORDER — CEFTRIAXONE 500 MG/1
1000 INJECTION, POWDER, FOR SOLUTION INTRAMUSCULAR; INTRAVENOUS EVERY 24 HOURS
Refills: 0 | Status: DISCONTINUED | OUTPATIENT
Start: 2024-01-09 | End: 2024-01-11

## 2024-01-09 RX ORDER — GABAPENTIN 400 MG/1
400 CAPSULE ORAL
Refills: 0 | Status: DISCONTINUED | OUTPATIENT
Start: 2024-01-09 | End: 2024-01-12

## 2024-01-09 RX ADMIN — ENOXAPARIN SODIUM 40 MILLIGRAM(S): 100 INJECTION SUBCUTANEOUS at 22:07

## 2024-01-09 RX ADMIN — PIMAVANSERIN TARTRATE 34 MILLIGRAM(S): 10 TABLET, COATED ORAL at 12:55

## 2024-01-09 RX ADMIN — SODIUM CHLORIDE 75 MILLILITER(S): 9 INJECTION, SOLUTION INTRAVENOUS at 22:15

## 2024-01-09 RX ADMIN — CEFTRIAXONE 1000 MILLIGRAM(S): 500 INJECTION, POWDER, FOR SOLUTION INTRAMUSCULAR; INTRAVENOUS at 22:08

## 2024-01-09 NOTE — CHART NOTE - NSCHARTNOTEFT_GEN_A_CORE
H+P reviewed from this AM.    83W PMH dementia, s/p PPM, Hypothyroidism, h/o Parkinson's psychosis, trigeminal neuralgia, spinal stenosis presents from [facility] for "failure to thrive".    Acute Urinary Tract Infection  Metabolic Encephalopathy secondary to UTI  - cont rocephin for 3 days (day 2/3)  - follow up urine cultures  - IV fluids    Dementia  - cont Nuplazid and aricept    Anxiety  Depression  Trigeminal neuralgia  - cont neurontin, duloxetine    Hypothyroidism  - cont levothyroxine    DVT PPx  - lovenox    PT consult    Hopeful for return to [facility] tomorrow 1/10/24 H+P reviewed from this AM.    83W PMH dementia, s/p PPM, Hypothyroidism, h/o Parkinson's psychosis, trigeminal neuralgia, spinal stenosis presents from [facility] for "failure to thrive".    Acute Urinary Tract Infection  Metabolic Encephalopathy secondary to UTI  - cont rocephin for 3 days (day 2/3)  - follow up urine cultures  - IV fluids    Dementia  - cont Nuplazid and aricept    Anxiety  Depression  Trigeminal neuralgia  - cont neurontin, duloxetine    Hypothyroidism  - cont levothyroxine    DVT PPx  - lovenox    PT consult    Hopeful for return to [facility] tomorrow 1/10/24

## 2024-01-09 NOTE — H&P ADULT - ASSESSMENT
The patient is a 83y Female, a resident of [facility] with PMH of dementia, PPM in place, Hypothyroidism, Parkinson's psychosis, trigeminal neuralgia, spinal stenosis and others presented in ED with sister from [facility] for failure to thrive. Per sister, patient had previously resided at [place] where she was reportedly doing well. She recently fell out of bed and sustained a foot fracture, for which she was transferred to [facility] for subacute rehab and has stopped eating or hydrating, and was referred to the ED because [facility] staff were unable to provide adequate care for her needs. Sister believes failure to thrive may be patient's stubbornness manifesting rather than a physical issue. During my evaluation, she was calm and very sleepy.  She was able to answer very simple question. Seems to be slightly confused.  Denies chest pain, sob, N/V, abdominal pain, diarrhea or dysuria.      # Acute metabolic encephalopathy due to Acute UTI.  -Maintenance IV fluids.  -Follow cultures.  -Rocephin 1 gm IV daily.  -Supportive care.    # Elevated LFTs.  -Acute hepatitis panel.  -CMP in am.    # Hypothyroidism.  -On Levothyroxine.    # Dementia with Parkinson's psychosis.  -Continue her Aricept.  -On Nuplazid (not available in inpatient pharmacy).    # Anxiety, depression and trigeminal neuralgia.  -On Duloxetine and Neurontin.    # DVT prophylaxis: Lovenox sq daily. The patient is a 83y Female, a resident of [facility] with PMH of dementia, PPM in place, Hypothyroidism, Parkinson's psychosis, trigeminal neuralgia, spinal stenosis and others presented in ED with sister from [facility] for failure to thrive. Per sister, patient had previously resided at [place] where she was reportedly doing well. She recently fell out of bed and sustained a foot fracture, for which she was transferred to [facility] for subacute rehab and has stopped eating or hydrating, and was referred to the ED because [facility] staff were unable to provide adequate care for her needs. Sister believes failure to thrive may be patient's stubbornness manifesting rather than a physical issue. During my evaluation, she was calm and very sleepy.  She was able to answer very simple question. Seems to be slightly confused.  Denies chest pain, sob, N/V, abdominal pain, diarrhea or dysuria.      # Acute metabolic encephalopathy due to Acute UTI.  -Maintenance IV fluids.  -Follow cultures.  -Rocephin 1 gm IV daily.  -Supportive care.    # Elevated LFTs.  -Acute hepatitis panel.  -CMP in am.    # Hypothyroidism.  -On Levothyroxine.    # Dementia with Parkinson's psychosis.  -Continue her Aricept.  -On Nuplazid (not available in inpatient pharmacy).    # Anxiety, depression and trigeminal neuralgia.  -On Duloxetine and Neurontin.    # DVT prophylaxis: Lovenox sq daily.

## 2024-01-09 NOTE — PHYSICAL THERAPY INITIAL EVALUATION ADULT - ADDITIONAL COMMENTS
As per chart review it stated pt ambulates w/ RW at [facility]. As per chart review it stated pt ambulates w/ RW at [facility]. pt from subacute rehab this admission.

## 2024-01-09 NOTE — PHYSICAL THERAPY INITIAL EVALUATION ADULT - GENERAL OBSERVATIONS, REHAB EVAL
Pt seen for 30min PT bedside Eval on 1N. Dtr bedside providing history. Pt rec'd semi supine in bed in NAD sleeping, arousalable however confused declining OOB willing to participate in bedside eval. History taken, pt ROM WFL, and strength grossly 3/5 throughout moving all extremities against gravity however resisting PROM. Pt left semi supine in bed in NAD, all needs met, +bed alarm, RN aware.

## 2024-01-09 NOTE — CONSULT NOTE ADULT - SUBJECTIVE AND OBJECTIVE BOX
HPI: Pt is a 83y old Female with hx of dementia, PPM in place, Hypothyroidism, Parkinson's psychosis, trigeminal neuralgia, spinal stenosis and others presented in ED with [facility] from [facility] for failure to thrive. Per sister, patient had previously resided at [place] where she was reportedly doing well. She recently fell out of bed and sustained a foot fracture, for which she was transferred to [facility] for subacute rehab and has stopped eating or hydrating, and was referred to the ED because [facility] staff were unable to provide adequate care for her needs. Sister believes failure to thrive may be patient's stubbornness manifesting rather than a physical issue. During my evaluation, she was calm and very sleepy.  She was able to answer very simple question. Seems to be slightly confused.  Denies chest pain, sob, N/V, abdominal pain, diarrhea or dysuria. Palliative medicine consulted to help establish GOC and advance care planning     1/9/2023 pt seen and examined with  family at bedside, patient resting comfortable at this time with daughter at bedside. Patient was eating lunch and appeared comfortable. Was able to tell me her first name but otherwise was unable to answer any questions. Patient does appear comfortable at this time     PAIN: ( )Yes   (x )No  non verbal indicators not present     DYSPNEA: ( ) Yes  (x ) No  Level:    PAST MEDICAL & SURGICAL HISTORY:  Trigeminal neuralgia  HLD (hyperlipidemia)  Spinal stenosis  Burning mouth syndrome  Parkinson disease  Dementia    No significant past surgical history    SOCIAL HX:    Hx opiate tolerance ( )YES  (x )NO    Baseline ADLs  (Prior to Admission)  ( ) Independent   (x )Dependent    FAMILY HISTORY:  No significant family history    Review of Systems:    Unable to obtain/Limited due to: Dementia     PHYSICAL EXAM:    Vital Signs Last 24 Hrs  T(C): 36.5 (09 Jan 2024 10:15), Max: 37.4 (08 Jan 2024 23:31)  T(F): 97.7 (09 Jan 2024 10:15), Max: 99.3 (08 Jan 2024 23:31)  HR: 80 (09 Jan 2024 10:15) (75 - 89)  BP: 127/55 (09 Jan 2024 10:15) (127/55 - 151/77)  BP(mean): 94 (08 Jan 2024 23:31) (94 - 97)  RR: 18 (09 Jan 2024 10:15) (16 - 18)  SpO2: 96% (09 Jan 2024 10:15) (95% - 100%)    Parameters below as of 09 Jan 2024 10:15  Patient On (Oxygen Delivery Method): room air    Daily Height in cm: 165.1 (08 Jan 2024 15:09)      PPSV2: 30  %  FAST: unsure of baseline     General: elderly pleasantly confused female in bed, NAD  Mental Status: alert but disoriented knows first name only   HEENT: MMM   Lungs: cta b/l   Cardiac: s1s2 +   GI: nontender, nondistended, +BS   : +voiding   Ext: no edema present  Neuro: dementia       LABS:                        10.9   6.05  )-----------( 208      ( 09 Jan 2024 09:48 )             33.3     01-09    146<H>  |  115<H>  |  13  ----------------------------<  100<H>  4.0   |  29  |  0.65    Ca    8.7      09 Jan 2024 09:48    TPro  6.7  /  Alb  2.8<L>  /  TBili  0.3  /  DBili  x   /  AST  68<H>  /  ALT  95<H>  /  AlkPhos  243<H>  01-09      Albumin: Albumin: 2.8 g/dL (01-09 @ 09:48)      Allergies    penicillins (Unknown)  latex (Unknown)  Cleocin HCl (Unknown)  sulfa drugs (Unknown)    Intolerances      MEDICATIONS  (STANDING):  calcium carbonate 1250 mG  + Vitamin D (OsCal 500 + D) 1 Tablet(s) Oral three times a day  cefTRIAXone Injectable. 1000 milliGRAM(s) IV Push every 24 hours  dicyclomine 10 milliGRAM(s) Oral two times a day before meals  donepezil 10 milliGRAM(s) Oral at bedtime  DULoxetine 60 milliGRAM(s) Oral daily  enoxaparin Injectable 40 milliGRAM(s) SubCutaneous every 24 hours  gabapentin 400 milliGRAM(s) Oral two times a day  levothyroxine 25 MICROGram(s) Oral daily  multivitamin 1 Tablet(s) Oral daily  pantoprazole    Tablet 40 milliGRAM(s) Oral before breakfast  pimavanserin Capsule 34 milliGRAM(s) Oral daily  sodium chloride 0.9%. 1000 milliLiter(s) (75 mL/Hr) IV Continuous <Continuous>    MEDICATIONS  (PRN):  acetaminophen     Tablet .. 650 milliGRAM(s) Oral every 6 hours PRN Temp greater or equal to 38C (100.4F), Mild Pain (1 - 3)  aluminum hydroxide/magnesium hydroxide/simethicone Suspension 30 milliLiter(s) Oral every 4 hours PRN Dyspepsia  melatonin 3 milliGRAM(s) Oral at bedtime PRN Insomnia  ondansetron Injectable 4 milliGRAM(s) IV Push every 8 hours PRN Nausea and/or Vomiting      RADIOLOGY/ADDITIONAL STUDIES:    ACC: [id number] EXAM:  CT BRAIN   ORDERED BY: NEGAR GUALLPA   PROCEDURE DATE:  01/08/2024    INTERPRETATION:  CLINICAL INFORMATION: AMS  COMPARISON: 12/9/2023  TECHNIQUE:  Serial axial images were obtained from the skull base to the   vertex using multi-slice helical technique. Sagittal and coronal   reformats were obtained.    FINDINGS:  VENTRICLES AND SULCI: Ventricles are dilated out of proportion to the   sulci without transependymal flow CSF. Findings may represent normal   pressure hydrocephalus versus central atrophy.  INTRA-AXIAL: No intracranial mass, acute hemorrhage, or midline shift.    There are periventricular and subcortical white matter hypodensities,   consistent with microvascular type changes.  EXTRA-AXIAL: No mass or fluid collection. Basal cisterns are normal in   appearance.    VISUALIZED SINUSES:  Small bilateral maxillary sinus air-fluid levels,   slightly greater on the right than the left. Mild bilateral ethmoid air   cell mucosal thickening. Correlate clinically for sinusitis.  TYMPANOMASTOID CAVITIES:  Clear.  VISUALIZED ORBITS: Normal.  CALVARIUM: Intact.    MISCELLANEOUS: None.      IMPRESSION:  1. No acute intracranial process.  2. Ventricles dilated out of proportion to the sulci, which may represent   central atrophy versus normal pressure hydrocephalus, stable in   appearance compared with prior dated 12/9/2023. If altered mental status   persists, consider further evaluation via MR imaging to include DWI and   ADC mapping techniques, provided there are no contraindications.      ACC: [id number] EXAM:  XR CHEST 1 VIEW   ORDERED BY: NEGAR GUALLPA   PROCEDURE DATE:  01/08/2024    INTERPRETATION:  AP chest on January 8, 2024 at 3:26 PM. Patient has   altered mental status.  Heart magnified by technique. Left-sided pacemaker again noted.  Lungs are under circulated but otherwise unremarkable.  Chest is similar to December 9, 2023.    IMPRESSION: Pacemaker and dehydration again noted.         HPI: Pt is a 83y old Female with hx of dementia, PPM in place, Hypothyroidism, Parkinson's psychosis, trigeminal neuralgia, spinal stenosis and others [facility]. Per sister, patient had previously resided at [place] where she was reportedly doing well. She recently fell out of bed and sustained a foot fracture, for which she was transferred to [facility] for subacute rehab and has stopped eating or hydrating, and was referred to the ED because [facility] staff were unable to provide adequate care for her needs. Sister believes failure to thrive may be patient's stubbornness manifesting rather than a physical issue. During my evaluation, she was calm and very sleepy.  She was able to answer very simple question. Seems to be slightly confused.  Denies chest pain, sob, N/V, abdominal pain, diarrhea or dysuria. Palliative medicine consulted to help establish GOC and advance care planning     1/9/2023 pt seen and examined with  family at bedside, patient resting comfortable at this time with daughter at bedside. Patient was eating lunch and appeared comfortable. Was able to tell me her first name but otherwise was unable to answer any questions. Patient does appear comfortable at this time     PAIN: ( )Yes   (x )No  non verbal indicators not present     DYSPNEA: ( ) Yes  (x ) No  Level:    PAST MEDICAL & SURGICAL HISTORY:  Trigeminal neuralgia  HLD (hyperlipidemia)  Spinal stenosis  Burning mouth syndrome  Parkinson disease  Dementia    No significant past surgical history    SOCIAL HX:    Hx opiate tolerance ( )YES  (x )NO    Baseline ADLs  (Prior to Admission)  ( ) Independent   (x )Dependent    FAMILY HISTORY:  No significant family history    Review of Systems:    Unable to obtain/Limited due to: Dementia     PHYSICAL EXAM:    Vital Signs Last 24 Hrs  T(C): 36.5 (09 Jan 2024 10:15), Max: 37.4 (08 Jan 2024 23:31)  T(F): 97.7 (09 Jan 2024 10:15), Max: 99.3 (08 Jan 2024 23:31)  HR: 80 (09 Jan 2024 10:15) (75 - 89)  BP: 127/55 (09 Jan 2024 10:15) (127/55 - 151/77)  BP(mean): 94 (08 Jan 2024 23:31) (94 - 97)  RR: 18 (09 Jan 2024 10:15) (16 - 18)  SpO2: 96% (09 Jan 2024 10:15) (95% - 100%)    Parameters below as of 09 Jan 2024 10:15  Patient On (Oxygen Delivery Method): room air    Daily Height in cm: 165.1 (08 Jan 2024 15:09)      PPSV2: 30  %  FAST: unsure of baseline     General: elderly pleasantly confused female in bed, NAD  Mental Status: alert but disoriented knows first name only   HEENT: MMM   Lungs: cta b/l   Cardiac: s1s2 +   GI: nontender, nondistended, +BS   : +voiding   Ext: no edema present  Neuro: dementia       LABS:                        10.9   6.05  )-----------( 208      ( 09 Jan 2024 09:48 )             33.3     01-09    146<H>  |  115<H>  |  13  ----------------------------<  100<H>  4.0   |  29  |  0.65    Ca    8.7      09 Jan 2024 09:48    TPro  6.7  /  Alb  2.8<L>  /  TBili  0.3  /  DBili  x   /  AST  68<H>  /  ALT  95<H>  /  AlkPhos  243<H>  01-09      Albumin: Albumin: 2.8 g/dL (01-09 @ 09:48)      Allergies    penicillins (Unknown)  latex (Unknown)  Cleocin HCl (Unknown)  sulfa drugs (Unknown)    Intolerances      MEDICATIONS  (STANDING):  calcium carbonate 1250 mG  + Vitamin D (OsCal 500 + D) 1 Tablet(s) Oral three times a day  cefTRIAXone Injectable. 1000 milliGRAM(s) IV Push every 24 hours  dicyclomine 10 milliGRAM(s) Oral two times a day before meals  donepezil 10 milliGRAM(s) Oral at bedtime  DULoxetine 60 milliGRAM(s) Oral daily  enoxaparin Injectable 40 milliGRAM(s) SubCutaneous every 24 hours  gabapentin 400 milliGRAM(s) Oral two times a day  levothyroxine 25 MICROGram(s) Oral daily  multivitamin 1 Tablet(s) Oral daily  pantoprazole    Tablet 40 milliGRAM(s) Oral before breakfast  pimavanserin Capsule 34 milliGRAM(s) Oral daily  sodium chloride 0.9%. 1000 milliLiter(s) (75 mL/Hr) IV Continuous <Continuous>    MEDICATIONS  (PRN):  acetaminophen     Tablet .. 650 milliGRAM(s) Oral every 6 hours PRN Temp greater or equal to 38C (100.4F), Mild Pain (1 - 3)  aluminum hydroxide/magnesium hydroxide/simethicone Suspension 30 milliLiter(s) Oral every 4 hours PRN Dyspepsia  melatonin 3 milliGRAM(s) Oral at bedtime PRN Insomnia  ondansetron Injectable 4 milliGRAM(s) IV Push every 8 hours PRN Nausea and/or Vomiting      RADIOLOGY/ADDITIONAL STUDIES:    ACC: [id number] EXAM:  CT BRAIN   ORDERED BY: NEGAR GUALLPA   PROCEDURE DATE:  01/08/2024    INTERPRETATION:  CLINICAL INFORMATION: AMS  COMPARISON: 12/9/2023  TECHNIQUE:  Serial axial images were obtained from the skull base to the   vertex using multi-slice helical technique. Sagittal and coronal   reformats were obtained.    FINDINGS:  VENTRICLES AND SULCI: Ventricles are dilated out of proportion to the   sulci without transependymal flow CSF. Findings may represent normal   pressure hydrocephalus versus central atrophy.  INTRA-AXIAL: No intracranial mass, acute hemorrhage, or midline shift.    There are periventricular and subcortical white matter hypodensities,   consistent with microvascular type changes.  EXTRA-AXIAL: No mass or fluid collection. Basal cisterns are normal in   appearance.    VISUALIZED SINUSES:  Small bilateral maxillary sinus air-fluid levels,   slightly greater on the right than the left. Mild bilateral ethmoid air   cell mucosal thickening. Correlate clinically for sinusitis.  TYMPANOMASTOID CAVITIES:  Clear.  VISUALIZED ORBITS: Normal.  CALVARIUM: Intact.    MISCELLANEOUS: None.      IMPRESSION:  1. No acute intracranial process.  2. Ventricles dilated out of proportion to the sulci, which may represent   central atrophy versus normal pressure hydrocephalus, stable in   appearance compared with prior dated 12/9/2023. If altered mental status   persists, consider further evaluation via MR imaging to include DWI and   ADC mapping techniques, provided there are no contraindications.      ACC: [id number] EXAM:  XR CHEST 1 VIEW   ORDERED BY: NEGAR GUALLPA   PROCEDURE DATE:  01/08/2024    INTERPRETATION:  AP chest on January 8, 2024 at 3:26 PM. Patient has   altered mental status.  Heart magnified by technique. Left-sided pacemaker again noted.  Lungs are under circulated but otherwise unremarkable.  Chest is similar to December 9, 2023.    IMPRESSION: Pacemaker and dehydration again noted.

## 2024-01-09 NOTE — PHYSICAL THERAPY INITIAL EVALUATION ADULT - NSPTDISCHREC_GEN_A_CORE
TBD pending further mobility. dtr prefers return to [facility] TBD pending further mobility. dtr prefers return to nursing home

## 2024-01-09 NOTE — CHART NOTE - NSCHARTNOTEFT_GEN_A_CORE
Mariela was awake and alert with daughter present during evaluation, measurements, and fitting of right small pneumatic cam walker boot. Removed posterior splint. Applied right cam boot. Fit was good. Showed patient and daughter how to don and doff right cam boot. Provided two tibia length socks for comfort and hygiene. Provided written and verbal instructions. [facility] [phone] Mariela was awake and alert with daughter present during evaluation, measurements, and fitting of right small pneumatic cam walker boot. Removed posterior splint. Applied right cam boot. Fit was good. Showed patient and daughter how to don and doff right cam boot. Provided two tibia length socks for comfort and hygiene. Provided written and verbal instructions. [facility] [phone]

## 2024-01-09 NOTE — PHYSICAL THERAPY INITIAL EVALUATION ADULT - PERTINENT HX OF CURRENT PROBLEM, REHAB EVAL
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 admitted with UTI.     CT HEAD: No acute intracranial process. Ventricles dilated out of proportion to the sulci, which may represent central atrophy versus normal pressure hydrocephalus, stable in appearance compared with prior dated 12/9/2023. If altered mental status persists, consider further evaluation via MR imaging to include DWI and ADC mapping techniques, provided there are no contraindications. 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 admitted with UTI.     CT HEAD: No acute intracranial process. Ventricles dilated out of proportion to the sulci, which may represent central atrophy versus normal pressure hydrocephalus, stable in appearance compared with prior dated 12/9/2023. If altered mental status persists, consider further evaluation via MR imaging to include DWI and ADC mapping techniques, provided there are no contraindications.

## 2024-01-09 NOTE — CONSULT NOTE ADULT - ASSESSMENT
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 Per sister, patient had previously resided at [place] where she was reportedly doing well. She recently fell out of bed and sustained a foot fracture, for which she was transferred to [facility] for subacute rehab and has stopped eating or hydrating, and was referred to the ED because [facility] staff were unable to provide adequate care for her needs. Sister believes failure to thrive may be patient's stubbornness manifesting rather than a physical issue. During my evaluation, she was calm and very sleepy.  She was able to answer very simple question. Seems to be slightly confused.  Denies chest pain, sob, N/V, abdominal pain, diarrhea or dysuria. Palliative medicine consulted to help establish GOC and advance care planning     1) Acute metabolic encephalopathy   - Acute - UTI    - history of Dementia   - supportive care   - c/w Rocephin     Process of Care  --Reviewed dx/treatment problems and alignment with Goals of Care    Physical Aspects of Care  --Pain  patient appear comfortable   c/w current managment    --Bowel Regimen  denies constipation  risk for constipation d/t immobility  daily dulcolax    --Dyspnea  No SOB at this time  comfortable and in NAD    --Nausea Vomiting  denies    --Weakness  PT as tolerated     Psychological and Psychiatric Aspects of Care:   --Greif/Bereavment: emotional support provided  -Pastoral Care Available PRN     Social Aspects of Care  -SW involved     Cultural Aspects  -Primary Language: English    Goals of Care:     We discussed Palliative Care team being a supportive team when a patient has ongoing illnesses.  We also discussed that it is not an end of life care service, but can help navigate symptoms and emotional support througout their hospital stay here.    Ethical and Legal Aspects:   NA    Capacity- pt does not have capacity     HCP/Surrogate: HCP daughter Latisha Lopez - is listed as alternate on HCP primary is Gene who is      Code Status- full code   MOLST-  Dispo Plan-    Discussed With: Dr. Herring coordinated with attending and SW and RN     Time Spent: 90 minutes including the care, coordination and counseling of this patient, 50% of which was spent coordinating and counseling.  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 Per sister, patient had previously resided at [place] where she was reportedly doing well. She recently fell out of bed and sustained a foot fracture, for which she was transferred to [facility] for subacute rehab and has stopped eating or hydrating, and was referred to the ED because [facility] staff were unable to provide adequate care for her needs. Sister believes failure to thrive may be patient's stubbornness manifesting rather than a physical issue. During my evaluation, she was calm and very sleepy.  She was able to answer very simple question. Seems to be slightly confused.  Denies chest pain, sob, N/V, abdominal pain, diarrhea or dysuria. Palliative medicine consulted to help establish GOC and advance care planning     1) Acute metabolic encephalopathy   - Acute - UTI    - history of Dementia   - supportive care   - c/w Rocephin     Process of Care  --Reviewed dx/treatment problems and alignment with Goals of Care    Physical Aspects of Care  --Pain  patient appear comfortable   c/w current managment    --Bowel Regimen  denies constipation  risk for constipation d/t immobility  daily dulcolax    --Dyspnea  No SOB at this time  comfortable and in NAD    --Nausea Vomiting  denies    --Weakness  PT as tolerated     Psychological and Psychiatric Aspects of Care:   --Greif/Bereavment: emotional support provided  -Pastoral Care Available PRN     Social Aspects of Care  -SW involved     Cultural Aspects  -Primary Language: English    Goals of Care:     We discussed Palliative Care team being a supportive team when a patient has ongoing illnesses.  We also discussed that it is not an end of life care service, but can help navigate symptoms and emotional support througout their hospital stay here.    Ethical and Legal Aspects:   NA    Capacity- pt does not have capacity     HCP/Surrogate: HCP daughter Latisha Lopez - is listed as alternate on HCP primary is Gene who is      Code Status- full code   MOLST-  Dispo Plan-    Discussed With: Dr. Herring coordinated with attending and SW and RN     Time Spent: 90 minutes including the care, coordination and counseling of this patient, 50% of which was spent coordinating and counseling.

## 2024-01-09 NOTE — H&P ADULT - NSICDXPASTMEDICALHX_GEN_ALL_CORE_FT
PAST MEDICAL HISTORY:  Burning mouth syndrome     Dementia     HLD (hyperlipidemia)     Parkinson disease     Spinal stenosis     Trigeminal neuralgia

## 2024-01-09 NOTE — GOALS OF CARE CONVERSATION - ADVANCED CARE PLANNING - CONVERSATION DETAILS
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   (2024 02:12)      PERTINENT PMH REVIEWED:  [ X ] YES [ ] NO           Mental Status: [ ] Alert  [  ] Oriented [  ] Confused [ X  ] Lethargic [  ]  Concerns of Depression [  ]- unable to assess, not identified by family  Anxiety [   ]- unable to assess, not identified by family   Baseline ADLs (prior to admission):  Independent [ ] moderately [ ] fully   Dependent   [ X ] moderately [ ] fully    Anticipated Grief: Patient [  ] Family [ X ]    Caregiver [place] Assessed: Yes [ X  ] No [  ]    Restoration: Congregational     Spiritual Concerns: Not identified     Goals of Care: Continue with current medical management in hopes pt. can improve     Previous Services: [facility] ([place] previously)    ADVANCE DIRECTIVES: Full Code - pts daughter ro discuss further with her siblings      Anticipated D/C Plan: to be further determined                      Summary:  This SW spoke with pts daughter Latisha via phone to discuss goals of care, assist with planning and provide supportive counseling. Pt. was at [facility] at [facility] after having surgery here and prior to was at [facility]. Pts daughter shared that pt. has declined and over the last month has not been near her baseline. She also reports pt. has not been eating but pt. started to eat more today.  Pts daughter discussed frustrations with [facility] facility  how she has declined since being there. Feelings explored and support provided.     Goals of care discussed. Pts daughter shared how she wants to give pt. a chance and think she can get back to her baseline once she is regulated with meds and nutrition she can get back to her baseline. She does not want pt. to go back to a [facility] and hopes pt. will be able to return to [facility]. We discussed PEG in the event pt. was unable to eat enough and explained why with pts underlying dementia this would not be recommended. Pts daughter shared that pt. is eating more today and hopes she will only continue to increase her nutrition. We discussed possible options including if pt. improves returning to [facility] VS if pt. declines further focusing on her comfort and possibly hospice at [facility]. Pts daughter expressed understanding and would like to give pt. some time to see if there is improvement.     Advanced directives discussed. HCP in [facility] naming pts  Gene as primary however, he is . Pts daughter Latisha is alternate. Pts wishes regarding resuscitation and intubation discussed. This SW explained why with pts underlying conditions these interventions would not be recommended as they are unlikely to be successful. Pts daughter expressed understanding and reports she will speak with her siblings. Pt. remains full code at this time.     Plan at this time to be further determined. No limits currently set. Emotional support provided. Our team will continue to follow. 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   (2024 02:12)      PERTINENT PMH REVIEWED:  [ X ] YES [ ] NO           Mental Status: [ ] Alert  [  ] Oriented [  ] Confused [ X  ] Lethargic [  ]  Concerns of Depression [  ]- unable to assess, not identified by family  Anxiety [   ]- unable to assess, not identified by family   Baseline ADLs (prior to admission):  Independent [ ] moderately [ ] fully   Dependent   [ X ] moderately [ ] fully    Anticipated Grief: Patient [  ] Family [ X ]    Caregiver [place] Assessed: Yes [ X  ] No [  ]    Anabaptism: Orthodoxy     Spiritual Concerns: Not identified     Goals of Care: Continue with current medical management in hopes pt. can improve     Previous Services: [facility] ([place] previously)    ADVANCE DIRECTIVES: Full Code - pts daughter ro discuss further with her siblings      Anticipated D/C Plan: to be further determined                      Summary:  This SW spoke with pts daughter Latisha via phone to discuss goals of care, assist with planning and provide supportive counseling. Pt. was at [facility] at [facility] after having surgery here and prior to was at [facility] [place] [facility]. Pts daughter shared that pt. has declined and over the last month has not been near her baseline. She also reports pt. has not been eating but pt. started to eat more today.  Pts daughter discussed frustrations with [facility] facility  how she has declined since being there. Feelings explored and support provided.     Goals of care discussed. Pts daughter shared how she wants to give pt. a chance and think she can get back to her baseline once she is regulated with meds and nutrition she can get back to her baseline. She does not want pt. to go back to a [facility] and hopes pt. will be able to return to [facility] [place]. We discussed PEG in the event pt. was unable to eat enough and explained why with pts underlying dementia this would not be recommended. Pts daughter shared that pt. is eating more today and hopes she will only continue to increase her nutrition. We discussed possible options including if pt. improves returning to [facility] VS if pt. declines further focusing on her comfort and possibly hospice at [facility]. Pts daughter expressed understanding and would like to give pt. some time to see if there is improvement.     Advanced directives discussed. HCP in [facility] [employer] naming pts  Gene as primary however, he is . Pts daughter Latisha is alternate. Pts wishes regarding resuscitation and intubation discussed. This SW explained why with pts underlying conditions these interventions would not be recommended as they are unlikely to be successful. Pts daughter expressed understanding and reports she will speak with her siblings. Pt. remains full code at this time.     Plan at this time to be further determined. No limits currently set. Emotional support provided. Our team will continue to follow.

## 2024-01-09 NOTE — PHYSICAL THERAPY INITIAL EVALUATION ADULT - TRANSFER TRAINING, PT EVAL
GOAL: Pt will perform sit to/from stand transfers with SBA-CGA with/without AD as needed within 4weeks.

## 2024-01-09 NOTE — H&P ADULT - NSHPLABSRESULTS_GEN_ALL_CORE
LABS:                        12.0   7.68  )-----------( 228      ( 08 Jan 2024 16:52 )             38.0     01-08    145  |  113<H>  |  17  ----------------------------<  126<H>  4.1   |  30  |  0.72    Ca    9.2      08 Jan 2024 16:52    TPro  7.2  /  Alb  3.0<L>  /  TBili  0.3  /  DBili  x   /  AST  86<H>  /  ALT  111<H>  /  AlkPhos  247<H>  01-08        < from: CT Head No Cont (01.08.24 @ 16:07) >    IMPRESSION:  1. No acute intracranial process.  2. Ventricles dilated out of proportion to the sulci, which may represent   central atrophy versus normal pressure hydrocephalus, stable in   appearance compared with prior dated 12/9/2023. If altered mental status   persists, consider further evaluation via MR imaging to include DWI and   ADC mapping techniques, provided there are no contraindications.        --- End of Report ---    < end of copied text >        < from: Xray Chest 1 View AP/PA. (01.08.24 @ 15:32) >    IMPRESSION: Pacemaker and dehydration again noted.    --- End of Report ---    < end of copied text >

## 2024-01-09 NOTE — H&P ADULT - NSHPPHYSICALEXAM_GEN_ALL_CORE
PHYSICAL EXAM:  GENERAL: NAD, lying in bed comfortably  HEAD:  Atraumatic, Normocephalic  EYES: EOMI, PERRLA, conjunctiva and sclera clear  ENT: Moist mucous membranes  NECK: Supple, No JVD  CHEST/LUNG: Clear to auscultation bilaterally; No rales, rhonchi, wheezing, or rubs. Unlabored respirations  HEART: Regular rate and rhythm; No murmurs, rubs, or gallops  ABDOMEN: Bowel sounds present; Soft, Nontender, Nondistended. No hepatomegaly  EXTREMITIES:  2+ Peripheral Pulses, brisk capillary refill. No clubbing, cyanosis, or edema  NERVOUS SYSTEM:  Alert & Oriented X2, speech clear. No deficits   MSK: FROM all 4 extremities, full and equal strength  SKIN: No rashes or lesions

## 2024-01-09 NOTE — H&P ADULT - HISTORY OF PRESENT ILLNESS
Chief Complaint: see chief complaint quote.    · Chief Complaint: The patient is a 83y Female complaining of  · HPI Objective Statement: 82 y/o female with PMHx of dementia, HLD, Parkinson's disease, trigeminal neuralgia, spinal stenosis presents to the ED with [facility] to thrive. Per sister, patient had previously resided at [place] where she was reportedly doing well. She recently fell out of bed and sustained a foot fracture, for which she was transferred to [facility] for subacute rehab and has stopped eating or hydrating, and was referred to the ED because [facility] staff were unable to provide adequate care for her needs. Sister believes failure to thrive may be patient's stubbornness manifesting rather than a physical issue.   Chief Complaint: see chief complaint quote.    · Chief Complaint: The patient is a 83y Female complaining of  · HPI Objective Statement: 84 y/o female with PMHx of dementia, HLD, Parkinson's disease, trigeminal neuralgia, spinal stenosis [facility]. Per sister, patient had previously resided at [place] where she was reportedly doing well. She recently fell out of bed and sustained a foot fracture, for which she was transferred to [facility] for subacute rehab and has stopped eating or hydrating, and was referred to the ED because [facility] staff were unable to provide adequate care for her needs. Sister believes failure to thrive may be patient's stubbornness manifesting rather than a physical issue.   Chief Complaint: see chief complaint quote.    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 Per sister, patient had previously resided at [place] where she was reportedly doing well. She recently fell out of bed and sustained a foot fracture, for which she was transferred to [facility] for subacute rehab and has stopped eating or hydrating, and was referred to the ED because [facility] staff were unable to provide adequate care for her needs. Sister believes failure to thrive may be patient's stubbornness manifesting rather than a physical issue. During my evaluation, she was calm and very sleepy.  She was able to answer very simple question. Seems to be slightly confused.  Denies chest pain, sob, N/V, abdominal pain, diarrhea or dysuria.  Sig labs: CBC and CMP wnl except abnormal LFTs: AST 86, , .    UA positive for UTI.    CT head: stable ventriculomegaly.  CXR: negative for any acute process. 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    NS 1 L bolus and Rocephin 1 gm IV given in ED.    Chief Complaint: see chief complaint quote.    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 Per sister, patient had previously resided at [place] where she was reportedly doing well. She recently fell out of bed and sustained a foot fracture, for which she was transferred to [facility] for subacute rehab and has stopped eating or hydrating, and was referred to the ED because [facility] staff were unable to provide adequate care for her needs. Sister believes failure to thrive may be patient's stubbornness manifesting rather than a physical issue. During my evaluation, she was calm and very sleepy.  She was able to answer very simple question. Seems to be slightly confused.  Denies chest pain, sob, N/V, abdominal pain, diarrhea or dysuria.  Sig labs: CBC and CMP wnl except abnormal LFTs: AST 86, , .    UA positive for UTI.    CT head: stable ventriculomegaly.  CXR: negative for any acute process. 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    NS 1 L bolus and Rocephin 1 gm IV given in ED.

## 2024-01-10 LAB
ANION GAP SERPL CALC-SCNC: 5 MMOL/L — SIGNIFICANT CHANGE UP (ref 5–17)
ANION GAP SERPL CALC-SCNC: 5 MMOL/L — SIGNIFICANT CHANGE UP (ref 5–17)
BUN SERPL-MCNC: 11 MG/DL — SIGNIFICANT CHANGE UP (ref 7–23)
BUN SERPL-MCNC: 11 MG/DL — SIGNIFICANT CHANGE UP (ref 7–23)
CALCIUM SERPL-MCNC: 8.5 MG/DL — SIGNIFICANT CHANGE UP (ref 8.5–10.1)
CALCIUM SERPL-MCNC: 8.5 MG/DL — SIGNIFICANT CHANGE UP (ref 8.5–10.1)
CHLORIDE SERPL-SCNC: 109 MMOL/L — HIGH (ref 96–108)
CHLORIDE SERPL-SCNC: 109 MMOL/L — HIGH (ref 96–108)
CO2 SERPL-SCNC: 25 MMOL/L — SIGNIFICANT CHANGE UP (ref 22–31)
CO2 SERPL-SCNC: 25 MMOL/L — SIGNIFICANT CHANGE UP (ref 22–31)
CREAT SERPL-MCNC: 0.58 MG/DL — SIGNIFICANT CHANGE UP (ref 0.5–1.3)
CREAT SERPL-MCNC: 0.58 MG/DL — SIGNIFICANT CHANGE UP (ref 0.5–1.3)
EGFR: 89 ML/MIN/1.73M2 — SIGNIFICANT CHANGE UP
EGFR: 89 ML/MIN/1.73M2 — SIGNIFICANT CHANGE UP
GLUCOSE SERPL-MCNC: 104 MG/DL — HIGH (ref 70–99)
GLUCOSE SERPL-MCNC: 104 MG/DL — HIGH (ref 70–99)
HAV IGM SER-ACNC: SIGNIFICANT CHANGE UP
HAV IGM SER-ACNC: SIGNIFICANT CHANGE UP
HBV CORE IGM SER-ACNC: SIGNIFICANT CHANGE UP
HBV CORE IGM SER-ACNC: SIGNIFICANT CHANGE UP
HBV SURFACE AG SER-ACNC: SIGNIFICANT CHANGE UP
HBV SURFACE AG SER-ACNC: SIGNIFICANT CHANGE UP
HCT VFR BLD CALC: 34.3 % — LOW (ref 34.5–45)
HCT VFR BLD CALC: 34.3 % — LOW (ref 34.5–45)
HCV AB S/CO SERPL IA: 0.11 S/CO — SIGNIFICANT CHANGE UP (ref 0–0.99)
HCV AB S/CO SERPL IA: 0.11 S/CO — SIGNIFICANT CHANGE UP (ref 0–0.99)
HCV AB SERPL-IMP: SIGNIFICANT CHANGE UP
HCV AB SERPL-IMP: SIGNIFICANT CHANGE UP
HGB BLD-MCNC: 11.2 G/DL — LOW (ref 11.5–15.5)
HGB BLD-MCNC: 11.2 G/DL — LOW (ref 11.5–15.5)
MCHC RBC-ENTMCNC: 31 PG — SIGNIFICANT CHANGE UP (ref 27–34)
MCHC RBC-ENTMCNC: 31 PG — SIGNIFICANT CHANGE UP (ref 27–34)
MCHC RBC-ENTMCNC: 32.7 GM/DL — SIGNIFICANT CHANGE UP (ref 32–36)
MCHC RBC-ENTMCNC: 32.7 GM/DL — SIGNIFICANT CHANGE UP (ref 32–36)
MCV RBC AUTO: 95 FL — SIGNIFICANT CHANGE UP (ref 80–100)
MCV RBC AUTO: 95 FL — SIGNIFICANT CHANGE UP (ref 80–100)
PLATELET # BLD AUTO: 197 K/UL — SIGNIFICANT CHANGE UP (ref 150–400)
PLATELET # BLD AUTO: 197 K/UL — SIGNIFICANT CHANGE UP (ref 150–400)
POTASSIUM SERPL-MCNC: 3.4 MMOL/L — LOW (ref 3.5–5.3)
POTASSIUM SERPL-MCNC: 3.4 MMOL/L — LOW (ref 3.5–5.3)
POTASSIUM SERPL-SCNC: 3.4 MMOL/L — LOW (ref 3.5–5.3)
POTASSIUM SERPL-SCNC: 3.4 MMOL/L — LOW (ref 3.5–5.3)
RBC # BLD: 3.61 M/UL — LOW (ref 3.8–5.2)
RBC # BLD: 3.61 M/UL — LOW (ref 3.8–5.2)
RBC # FLD: 13 % — SIGNIFICANT CHANGE UP (ref 10.3–14.5)
RBC # FLD: 13 % — SIGNIFICANT CHANGE UP (ref 10.3–14.5)
SODIUM SERPL-SCNC: 139 MMOL/L — SIGNIFICANT CHANGE UP (ref 135–145)
SODIUM SERPL-SCNC: 139 MMOL/L — SIGNIFICANT CHANGE UP (ref 135–145)
WBC # BLD: 6.33 K/UL — SIGNIFICANT CHANGE UP (ref 3.8–10.5)
WBC # BLD: 6.33 K/UL — SIGNIFICANT CHANGE UP (ref 3.8–10.5)
WBC # FLD AUTO: 6.33 K/UL — SIGNIFICANT CHANGE UP (ref 3.8–10.5)
WBC # FLD AUTO: 6.33 K/UL — SIGNIFICANT CHANGE UP (ref 3.8–10.5)

## 2024-01-10 PROCEDURE — 99232 SBSQ HOSP IP/OBS MODERATE 35: CPT

## 2024-01-10 RX ADMIN — PIMAVANSERIN TARTRATE 34 MILLIGRAM(S): 10 TABLET, COATED ORAL at 15:41

## 2024-01-10 RX ADMIN — SODIUM CHLORIDE 75 MILLILITER(S): 9 INJECTION, SOLUTION INTRAVENOUS at 22:56

## 2024-01-10 RX ADMIN — ENOXAPARIN SODIUM 40 MILLIGRAM(S): 100 INJECTION SUBCUTANEOUS at 23:06

## 2024-01-10 RX ADMIN — SODIUM CHLORIDE 75 MILLILITER(S): 9 INJECTION, SOLUTION INTRAVENOUS at 11:18

## 2024-01-10 RX ADMIN — CEFTRIAXONE 1000 MILLIGRAM(S): 500 INJECTION, POWDER, FOR SOLUTION INTRAMUSCULAR; INTRAVENOUS at 23:04

## 2024-01-10 NOTE — GOALS OF CARE CONVERSATION - ADVANCED CARE PLANNING - CONVERSATION DETAILS
This SW met with pts daughter/HCP Latisha and pts other daughter. Pts daughter shared that the plan is for pt. to go back to [place]. They shared that [place] wanted clear directives of pts goals moving forward. We reviewed MOLST in full and pts daughters shared that at this point they would want pt. to come back tot  hospital if needed for things like antibiotics etc and continue discussions. Pts daughters shared that they do not think pt. would want a PEG but did not wish to put this in place on MOLST today as they just want to confirm with their brothers. Pt. not ready for comfort focus or hospice at this time but family is aware this is in option if pt. declines further. MOLST completed with DNR/DNI Trial of noninvasive ventilation. They did not wish to complete rest of the MOLST today as they wish to speak with their other siblings. Plan to return to [facility] upon d/c. Emotional support provided. Our team will continue to follow. This SW met with pts daughter/HCP Latisha and pts other daughter. Pts daughter shared that the plan is for pt. to go back to [place]. They shared that [place] wanted clear directives of pts goals moving forward. We reviewed MOLST in full and pts daughters shared that at this point they would want pt. to come back tot  hospital if needed for things like antibiotics etc and continue discussions. Pts daughters shared that they do not think pt. would want a PEG but did not wish to put this in place on MOLST today as they just want to confirm with their brothers. Pt. not ready for comfort focus or hospice at this time but family is aware this is in option if pt. declines further. MOLST completed with DNR/DNI Trial of noninvasive ventilation. They did not wish to complete rest of the MOLST today as they wish to speak with their other siblings. Plan to return to [place] [facility] upon d/c. Emotional support provided. Our team will continue to follow.

## 2024-01-10 NOTE — PROGRESS NOTE ADULT - ASSESSMENT
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    Acute metabolic encephalopathy due to Acute UTI  - improving  - cont antibiotics for 3 days total (today is last day)  - Follow cultures.    Elevated LFTs.  -Acute hepatitis panel.  -CMP in am.    Hypothyroidism.  -On Levothyroxine.    Dementia with Parkinson's psychosis.  - Continue her Aricept.  - On Nuplazid - family brought from home    Anxiety, depression and trigeminal neuralgia.  - On Duloxetine and Neurontin.    DVT prophylaxis: Lovenox sq daily.    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    Acute metabolic encephalopathy due to Acute UTI  - improving  - cont antibiotics for 3 days total (today is last day)  - Follow cultures.    Elevated LFTs.  -Acute hepatitis panel.  -CMP in am.    Hypothyroidism.  -On Levothyroxine.    Dementia with Parkinson's psychosis.  - Continue her Aricept.  - On Nuplazid - family brought from home    Anxiety, depression and trigeminal neuralgia.  - On Duloxetine and Neurontin.    DVT prophylaxis: Lovenox sq daily.    Dispo: family deciding on another ANSELMO

## 2024-01-10 NOTE — GOALS OF CARE CONVERSATION - ADVANCED CARE PLANNING - AGENT'S NAME
Pts daughter Latisha is HCP ( primary HCP Gene is  and he is )
HCP in [facility] [employer] naming Gene Yesy as primary ( pts  who is ) and Latisha Lopez daughter as alternate

## 2024-01-10 NOTE — PROGRESS NOTE ADULT - SUBJECTIVE AND OBJECTIVE BOX
Taj García MD  [facility]  Contact via Teams or text/call at [phone]    Patient is a 83y old  Female who presents with a chief complaint of Acute metabolic encephalopathy due to Acute UTI (09 Jan 2024 13:57)    Feels okay.  Seen sitting in chair at bedside.  Answers simple questions but unable to answer to complex answers.  Daughter in law at bedside.     Patient seen and examined at bedside. No overnight events reported.     ALLERGIES:  penicillins (Unknown)  latex (Unknown)  Cleocin HCl (Unknown)  sulfa drugs (Unknown)    MEDICATIONS  (STANDING):  calcium carbonate 1250 mG  + Vitamin D (OsCal 500 + D) 1 Tablet(s) Oral three times a day  cefTRIAXone Injectable. 1000 milliGRAM(s) IV Push every 24 hours  dextrose 5% + sodium chloride 0.45%. 1000 milliLiter(s) (75 mL/Hr) IV Continuous <Continuous>  dicyclomine 10 milliGRAM(s) Oral two times a day before meals  donepezil 10 milliGRAM(s) Oral at bedtime  DULoxetine 60 milliGRAM(s) Oral daily  enoxaparin Injectable 40 milliGRAM(s) SubCutaneous every 24 hours  gabapentin 400 milliGRAM(s) Oral two times a day  levothyroxine 25 MICROGram(s) Oral daily  multivitamin 1 Tablet(s) Oral daily  pantoprazole    Tablet 40 milliGRAM(s) Oral before breakfast  pimavanserin Capsule 34 milliGRAM(s) Oral daily    MEDICATIONS  (PRN):  acetaminophen     Tablet .. 650 milliGRAM(s) Oral every 6 hours PRN Temp greater or equal to 38C (100.4F), Mild Pain (1 - 3)  aluminum hydroxide/magnesium hydroxide/simethicone Suspension 30 milliLiter(s) Oral every 4 hours PRN Dyspepsia  melatonin 3 milliGRAM(s) Oral at bedtime PRN Insomnia  ondansetron Injectable 4 milliGRAM(s) IV Push every 8 hours PRN Nausea and/or Vomiting    Vital Signs Last 24 Hrs  T(F): 97.5 (10 Ulises 2024 10:15), Max: 97.8 (09 Jan 2024 20:27)  HR: 72 (10 Ulises 2024 10:15) (72 - 96)  BP: 124/57 (10 Ulises 2024 10:15) (124/57 - 142/54)  RR: 17 (10 Ulises 2024 10:15) (17 - 20)  SpO2: 95% (10 Ulises 2024 10:15) (93% - 98%)  I&O's Summary    PHYSICAL EXAM:  General: NAD, Alert but not oriented   ENT: No gross hearing impairment, Moist mucous membranes, no thrush  Neck: Supple, No JVD  Lungs: Clear to auscultation bilaterally, good air entry, non-labored breathing  Cardio: RRR, S1/S2, No murmur  Abdomen: Soft, Nontender, Nondistended; Bowel sounds present  Extremities: No calf tenderness, No cyanosis, No pitting edema  Psych: calm pleasant mood    LABS:                        11.2   6.33  )-----------( 197      ( 10 Ulises 2024 08:04 )             34.3     01-10    139  |  109  |  11  ----------------------------<  104  3.4   |  25  |  0.58    Ca    8.5      10 Ulises 2024 08:04    TPro  6.7  /  Alb  2.8  /  TBili  0.3  /  DBili  x   /  AST  68  /  ALT  95  /  AlkPhos  243  01-09    Urinalysis Basic - ( 10 Ulises 2024 08:04 )    Color: x / Appearance: x / SG: x / pH: x  Gluc: 104 mg/dL / Ketone: x  / Bili: x / Urobili: x   Blood: x / Protein: x / Nitrite: x   Leuk Esterase: x / RBC: x / WBC x   Sq Epi: x / Non Sq Epi: x / Bacteria: x        COVID-19 PCR: Albino (12-12-23 @ 09:10)    RADIOLOGY & ADDITIONAL TESTS:    Care Discussed with Consultants/Other Providers:    Taj García MD  [facility]  Contact via Teams or text/call at [phone]    Patient is a 83y old  Female who presents with a chief complaint of Acute metabolic encephalopathy due to Acute UTI (09 Jan 2024 13:57)    Feels okay.  Seen sitting in chair at bedside.  Answers simple questions but unable to answer to complex answers.  Daughter in law at bedside.     Patient seen and examined at bedside. No overnight events reported.     ALLERGIES:  penicillins (Unknown)  latex (Unknown)  Cleocin HCl (Unknown)  sulfa drugs (Unknown)    MEDICATIONS  (STANDING):  calcium carbonate 1250 mG  + Vitamin D (OsCal 500 + D) 1 Tablet(s) Oral three times a day  cefTRIAXone Injectable. 1000 milliGRAM(s) IV Push every 24 hours  dextrose 5% + sodium chloride 0.45%. 1000 milliLiter(s) (75 mL/Hr) IV Continuous <Continuous>  dicyclomine 10 milliGRAM(s) Oral two times a day before meals  donepezil 10 milliGRAM(s) Oral at bedtime  DULoxetine 60 milliGRAM(s) Oral daily  enoxaparin Injectable 40 milliGRAM(s) SubCutaneous every 24 hours  gabapentin 400 milliGRAM(s) Oral two times a day  levothyroxine 25 MICROGram(s) Oral daily  multivitamin 1 Tablet(s) Oral daily  pantoprazole    Tablet 40 milliGRAM(s) Oral before breakfast  pimavanserin Capsule 34 milliGRAM(s) Oral daily    MEDICATIONS  (PRN):  acetaminophen     Tablet .. 650 milliGRAM(s) Oral every 6 hours PRN Temp greater or equal to 38C (100.4F), Mild Pain (1 - 3)  aluminum hydroxide/magnesium hydroxide/simethicone Suspension 30 milliLiter(s) Oral every 4 hours PRN Dyspepsia  melatonin 3 milliGRAM(s) Oral at bedtime PRN Insomnia  ondansetron Injectable 4 milliGRAM(s) IV Push every 8 hours PRN Nausea and/or Vomiting    Vital Signs Last 24 Hrs  T(F): 97.5 (10 Ulises 2024 10:15), Max: 97.8 (09 Jan 2024 20:27)  HR: 72 (10 Ulises 2024 10:15) (72 - 96)  BP: 124/57 (10 Ulises 2024 10:15) (124/57 - 142/54)  RR: 17 (10 Ulises 2024 10:15) (17 - 20)  SpO2: 95% (10 Ulises 2024 10:15) (93% - 98%)  I&O's Summary    PHYSICAL EXAM:  General: NAD, Alert but not oriented   ENT: No gross hearing impairment, Moist mucous membranes, no thrush  Neck: Supple, No JVD  Lungs: Clear to auscultation bilaterally, good air entry, non-labored breathing  Cardio: RRR, S1/S2, No murmur  Abdomen: Soft, Nontender, Nondistended; Bowel sounds present  Extremities: No calf tenderness, No cyanosis, No pitting edema  Psych: calm pleasant mood    LABS:                        11.2   6.33  )-----------( 197      ( 10 Ulises 2024 08:04 )             34.3     01-10    139  |  109  |  11  ----------------------------<  104  3.4   |  25  |  0.58    Ca    8.5      10 Ulises 2024 08:04    TPro  6.7  /  Alb  2.8  /  TBili  0.3  /  DBili  x   /  AST  68  /  ALT  95  /  AlkPhos  243  01-09    Urinalysis Basic - ( 10 Ulises 2024 08:04 )    Color: x / Appearance: x / SG: x / pH: x  Gluc: 104 mg/dL / Ketone: x  / Bili: x / Urobili: x   Blood: x / Protein: x / Nitrite: x   Leuk Esterase: x / RBC: x / WBC x   Sq Epi: x / Non Sq Epi: x / Bacteria: x        COVID-19 PCR: [place] (12-12-23 @ 09:10)    RADIOLOGY & ADDITIONAL TESTS:    Care Discussed with Consultants/Other Providers:

## 2024-01-11 ENCOUNTER — TRANSCRIPTION ENCOUNTER (OUTPATIENT)
Age: 84
End: 2024-01-11

## 2024-01-11 PROCEDURE — 99232 SBSQ HOSP IP/OBS MODERATE 35: CPT

## 2024-01-11 RX ORDER — NYSTATIN CREAM 100000 [USP'U]/G
1 CREAM TOPICAL
Refills: 0 | DISCHARGE

## 2024-01-11 RX ORDER — ENOXAPARIN SODIUM 100 MG/ML
40 INJECTION SUBCUTANEOUS
Qty: 0 | Refills: 0 | DISCHARGE

## 2024-01-11 RX ORDER — ACETAMINOPHEN 500 MG
2 TABLET ORAL
Qty: 0 | Refills: 0 | DISCHARGE
Start: 2024-01-11

## 2024-01-11 RX ADMIN — ENOXAPARIN SODIUM 40 MILLIGRAM(S): 100 INJECTION SUBCUTANEOUS at 21:07

## 2024-01-11 NOTE — DISCHARGE NOTE NURSING/CASE MANAGEMENT/SOCIAL WORK - PATIENT PORTAL LINK FT
You can access the FollowMyHealth Patient Portal offered by [facility] by registering at the following website: [email or web address]. By joining [employer]’s FollowMyHealth portal, you will also be able to view your health information using other applications (apps) compatible with our system. You can access the FollowMyHealth Patient Portal offered by [facility] by registering at the following website: [email or web address]. By joining [facility] [facility] [employer]’s FollowMyHealth portal, you will also be able to view your health information using other applications (apps) compatible with our system.

## 2024-01-11 NOTE — DISCHARGE NOTE PROVIDER - NSDCMRMEDTOKEN_GEN_ALL_CORE_FT
acetaminophen 500 mg oral tablet: 1 tab(s) orally every 8 hours  Aricept 10 mg oral tablet: 1 tab(s) orally once a day (at bedtime)  ascorbic acid 500 mg oral tablet: 1 tab(s) orally 2 times a day  calcium-vitamin D 500 mg-5 mcg (200 intl units) oral tablet: 1 tab(s) orally 3 times a day  dicyclomine 10 mg oral capsule: 1 cap(s) orally 2 times a day  DULoxetine 60 mg oral delayed release capsule: 1 cap(s) orally once a day  levothyroxine 25 mcg (0.025 mg) oral tablet: 1 tab(s) orally once a day  Lovenox 40 mg/0.4 mL injectable solution: 40 milligram(s) subcutaneously once a day until fully ambulatory  Multiple Vitamins oral tablet: 1 tab(s) orally once a day  Neurontin 400 mg oral capsule: 1 cap(s) orally 2 times a day  Nuplazid 34 mg oral capsule: 1 cap(s) orally once a day  nystatin 100,000 units/g topical powder: Apply topically to affected area once a day as needed for  rash , apply under both Breastss  pantoprazole 40 mg oral delayed release tablet: 1 tab(s) orally once a day (before a meal)  polyethylene glycol 3350 oral powder for reconstitution: 17 gram(s) orally once a day  senna leaf extract oral tablet: 2 tab(s) orally once a day (at bedtime)   acetaminophen 325 mg oral tablet: 2 tab(s) orally every 6 hours As needed Temp greater or equal to 38C (100.4F), Mild Pain (1 - 3)  Aricept 10 mg oral tablet: 1 tab(s) orally once a day (at bedtime)  dicyclomine 10 mg oral capsule: 1 cap(s) orally 2 times a day  DULoxetine 60 mg oral delayed release capsule: 1 cap(s) orally once a day  levothyroxine 25 mcg (0.025 mg) oral tablet: 1 tab(s) orally once a day  Neurontin 400 mg oral capsule: 1 cap(s) orally 2 times a day  Nuplazid 34 mg oral capsule: 1 cap(s) orally once a day  pantoprazole 40 mg oral delayed release tablet: 1 tab(s) orally once a day (before a meal)  polyethylene glycol 3350 oral powder for reconstitution: 17 gram(s) orally once a day  senna leaf extract oral tablet: 2 tab(s) orally once a day (at bedtime)   acetaminophen 325 mg oral tablet: 2 tab(s) orally every 6 hours as needed for Temp greater or equal to 38C (100.4F), Mild Pain (1 - 3)  Aricept 10 mg oral tablet: 1 tab(s) orally once a day (at bedtime)  dicyclomine 10 mg oral capsule: 1 cap(s) orally 2 times a day  DULoxetine 60 mg oral delayed release capsule: 1 cap(s) orally once a day  levothyroxine 25 mcg (0.025 mg) oral tablet: 1 tab(s) orally once a day  Neurontin 400 mg oral capsule: 1 cap(s) orally 2 times a day  Nuplazid 34 mg oral capsule: 1 cap(s) orally once a day  pantoprazole 40 mg oral delayed release tablet: 1 tab(s) orally once a day (before a meal)  polyethylene glycol 3350 oral powder for reconstitution: 17 gram(s) orally once a day  senna leaf extract oral tablet: 2 tab(s) orally once a day (at bedtime)   acetaminophen 325 mg oral tablet: 2 tab(s) orally every 6 hours as needed for Temp greater or equal to 38C (100.4F), Mild Pain (1 - 3)  Aricept 10 mg oral tablet: 1 tab(s) orally once a day (at bedtime)  dicyclomine 10 mg oral capsule: 1 cap(s) orally 2 times a day  DULoxetine 60 mg oral delayed release capsule: 1 cap(s) orally once a day  levothyroxine 25 mcg (0.025 mg) oral tablet: 1 tab(s) orally once a day  Neurontin 400 mg oral capsule: 1 cap(s) orally 2 times a day  Nuplazid 34 mg oral capsule: 1 cap(s) orally once a day  pantoprazole 40 mg oral delayed release tablet: 1 tab(s) orally once a day (before a meal)  senna leaf extract oral tablet: 2 tab(s) orally once a day (at bedtime)

## 2024-01-11 NOTE — DIETITIAN INITIAL EVALUATION ADULT - ETIOLOGY
r/t decreased ability to meet increased nutrient needs 2/2 advanced age w/ dementia and Parkinson Disease

## 2024-01-11 NOTE — DISCHARGE NOTE NURSING/CASE MANAGEMENT/SOCIAL WORK - NSDCPEFALRISK_GEN_ALL_CORE
For information on Fall & Injury Prevention, visit: [email or web address] OR  [email or web address] OR  https://www.cdc.gov/steadi/patient.html For information on Fall & Injury Prevention, visit: [email or web address] OR  [email or web address] OR  https://www.cdc.gov/steadi/patient.html

## 2024-01-11 NOTE — DIETITIAN INITIAL EVALUATION ADULT - OTHER CALCULATIONS
Subjective:  [place] is here for followup after bilateral endoscopic carpal tunnel surgery.  He is approximately 3 and half weeks out from the right and 10 days from the left.  The patient notes early signs of improvement of preoperative symptoms. The patient denies any problems.    Objective:       General :    Appears well and cooperative   Sutures:   Removed in the office today from the left   Incision:  Healing well with no significant erythema, drainage, or induration   Tenderness:  Minimal   Motor Function:  Intact in median nerve distribution       Assessment:    Status post right endoscopic carpal tunnel surgery. Doing well postoperatively.      Plan:    Patient will resume activities as tolerated without restrictions.  Patient was given handouts for nerve glides and scar message.  All questions were answered.  He will call with further questions or concerns.  We will see him back for follow up as needed.         
Based on bed scale wt obtained by RD on 1/11

## 2024-01-11 NOTE — DISCHARGE NOTE PROVIDER - DETAILS OF MALNUTRITION DIAGNOSIS/DIAGNOSES
This patient has been assessed with a concern for Malnutrition and was treated during this hospitalization for the following Nutrition diagnosis/diagnoses:     -  01/11/2024: Moderate protein-calorie malnutrition

## 2024-01-11 NOTE — DIETITIAN INITIAL EVALUATION ADULT - PERTINENT LABORATORY DATA
01-10    139  |  109<H>  |  11  ----------------------------<  104<H>  3.4<L>   |  25  |  0.58    Ca    8.5      10 Ulises 2024 08:04

## 2024-01-11 NOTE — PROGRESS NOTE ADULT - SUBJECTIVE AND OBJECTIVE BOX
Taj García MD  [facility]  Contact via Teams or text/call at [phone]    Patient is a 83y old  Female who presents with a chief complaint of Acute metabolic encephalopathy due to Acute UTI (10 Ulises 2024 13:44)    Improved mentation, appears calm but confused    Patient seen and examined at bedside. No overnight events reported.     ALLERGIES:  penicillins (Unknown)  latex (Unknown)  Cleocin HCl (Unknown)  sulfa drugs (Unknown)    MEDICATIONS  (STANDING):  calcium carbonate 1250 mG  + Vitamin D (OsCal 500 + D) 1 Tablet(s) Oral three times a day  cefTRIAXone Injectable. 1000 milliGRAM(s) IV Push every 24 hours  dextrose 5% + sodium chloride 0.45%. 1000 milliLiter(s) (75 mL/Hr) IV Continuous <Continuous>  dicyclomine 10 milliGRAM(s) Oral two times a day before meals  donepezil 10 milliGRAM(s) Oral at bedtime  DULoxetine 60 milliGRAM(s) Oral daily  enoxaparin Injectable 40 milliGRAM(s) SubCutaneous every 24 hours  gabapentin 400 milliGRAM(s) Oral two times a day  levothyroxine 25 MICROGram(s) Oral daily  multivitamin 1 Tablet(s) Oral daily  pantoprazole    Tablet 40 milliGRAM(s) Oral before breakfast  pimavanserin Capsule 34 milliGRAM(s) Oral daily    MEDICATIONS  (PRN):  acetaminophen     Tablet .. 650 milliGRAM(s) Oral every 6 hours PRN Temp greater or equal to 38C (100.4F), Mild Pain (1 - 3)  aluminum hydroxide/magnesium hydroxide/simethicone Suspension 30 milliLiter(s) Oral every 4 hours PRN Dyspepsia  melatonin 3 milliGRAM(s) Oral at bedtime PRN Insomnia  ondansetron Injectable 4 milliGRAM(s) IV Push every 8 hours PRN Nausea and/or Vomiting    Vital Signs Last 24 Hrs  T(F): 98.9 (10 Ulises 2024 21:55), Max: 98.9 (10 Ulises 2024 21:55)  HR: 98 (10 Ulises 2024 21:55) (72 - 98)  BP: 147/60 (10 Ulises 2024 21:55) (124/57 - 147/60)  RR: 18 (10 Ulises 2024 21:55) (17 - 18)  SpO2: 96% (10 Ulises 2024 21:55) (94% - 96%)  I&O's Summary    PHYSICAL EXAM:  General: NAD, Alert but confused  ENT: No gross hearing impairment, Moist mucous membranes, no thrush  Neck: Supple, No JVD  Lungs: Clear to auscultation bilaterally, good air entry, non-labored breathing  Cardio: RRR, S1/S2, No murmur  Abdomen: Soft, Nontender, Nondistended; Bowel sounds present  Extremities: No calf tenderness, No cyanosis, No pitting edema  Psych: calm    LABS:                        11.2   6.33  )-----------( 197      ( 10 Ulises 2024 08:04 )             34.3     01-10    139  |  109  |  11  ----------------------------<  104  3.4   |  25  |  0.58    Ca    8.5      10 Ulises 2024 08:04    TPro  6.7  /  Alb  2.8  /  TBili  0.3  /  DBili  x   /  AST  68  /  ALT  95  /  AlkPhos  243  01-09    Urinalysis Basic - ( 10 Ulises 2024 08:04 )    Color: x / Appearance: x / SG: x / pH: x  Gluc: 104 mg/dL / Ketone: x  / Bili: x / Urobili: x   Blood: x / Protein: x / Nitrite: x   Leuk Esterase: x / RBC: x / WBC x   Sq Epi: x / Non Sq Epi: x / Bacteria: x    Culture - Urine (collected 08 Jan 2024 19:00)  Source: Clean Catch None  Preliminary Report (10 Ulises 2024 13:48):    >100,000 CFU/ml Escherichia coli    <10,000 CFU/ml Normal Urogenital sherry present      COVID-19 PCR: NotDetec (12-12-23 @ 09:10)    RADIOLOGY & ADDITIONAL TESTS:    Care Discussed with Consultants/Other Providers:    Taj García MD  [facility]  Contact via Teams or text/call at [phone]    Patient is a 83y old  Female who presents with a chief complaint of Acute metabolic encephalopathy due to Acute UTI (10 Ulises 2024 13:44)    Improved mentation, appears calm but confused    Patient seen and examined at bedside. No overnight events reported.     ALLERGIES:  penicillins (Unknown)  latex (Unknown)  Cleocin HCl (Unknown)  sulfa drugs (Unknown)    MEDICATIONS  (STANDING):  calcium carbonate 1250 mG  + Vitamin D (OsCal 500 + D) 1 Tablet(s) Oral three times a day  cefTRIAXone Injectable. 1000 milliGRAM(s) IV Push every 24 hours  dextrose 5% + sodium chloride 0.45%. 1000 milliLiter(s) (75 mL/Hr) IV Continuous <Continuous>  dicyclomine 10 milliGRAM(s) Oral two times a day before meals  donepezil 10 milliGRAM(s) Oral at bedtime  DULoxetine 60 milliGRAM(s) Oral daily  enoxaparin Injectable 40 milliGRAM(s) SubCutaneous every 24 hours  gabapentin 400 milliGRAM(s) Oral two times a day  levothyroxine 25 MICROGram(s) Oral daily  multivitamin 1 Tablet(s) Oral daily  pantoprazole    Tablet 40 milliGRAM(s) Oral before breakfast  pimavanserin Capsule 34 milliGRAM(s) Oral daily    MEDICATIONS  (PRN):  acetaminophen     Tablet .. 650 milliGRAM(s) Oral every 6 hours PRN Temp greater or equal to 38C (100.4F), Mild Pain (1 - 3)  aluminum hydroxide/magnesium hydroxide/simethicone Suspension 30 milliLiter(s) Oral every 4 hours PRN Dyspepsia  melatonin 3 milliGRAM(s) Oral at bedtime PRN Insomnia  ondansetron Injectable 4 milliGRAM(s) IV Push every 8 hours PRN Nausea and/or Vomiting    Vital Signs Last 24 Hrs  T(F): 98.9 (10 Ulises 2024 21:55), Max: 98.9 (10 Ulises 2024 21:55)  HR: 98 (10 Ulises 2024 21:55) (72 - 98)  BP: 147/60 (10 Ulises 2024 21:55) (124/57 - 147/60)  RR: 18 (10 Ulises 2024 21:55) (17 - 18)  SpO2: 96% (10 Ulises 2024 21:55) (94% - 96%)  I&O's Summary    PHYSICAL EXAM:  General: NAD, Alert but confused  ENT: No gross hearing impairment, Moist mucous membranes, no thrush  Neck: Supple, No JVD  Lungs: Clear to auscultation bilaterally, good air entry, non-labored breathing  Cardio: RRR, S1/S2, No murmur  Abdomen: Soft, Nontender, Nondistended; Bowel sounds present  Extremities: No calf tenderness, No cyanosis, No pitting edema  Psych: calm    LABS:                        11.2   6.33  )-----------( 197      ( 10 Ulises 2024 08:04 )             34.3     01-10    139  |  109  |  11  ----------------------------<  104  3.4   |  25  |  0.58    Ca    8.5      10 Ulises 2024 08:04    TPro  6.7  /  Alb  2.8  /  TBili  0.3  /  DBili  x   /  AST  68  /  ALT  95  /  AlkPhos  243  01-09    Urinalysis Basic - ( 10 Ulises 2024 08:04 )    Color: x / Appearance: x / SG: x / pH: x  Gluc: 104 mg/dL / Ketone: x  / Bili: x / Urobili: x   Blood: x / Protein: x / Nitrite: x   Leuk Esterase: x / RBC: x / WBC x   Sq Epi: x / Non Sq Epi: x / Bacteria: x    Culture - Urine (collected 08 Jan 2024 19:00)  Source: Clean Catch None  Preliminary Report (10 Ulises 2024 13:48):    >100,000 CFU/ml Escherichia coli    <10,000 CFU/ml Normal Urogenital sherry present      COVID-19 PCR: NotDetec (12-12-23 @ 09:10)    RADIOLOGY & ADDITIONAL TESTS:    Care Discussed with Consultants/Other Providers:

## 2024-01-11 NOTE — DISCHARGE NOTE PROVIDER - NSDCCPCAREPLAN_GEN_ALL_CORE_FT
PRINCIPAL DISCHARGE DIAGNOSIS  Diagnosis: Acute UTI  Assessment and Plan of Treatment: You were admitted for altered mental status and found to have a urinary tract infection.  You were started on IV antibiotics with improvement of your mental status.   You completed your antibiotics in the hospital.    There are no changes to your home medications.  You will need to follow up with your primary care physician.

## 2024-01-11 NOTE — PROVIDER CONTACT NOTE (OTHER) - SITUATION
patient refusing calcium carbonate with vitamin D, as well as synthroid this AM   reeducated and patient still refuses
patient refused oral medications despite reeducation

## 2024-01-11 NOTE — CHART NOTE - NSCHARTNOTEFT_GEN_A_CORE
Patient requires a patient lift for transfer between bed and chair/wheelchair/commode without the use of a lift, the patient would be bed confined.              Patient will require a wheelchair and associated accessories (gel cushion and high back cushion) due to non-weightbearing on right leg secondary to ankle fracture.    Patient has a mobility limitation that significantly impairs the pts ability to participate in one or more MRADLs such as toileting, eating, dressing, and bathing in customary locations in the home. The patients home provides adequate access between rooms for the use of the manual wheelchair. The wheelchair will significantly improve the patients ability to participate in MRADLs and will be used on a regular basis at home. The pts mobility limitation cannot be resolved by the use of a walker or cane. The pt has sufficient upper extremity function to safely propel the manual wheelchair that is provided in the home. Patient requires a patient lift for transfer between bed and chair/wheelchair/commode without the use of a lift, the patient would be bed confined.              Patient will require a high back wheelchair and associated accessories (gel cushion and high back cushion) due to non-weightbearing on right leg secondary to ankle fracture making patient a high risk for development of a pressure injury and unable to perform functional weight shift.    Patient has a mobility limitation that significantly impairs the pts ability to participate in one or more MRADLs such as toileting, eating, dressing, and bathing in customary locations in the home. The patients home provides adequate access between rooms for the use of the manual wheelchair. The wheelchair will significantly improve the patients ability to participate in MRADLs and will be used on a regular basis at home. The pts mobility limitation cannot be resolved by the use of a walker or cane. The pt has sufficient upper extremity function to safely propel the manual wheelchair that is provided in the home. DASH Diet

## 2024-01-11 NOTE — DIETITIAN INITIAL EVALUATION ADULT - OTHER INFO
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 Per sister, patient had previously resided at [place] where she was reportedly doing well. She recently fell out of bed and sustained a foot fracture, for which she was transferred to [facility] for subacute rehab and has stopped eating or hydrating, and was referred to the ED because [facility] staff were unable to provide adequate care for her needs. Sister believes failure to thrive may be patient's stubbornness manifesting rather than a physical issue. During my evaluation, she was calm and very sleepy.  She was able to answer very simple question. Seems to be slightly confused. Admit for UTI and dementia.     Known to nutr services w/ dx severe PCM on 6/12/21. Upon RD visit this morning, unable to obtain wt hx and relevant information 2/2 pt sleeping and unarousable w/ hx dementia noted. Currently on DASH diet, observed breakfast tray of pancakes and yogurt untouched as pt was sleeping, likely consuming <50% ENN. RD obtained bed scale wt 132# on 1/11, appears accurate w/ 2+ edema doc'd 1/10 - likely skewing wt and masking potential wt loss. Last wt hx 120# on 6/12/21 as per previous RD note. Wt gain 12# / 10% x ~2.5 yr - not clin sig and wt gain considered appropriate. Appears thin and frail w/ NFPE revealing moderate muscle/fat wasting. Recommend to liberalize diet to regular to maximize PO intake and food preferences. Will add ensure plus high protein BID to optimize PO intake (provides 350 kcal, 20g protein/ shake). As per paper chart, was on bowel regimen at [facility], continue to monitor bowel movements, if no BM for >3 days, consider implementing bowel regimen. DNR/DNI noted - confirm goals of care regarding nutrition support. See below for other recs.  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 Per sister, patient had previously resided at [place] where she was reportedly doing well. She recently fell out of bed and sustained a foot fracture, for which she was transferred to [facility] for subacute rehab and has stopped eating or hydrating, and was referred to the ED because [facility] staff were unable to provide adequate care for her needs. Sister believes failure to thrive may be patient's stubbornness manifesting rather than a physical issue. During my evaluation, she was calm and very sleepy.  She was able to answer very simple question. Seems to be slightly confused. Admit for UTI and dementia.     Known to nutr services w/ dx severe PCM on 6/12/21. Upon RD visit this morning, unable to obtain wt hx and relevant information 2/2 pt sleeping and unarousable w/ hx dementia noted. Currently on DASH diet, observed breakfast tray of pancakes and yogurt untouched as pt was sleeping, likely consuming <50% ENN. RD obtained bed scale wt 132# on 1/11, appears accurate w/ 2+ edema doc'd 1/10 - likely skewing wt and masking potential wt loss. Last wt hx 120# on 6/12/21 as per previous RD note. Wt gain 12# / 10% x ~2.5 yr - not clin sig and wt gain considered appropriate. Appears thin and frail w/ NFPE revealing moderate muscle/fat wasting. Recommend to liberalize diet to regular to maximize PO intake and food preferences. Will add ensure plus high protein BID to optimize PO intake (provides 350 kcal, 20g protein/ shake). As per paper chart, was on bowel regimen at [facility], continue to monitor bowel movements, if no BM for >3 days, consider implementing bowel regimen. DNR/DNI noted - confirm goals of care regarding nutrition support. See below for other recs.

## 2024-01-11 NOTE — DIETITIAN INITIAL EVALUATION ADULT - NSFNSGIASSESSMENTFT_GEN_A_CORE
Last Bowel Movement: 09-Jan-2024 (01-10-24 @ 21:00)  Last Bowel Movement: 09-Jan-2024 (01-10-24 @ 08:33)  Last Bowel Movement: 09-Jan-2024 (01-10-24 @ 04:11)  *Not on bowel regimen

## 2024-01-11 NOTE — DISCHARGE NOTE PROVIDER - NS AS DC PROVIDER CONTACT Y/N MULTI
Scheduling calling to try and fit Gio into the schedule.      Instructed that Cora has an appointment in the morning.  She was going to place him on her schedule if he was agreeable.  
Yes

## 2024-01-11 NOTE — DISCHARGE NOTE PROVIDER - HOSPITAL COURSE
Hospital Course  HPI:  Chief Complaint: see chief complaint quote.    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 Per sister, patient had previously resided at [place] where she was reportedly doing well. She recently fell out of bed and sustained a foot fracture, for which she was transferred to [facility] for subacute rehab and has stopped eating or hydrating, and was referred to the ED because [facility] staff were unable to provide adequate care for her needs. Sister believes failure to thrive may be patient's stubbornness manifesting rather than a physical issue. During my evaluation, she was calm and very sleepy.  She was able to answer very simple question. Seems to be slightly confused.  Denies chest pain, sob, N/V, abdominal pain, diarrhea or dysuria.  Sig labs: CBC and CMP wnl except abnormal LFTs: AST 86, , .    UA positive for UTI.    CT head: stable ventriculomegaly.  CXR: negative for any acute process. 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    NS 1 L bolus and Rocephin 1 gm IV given in ED.    (09 Jan 2024 02:12)    You were admitted for altered mental status and found to have a urinary tract infection.    You were started on IV antibiotics with improvement of your mental status.     You completed your antibiotics in the hospital.      There are no changes to your home medications.    You will need to follow up with your primary care physician.    Discharging Provider:  Taj García MD  Contact Info: [phone] - Please call with any questions or concerns.       Hospital Course  HPI:  Chief Complaint: see chief complaint quote.    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 Per sister, patient had previously resided at [place] where she was reportedly doing well. She recently fell out of bed and sustained a foot fracture, for which she was transferred to [facility] for subacute rehab and has stopped eating or hydrating, and was referred to the ED because [facility] staff were unable to provide adequate care for her needs. Sister believes failure to thrive may be patient's stubbornness manifesting rather than a physical issue. During my evaluation, she was calm and very sleepy.  She was able to answer very simple question. Seems to be slightly confused.  Denies chest pain, sob, N/V, abdominal pain, diarrhea or dysuria.  Sig labs: CBC and CMP wnl except abnormal LFTs: AST 86, , .    UA positive for UTI.    CT head: stable ventriculomegaly.  CXR: negative for any acute process. 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    NS 1 L bolus and Rocephin 1 gm IV given in ED.    (09 Jan 2024 02:12)    You were admitted for altered mental status and found to have a urinary tract infection.    You were started on IV antibiotics with improvement of your mental status.     You completed your antibiotics in the hospital.      There are no changes to your home medications.    You will need to follow up with your primary care physician.    Discharging Provider:  Taj García MD  Contact Info: [phone] - Please call with any questions or concerns.

## 2024-01-11 NOTE — DIETITIAN INITIAL EVALUATION ADULT - ADD RECOMMEND
1. Recommend to liberalize diet to regular to maximize PO intake and encourage high-kcal, protein-rich foods, maximize food preferences   2. Add ensure plus high protein BID to optimize PO intake (provides 350 kcal, 20g protein/ shake)   3. Consider obtaining vitamin D 25OH level to assess nutriture   4. Monitor bowel movements, if no BM for >3 days, consider implementing bowel regimen.   5. Consider adding thiamine 100 mg daily 2/2 poor PO intake/ malnutrition and MVI w/ minerals daily to ensure 100% RDA met   6. Confirm goals of care regarding nutrition support   7. Consider adding appetite stimulant such as Remeron or Marinol 2/2 likely chronically poor appetite/ PO intake   RD will continue to monitor PO intake, labs, hydration, and wt prn.

## 2024-01-11 NOTE — DIETITIAN INITIAL EVALUATION ADULT - PERTINENT MEDS FT
MEDICATIONS  (STANDING):  calcium carbonate 1250 mG  + Vitamin D (OsCal 500 + D) 1 Tablet(s) Oral three times a day  dextrose 5% + sodium chloride 0.45%. 1000 milliLiter(s) (75 mL/Hr) IV Continuous <Continuous>  dicyclomine 10 milliGRAM(s) Oral two times a day before meals  donepezil 10 milliGRAM(s) Oral at bedtime  DULoxetine 60 milliGRAM(s) Oral daily  enoxaparin Injectable 40 milliGRAM(s) SubCutaneous every 24 hours  gabapentin 400 milliGRAM(s) Oral two times a day  levothyroxine 25 MICROGram(s) Oral daily  multivitamin 1 Tablet(s) Oral daily  pantoprazole    Tablet 40 milliGRAM(s) Oral before breakfast  pimavanserin Capsule 34 milliGRAM(s) Oral daily    MEDICATIONS  (PRN):  acetaminophen     Tablet .. 650 milliGRAM(s) Oral every 6 hours PRN Temp greater or equal to 38C (100.4F), Mild Pain (1 - 3)  aluminum hydroxide/magnesium hydroxide/simethicone Suspension 30 milliLiter(s) Oral every 4 hours PRN Dyspepsia  melatonin 3 milliGRAM(s) Oral at bedtime PRN Insomnia  ondansetron Injectable 4 milliGRAM(s) IV Push every 8 hours PRN Nausea and/or Vomiting

## 2024-01-11 NOTE — DISCHARGE NOTE NURSING/CASE MANAGEMENT/SOCIAL WORK - NSDCVIVACCINE_GEN_ALL_CORE_FT
Tdap; 09-Dec-2023 09:29; Hafsa Hughes (RN); [employer]; [id number] (Exp. Date: 23-Nov-2025); IntraMuscular; Deltoid Right.; 0.5 milliLiter(s); VIS (VIS Published: 09-May-2013, VIS Presented: 09-Dec-2023);    Tdap; 09-Dec-2023 09:29; Hafsa Hughes (RN); [employer]; [id number] (Exp. Date: 23-Nov-2025); IntraMuscular; Deltoid Right.; 0.5 milliLiter(s); VIS (VIS Published: 09-May-2013, VIS Presented: 09-Dec-2023);

## 2024-01-11 NOTE — PROGRESS NOTE ADULT - ASSESSMENT
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    Acute metabolic encephalopathy due to Acute UTI  - near baseline mentation  - completed antibiotic course     Elevated LFTs.  -Acute hepatitis panel.  -CMP in am.    Hypothyroidism.  -On Levothyroxine.    Dementia with Parkinson's psychosis.  - Continue her Aricept.  - On Nuplazid - family brought from home    Anxiety, depression and trigeminal neuralgia.  - On Duloxetine and Neurontin.    DVT prophylaxis: Lovenox sq daily.    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    Acute metabolic encephalopathy due to Acute UTI  - near baseline mentation  - completed antibiotic course     Elevated LFTs.  -Acute hepatitis panel.  -CMP in am.    Hypothyroidism.  -On Levothyroxine.    Dementia with Parkinson's psychosis.  - Continue her Aricept.  - On Nuplazid - family brought from home    Anxiety, depression and trigeminal neuralgia.  - On Duloxetine and Neurontin.    DVT prophylaxis: Lovenox sq daily.    Dispo: ANSELMO

## 2024-01-11 NOTE — DISCHARGE NOTE PROVIDER - NSCORESITESY/N_GEN_A_CORE_RD
Yes
Please see your doctor in next few days. Take Tylenol and Ibuprofen for pain, drink lots of fluids and take antibiotics as prescribed. Return to ED if you develop fever, vomiting, worsening pain, trouble urinating, numbness/weakness in your legs or any other concerning symptom.

## 2024-01-12 VITALS
HEART RATE: 81 BPM | TEMPERATURE: 98 F | DIASTOLIC BLOOD PRESSURE: 52 MMHG | RESPIRATION RATE: 17 BRPM | SYSTOLIC BLOOD PRESSURE: 127 MMHG | OXYGEN SATURATION: 98 %

## 2024-01-12 LAB
-  AMOXICILLIN/CLAVULANIC ACID: SIGNIFICANT CHANGE UP
-  AMOXICILLIN/CLAVULANIC ACID: SIGNIFICANT CHANGE UP
-  AMPICILLIN/SULBACTAM: SIGNIFICANT CHANGE UP
-  AMPICILLIN/SULBACTAM: SIGNIFICANT CHANGE UP
-  AMPICILLIN: SIGNIFICANT CHANGE UP
-  AMPICILLIN: SIGNIFICANT CHANGE UP
-  AZTREONAM: SIGNIFICANT CHANGE UP
-  AZTREONAM: SIGNIFICANT CHANGE UP
-  CEFAZOLIN: SIGNIFICANT CHANGE UP
-  CEFAZOLIN: SIGNIFICANT CHANGE UP
-  CEFEPIME: SIGNIFICANT CHANGE UP
-  CEFEPIME: SIGNIFICANT CHANGE UP
-  CEFOXITIN: SIGNIFICANT CHANGE UP
-  CEFOXITIN: SIGNIFICANT CHANGE UP
-  CEFTRIAXONE: SIGNIFICANT CHANGE UP
-  CEFTRIAXONE: SIGNIFICANT CHANGE UP
-  CEFUROXIME: SIGNIFICANT CHANGE UP
-  CEFUROXIME: SIGNIFICANT CHANGE UP
-  CIPROFLOXACIN: SIGNIFICANT CHANGE UP
-  CIPROFLOXACIN: SIGNIFICANT CHANGE UP
-  ERTAPENEM: SIGNIFICANT CHANGE UP
-  ERTAPENEM: SIGNIFICANT CHANGE UP
-  GENTAMICIN: SIGNIFICANT CHANGE UP
-  GENTAMICIN: SIGNIFICANT CHANGE UP
-  IMIPENEM: SIGNIFICANT CHANGE UP
-  IMIPENEM: SIGNIFICANT CHANGE UP
-  LEVOFLOXACIN: SIGNIFICANT CHANGE UP
-  LEVOFLOXACIN: SIGNIFICANT CHANGE UP
-  MEROPENEM: SIGNIFICANT CHANGE UP
-  MEROPENEM: SIGNIFICANT CHANGE UP
-  NITROFURANTOIN: SIGNIFICANT CHANGE UP
-  NITROFURANTOIN: SIGNIFICANT CHANGE UP
-  PIPERACILLIN/TAZOBACTAM: SIGNIFICANT CHANGE UP
-  PIPERACILLIN/TAZOBACTAM: SIGNIFICANT CHANGE UP
-  TOBRAMYCIN: SIGNIFICANT CHANGE UP
-  TOBRAMYCIN: SIGNIFICANT CHANGE UP
-  TRIMETHOPRIM/SULFAMETHOXAZOLE: SIGNIFICANT CHANGE UP
-  TRIMETHOPRIM/SULFAMETHOXAZOLE: SIGNIFICANT CHANGE UP
CULTURE RESULTS: ABNORMAL
CULTURE RESULTS: ABNORMAL
METHOD TYPE: SIGNIFICANT CHANGE UP
METHOD TYPE: SIGNIFICANT CHANGE UP
ORGANISM # SPEC MICROSCOPIC CNT: ABNORMAL
ORGANISM # SPEC MICROSCOPIC CNT: ABNORMAL
ORGANISM # SPEC MICROSCOPIC CNT: SIGNIFICANT CHANGE UP
ORGANISM # SPEC MICROSCOPIC CNT: SIGNIFICANT CHANGE UP
SPECIMEN SOURCE: SIGNIFICANT CHANGE UP
SPECIMEN SOURCE: SIGNIFICANT CHANGE UP

## 2024-01-12 PROCEDURE — 99239 HOSP IP/OBS DSCHRG MGMT >30: CPT

## 2024-01-12 RX ORDER — POLYETHYLENE GLYCOL 3350 17 G/17G
17 POWDER, FOR SOLUTION ORAL
Qty: 510 | Refills: 0
Start: 2024-01-12 | End: 2024-02-10

## 2024-01-12 RX ORDER — PIMAVANSERIN TARTRATE 10 MG/1
1 TABLET, COATED ORAL
Qty: 30 | Refills: 0
Start: 2024-01-12 | End: 2024-02-10

## 2024-01-12 RX ORDER — PIMAVANSERIN TARTRATE 10 MG/1
1 TABLET, COATED ORAL
Refills: 0 | DISCHARGE

## 2024-01-12 RX ORDER — DULOXETINE HYDROCHLORIDE 30 MG/1
1 CAPSULE, DELAYED RELEASE ORAL
Qty: 30 | Refills: 0
Start: 2024-01-12 | End: 2024-02-10

## 2024-01-12 RX ORDER — DONEPEZIL HYDROCHLORIDE 10 MG/1
1 TABLET, FILM COATED ORAL
Qty: 30 | Refills: 0
Start: 2024-01-12 | End: 2024-02-10

## 2024-01-12 RX ORDER — ACETAMINOPHEN 500 MG
2 TABLET ORAL
Qty: 240 | Refills: 0
Start: 2024-01-12 | End: 2024-02-10

## 2024-01-12 RX ORDER — SENNA PLUS 8.6 MG/1
2 TABLET ORAL
Qty: 60 | Refills: 0
Start: 2024-01-12 | End: 2024-02-10

## 2024-01-12 RX ORDER — DONEPEZIL HYDROCHLORIDE 10 MG/1
1 TABLET, FILM COATED ORAL
Qty: 0 | Refills: 0 | DISCHARGE

## 2024-01-12 RX ORDER — LEVOTHYROXINE SODIUM 125 MCG
1 TABLET ORAL
Refills: 0 | DISCHARGE

## 2024-01-12 RX ORDER — LEVOTHYROXINE SODIUM 125 MCG
1 TABLET ORAL
Qty: 30 | Refills: 0
Start: 2024-01-12 | End: 2024-02-10

## 2024-01-12 RX ORDER — GABAPENTIN 400 MG/1
1 CAPSULE ORAL
Qty: 60 | Refills: 0
Start: 2024-01-12 | End: 2024-02-10

## 2024-01-12 RX ORDER — PANTOPRAZOLE SODIUM 20 MG/1
1 TABLET, DELAYED RELEASE ORAL
Qty: 30 | Refills: 0
Start: 2024-01-12 | End: 2024-02-10

## 2024-01-12 RX ORDER — GABAPENTIN 400 MG/1
1 CAPSULE ORAL
Refills: 0 | DISCHARGE

## 2024-01-12 RX ORDER — DULOXETINE HYDROCHLORIDE 30 MG/1
1 CAPSULE, DELAYED RELEASE ORAL
Refills: 0 | DISCHARGE

## 2024-01-12 RX ADMIN — SODIUM CHLORIDE 75 MILLILITER(S): 9 INJECTION, SOLUTION INTRAVENOUS at 03:23

## 2024-01-12 NOTE — PROGRESS NOTE ADULT - REASON FOR ADMISSION
Acute metabolic encephalopathy due to Acute UTI

## 2024-01-12 NOTE — PATIENT PROFILE ADULT - FALL HARM RISK - HARM RISK INTERVENTIONS
Assistance with ambulation/Assistance OOB with selected safe patient handling equipment/Communicate Risk of Fall with Harm to all staff/Discuss with provider need for PT consult/Monitor gait and stability/Reinforce activity limits and safety measures with patient and family/Review medications for side effects contributing to fall risk/Sit up slowly, dangle for a short time, stand at bedside before walking/Tailored Fall Risk Interventions/Toileting schedule using arm’s reach rule for commode and bathroom/Visual Cue: Yellow wristband and red socks/Bed in lowest position, wheels locked, appropriate side rails in place/Call bell, personal items and telephone in reach/Instruct patient to call for assistance before getting out of bed or chair/Non-slip footwear when patient is out of bed/[place] to call system/Physically safe environment - no spills, clutter or unnecessary equipment/Purposeful Proactive Rounding/Room/bathroom lighting operational, light cord in reach Assistance with ambulation/Assistance OOB with selected safe patient handling equipment/Communicate Risk of Fall with Harm to all staff/Discuss with provider need for PT consult/Monitor gait and stability/Reinforce activity limits and safety measures with patient and family/Review medications for side effects contributing to fall risk/Sit up slowly, dangle for a short time, stand at bedside before walking/Tailored Fall Risk Interventions/Toileting schedule using arm’s reach rule for commode and bathroom/Visual Cue: Yellow wristband and red socks/Bed in lowest position, wheels locked, appropriate side rails in place/Call bell, personal items and telephone in reach/Instruct patient to call for assistance before getting out of bed or chair/Non-slip footwear when patient is out of bed/[place] to call system/Physically safe environment - no spills, clutter or unnecessary equipment/Purposeful Proactive Rounding/Room/bathroom lighting operational, light cord in reach

## 2024-01-12 NOTE — PATIENT PROFILE ADULT - ARE SIGNIFICANT INDICATORS COMPLETE.
Problem: Alcohol Withdrawal Management  Goal: # No alcohol-related delirium or seizures  10/11/2020 1739 by Lenny Reese RN  Outcome: Outcome Met, Continue evaluating goal progress toward completion  10/11/2020 1708 by Lenny Reese RN  Outcome: Outcome Met, Continue evaluating goal progress toward completion  10/11/2020 1400 by Lenny Reese RN  Outcome: Outcome Met, Continue evaluating goal progress toward completion  Goal: # Verbalizes understanding of alcohol withdrawal and health effects of excessive alcohol intake  Description: Documented on patient education activity  10/11/2020 1739 by Lenny Reese RN  Outcome: Outcome Met, Continue evaluating goal progress toward completion  10/11/2020 1708 by Lenny Reese RN  Outcome: Outcome Met, Continue evaluating goal progress toward completion  10/11/2020 1400 by Lenny Reese RN  Outcome: Outcome Met, Continue evaluating goal progress toward completion      No

## 2024-01-12 NOTE — PROGRESS NOTE ADULT - NUTRITIONAL ASSESSMENT
This patient has been assessed with a concern for Malnutrition and has been determined to have a diagnosis/diagnoses of Moderate protein-calorie malnutrition.    This patient is being managed with:   Diet DASH/TLC-  Sodium & Cholesterol Restricted  Entered: Jan 8 2024  9:32PM    The following pending diet order is being considered for treatment of Moderate protein-calorie malnutrition:  Diet Regular-  Supplement Feeding Modality:  Oral  Ensure Plus High Protein Cans or Servings Per Day:  1       Frequency:  Two Times a day  Entered: Jan 11 2024 12:42PM

## 2024-01-12 NOTE — PROGRESS NOTE ADULT - ASSESSMENT
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    Acute metabolic encephalopathy due to Acute UTI  - near baseline mentation  - completed antibiotic course     Elevated LFTs.  -Acute hepatitis panel.  -CMP in am.    Hypothyroidism.  -On Levothyroxine.    Dementia with Parkinson's psychosis.  - Continue her Aricept.  - On Nuplazid - family brought from home    Anxiety, depression and trigeminal neuralgia.  - On Duloxetine and Neurontin.    DVT prophylaxis: Lovenox sq daily.    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    Acute metabolic encephalopathy due to Acute UTI  - near baseline mentation  - completed antibiotic course     Elevated LFTs.  -Acute hepatitis panel.  -CMP in am.    Hypothyroidism.  -On Levothyroxine.    Dementia with Parkinson's psychosis.  - Continue her Aricept.  - On Nuplazid - family brought from home    Anxiety, depression and trigeminal neuralgia.  - On Duloxetine and Neurontin.    DVT prophylaxis: Lovenox sq daily.    Dispo: half-way today

## 2024-01-12 NOTE — PROGRESS NOTE ADULT - SUBJECTIVE AND OBJECTIVE BOX
Taj García MD  [facility] Medicine  Contact via Teams or text/call at [phone]    Patient is a 83y old  Female who presents with a chief complaint of Acute metabolic encephalopathy due to Acute UTI (11 Jan 2024 12:10)    Feels good.  Confused but pleasant.      Patient seen and examined at bedside. No overnight events reported.     ALLERGIES:  penicillins (Unknown)  latex (Unknown)  Cleocin HCl (Unknown)  sulfa drugs (Unknown)    MEDICATIONS  (STANDING):  calcium carbonate 1250 mG  + Vitamin D (OsCal 500 + D) 1 Tablet(s) Oral three times a day  dextrose 5% + sodium chloride 0.45%. 1000 milliLiter(s) (75 mL/Hr) IV Continuous <Continuous>  dicyclomine 10 milliGRAM(s) Oral two times a day before meals  donepezil 10 milliGRAM(s) Oral at bedtime  DULoxetine 60 milliGRAM(s) Oral daily  enoxaparin Injectable 40 milliGRAM(s) SubCutaneous every 24 hours  gabapentin 400 milliGRAM(s) Oral two times a day  levothyroxine 25 MICROGram(s) Oral daily  multivitamin 1 Tablet(s) Oral daily  pantoprazole    Tablet 40 milliGRAM(s) Oral before breakfast  pimavanserin Capsule 34 milliGRAM(s) Oral daily    MEDICATIONS  (PRN):  acetaminophen     Tablet .. 650 milliGRAM(s) Oral every 6 hours PRN Temp greater or equal to 38C (100.4F), Mild Pain (1 - 3)  aluminum hydroxide/magnesium hydroxide/simethicone Suspension 30 milliLiter(s) Oral every 4 hours PRN Dyspepsia  melatonin 3 milliGRAM(s) Oral at bedtime PRN Insomnia  ondansetron Injectable 4 milliGRAM(s) IV Push every 8 hours PRN Nausea and/or Vomiting    Vital Signs Last 24 Hrs  T(F): 97.8 (11 Jan 2024 20:39), Max: 98.6 (11 Jan 2024 15:42)  HR: 98 (11 Jan 2024 20:39) (98 - 107)  BP: 126/72 (11 Jan 2024 20:39) (119/61 - 126/72)  RR: 18 (11 Jan 2024 20:39) (18 - 18)  SpO2: 98% (11 Jan 2024 20:39) (98% - 98%)  I&O's Summary    PHYSICAL EXAM:  General: NAD, A/O x 2  ENT: No gross hearing impairment, Moist mucous membranes, no thrush  Neck: Supple, No JVD  Lungs: Clear to auscultation bilaterally, good air entry, non-labored breathing  Cardio: RRR, S1/S2, No murmur  Abdomen: Soft, Nontender, Nondistended; Bowel sounds present  Extremities: No calf tenderness, No cyanosis, No pitting edema  Psych: Appropriate mood and affect    LABS:                        11.2   6.33  )-----------( 197      ( 10 Ulises 2024 08:04 )             34.3     01-10    139  |  109  |  11  ----------------------------<  104  3.4   |  25  |  0.58    Ca    8.5      10 Ulises 2024 08:04    TPro  6.7  /  Alb  2.8  /  TBili  0.3  /  DBili  x   /  AST  68  /  ALT  95  /  AlkPhos  243  01-09    Urinalysis Basic - ( 10 Ulises 2024 08:04 )    Color: x / Appearance: x / SG: x / pH: x  Gluc: 104 mg/dL / Ketone: x  / Bili: x / Urobili: x   Blood: x / Protein: x / Nitrite: x   Leuk Esterase: x / RBC: x / WBC x   Sq Epi: x / Non Sq Epi: x / Bacteria: x        Culture - Urine (collected 08 Jan 2024 19:00)  Source: Clean Catch None  Preliminary Report (10 Ulises 2024 13:48):    >100,000 CFU/ml Escherichia coli    <10,000 CFU/ml Normal Urogenital sherry present    RADIOLOGY & ADDITIONAL TESTS:    Care Discussed with Consultants/Other Providers:    Taj García MD  [facility] Medicine  Contact via Teams or text/call at [phone]    Patient is a 83y old  Female who presents with a chief complaint of Acute metabolic encephalopathy due to Acute UTI (11 Jan 2024 12:10)    Feels good.  Confused but pleasant.      Patient seen and examined at bedside. No overnight events reported.     ALLERGIES:  penicillins (Unknown)  latex (Unknown)  Cleocin HCl (Unknown)  sulfa drugs (Unknown)    MEDICATIONS  (STANDING):  calcium carbonate 1250 mG  + Vitamin D (OsCal 500 + D) 1 Tablet(s) Oral three times a day  dextrose 5% + sodium chloride 0.45%. 1000 milliLiter(s) (75 mL/Hr) IV Continuous <Continuous>  dicyclomine 10 milliGRAM(s) Oral two times a day before meals  donepezil 10 milliGRAM(s) Oral at bedtime  DULoxetine 60 milliGRAM(s) Oral daily  enoxaparin Injectable 40 milliGRAM(s) SubCutaneous every 24 hours  gabapentin 400 milliGRAM(s) Oral two times a day  levothyroxine 25 MICROGram(s) Oral daily  multivitamin 1 Tablet(s) Oral daily  pantoprazole    Tablet 40 milliGRAM(s) Oral before breakfast  pimavanserin Capsule 34 milliGRAM(s) Oral daily    MEDICATIONS  (PRN):  acetaminophen     Tablet .. 650 milliGRAM(s) Oral every 6 hours PRN Temp greater or equal to 38C (100.4F), Mild Pain (1 - 3)  aluminum hydroxide/magnesium hydroxide/simethicone Suspension 30 milliLiter(s) Oral every 4 hours PRN Dyspepsia  melatonin 3 milliGRAM(s) Oral at bedtime PRN Insomnia  ondansetron Injectable 4 milliGRAM(s) IV Push every 8 hours PRN Nausea and/or Vomiting    Vital Signs Last 24 Hrs  T(F): 97.8 (11 Jan 2024 20:39), Max: 98.6 (11 Jan 2024 15:42)  HR: 98 (11 Jan 2024 20:39) (98 - 107)  BP: 126/72 (11 Jan 2024 20:39) (119/61 - 126/72)  RR: 18 (11 Jan 2024 20:39) (18 - 18)  SpO2: 98% (11 Jan 2024 20:39) (98% - 98%)  I&O's Summary    PHYSICAL EXAM:  General: NAD, A/O x 2  ENT: No gross hearing impairment, Moist mucous membranes, no thrush  Neck: Supple, No JVD  Lungs: Clear to auscultation bilaterally, good air entry, non-labored breathing  Cardio: RRR, S1/S2, No murmur  Abdomen: Soft, Nontender, Nondistended; Bowel sounds present  Extremities: No calf tenderness, No cyanosis, No pitting edema  Psych: Appropriate mood and affect    LABS:                        11.2   6.33  )-----------( 197      ( 10 Ulises 2024 08:04 )             34.3     01-10    139  |  109  |  11  ----------------------------<  104  3.4   |  25  |  0.58    Ca    8.5      10 Ulises 2024 08:04    TPro  6.7  /  Alb  2.8  /  TBili  0.3  /  DBili  x   /  AST  68  /  ALT  95  /  AlkPhos  243  01-09    Urinalysis Basic - ( 10 Ulises 2024 08:04 )    Color: x / Appearance: x / SG: x / pH: x  Gluc: 104 mg/dL / Ketone: x  / Bili: x / Urobili: x   Blood: x / Protein: x / Nitrite: x   Leuk Esterase: x / RBC: x / WBC x   Sq Epi: x / Non Sq Epi: x / Bacteria: x        Culture - Urine (collected 08 Jan 2024 19:00)  Source: Clean Catch None  Preliminary Report (10 Ulises 2024 13:48):    >100,000 CFU/ml Escherichia coli    <10,000 CFU/ml Normal Urogenital sherry present    RADIOLOGY & ADDITIONAL TESTS:    Care Discussed with Consultants/Other Providers:

## 2024-01-17 DIAGNOSIS — N39.0 URINARY TRACT INFECTION, SITE NOT SPECIFIED: ICD-10-CM

## 2024-01-17 DIAGNOSIS — Z95.0 PRESENCE OF CARDIAC PACEMAKER: ICD-10-CM

## 2024-01-17 DIAGNOSIS — Z88.0 ALLERGY STATUS TO PENICILLIN: ICD-10-CM

## 2024-01-17 DIAGNOSIS — F32.A DEPRESSION, UNSPECIFIED: ICD-10-CM

## 2024-01-17 DIAGNOSIS — Z91.040 LATEX ALLERGY STATUS: ICD-10-CM

## 2024-01-17 DIAGNOSIS — F02.84 DEMENTIA IN OTHER DISEASES CLASSIFIED ELSEWHERE, UNSPECIFIED SEVERITY, WITH ANXIETY: ICD-10-CM

## 2024-01-17 DIAGNOSIS — E44.0 MODERATE PROTEIN-CALORIE MALNUTRITION: ICD-10-CM

## 2024-01-17 DIAGNOSIS — G93.41 METABOLIC ENCEPHALOPATHY: ICD-10-CM

## 2024-01-17 DIAGNOSIS — Z79.890 HORMONE REPLACEMENT THERAPY: ICD-10-CM

## 2024-01-17 DIAGNOSIS — G20.A1 PARKINSON'S DISEASE WITHOUT DYSKINESIA, WITHOUT MENTION OF FLUCTUATIONS: ICD-10-CM

## 2024-01-17 DIAGNOSIS — Z88.2 ALLERGY STATUS TO SULFONAMIDES: ICD-10-CM

## 2024-01-17 DIAGNOSIS — G50.0 TRIGEMINAL NEURALGIA: ICD-10-CM

## 2024-01-17 DIAGNOSIS — E03.9 HYPOTHYROIDISM, UNSPECIFIED: ICD-10-CM

## 2024-01-17 DIAGNOSIS — R79.89 OTHER SPECIFIED ABNORMAL FINDINGS OF BLOOD CHEMISTRY: ICD-10-CM

## 2024-01-31 ENCOUNTER — INPATIENT (INPATIENT)
Facility: HOSPITAL | Age: 84
LOS: 4 days | Discharge: HOME HEALTH | DRG: 871 | End: 2024-02-05
Attending: HOSPITALIST | Admitting: FAMILY MEDICINE
Payer: MEDICARE

## 2024-01-31 VITALS
WEIGHT: 139.99 LBS | DIASTOLIC BLOOD PRESSURE: 73 MMHG | HEART RATE: 104 BPM | TEMPERATURE: 98 F | RESPIRATION RATE: 18 BRPM | HEIGHT: 65 IN | SYSTOLIC BLOOD PRESSURE: 136 MMHG

## 2024-01-31 DIAGNOSIS — R56.9 UNSPECIFIED CONVULSIONS: ICD-10-CM

## 2024-01-31 PROBLEM — F03.90 UNSPECIFIED DEMENTIA, UNSPECIFIED SEVERITY, WITHOUT BEHAVIORAL DISTURBANCE, PSYCHOTIC DISTURBANCE, MOOD DISTURBANCE, AND ANXIETY: Chronic | Status: ACTIVE | Noted: 2024-01-08

## 2024-01-31 PROBLEM — F03.90 UNSPECIFIED DEMENTIA WITHOUT BEHAVIORAL DISTURBANCE: Chronic | Status: ACTIVE | Noted: 2024-01-08

## 2024-01-31 LAB
ALBUMIN SERPL ELPH-MCNC: 2.9 G/DL — LOW (ref 3.3–5)
ALP SERPL-CCNC: 182 U/L — HIGH (ref 40–120)
ALT FLD-CCNC: 25 U/L — SIGNIFICANT CHANGE UP (ref 12–78)
ANION GAP SERPL CALC-SCNC: 3 MMOL/L — LOW (ref 5–17)
APPEARANCE UR: ABNORMAL
AST SERPL-CCNC: 34 U/L — SIGNIFICANT CHANGE UP (ref 15–37)
BACTERIA # UR AUTO: ABNORMAL /HPF
BASOPHILS # BLD AUTO: 0.05 K/UL — SIGNIFICANT CHANGE UP (ref 0–0.2)
BASOPHILS NFR BLD AUTO: 0.5 % — SIGNIFICANT CHANGE UP (ref 0–2)
BILIRUB SERPL-MCNC: 0.2 MG/DL — SIGNIFICANT CHANGE UP (ref 0.2–1.2)
BILIRUB UR-MCNC: NEGATIVE — SIGNIFICANT CHANGE UP
BUN SERPL-MCNC: 21 MG/DL — SIGNIFICANT CHANGE UP (ref 7–23)
CALCIUM SERPL-MCNC: 9.3 MG/DL — SIGNIFICANT CHANGE UP (ref 8.5–10.1)
CAST: 3 /LPF — SIGNIFICANT CHANGE UP (ref 0–4)
CHLORIDE SERPL-SCNC: 104 MMOL/L — SIGNIFICANT CHANGE UP (ref 96–108)
CO2 SERPL-SCNC: 34 MMOL/L — HIGH (ref 22–31)
COLOR SPEC: YELLOW — SIGNIFICANT CHANGE UP
CREAT SERPL-MCNC: 0.97 MG/DL — SIGNIFICANT CHANGE UP (ref 0.5–1.3)
DIFF PNL FLD: NEGATIVE — SIGNIFICANT CHANGE UP
EGFR: 58 ML/MIN/1.73M2 — LOW
EOSINOPHIL # BLD AUTO: 0.17 K/UL — SIGNIFICANT CHANGE UP (ref 0–0.5)
EOSINOPHIL NFR BLD AUTO: 1.6 % — SIGNIFICANT CHANGE UP (ref 0–6)
FLUAV AG NPH QL: SIGNIFICANT CHANGE UP
FLUBV AG NPH QL: SIGNIFICANT CHANGE UP
GLUCOSE SERPL-MCNC: 94 MG/DL — SIGNIFICANT CHANGE UP (ref 70–99)
GLUCOSE UR QL: NEGATIVE MG/DL — SIGNIFICANT CHANGE UP
HCT VFR BLD CALC: 39.6 % — SIGNIFICANT CHANGE UP (ref 34.5–45)
HGB BLD-MCNC: 12.7 G/DL — SIGNIFICANT CHANGE UP (ref 11.5–15.5)
IMM GRANULOCYTES NFR BLD AUTO: 0.4 % — SIGNIFICANT CHANGE UP (ref 0–0.9)
KETONES UR-MCNC: ABNORMAL MG/DL
LEUKOCYTE ESTERASE UR-ACNC: NEGATIVE — SIGNIFICANT CHANGE UP
LYMPHOCYTES # BLD AUTO: 2.3 K/UL — SIGNIFICANT CHANGE UP (ref 1–3.3)
LYMPHOCYTES # BLD AUTO: 21.8 % — SIGNIFICANT CHANGE UP (ref 13–44)
MAGNESIUM SERPL-MCNC: 1.9 MG/DL — SIGNIFICANT CHANGE UP (ref 1.6–2.6)
MCHC RBC-ENTMCNC: 30.7 PG — SIGNIFICANT CHANGE UP (ref 27–34)
MCHC RBC-ENTMCNC: 32.1 GM/DL — SIGNIFICANT CHANGE UP (ref 32–36)
MCV RBC AUTO: 95.7 FL — SIGNIFICANT CHANGE UP (ref 80–100)
MONOCYTES # BLD AUTO: 0.77 K/UL — SIGNIFICANT CHANGE UP (ref 0–0.9)
MONOCYTES NFR BLD AUTO: 7.3 % — SIGNIFICANT CHANGE UP (ref 2–14)
NEUTROPHILS # BLD AUTO: 7.21 K/UL — SIGNIFICANT CHANGE UP (ref 1.8–7.4)
NEUTROPHILS NFR BLD AUTO: 68.4 % — SIGNIFICANT CHANGE UP (ref 43–77)
NITRITE UR-MCNC: POSITIVE
PH UR: 5.5 — SIGNIFICANT CHANGE UP (ref 5–8)
PLATELET # BLD AUTO: 158 K/UL — SIGNIFICANT CHANGE UP (ref 150–400)
POTASSIUM SERPL-MCNC: 3.5 MMOL/L — SIGNIFICANT CHANGE UP (ref 3.5–5.3)
POTASSIUM SERPL-SCNC: 3.5 MMOL/L — SIGNIFICANT CHANGE UP (ref 3.5–5.3)
PROT SERPL-MCNC: 7.5 GM/DL — SIGNIFICANT CHANGE UP (ref 6–8.3)
PROT UR-MCNC: NEGATIVE MG/DL — SIGNIFICANT CHANGE UP
RBC # BLD: 4.14 M/UL — SIGNIFICANT CHANGE UP (ref 3.8–5.2)
RBC # FLD: 12.9 % — SIGNIFICANT CHANGE UP (ref 10.3–14.5)
RBC CASTS # UR COMP ASSIST: 3 /HPF — SIGNIFICANT CHANGE UP (ref 0–4)
RSV RNA NPH QL NAA+NON-PROBE: SIGNIFICANT CHANGE UP
SARS-COV-2 RNA SPEC QL NAA+PROBE: SIGNIFICANT CHANGE UP
SODIUM SERPL-SCNC: 141 MMOL/L — SIGNIFICANT CHANGE UP (ref 135–145)
SP GR SPEC: 1.02 — SIGNIFICANT CHANGE UP (ref 1–1.03)
SQUAMOUS # UR AUTO: 4 /HPF — SIGNIFICANT CHANGE UP (ref 0–5)
UROBILINOGEN FLD QL: 1 MG/DL — SIGNIFICANT CHANGE UP (ref 0.2–1)
WBC # BLD: 10.54 K/UL — HIGH (ref 3.8–10.5)
WBC # FLD AUTO: 10.54 K/UL — HIGH (ref 3.8–10.5)
WBC UR QL: 5 /HPF — SIGNIFICANT CHANGE UP (ref 0–5)

## 2024-01-31 PROCEDURE — 99497 ADVNCD CARE PLAN 30 MIN: CPT | Mod: 25

## 2024-01-31 PROCEDURE — 70551 MRI BRAIN STEM W/O DYE: CPT

## 2024-01-31 PROCEDURE — 95816 EEG AWAKE AND DROWSY: CPT | Mod: 26

## 2024-01-31 PROCEDURE — 71045 X-RAY EXAM CHEST 1 VIEW: CPT | Mod: 26

## 2024-01-31 PROCEDURE — 99285 EMERGENCY DEPT VISIT HI MDM: CPT

## 2024-01-31 PROCEDURE — 97530 THERAPEUTIC ACTIVITIES: CPT | Mod: GP

## 2024-01-31 PROCEDURE — 85027 COMPLETE CBC AUTOMATED: CPT

## 2024-01-31 PROCEDURE — 85610 PROTHROMBIN TIME: CPT

## 2024-01-31 PROCEDURE — 87086 URINE CULTURE/COLONY COUNT: CPT

## 2024-01-31 PROCEDURE — 80053 COMPREHEN METABOLIC PANEL: CPT

## 2024-01-31 PROCEDURE — 87077 CULTURE AEROBIC IDENTIFY: CPT

## 2024-01-31 PROCEDURE — 36415 COLL VENOUS BLD VENIPUNCTURE: CPT

## 2024-01-31 PROCEDURE — 87186 SC STD MICRODIL/AGAR DIL: CPT

## 2024-01-31 PROCEDURE — 85730 THROMBOPLASTIN TIME PARTIAL: CPT

## 2024-01-31 PROCEDURE — 70450 CT HEAD/BRAIN W/O DYE: CPT | Mod: 26,MA

## 2024-01-31 PROCEDURE — 93010 ELECTROCARDIOGRAM REPORT: CPT

## 2024-01-31 PROCEDURE — 99223 1ST HOSP IP/OBS HIGH 75: CPT

## 2024-01-31 PROCEDURE — 80048 BASIC METABOLIC PNL TOTAL CA: CPT

## 2024-01-31 PROCEDURE — 97163 PT EVAL HIGH COMPLEX 45 MIN: CPT | Mod: GP

## 2024-01-31 PROCEDURE — 81001 URINALYSIS AUTO W/SCOPE: CPT

## 2024-01-31 PROCEDURE — 85025 COMPLETE CBC W/AUTO DIFF WBC: CPT

## 2024-01-31 RX ORDER — LEVOTHYROXINE SODIUM 125 MCG
25 TABLET ORAL DAILY
Refills: 0 | Status: DISCONTINUED | OUTPATIENT
Start: 2024-01-31 | End: 2024-02-05

## 2024-01-31 RX ORDER — ACETAMINOPHEN 500 MG
650 TABLET ORAL EVERY 6 HOURS
Refills: 0 | Status: DISCONTINUED | OUTPATIENT
Start: 2024-01-31 | End: 2024-02-05

## 2024-01-31 RX ORDER — MEROPENEM 1 G/30ML
1000 INJECTION INTRAVENOUS ONCE
Refills: 0 | Status: COMPLETED | OUTPATIENT
Start: 2024-01-31 | End: 2024-01-31

## 2024-01-31 RX ORDER — CEFTRIAXONE 500 MG/1
1000 INJECTION, POWDER, FOR SOLUTION INTRAMUSCULAR; INTRAVENOUS EVERY 24 HOURS
Refills: 0 | Status: COMPLETED | OUTPATIENT
Start: 2024-01-31 | End: 2024-02-03

## 2024-01-31 RX ORDER — SODIUM CHLORIDE 9 MG/ML
1000 INJECTION INTRAMUSCULAR; INTRAVENOUS; SUBCUTANEOUS ONCE
Refills: 0 | Status: COMPLETED | OUTPATIENT
Start: 2024-01-31 | End: 2024-01-31

## 2024-01-31 RX ORDER — LEVETIRACETAM 250 MG/1
1000 TABLET, FILM COATED ORAL ONCE
Refills: 0 | Status: COMPLETED | OUTPATIENT
Start: 2024-01-31 | End: 2024-01-31

## 2024-01-31 RX ORDER — DULOXETINE HYDROCHLORIDE 30 MG/1
60 CAPSULE, DELAYED RELEASE ORAL DAILY
Refills: 0 | Status: DISCONTINUED | OUTPATIENT
Start: 2024-01-31 | End: 2024-02-05

## 2024-01-31 RX ORDER — PANTOPRAZOLE SODIUM 20 MG/1
40 TABLET, DELAYED RELEASE ORAL
Refills: 0 | Status: DISCONTINUED | OUTPATIENT
Start: 2024-01-31 | End: 2024-02-05

## 2024-01-31 RX ORDER — HEPARIN SODIUM 5000 [USP'U]/ML
5000 INJECTION INTRAVENOUS; SUBCUTANEOUS EVERY 8 HOURS
Refills: 0 | Status: DISCONTINUED | OUTPATIENT
Start: 2024-01-31 | End: 2024-02-05

## 2024-01-31 RX ORDER — SODIUM CHLORIDE 9 MG/ML
500 INJECTION INTRAMUSCULAR; INTRAVENOUS; SUBCUTANEOUS ONCE
Refills: 0 | Status: COMPLETED | OUTPATIENT
Start: 2024-01-31 | End: 2024-01-31

## 2024-01-31 RX ORDER — DONEPEZIL HYDROCHLORIDE 10 MG/1
10 TABLET, FILM COATED ORAL AT BEDTIME
Refills: 0 | Status: DISCONTINUED | OUTPATIENT
Start: 2024-01-31 | End: 2024-02-05

## 2024-01-31 RX ORDER — VANCOMYCIN HCL 1 G
1000 VIAL (EA) INTRAVENOUS ONCE
Refills: 0 | Status: COMPLETED | OUTPATIENT
Start: 2024-01-31 | End: 2024-01-31

## 2024-01-31 RX ORDER — GABAPENTIN 400 MG/1
400 CAPSULE ORAL
Refills: 0 | Status: DISCONTINUED | OUTPATIENT
Start: 2024-01-31 | End: 2024-02-05

## 2024-01-31 RX ORDER — MEROPENEM 1 G/30ML
1000 INJECTION INTRAVENOUS ONCE
Refills: 0 | Status: DISCONTINUED | OUTPATIENT
Start: 2024-01-31 | End: 2024-01-31

## 2024-01-31 RX ORDER — ACETAMINOPHEN 500 MG
2 TABLET ORAL
Refills: 0 | DISCHARGE

## 2024-01-31 RX ORDER — ACETAMINOPHEN 500 MG
650 TABLET ORAL ONCE
Refills: 0 | Status: DISCONTINUED | OUTPATIENT
Start: 2024-01-31 | End: 2024-01-31

## 2024-01-31 RX ADMIN — MEROPENEM 1000 MILLIGRAM(S): 1 INJECTION INTRAVENOUS at 13:32

## 2024-01-31 RX ADMIN — SODIUM CHLORIDE 1000 MILLILITER(S): 9 INJECTION INTRAMUSCULAR; INTRAVENOUS; SUBCUTANEOUS at 13:36

## 2024-01-31 RX ADMIN — SODIUM CHLORIDE 500 MILLILITER(S): 9 INJECTION INTRAMUSCULAR; INTRAVENOUS; SUBCUTANEOUS at 17:51

## 2024-01-31 RX ADMIN — SODIUM CHLORIDE 500 MILLILITER(S): 9 INJECTION INTRAMUSCULAR; INTRAVENOUS; SUBCUTANEOUS at 16:51

## 2024-01-31 RX ADMIN — Medication 1000 MILLIGRAM(S): at 15:10

## 2024-01-31 RX ADMIN — SODIUM CHLORIDE 1000 MILLILITER(S): 9 INJECTION INTRAMUSCULAR; INTRAVENOUS; SUBCUTANEOUS at 14:36

## 2024-01-31 RX ADMIN — Medication 250 MILLIGRAM(S): at 14:10

## 2024-01-31 RX ADMIN — LEVETIRACETAM 400 MILLIGRAM(S): 250 TABLET, FILM COATED ORAL at 12:08

## 2024-01-31 RX ADMIN — LEVETIRACETAM 1000 MILLIGRAM(S): 250 TABLET, FILM COATED ORAL at 12:23

## 2024-01-31 NOTE — ED ADULT NURSE NOTE - NS ED NURSE LEVEL OF CONSCIOUSNESS AFFECT
CLINICAL NUTRITION SERVICES - REASSESSMENT NOTE      RECOMMENDATIONS FOR MD/PROVIDER TO ORDER:   Recommend change to 12 hr nocturnal cycle TPN in preparation for discharge home on TPN.   Malnutrition:  (12/14)  % Weight Loss:  Weight loss does not meet criteria for malnutrition   % Intake:  </= 50% for >/= 5 days (severe malnutrition)  Subcutaneous Fat Loss:  None observed  Muscle Loss:  None observed  Fluid Retention:  None noted     Malnutrition Diagnosis: Patient does not meet two of the above criteria necessary for diagnosing malnutrition       EVALUATION OF PROGRESS TOWARD GOALS   Diet:  NPO    Nutrition Support:  TPN was started on 12/14 at 50 mL/hr and increased to goal on 12/15 as below:    Nutrition Support Parenteral:  Type of Access: Port  Parenteral Frequency:  Continuous  Parenteral Regimen: D15 AA5 at 75 mL/hr + Lipids 250 mL 5x/week  Total Parenteral Provisions: 1550 kcals (28 kcal/kg), 84 gm pro (1.5 gm/kg), 252 gm CHO, 23% fat    Intake/Tolerance:    Yesterday's labs acceptable.  Phos 2.0 (L) on 12/14 but otherwise Phos has been WNL.  K 3.3 (L) --> 3.5 (NL) - yesterday.  BGM:  127, 123, 148, 113  800 mL via G-tube yesterday.  Pt is having intermittent nausea with small emesis earlier.  Last recorded weight:  69.4 kg (12/15) - up 0.5 kg from admit.     ASSESSED NUTRITION NEEDS PER APPROVED PRACTICE GUIDELINES:     Dosing Weight:  54.7 kg (adjusted for overweight)  Estimated Energy Needs: 2470-4992 kcals (25-30 Kcal/Kg)  Justification: overweight  Estimated Protein Needs: 65-82 grams protein (1.2-1.5 g pro/Kg)  Justification: post-op and preservation of lean body mass    NEW FINDINGS:   LR running at 55 mL/hr.  Planning home TPN at time of discharge.    Previous Goals (12/14):   TPN/Lipid will meet % estimated needs in the next 48-72 hrs.  Evaluation: Met    Previous Nutrition Diagnosis (12/14):   Inadequate protein-energy intake related to altered GI function (s/p surgery for bowel obstruction)  as evidenced by NPO x 3 days, meeting 0% needs and TPN planned  Evaluation:  Completed    CURRENT NUTRITION DIAGNOSIS  No nutrition diagnosis identified at this time     INTERVENTIONS  Recommendations / Nutrition Prescription  Recommend change to 12 hr nocturnal cycle TPN in preparation for discharge home on TPN.    Implementation  Collaboration and Referral of Nutrition care - Discussed with Pharmacist earlier this morning.    Goals  TPN/Lipids will continue to meet % estimated needs    MONITORING AND EVALUATION:  Progress towards goals will be monitored and evaluated per protocol and Practice Guidelines    Claudette Beavers, CINDY, LD, CNSC   Calm

## 2024-01-31 NOTE — ED ADULT TRIAGE NOTE - CHIEF COMPLAINT QUOTE
Per EMS, patient had a witnessed seizure at the dining room table at NH. Lasting about 3 minutes, resolved on own. No history of seizures. At triage, awake and alert. VS stable. Patient has history of dementia, currently at baseline, no complaints.

## 2024-01-31 NOTE — CONSULT NOTE ADULT - SUBJECTIVE AND OBJECTIVE BOX
Patient is a 83y old  Female who presents with a chief complaint of seizure.    HPI:      PAST MEDICAL & SURGICAL HISTORY:  Trigeminal neuralgia      HLD (hyperlipidemia)      Spinal stenosis      Burning mouth syndrome      Parkinson disease      Dementia      No significant past surgical history          FAMILY HISTORY:  No significant family history        Social Hx:  Nonsmoker, no drug or alcohol use    MEDICATIONS  (STANDING):  meropenem Injectable 1000 milliGRAM(s) IV Push Once  vancomycin  IVPB. 1000 milliGRAM(s) IV Intermittent once       Allergies    Cleocin HCl (Unknown)  penicillins (Unknown)  latex (Unknown)  sulfa drugs (Unknown)    Intolerances        ROS: Pertinent positives in HPI, all other ROS were reviewed and are negative.      Vital Signs Last 24 Hrs  T(C): 35.2 (31 Jan 2024 12:04), Max: 36.6 (31 Jan 2024 11:13)  T(F): 95.3 (31 Jan 2024 12:04), Max: 97.8 (31 Jan 2024 11:13)  HR: 104 (31 Jan 2024 11:13) (104 - 104)  BP: 136/73 (31 Jan 2024 11:13) (136/73 - 136/73)  BP(mean): --  RR: 18 (31 Jan 2024 11:13) (18 - 18)  SpO2: --    Parameters below as of 31 Jan 2024 11:13  Patient On (Oxygen Delivery Method): room air            Constitutional: awake and alert.  HEENT: PERRLA, EOMI,   Neck: Supple.  Respiratory: Breath sounds are clear bilaterally  Cardiovascular: S1 and S2, regular / irregular rhythm  Gastrointestinal: soft, nontender  Extremities:  no edema  Vascular: Caritid Bruit - no  Musculoskeletal: no joint swelling/tenderness, no abnormal movements  Skin: No rashes    Neurological exam:  HF: A x O x 3. Appropriately interactive, normal affect. Speech fluent, No Aphasia or paraphasic errors. Naming /repetition intact   CN: KIMBERLY, EOMI, VFF, facial sensation normal, no NLFD, tongue midline, Palate moves equally, SCM equal bilaterally  Motor: No pronator drift, Strength 5/5 in all 4 ext, normal bulk and tone, no tremor, rigidity or bradykinesia.    Sens: Intact to light touch / PP/ VS/ JS    Reflexes: Symmetric and normal . BJ 2+, BR 2+, KJ 2+, AJ 2+, downgoing toes b/l  Coord:  No FNFA, dysmetria, HOANG intact   Gait/Balance: Normal/Cannot test    NIHSS:          Labs:   01-31    141  |  104  |  21  ----------------------------<  94  3.5   |  34<H>  |  0.97    Ca    9.3      31 Jan 2024 11:43  Mg     1.9     01-31    TPro  7.5  /  Alb  2.9<L>  /  TBili  0.2  /  DBili  x   /  AST  34  /  ALT  25  /  AlkPhos  182<H>  01-31                              12.7   10.54 )-----------( 158      ( 31 Jan 2024 11:43 )             39.6       Radiology:  CT head 1/31/24:  No significant interval intracranial changes.    No acute intracranial hemorrhage.    Moderate ventricular dilatation indicating component of communicating   hydrocephalus.    Progressive pansinusitis.     Patient is a 83y old  Female who presents with a chief complaint of seizure.    HPI:  She has been a resident of [facility] in the memory care unit  She was sitting in the TV room. She was holding a doll and then started "punching" thedoll.    PAST MEDICAL & SURGICAL HISTORY:  Trigeminal neuralgia      HLD (hyperlipidemia)      Spinal stenosis      Burning mouth syndrome      Parkinson disease      Dementia      No significant past surgical history          FAMILY HISTORY:  No significant family history        Social Hx:  Nonsmoker, no drug or alcohol use    Home medications;  Aricept 10 mg/day  dicylomine 10 mg bid  duloxetine 60 mg/day  levothyroxine 5 mcg/day  neurontin 400 mg bid  Nuplazid 34 mg/day  pantoprazole 40 mg/day      MEDICATIONS  (STANDING):  meropenem Injectable 1000 milliGRAM(s) IV Push Once  vancomycin  IVPB. 1000 milliGRAM(s) IV Intermittent once       Allergies    Cleocin HCl (Unknown)  penicillins (Unknown)  latex (Unknown)  sulfa drugs (Unknown)    Intolerances        ROS: Pertinent positives in HPI, all other ROS were reviewed and are negative.      Vital Signs Last 24 Hrs  T(C): 35.2 (31 Jan 2024 12:04), Max: 36.6 (31 Jan 2024 11:13)  T(F): 95.3 (31 Jan 2024 12:04), Max: 97.8 (31 Jan 2024 11:13)  HR: 104 (31 Jan 2024 11:13) (104 - 104)  BP: 136/73 (31 Jan 2024 11:13) (136/73 - 136/73)  BP(mean): --  RR: 18 (31 Jan 2024 11:13) (18 - 18)  SpO2: --    Parameters below as of 31 Jan 2024 11:13  Patient On (Oxygen Delivery Method): room air            Constitutional: awake and alert.  HEENT: PERRLA, EOMI,   Neck: Supple.  Respiratory: Breath sounds are clear bilaterally  Cardiovascular: S1 and S2, regular / irregular rhythm  Gastrointestinal: soft, nontender  Extremities:  no edema  Vascular: Caritid Bruit - no  Musculoskeletal: no joint swelling/tenderness, no abnormal movements  Skin: No rashes    Neurological exam:  HF: A x O x 3. Appropriately interactive, normal affect. Speech fluent, No Aphasia or paraphasic errors. Naming /repetition intact   CN: KIMBERLY, EOMI, VFF, facial sensation normal, no NLFD, tongue midline, Palate moves equally, SCM equal bilaterally  Motor: No pronator drift, Strength 5/5 in all 4 ext, normal bulk and tone, no tremor, rigidity or bradykinesia.    Sens: Intact to light touch / PP/ VS/ JS    Reflexes: Symmetric and normal . BJ 2+, BR 2+, KJ 2+, AJ 2+, downgoing toes b/l  Coord:  No FNFA, dysmetria, HOANG intact   Gait/Balance: Normal/Cannot test    NIHSS:          Labs:   01-31    141  |  104  |  21  ----------------------------<  94  3.5   |  34<H>  |  0.97    Ca    9.3      31 Jan 2024 11:43  Mg     1.9     01-31    TPro  7.5  /  Alb  2.9<L>  /  TBili  0.2  /  DBili  x   /  AST  34  /  ALT  25  /  AlkPhos  182<H>  01-31                              12.7   10.54 )-----------( 158      ( 31 Jan 2024 11:43 )             39.6       Radiology:  CT head 1/31/24:  No significant interval intracranial changes.    No acute intracranial hemorrhage.    Moderate ventricular dilatation indicating component of communicating   hydrocephalus.    Progressive pansinusitis.     Patient is a 83y old  Female who presents with a chief complaint of seizure.    HPI:  History obtained from ED physician and patient's daughter who is at bedside.  She has been a resident of [facility] in the memory care unit since July 2021.  Today she was sitting in the TV room. She was holding a doll and then started "punching" the doll. Staff described this as a seizure, lasting 2-3 minutes. She was taken to the ED where a second event was witnessed by nursing staff, described as a tonic clonic seizure.  She is now postictal.  At baseline she has dementia. She is able to feed herself and ambulate but does not follow directions well.  She recently fell and fractured her ankle, had surgery and went to rehab at [facility]. Her daughter says that during the hospitalization and rehab stay she was very disoriented and had "hospital dementia".   She has no prior history of seizures.  She is unsure when a diagnosis of Parkinson's was made but is not seeing a neurologist at this time.   She was seen at [facility] earlier in the month for a UTI.    PAST MEDICAL & SURGICAL HISTORY:  Trigeminal neuralgia      HLD (hyperlipidemia)      Spinal stenosis      Burning mouth syndrome      Parkinson disease      Dementia      No significant past surgical history          FAMILY HISTORY:  No significant family history        Social Hx:  Nonsmoker, no drug or alcohol use    Home medications;  Aricept 10 mg/day  dicyclomine 10 mg bid  duloxetine 60 mg/day  levothyroxine 5 mcg/day  neurontin 400 mg bid  Nuplazid 34 mg/day  pantoprazole 40 mg/day      MEDICATIONS  (STANDING):  meropenem Injectable 1000 milliGRAM(s) IV Push Once  vancomycin  IVPB. 1000 milliGRAM(s) IV Intermittent once       Allergies    Cleocin HCl (Unknown)  penicillins (Unknown)  latex (Unknown)  sulfa drugs (Unknown)    Intolerances        ROS: Pertinent positives in HPI, all other ROS were reviewed and are negative.      Vital Signs Last 24 Hrs  T(C): 35.2 (31 Jan 2024 12:04), Max: 36.6 (31 Jan 2024 11:13)  T(F): 95.3 (31 Jan 2024 12:04), Max: 97.8 (31 Jan 2024 11:13)  HR: 104 (31 Jan 2024 11:13) (104 - 104)  BP: 136/73 (31 Jan 2024 11:13) (136/73 - 136/73)  BP(mean): --  RR: 18 (31 Jan 2024 11:13) (18 - 18)  SpO2: --    Parameters below as of 31 Jan 2024 11:13  Patient On (Oxygen Delivery Method): room air            Constitutional: unresponsive  HEENT: PERRLA, EOMI,   Neck: Supple.  Respiratory: Breath sounds are clear bilaterally  Cardiovascular: S1 and S2, regular / irregular rhythm  Gastrointestinal: soft, nontender  Extremities:  no edema  Musculoskeletal: no joint swelling/tenderness, no abnormal movements  Skin: No rashes    Neurological exam:  HF: Unresponsive. Does not open eyes to voice or noxious stimuli.   CN: KIMBERLY, no facial asymmetry noted  Motor: Normal tone in upper extremities. Unable to perform confrontation testing  Sens: no withdrawal to noxious stimuli  Reflexes: Symmetric, hypoactive throughout, plantars flexor b/l.   Coord:  unable to test, no tremors noted  Gait/Balance:  unable to test          Labs:   01-31    141  |  104  |  21  ----------------------------<  94  3.5   |  34<H>  |  0.97    Ca    9.3      31 Jan 2024 11:43  Mg     1.9     01-31    TPro  7.5  /  Alb  2.9<L>  /  TBili  0.2  /  DBili  x   /  AST  34  /  ALT  25  /  AlkPhos  182<H>  01-31                              12.7   10.54 )-----------( 158      ( 31 Jan 2024 11:43 )             39.6       Radiology:  CT head 1/31/24:  No significant interval intracranial changes.    No acute intracranial hemorrhage.    Moderate ventricular dilatation indicating component of communicating   hydrocephalus.    Progressive pansinusitis.

## 2024-01-31 NOTE — ED ADULT NURSE NOTE - OBJECTIVE STATEMENT
Per EMS, patient had a witnessed seizure at the dining room table at NH. Lasting about 3 minutes, resolved on own. No history of seizures. At triage, awake and alert. VS stable. Patient has history of dementia, currently at baseline, no complaints.   Pt with witnessed seizure in ED, tonic-clonic lasting approx 20 seconds. MD Miguel to bedside to evaluate.

## 2024-01-31 NOTE — ED PROVIDER NOTE - CLINICAL SUMMARY MEDICAL DECISION MAKING FREE TEXT BOX
82 y/o female with a PMHx of burning mouth syndrome, dementia, HLD, Parkinsons, spinal stenosis, trigeminal neuralgia presents to the ED BIBA from [facility] s/p seizure. Pt had a witnessed seizure while at the dining table. Seizure lasted about 3 mins. Unclear if seizure activity vs Parkinsonian tremor vs fever. Will evaluate for metabolic vs infectious abnormality, CT head to evaluate for intracranial abnormality. Plan: labs, imaging, meds as needed. 84 y/o female with a PMHx of burning mouth syndrome, dementia, HLD, Parkinsons, spinal stenosis, trigeminal neuralgia presents to the ED BIBA from [place] s/p seizure. Pt had a witnessed seizure while at the dining table. Seizure lasted about 3 mins. Unclear if seizure activity vs Parkinsonian tremor vs fever. Pt found to be hypothermic, will initiated infectious work up. Will evaluate for metabolic vs infectious abnormality, CT head to evaluate for intracranial abnormality. Plan: labs, imaging, meds, EKG, IVF. Will reassess the need for additional interventions as clinically warranted. Refer to any progress notes for updates on clinical course and as a continuation of this MDM.

## 2024-01-31 NOTE — ED PROVIDER NOTE - PROGRESS NOTE DETAILS
Lili Loo for attending Dr. Miguel: Pt with 30 second tonic clonic seizure witnessed by nursing staff. Seizure stopped without intervention. Keppra ordered and neuro, Dr. Seay consulted. Attending Nello: labs/imaging w/o emergent findings thus far. endorsed to Dr. escobar for admission

## 2024-01-31 NOTE — ED PROVIDER NOTE - OBJECTIVE STATEMENT
82 y/o female with a PMHx of burning mouth syndrome, dementia, HLD, Parkinsons, spinal stenosis, trigeminal neuralgia presents to the ED BIBA from [place] s/p seizure. Pt had a witnessed seizure while at the dining table. Seizure lasted about 3 mins.

## 2024-01-31 NOTE — H&P ADULT - HISTORY OF PRESENT ILLNESS
Patient is an 83-year-old female with a past medical history of depression, hypothyroidism, back pain, Parkinson's, GERD, UTI, anxiety disorder, dementia with behavioral disturbances, major depressive disorder, IBS, CVA, atopic conjunctivitis of right eye, who presents to ED after having witnessed seizure-like activity which lasted 2 to 3 minutes and was described as diffuse body shaking followed by lethargy.  Patient had another witnessed seizure while in ED per ED physician was also tonic-clonic.      ED course: Neurology was called and patient evaluated.  Patient is a poor historian and unable to obtain review of systems.  History obtained by family at bedside.  Per family patient just discharged to subacute rehab after being found to have encephalopathy in setting of UTI.  Patient discharged January 12.  Now presented to ED with heart rate of 104, respirations of 18, blood pressure 136/73, . Urinalysis nitrite and many bacteria. UTI identified. Later became hypotensive to 92/62, T 95, HR 50 however later tachycardic to low 100s. 1500cc IVF given. Vancomycin and Merrem given. Warming blanket placed. BP now 100s/70's, .  Patient had EEG done and CT head negative for any acute findings.

## 2024-01-31 NOTE — PHARMACOTHERAPY INTERVENTION NOTE - COMMENTS
Medication reconciliation completed.  Reviewed Medication list and confirmed med allergies with patient; confirmed with Dr. First Medyenni.

## 2024-01-31 NOTE — H&P ADULT - ASSESSMENT
Pt is an 83-year-old female with a past medical history pertinent for recent encephalopathy due to UTI with admission, treated with Rocephin 1 g IV, now presenting from facility with witnessed shaking activity concerning for new onset seizures, infectious encephalopathy.    Pt is an 83-year-old female with a past medical history pertinent for recent encephalopathy due to UTI with admission, treated with Rocephin 1 g IV, now presenting from facility with witnessed shaking activity concerning for new onset seizures, infectious encephalopathy.    #Acute encephalopathy  #Acute UTI with sepsis  #Seizure  Admit to [facility]  Lactate <1  SIRS criteria: Tachycardia, Fever, +source  Rocephin, urine cultures  Urinalysis +LE mod bacteris, was sterile sample  Fall precautions, bed alarm  Aspiration precautions  Seizure precautions  Out of bed with assistance/ambulate with assistance  EEG without findings consistent with seizure  CT with stable hydrocephalus  Status post Keppra in ED, per neurology note provoking factor found and so we will hold starting Keppra twice daily at this time.  Neurochecks every 8 hours  SCD for DVT ppx    #Parkinson  #Dementia  #Hospital associated delerium  turn and position q 2 hrs  PT consult  c/w aricept  c/w nuplazid- please contact facility to send to ED, not available in pharmacy  may require higher level of observation due to high fall risk and known sundowning/delerium hx  heparin for dvt ppx    #Protein calorie malnutrition  dietary consult    #Hypothyroidism  c/w synthroid

## 2024-01-31 NOTE — CONSULT NOTE ADULT - ASSESSMENT
83 year old woman with dementia who had a witnessed event suspicious for seizure at [facility] and had a second event witnessed in the ED.  She is now unrepsonsive/postictal.    Seizure  -Description of events concerning for seizures  -EEG  -Evaluate for any metabolic, infectious etiologies  -If no provoking factor found, may be secondary to dementia and can consider starting a low dose of Keppra 250 mg BID    Parkinson's  -Unclear when diagnosis was made  -No rigidity or tremors seen on exam at this time. Would not start any new meds    Dementia  -Continue donepezil 10 mg/day  -Continue Nuplazid 34 mg/day    Will follow

## 2024-01-31 NOTE — ED PROVIDER NOTE - NS_ ATTENDINGSCRIBEDETAILS _ED_A_ED_FT
Attending Myriam: The scribe's documentation has been prepared under my direction and personally reviewed by me in its entirety. I confirm that the note above accurately reflects all work, treatment, procedures, and medical decision making performed by me.

## 2024-01-31 NOTE — H&P ADULT - TIME BILLING
A minimum of 75 min was spent completing this admission not including time spent discussing advanced care planning or discussing goals of care with a minimum of 36 min spent face to face with patient while in ED unit on admission, counseling patient on current acute on chronic conditions and discussing plan and coordination of care including PT, neurology consult, iv abx, and strict fall precautions.

## 2024-01-31 NOTE — ED PROVIDER NOTE - WR ORDER DATE AND TIME 1
BG Goal 100-180mg/dl   -FS TID AC qhs  -Changed Lantus to 6 units qhs if bedtime bg <120 reduce lantus to 4 units   -C/w admelog 3 units with meals hold it npo hold if not eating   -C/w low admelog correction scales before meals and bedtime and add 2am low correction to identify further hypoglycemia  Discharge:  - Basaglar 6 units plus Humalog based on ICR 1:15 and ISF 50 for BG target of 150. Pt has knowledge to do this.   - patient lost her insurance and has been unable to follow up with Dr Blair since april  -  consult for [facility] application for patient to receive free insulin   - Make sure pt has Rx for all DM supplies and insulins.  - [facility]  at [facility] on [address] phone :[phone]   -When insurance reinstated can follow up at Endocrine Practice at [address] [place] [address], [address], [place] [address]; Ph # [phone] with Dr. Blair 31-Jan-2024 11:40

## 2024-01-31 NOTE — ED ADULT TRIAGE NOTE - NS ED NURSE AMBULANCES
[facility]  Outpatient Physical Therapy    Treatment Note        Date: 2022  Patient: Ewa Meadows  : 1951   Confirmed: Yes  MRN: [id number]  Referring Provider: Leonel Crane MD    Medical Diagnosis: Strain of muscle, fascia and tendon at neck level, initial encounter [S16. 1XXA]  Concussion with loss of consciousness of unspecified duration, initial encounter [S06.0X9A]  Spondylosis without myelopathy or radiculopathy, cervical region [M47.812]  Cervicalgia [M54.2]  Labyrinthine dysfunction, unspecified ear [H83.2X9]       Treatment Diagnosis: impaired balance, impaired gait, decreased cervical AROM    Visit Information:  Insurance: Payor: [facility] [place] MEDICARE / Plan: [employer] / Product Type: *No Product type* /   PT Visit Information  Onset Date: 22  PT Insurance Information: [facility] Medicare  Total # of Visits to Date: 25  Plan of Care/Certification Expiration Date: 23  No Show: 1  Progress Note Due Date: 22  Canceled Appointment: 0  Progress Note Counter: 3/12    Subjective Information:  Subjective: Patient reports she fall into her couch this weekend when doing on step. Reports no injury but did have a headache afterwards. Patient reports she saw Dr. Sharif Blake last week and he was happy with her progress.   HEP Compliance:  [x] Good [] Fair [] Poor [] Reports not doing due to:    Pain Screening  Patient Currently in Pain: No  Pain Assessment: 0-10  Pain Level: 0    Treatment:  Exercises:  Exercises  Exercise 4: smooth pursuits standing x 2-3 with sba, smooth pursuits seated  Exercise 6: foam: FA, FT, EO, EC, weightshifts  Exercise 9: Abnormal convergence >12 in from nose, convergence with 3 spots on paper with difficulty  Exercise 12: figure 8 walking around hurdles with supervision-pt reports some blurry vision post ambulation  Exercise 14: ambulation around track, attempted up/down head turns but needs CGA to avoid LOB  Exercise 15: 4 square stepping  Exercise 16: standing reaching: therapist calling out color of cones to reach and touch (cones on table) superviion  Exercise 17: thumb push ups x 10  Exercise 18: ambulation over 6'' luigi forward with SBA- reports some blurry vision post ambulation  Exercise 20: HEP: continue with current     Modalities:  Moist Heat (CPT 24655)  Patient Position: Seated  Number Minutes Moist Heat: 10  Moist heat location: Cervical  Post treatment skin assessment: Redness - no adverse reaction       *Indicates exercise, modality, or manual techniques to be initiated when appropriate    Objective Measures:           Strength: [x] NT  [] MMT completed:        ROM: [x] NT  [] ROM measurements:             Assessment: Body Structures, Functions, Activity Limitations Requiring Skilled Therapeutic Intervention: Decreased ROM, Decreased strength, Decreased endurance, Increased pain, Decreased posture, Decreased balance, Decreased functional mobility   Assessment: Patient demonstrates difficulty with standing smooth pursuits compared to seated. Continued to work on balance and maneuvering over/around objects. Patient would continue to benefit from PT to work on balance and endurance for overall quality of life. Treatment Diagnosis: impaired balance, impaired gait, decreased cervical AROM             Post-Pain Assessment:       Pain Rating (0-10 pain scale):   /10   Location and pain description same as pre-treatment unless indicated. Action: [] NA   [] Perform HEP  [] Meds as prescribed  [] Modalities as prescribed   [] Call Physician     GOALS   Patient Goal(s): Patient Goals : \"not to have headaches and dizziness\"    Short Term Goals Completed by 4 weeks Goal Status   STG 1 Patient will report 25% reduction in dizziness symptoms. Met   STG 2 Patient will ambulate 150ft independently with improved bilateral step length and symmetry.  Met   STG 3 Patient will report </= 1/10 pain in neck with movement for improved functional tolerance. In progress, Partially met   STG 4 Patient will demonstrates smooth pursuits WFLs for improved tracking. In progress     Long Term Goals Completed by 6 weeks Goal Status   LTG 1 Patient will increase cervical AROM >/= 10-15 degrees each direction for improved functional tolerance. In progress, Partially met   LTG 2 Pt be able to ambulate in a busy environment with scanning with minimal to no deviations and minimal dizziness reported. In progress   LTG 3 DGI >/= 21/24 to reduce pt's risk for falls. In progress     Plan:  Frequency/Duration:  Plan  Plan Frequency: 1-2  Plan weeks: 6  Current Treatment Recommendations: Strengthening, ROM, Endurance training, Functional mobility training, Gait training, Neuromuscular re-education, Manual Therapy - Soft Tissue Mobilization, Manual Therapy - Joint Manipulation, Pain management, Home exercise program, Patient/Caregiver education & training, Modalities, Vestibular rehab, Integrated dry needling  Pt to continue current HEP. See objective section for any therapeutic exercise changes, additions or modifications this date.     Therapy Time:      PT Individual Minutes  Time In: 0908  Time Out: 6655  Minutes: 60  Timed Code Treatment Minutes: 50 Minutes  Procedure Minutes:10 minutes hot pack  Timed Activity Minutes Units   Neuro re-ed 50 3   Manual        Electronically signed by Jerilyn Osler, PT on 11/21/22 at 10:48 AM EST [facility]

## 2024-02-01 LAB
ALBUMIN SERPL ELPH-MCNC: 2.2 G/DL — LOW (ref 3.3–5)
ALP SERPL-CCNC: 145 U/L — HIGH (ref 40–120)
ALT FLD-CCNC: 19 U/L — SIGNIFICANT CHANGE UP (ref 12–78)
ANION GAP SERPL CALC-SCNC: 0 MMOL/L — LOW (ref 5–17)
APTT BLD: 30.3 SEC — SIGNIFICANT CHANGE UP (ref 24.5–35.6)
AST SERPL-CCNC: 30 U/L — SIGNIFICANT CHANGE UP (ref 15–37)
BASOPHILS # BLD AUTO: 0.04 K/UL — SIGNIFICANT CHANGE UP (ref 0–0.2)
BASOPHILS NFR BLD AUTO: 0.5 % — SIGNIFICANT CHANGE UP (ref 0–2)
BILIRUB SERPL-MCNC: 0.3 MG/DL — SIGNIFICANT CHANGE UP (ref 0.2–1.2)
BUN SERPL-MCNC: 14 MG/DL — SIGNIFICANT CHANGE UP (ref 7–23)
CALCIUM SERPL-MCNC: 8.8 MG/DL — SIGNIFICANT CHANGE UP (ref 8.5–10.1)
CHLORIDE SERPL-SCNC: 109 MMOL/L — HIGH (ref 96–108)
CO2 SERPL-SCNC: 31 MMOL/L — SIGNIFICANT CHANGE UP (ref 22–31)
CREAT SERPL-MCNC: 0.67 MG/DL — SIGNIFICANT CHANGE UP (ref 0.5–1.3)
EGFR: 86 ML/MIN/1.73M2 — SIGNIFICANT CHANGE UP
EOSINOPHIL # BLD AUTO: 0.3 K/UL — SIGNIFICANT CHANGE UP (ref 0–0.5)
EOSINOPHIL NFR BLD AUTO: 4 % — SIGNIFICANT CHANGE UP (ref 0–6)
GLUCOSE SERPL-MCNC: 80 MG/DL — SIGNIFICANT CHANGE UP (ref 70–99)
HCT VFR BLD CALC: 31.2 % — LOW (ref 34.5–45)
HGB BLD-MCNC: 10 G/DL — LOW (ref 11.5–15.5)
IMM GRANULOCYTES NFR BLD AUTO: 0.3 % — SIGNIFICANT CHANGE UP (ref 0–0.9)
INR BLD: 0.99 RATIO — SIGNIFICANT CHANGE UP (ref 0.85–1.18)
LYMPHOCYTES # BLD AUTO: 2.77 K/UL — SIGNIFICANT CHANGE UP (ref 1–3.3)
LYMPHOCYTES # BLD AUTO: 37.4 % — SIGNIFICANT CHANGE UP (ref 13–44)
MCHC RBC-ENTMCNC: 30.4 PG — SIGNIFICANT CHANGE UP (ref 27–34)
MCHC RBC-ENTMCNC: 32.1 GM/DL — SIGNIFICANT CHANGE UP (ref 32–36)
MCV RBC AUTO: 94.8 FL — SIGNIFICANT CHANGE UP (ref 80–100)
MONOCYTES # BLD AUTO: 0.67 K/UL — SIGNIFICANT CHANGE UP (ref 0–0.9)
MONOCYTES NFR BLD AUTO: 9 % — SIGNIFICANT CHANGE UP (ref 2–14)
NEUTROPHILS # BLD AUTO: 3.61 K/UL — SIGNIFICANT CHANGE UP (ref 1.8–7.4)
NEUTROPHILS NFR BLD AUTO: 48.8 % — SIGNIFICANT CHANGE UP (ref 43–77)
PLATELET # BLD AUTO: 135 K/UL — LOW (ref 150–400)
POTASSIUM SERPL-MCNC: 3.7 MMOL/L — SIGNIFICANT CHANGE UP (ref 3.5–5.3)
POTASSIUM SERPL-SCNC: 3.7 MMOL/L — SIGNIFICANT CHANGE UP (ref 3.5–5.3)
PROT SERPL-MCNC: 5.5 GM/DL — LOW (ref 6–8.3)
PROTHROM AB SERPL-ACNC: 11.2 SEC — SIGNIFICANT CHANGE UP (ref 9.5–13)
RBC # BLD: 3.29 M/UL — LOW (ref 3.8–5.2)
RBC # FLD: 13 % — SIGNIFICANT CHANGE UP (ref 10.3–14.5)
SODIUM SERPL-SCNC: 140 MMOL/L — SIGNIFICANT CHANGE UP (ref 135–145)
WBC # BLD: 7.41 K/UL — SIGNIFICANT CHANGE UP (ref 3.8–10.5)
WBC # FLD AUTO: 7.41 K/UL — SIGNIFICANT CHANGE UP (ref 3.8–10.5)

## 2024-02-01 PROCEDURE — 99233 SBSQ HOSP IP/OBS HIGH 50: CPT

## 2024-02-01 PROCEDURE — 99232 SBSQ HOSP IP/OBS MODERATE 35: CPT

## 2024-02-01 RX ORDER — MULTIVIT-MIN/FERROUS GLUCONATE 9 MG/15 ML
1 LIQUID (ML) ORAL DAILY
Refills: 0 | Status: CANCELLED | OUTPATIENT
Start: 2024-02-01 | End: 2024-02-05

## 2024-02-01 RX ORDER — LEVETIRACETAM 250 MG/1
250 TABLET, FILM COATED ORAL EVERY 12 HOURS
Refills: 0 | Status: DISCONTINUED | OUTPATIENT
Start: 2024-02-01 | End: 2024-02-05

## 2024-02-01 RX ORDER — THIAMINE MONONITRATE (VIT B1) 100 MG
100 TABLET ORAL DAILY
Refills: 0 | Status: CANCELLED | OUTPATIENT
Start: 2024-02-01 | End: 2024-02-05

## 2024-02-01 RX ORDER — HALOPERIDOL DECANOATE 100 MG/ML
1 INJECTION INTRAMUSCULAR ONCE
Refills: 0 | Status: COMPLETED | OUTPATIENT
Start: 2024-02-01 | End: 2024-02-01

## 2024-02-01 RX ORDER — LEVETIRACETAM 250 MG/1
250 TABLET, FILM COATED ORAL
Refills: 0 | Status: DISCONTINUED | OUTPATIENT
Start: 2024-02-01 | End: 2024-02-01

## 2024-02-01 RX ADMIN — Medication 25 MICROGRAM(S): at 06:31

## 2024-02-01 RX ADMIN — CEFTRIAXONE 1000 MILLIGRAM(S): 500 INJECTION, POWDER, FOR SOLUTION INTRAMUSCULAR; INTRAVENOUS at 06:31

## 2024-02-01 RX ADMIN — HALOPERIDOL DECANOATE 1 MILLIGRAM(S): 100 INJECTION INTRAMUSCULAR at 23:41

## 2024-02-01 RX ADMIN — HEPARIN SODIUM 5000 UNIT(S): 5000 INJECTION INTRAVENOUS; SUBCUTANEOUS at 06:31

## 2024-02-01 RX ADMIN — HEPARIN SODIUM 5000 UNIT(S): 5000 INJECTION INTRAVENOUS; SUBCUTANEOUS at 15:00

## 2024-02-01 RX ADMIN — PANTOPRAZOLE SODIUM 40 MILLIGRAM(S): 20 TABLET, DELAYED RELEASE ORAL at 06:31

## 2024-02-01 NOTE — DIETITIAN INITIAL EVALUATION ADULT - ADD RECOMMEND
1. Recommend c/w Regular diet to optimize nutrient and caloric intake  2. Recommend ensure plus high protein BID to optimize PO intake (provides 350 kcal, 20g protein/ shake)   3. Consider adding thiamine 100 mg daily 2/2 poor PO intake/ malnutrition   4. Recommend MVI w/ minerals daily to ensure 100% RDA met   5. Maintain aspiration precautions, back of bed >35 degrees.   6. Recommend MVI w/ minerals daily to ensure 100% RDA met   7. Consider adding appetite stimulant such as Remeron or Marinol 2/2 chronically poor appetite/ PO intake   8. Monitor bowel movements, if no BM for >3 days, consider implementing bowel regimen.   9. Encourage protein-rich foods, maximize food preferences   10. Confirm goals of care regarding nutrition support   RD will continue to monitor PO intake, labs, hydration, and wt prn.

## 2024-02-01 NOTE — PHYSICAL THERAPY INITIAL EVALUATION ADULT - IMPAIRMENTS FOUND, PT EVAL
arousal, attention, and cognition/gait, locomotion, and balance/muscle strength/poor safety awareness

## 2024-02-01 NOTE — DIETITIAN NUTRITION RISK NOTIFICATION - UPON NUTRITIONAL ASSESSMENT BY THE REGISTERED DIETITIAN YOUR PATIENT WAS DETERMINED TO MEET CRITERIA/HAS EVIDENCE OF THE FOLLOWING DIAGNOSIS:
Patient walks into the office for a blood draw and Picc line site cares per verbal orders from Dr. Rosales. Patient had Meropenem infusing so it was paused and disconnected for lab draw. Writer flushed both lumens with 10ml 0.9%NaCl after good blood return was noted. Writer waited 5mins to let med clear line and vein. 5ml of blood wasted and then blood taken off of purple lumen for labs: (CBC with diff., CMP, ESR, CRP, CPK). New extension tubing and Care Fusion end cap primed and changed on white lumen; flushed with 10ml 0.9% Nacl. New Care Fusion end cap primed and changed on purple lumen. Flushed line with 10ml 0.9%Nacl followed by 3ml hep 10u/ml. Meropenem tubing reattached and restarted.   Writer applied mask and removed old dressing. Writer applied sterile gloves and thoroughly cleaned Picc site with Chloroprep and allowed to air dry. New biopatch applied to insertion site and covered with OpSite transparent dressing. Patient was also given size E tubigrip to cover site. Patient tolerated procedure well and reminded to return next week for next catheter cares.   
Moderate protein-calorie malnutrition

## 2024-02-01 NOTE — DIETITIAN INITIAL EVALUATION ADULT - OTHER INFO
Patient is an 83-year-old female with a PMHx of depression, hypothyroidism, back pain, Parkinson's, GERD, UTI, anxiety disorder, dementia with behavioral disturbances, major depressive disorder, IBS, CVA, atopic conjunctivitis of R eye, who presents to ED after having witnessed seizure-like activity which lasted 2 to 3 minutes and was described as diffuse body shaking followed by lethargy.  Patient had another witnessed seizure while in ED per ED physician was also tonic-clonic.  Adm w/ Acute encephalopathy; Acute UTI with sepsis, Seizure. Given Vancomycin and Merrem, EEG and CT head negative.     Pt known to nutr services, dx'd w/ moderate PCM 1/11/24 - continues to meet criteria. Pt unarousable at time of RD visit - 0% bkfst tray consumed. RD obtained bed scale wt 1/22/24 of 132# w/ 2+ edema; RD obtained bed scale wt of 128.4# on 2/1 - wt loss of 3.6#, 2.7% x 3 weeks (not clinsig).  Patient is an 83-year-old female with a PMHx of depression, hypothyroidism, back pain, Parkinson's, GERD, UTI, anxiety disorder, dementia with behavioral disturbances, major depressive disorder, IBS, CVA, atopic conjunctivitis of R eye, who presents to ED after having witnessed seizure-like activity which lasted 2 to 3 minutes and was described as diffuse body shaking followed by lethargy.  Patient had another witnessed seizure while in ED per ED physician was also tonic-clonic.  Adm w/ Acute encephalopathy; Acute UTI with sepsis, Seizure. Given Vancomycin and Merrem, EEG and CT head negative.     Pt known to nutr services, dx'd w/ moderate PCM 1/11/24 - continues to meet criteria. Pt unarousable at time of RD visit - 0% bkfst tray consumed. RD obtained bed scale wt 1/22/24 of 132# w/ 2+ edema; RD obtained bed scale wt of 128.4# on 2/1 - wt loss of 3.6#, 2.7% x 3 weeks (not clinsig). NFPE reveals mild-moderate muscle/fat wasting - appears lethargic, frail. Recommend c/w Regular diet and add ensure plus high protein BID to optimize PO intake (provides 350 kcal, 20g protein/ shake). See below for further recommendations.

## 2024-02-01 NOTE — PHYSICAL THERAPY INITIAL EVALUATION ADULT - GENERAL OBSERVATIONS, REHAB EVAL
Pt rec'd semi-supine in bed on 2S, +R CAM boot, +tele, +IVL, in NAD. A&Ox0. RN cleared pt for PT eval.

## 2024-02-01 NOTE — PHYSICAL THERAPY INITIAL EVALUATION ADULT - ADDITIONAL COMMENTS
Pt is AAOx0; unable to provide information regarding prior level of function and living situation. As per EMR - Pt admitted from sub-acute rehab.     Of note: Pt underwent R ankle ORIF 12/10/23 and was NWB as per previous PT notes. Contacted Priya Ybarra MD for clarification. Awaiting update. Pt is AAOx0; unable to provide information regarding prior level of function and living situation. As per EMR - Pt admitted assisted living facility memory care unit.     Of note: Pt underwent R ankle ORIF 12/10/23 and was NWB as per previous PT notes. Contacted Priya Ybarra MD for clarification, however provider unaware.    Addendum: contacted Daughter 2/1 at 15:30 and confirmed pt has been NWB since surgery. Follow-up appointment is soon.

## 2024-02-01 NOTE — PROGRESS NOTE ADULT - ASSESSMENT
83-year-old female with a past medical history of Dementia, Parkinson Dz, HLD, Spinal stenosis, Trigeminal neuralgia  admitted for:       #Acute encephalopathy likely 2/2   #Acute UTI with sepsis and Seizure  Lactate <1  SIRS criteria: Tachycardia, Fever, +source  F/u BCX: NGTD  F/u UCX:  P   C/w Rocephin      # New Onset Seizures  CT head neg for acute findings: stable hydrocephalus   EEG: no epileptic activity   Seizure precautions  Out of bed with assistance/ambulate with assistance  S/p IV  Keppra in ED   D/w Dr Seay, will order MRTI brain, start PO Keppra         #Parkinson  #Dementia  supportive care  C/w Aricept  On Nuplazid  (not available as per pharmacy)    C/w Duloxetine     #Protein calorie malnutrition  dietary consult    #Hypothyroidism  c/w synthroid    # Hypothyroidism   C/w Levothyroxine       # DVT PPX: heparin SQ     # GI PPX: PPIs     Dispo; EP eval  ro evaluate PPM for MRI compatibility MRI pending. PT

## 2024-02-01 NOTE — PHYSICAL THERAPY INITIAL EVALUATION ADULT - PASSIVE RANGE OF MOTION EXAMINATION, REHAB EVAL
L hip/knee WFL, L ankle NT/bilateral upper extremity Passive ROM was WFL (within functional limits)/Right LE Passive ROM was WFL (within functional limits) R hip/knee WFL, R ankle NT/bilateral upper extremity Passive ROM was WFL (within functional limits)/Left LE Passive ROM was WFL (within functional limits)

## 2024-02-01 NOTE — PATIENT PROFILE ADULT - FUNCTIONAL ASSESSMENT - BASIC MOBILITY 6.
2-calculated by average unable to assess/Not able to assess (calculate score using [facility] averaging method)

## 2024-02-01 NOTE — PHYSICAL THERAPY INITIAL EVALUATION ADULT - PERTINENT HX OF CURRENT PROBLEM, REHAB EVAL
Pt is an 83 year old female presenting after witnessed event suspicious for seizure at [facility]. Pt had a second event witnessed in the ED. Pt had EEG done and CT head negative for any acute findings. PMH listed below.

## 2024-02-01 NOTE — DIETITIAN INITIAL EVALUATION ADULT - ORAL INTAKE PTA/DIET HISTORY
Pt from [place]. W/ dementia and unarousable at time of RD visit - unable to obtain meaningful diet/wt hx. [place] transfer papers doc'd pt is on Regular diet and Regular consistency, on bowel regimen (dicyclomine HCl, duloxetine)

## 2024-02-01 NOTE — DIETITIAN INITIAL EVALUATION ADULT - PERTINENT MEDS FT
MEDICATIONS  (STANDING):  cefTRIAXone Injectable. 1000 milliGRAM(s) IV Push every 24 hours  dicyclomine 10 milliGRAM(s) Oral two times a day before meals  donepezil 10 milliGRAM(s) Oral at bedtime  DULoxetine 60 milliGRAM(s) Oral daily  gabapentin 400 milliGRAM(s) Oral two times a day  heparin   Injectable 5000 Unit(s) SubCutaneous every 8 hours  levETIRAcetam 250 milliGRAM(s) Oral two times a day  levothyroxine 25 MICROGram(s) Oral daily  pantoprazole    Tablet 40 milliGRAM(s) Oral before breakfast    MEDICATIONS  (PRN):  acetaminophen     Tablet .. 650 milliGRAM(s) Oral every 6 hours PRN Temp greater or equal to 38C (100.4F), Mild Pain (1 - 3)  LORazepam   Injectable 2 milliGRAM(s) IV Push once PRN Seizure Activity

## 2024-02-01 NOTE — PATIENT PROFILE ADULT - FALL HARM RISK - HARM RISK INTERVENTIONS
Assistance with ambulation/Assistance OOB with selected safe patient handling equipment/Communicate Risk of Fall with Harm to all staff/Discuss with provider need for PT consult/Monitor gait and stability/Reinforce activity limits and safety measures with patient and family/Tailored Fall Risk Interventions/Visual Cue: Yellow wristband and red socks/Bed in lowest position, wheels locked, appropriate side rails in place/Call bell, personal items and telephone in reach/Instruct patient to call for assistance before getting out of bed or chair/Non-slip footwear when patient is out of bed/[place] to call system/Physically safe environment - no spills, clutter or unnecessary equipment/Purposeful Proactive Rounding/Room/bathroom lighting operational, light cord in reach

## 2024-02-01 NOTE — PHYSICAL THERAPY INITIAL EVALUATION ADULT - LEVEL OF INDEPENDENCE: SIT/STAND, REHAB EVAL
due to inability to follow commands, +safety concern / awaiting clarification on WB'ing status/unable to perform

## 2024-02-01 NOTE — PHYSICAL THERAPY INITIAL EVALUATION ADULT - IMPAIRMENTS CONTRIBUTING IMPAIRED BED MOBILITY, REHAB EVAL
Pt maintained static sitting x 5 minutes, +posterior lean w/ fatigue./impaired balance/impaired postural control/decreased strength

## 2024-02-01 NOTE — PROGRESS NOTE ADULT - ASSESSMENT
83 year old woman with dementia who had a witnessed event suspicious for seizure at [facility] and had a second event witnessed in the ED.      Seizure  -Description of events concerning for seizures  -EEG shows generalized slowing  -No clear provoking factor noted, may be secondary to dementia.   -I had a lengthy conversation with her daughter. She is agreeable to a low dose of medication as long as it is possible to stop it in the future if not tolerated. Will start Keppra 250 mg BID.     Parkinson's  -Unclear when diagnosis was made  -No rigidity seen on exam at this time. Minimal tremor in left hand noted. Would not start any new meds    Dementia  -Continue donepezil 10 mg/day  -Continue Nuplazid 34 mg/day

## 2024-02-01 NOTE — PROGRESS NOTE ADULT - SUBJECTIVE AND OBJECTIVE BOX
Interval History:  2/1/24: No new events.  No seizures noted by nursing staff.  I spoke to her daughter over the telephone. She now reports that staff at [facility] reported drooping of her face after the "seizure". Neither her daughter nor I noted facial droop in the ED.    MEDICATIONS  (STANDING):  cefTRIAXone Injectable. 1000 milliGRAM(s) IV Push every 24 hours  dicyclomine 10 milliGRAM(s) Oral two times a day before meals  donepezil 10 milliGRAM(s) Oral at bedtime  DULoxetine 60 milliGRAM(s) Oral daily  gabapentin 400 milliGRAM(s) Oral two times a day  heparin   Injectable 5000 Unit(s) SubCutaneous every 8 hours  levothyroxine 25 MICROGram(s) Oral daily  pantoprazole    Tablet 40 milliGRAM(s) Oral before breakfast    MEDICATIONS  (PRN):  acetaminophen     Tablet .. 650 milliGRAM(s) Oral every 6 hours PRN Temp greater or equal to 38C (100.4F), Mild Pain (1 - 3)  LORazepam   Injectable 2 milliGRAM(s) IV Push once PRN Seizure Activity      Allergies    Cleocin HCl (Unknown)  penicillins (Unknown)  latex (Unknown)  sulfa drugs (Unknown)    Intolerances        PHYSICAL EXAM:  Vital Signs Last 24 Hrs  T(F): 97.3 (02-01-24 @ 06:30)  HR: 75 (02-01-24 @ 08:22)  BP: 103/39 (02-01-24 @ 08:22)  RR: 18 (02-01-24 @ 08:22)    GENERAL: NAD  HEAD:  Atraumatic, Normocephalic  Neuro:  Eyes closed. Does not respond to name. Responds when I began exam.   Not answering questions or following commands  CN: PERRL, EOMI, no facial asymmetry noted  motor: not cooperative. Moving both upper extremities.  Decreased movement in lower extremities, right leg in boot.  sensory: withdraws  coordination: mild tremor noted on left hand  gait: unable to test      LABS:                        10.0   7.41  )-----------( 135      ( 01 Feb 2024 06:18 )             31.2     02-01    140  |  109<H>  |  14  ----------------------------<  80  3.7   |  31  |  0.67    Ca    8.8      01 Feb 2024 06:18  Mg     1.9     01-31    TPro  5.5<L>  /  Alb  2.2<L>  /  TBili  0.3  /  DBili  x   /  AST  30  /  ALT  19  /  AlkPhos  145<H>  02-01    PT/INR - ( 01 Feb 2024 06:18 )   PT: 11.2 sec;   INR: 0.99 ratio         PTT - ( 01 Feb 2024 06:18 )  PTT:30.3 sec  Urinalysis Basic - ( 01 Feb 2024 06:18 )    Color: x / Appearance: x / SG: x / pH: x  Gluc: 80 mg/dL / Ketone: x  / Bili: x / Urobili: x   Blood: x / Protein: x / Nitrite: x   Leuk Esterase: x / RBC: x / WBC x   Sq Epi: x / Non Sq Epi: x / Bacteria: x        RADIOLOGY & ADDITIONAL STUDIES:  CT head 1/31/24:  No significant interval intracranial changes.  No acute intracranial hemorrhage.  Moderate ventricular dilatation indicating component of communicating   hydrocephalus.  Progressive pansinusitis.    EEG 1/31/24:  Moderate generalized slowing

## 2024-02-01 NOTE — DIETITIAN INITIAL EVALUATION ADULT - PERTINENT LABORATORY DATA
02-01    140  |  109<H>  |  14  ----------------------------<  80  3.7   |  31  |  0.67    Ca    8.8      01 Feb 2024 06:18  Mg     1.9     01-31    TPro  5.5<L>  /  Alb  2.2<L>  /  TBili  0.3  /  DBili  x   /  AST  30  /  ALT  19  /  AlkPhos  145<H>  02-01

## 2024-02-01 NOTE — PROGRESS NOTE ADULT - SUBJECTIVE AND OBJECTIVE BOX
CC: Acute encephalopathy (01 Feb 2024 11:32)    HPI:  Patient is an 83-year-old female with a past medical history of depression, hypothyroidism, back pain, Parkinson's, GERD, UTI, anxiety disorder, dementia with behavioral disturbances, major depressive disorder, IBS, CVA, atopic conjunctivitis of right eye, who presents to ED after having witnessed seizure-like activity which lasted 2 to 3 minutes and was described as diffuse body shaking followed by lethargy.  Patient had another witnessed seizure while in ED per ED physician was also tonic-clonic.      ED course: Neurology was called and patient evaluated.  Patient is a poor historian and unable to obtain review of systems.  History obtained by family at bedside.  Per family patient just discharged to subacute rehab after being found to have encephalopathy in setting of UTI.  Patient discharged January 12.  Now presented to ED with heart rate of 104, respirations of 18, blood pressure 136/73, . Urinalysis nitrite and many bacteria. UTI identified. Later became hypotensive to 92/62, T 95, HR 50 however later tachycardic to low 100s. 1500cc IVF given. Vancomycin and Merrem given. Warming blanket placed. BP now 100s/70's, .  Patient had EEG done and CT head negative for any acute findings.    (31 Jan 2024 21:37)    INTERVAL HPI/OVERNIGHT EVENTS:    Vital Signs Last 24 Hrs  T(C): 36.3 (01 Feb 2024 06:30), Max: 37.9 (31 Jan 2024 19:55)  T(F): 97.3 (01 Feb 2024 06:30), Max: 100.3 (31 Jan 2024 19:55)  HR: 75 (01 Feb 2024 08:22) (60 - 115)  BP: 103/39 (01 Feb 2024 08:22) (92/41 - 138/66)  BP(mean): 95 (01 Feb 2024 00:54) (55 - 103)  RR: 18 (01 Feb 2024 08:22) (9 - 23)  SpO2: 93% (01 Feb 2024 08:22) (93% - 100%)    Parameters below as of 01 Feb 2024 08:22  Patient On (Oxygen Delivery Method): room air      I&O's Detail    REVIEW OF SYSTEMS:    CONSTITUTIONAL: No weakness, fevers or chills  EYES/ENT: No visual changes;  No vertigo or throat pain   NECK: No pain or stiffness  RESPIRATORY: No cough, wheezing, hemoptysis; No shortness of breath  CARDIOVASCULAR: No chest pain or palpitations  GASTROINTESTINAL: No abdominal or epigastric pain. No nausea, vomiting, or hematemesis; No diarrhea or constipation. No melena or hematochezia.  GENITOURINARY: No dysuria, frequency or hematuria  NEUROLOGICAL: No numbness or weakness  SKIN: No itching, burning, rashes, or lesions   All other review of systems is negative unless indicated above.  PHYSICAL EXAM:    General: in no acute distress  Eyes: PERRLA, EOMI; conjunctiva and sclera clear  Head: Normocephalic; atraumatic  ENMT: No nasal discharge; airway clear  Neck: Supple; non tender; no masses  Respiratory: No wheezes, rales or rhonchi  Cardiovascular: Regular rate and rhythm. S1 and S2 Normal; No murmurs, gallops or rubs  Gastrointestinal: Soft non-tender non-distended; Normal bowel sounds  Genitourinary: No  suprapubic  tenderness  Extremities: Normal range of motion, No clubbing, cyanosis or edema  Vascular: Peripheral pulses palpable 2+ bilaterally  Neurological: Alert and oriented x4  Skin: Warm and dry. No acute rash  Lymph Nodes: No acute cervical adenopathy  Musculoskeletal: Normal muscle tone, without deformities  Psychiatric: Cooperative and appropriate                            10.0   7.41  )-----------( 135      ( 01 Feb 2024 06:18 )             31.2     01 Feb 2024 06:18    140    |  109    |  14     ----------------------------<  80     3.7     |  31     |  0.67     Ca    8.8        01 Feb 2024 06:18  Mg     1.9       31 Jan 2024 11:43    TPro  5.5    /  Alb  2.2    /  TBili  0.3    /  DBili  x      /  AST  30     /  ALT  19     /  AlkPhos  145    01 Feb 2024 06:18    PT/INR - ( 01 Feb 2024 06:18 )   PT: 11.2 sec;   INR: 0.99 ratio         PTT - ( 01 Feb 2024 06:18 )  PTT:30.3 sec  CAPILLARY BLOOD GLUCOSE        LIVER FUNCTIONS - ( 01 Feb 2024 06:18 )  Alb: 2.2 g/dL / Pro: 5.5 gm/dL / ALK PHOS: 145 U/L / ALT: 19 U/L / AST: 30 U/L / GGT: x           Urinalysis Basic - ( 01 Feb 2024 06:18 )    Color: x / Appearance: x / SG: x / pH: x  Gluc: 80 mg/dL / Ketone: x  / Bili: x / Urobili: x   Blood: x / Protein: x / Nitrite: x   Leuk Esterase: x / RBC: x / WBC x   Sq Epi: x / Non Sq Epi: x / Bacteria: x        MEDICATIONS  (STANDING):  cefTRIAXone Injectable. 1000 milliGRAM(s) IV Push every 24 hours  dicyclomine 10 milliGRAM(s) Oral two times a day before meals  donepezil 10 milliGRAM(s) Oral at bedtime  DULoxetine 60 milliGRAM(s) Oral daily  gabapentin 400 milliGRAM(s) Oral two times a day  heparin   Injectable 5000 Unit(s) SubCutaneous every 8 hours  levETIRAcetam 250 milliGRAM(s) Oral two times a day  levothyroxine 25 MICROGram(s) Oral daily  pantoprazole    Tablet 40 milliGRAM(s) Oral before breakfast    MEDICATIONS  (PRN):  acetaminophen     Tablet .. 650 milliGRAM(s) Oral every 6 hours PRN Temp greater or equal to 38C (100.4F), Mild Pain (1 - 3)  LORazepam   Injectable 2 milliGRAM(s) IV Push once PRN Seizure Activity      RADIOLOGY & ADDITIONAL TESTS: CC: Acute encephalopathy (01 Feb 2024 11:32)    HPI:  Patient is an 83-year-old female with a past medical history of depression, hypothyroidism, back pain, Parkinson's, GERD, UTI, anxiety disorder, dementia with behavioral disturbances, major depressive disorder, IBS, CVA, atopic conjunctivitis of right eye, who presents to ED after having witnessed seizure-like activity which lasted 2 to 3 minutes and was described as diffuse body shaking followed by lethargy.  Patient had another witnessed seizure while in ED per ED physician was also tonic-clonic.      ED course: Neurology was called and patient evaluated.  Patient is a poor historian and unable to obtain review of systems.  History obtained by family at bedside.  Per family patient just discharged to subacute rehab after being found to have encephalopathy in setting of UTI.  Patient discharged January 12.  Now presented to ED with heart rate of 104, respirations of 18, blood pressure 136/73, . Urinalysis nitrite and many bacteria. UTI identified. Later became hypotensive to 92/62, T 95, HR 50 however later tachycardic to low 100s. 1500cc IVF given. Vancomycin and Merrem given. Warming blanket placed. BP now 100s/70's, .  Patient had EEG done and CT head negative for any acute findings.    (31 Jan 2024 21:37)    INTERVAL HPI/ OVERNIGHT EVENTS: chart reviewed, Pt was seen and examined,  awake, confused, Not following commands. As per RN declined Am meds     Vital Signs Last 24 Hrs  T(C): 36.3 (01 Feb 2024 06:30), Max: 37.9 (31 Jan 2024 19:55)  T(F): 97.3 (01 Feb 2024 06:30), Max: 100.3 (31 Jan 2024 19:55)  HR: 75 (01 Feb 2024 08:22) (60 - 115)  BP: 103/39 (01 Feb 2024 08:22) (92/41 - 138/66)  BP(mean): 95 (01 Feb 2024 00:54) (55 - 103)  RR: 18 (01 Feb 2024 08:22) (9 - 23)  SpO2: 93% (01 Feb 2024 08:22) (93% - 100%)    Parameters below as of 01 Feb 2024 08:22  Patient On (Oxygen Delivery Method): room air      REVIEW OF SYSTEMS:  All other review of systems is negative unless indicated above.      PHYSICAL EXAM:  General: in no acute distress  Eyes: PERRLA, conjunctiva and sclera clear  Head: Normocephalic; atraumatic  ENMT: No nasal discharge; airway clear  Respiratory:  Decreased BS,  No wheezes, rales or rhonchi  Cardiovascular: Regular rate and rhythm. S1 and S2 Normal;   Gastrointestinal: Soft non-tender non-distended; Normal bowel sounds  Genitourinary: No  suprapubic  tenderness  Extremities: No edema LLE, RLE in boot   Neurological: Alert , confused, not following commands, moved UE spontaneously   Musculoskeletal: Normal muscle tone, without deformities      LABS:                         10.0   7.41  )-----------( 135      ( 01 Feb 2024 06:18 )             31.2     01 Feb 2024 06:18    140    |  109    |  14     ----------------------------<  80     3.7     |  31     |  0.67     Ca    8.8        01 Feb 2024 06:18  Mg     1.9       31 Jan 2024 11:43    TPro  5.5    /  Alb  2.2    /  TBili  0.3    /  DBili  x      /  AST  30     /  ALT  19     /  AlkPhos  145    01 Feb 2024 06:18    PT/INR - ( 01 Feb 2024 06:18 )   PT: 11.2 sec;   INR: 0.99 ratio    PTT - ( 01 Feb 2024 06:18 )  PTT:30.3 sec      LIVER FUNCTIONS - ( 01 Feb 2024 06:18 )  Alb: 2.2 g/dL / Pro: 5.5 gm/dL / ALK PHOS: 145 U/L / ALT: 19 U/L / AST: 30 U/L / GGT: x           Urinalysis Basic - ( 01 Feb 2024 06:18 )  Color: x / Appearance: x / SG: x / pH: x  Gluc: 80 mg/dL / Ketone: x  / Bili: x / Urobili: x   Blood: x / Protein: x / Nitrite: x   Leuk Esterase: x / RBC: x / WBC x   Sq Epi: x / Non Sq Epi: x / Bacteria: x        MEDICATIONS  (STANDING):  cefTRIAXone Injectable. 1000 milliGRAM(s) IV Push every 24 hours  dicyclomine 10 milliGRAM(s) Oral two times a day before meals  donepezil 10 milliGRAM(s) Oral at bedtime  DULoxetine 60 milliGRAM(s) Oral daily  gabapentin 400 milliGRAM(s) Oral two times a day  heparin   Injectable 5000 Unit(s) SubCutaneous every 8 hours  levETIRAcetam 250 milliGRAM(s) Oral two times a day  levothyroxine 25 MICROGram(s) Oral daily  pantoprazole    Tablet 40 milliGRAM(s) Oral before breakfast    MEDICATIONS  (PRN):  acetaminophen     Tablet .. 650 milliGRAM(s) Oral every 6 hours PRN Temp greater or equal to 38C (100.4F), Mild Pain (1 - 3)  LORazepam   Injectable 2 milliGRAM(s) IV Push once PRN Seizure Activity      RADIOLOGY & ADDITIONAL TESTS:      ACC: [id number] EXAM:  CT BRAIN   ORDERED BY: JOSE LUIS ARTIS     PROCEDURE DATE:  01/31/2024          INTERPRETATION:  CLINICAL INFORMATION: Seizure activity    COMPARISON: CT head 1/8/2024    TECHNIQUE:  Serial axial images were obtained from the skull base to the   vertex using multi-slice helical technique. Sagittal and coronal   reformats were obtained.    FINDINGS:    VENTRICLES AND SULCI: Prominence of cortical sulci, fissures. Moderate   ventricular dilatation with narrow callosal angle indicating component of   communicating hydrocephalus.  INTRA-AXIAL: No intracranial mass, acute hemorrhage, or midline shift.    Mild to moderate periventricular  white matter hypodensity.  EXTRA-AXIAL: No mass or fluid collection. Basal cisterns are normal in   appearance.    VISUALIZED SINUSES:  New moderate fluid levels within sphenoid sinus   interval near complete opacification of right maxillary sinus.  TYMPANOMASTOID CAVITIES:  Clear. Bilateral soft tissue opacity within the   external auditory canals likely related to cerumen VISUALIZED ORBITS:   Normal.  CALVARIUM: Intact.        IMPRESSION:    No significant interval intracranial changes.    No acute intracranial hemorrhage.    Moderate ventricular dilatation indicating component of communicating   hydrocephalus.    Progressive pansinusitis.      ACC: [id number] EXAM:  EEG AWAKE AND DROWSY   ORDERED BY: HOLLIE GOODE     PROCEDURE DATE:  01/31/2024    Impression:  The findings are suggestive of diffuse cerebral   dysfunction/encephalopathy. No epileptiform activity was seen and no  clinical events or seizures were recorded. Consider a repeat study if   clinically indicated.

## 2024-02-02 LAB
ANION GAP SERPL CALC-SCNC: 1 MMOL/L — LOW (ref 5–17)
BUN SERPL-MCNC: 8 MG/DL — SIGNIFICANT CHANGE UP (ref 7–23)
CALCIUM SERPL-MCNC: 8.9 MG/DL — SIGNIFICANT CHANGE UP (ref 8.5–10.1)
CHLORIDE SERPL-SCNC: 106 MMOL/L — SIGNIFICANT CHANGE UP (ref 96–108)
CO2 SERPL-SCNC: 31 MMOL/L — SIGNIFICANT CHANGE UP (ref 22–31)
CREAT SERPL-MCNC: 0.64 MG/DL — SIGNIFICANT CHANGE UP (ref 0.5–1.3)
EGFR: 87 ML/MIN/1.73M2 — SIGNIFICANT CHANGE UP
GLUCOSE SERPL-MCNC: 83 MG/DL — SIGNIFICANT CHANGE UP (ref 70–99)
HCT VFR BLD CALC: 36.7 % — SIGNIFICANT CHANGE UP (ref 34.5–45)
HGB BLD-MCNC: 11.8 G/DL — SIGNIFICANT CHANGE UP (ref 11.5–15.5)
MCHC RBC-ENTMCNC: 30.2 PG — SIGNIFICANT CHANGE UP (ref 27–34)
MCHC RBC-ENTMCNC: 32.2 GM/DL — SIGNIFICANT CHANGE UP (ref 32–36)
MCV RBC AUTO: 93.9 FL — SIGNIFICANT CHANGE UP (ref 80–100)
PLATELET # BLD AUTO: 145 K/UL — LOW (ref 150–400)
POTASSIUM SERPL-MCNC: 3.9 MMOL/L — SIGNIFICANT CHANGE UP (ref 3.5–5.3)
POTASSIUM SERPL-SCNC: 3.9 MMOL/L — SIGNIFICANT CHANGE UP (ref 3.5–5.3)
RBC # BLD: 3.91 M/UL — SIGNIFICANT CHANGE UP (ref 3.8–5.2)
RBC # FLD: 12.8 % — SIGNIFICANT CHANGE UP (ref 10.3–14.5)
SODIUM SERPL-SCNC: 138 MMOL/L — SIGNIFICANT CHANGE UP (ref 135–145)
WBC # BLD: 7.5 K/UL — SIGNIFICANT CHANGE UP (ref 3.8–10.5)
WBC # FLD AUTO: 7.5 K/UL — SIGNIFICANT CHANGE UP (ref 3.8–10.5)

## 2024-02-02 PROCEDURE — 99232 SBSQ HOSP IP/OBS MODERATE 35: CPT

## 2024-02-02 PROCEDURE — 70551 MRI BRAIN STEM W/O DYE: CPT | Mod: 26

## 2024-02-02 RX ORDER — PIMAVANSERIN TARTRATE 10 MG/1
34 TABLET, COATED ORAL DAILY
Refills: 0 | Status: DISCONTINUED | OUTPATIENT
Start: 2024-02-03 | End: 2024-02-05

## 2024-02-02 RX ORDER — HALOPERIDOL DECANOATE 100 MG/ML
1 INJECTION INTRAMUSCULAR ONCE
Refills: 0 | Status: COMPLETED | OUTPATIENT
Start: 2024-02-02 | End: 2024-02-02

## 2024-02-02 RX ADMIN — LEVETIRACETAM 250 MILLIGRAM(S): 250 TABLET, FILM COATED ORAL at 09:40

## 2024-02-02 RX ADMIN — DONEPEZIL HYDROCHLORIDE 10 MILLIGRAM(S): 10 TABLET, FILM COATED ORAL at 23:14

## 2024-02-02 RX ADMIN — HALOPERIDOL DECANOATE 1 MILLIGRAM(S): 100 INJECTION INTRAMUSCULAR at 11:46

## 2024-02-02 RX ADMIN — HEPARIN SODIUM 5000 UNIT(S): 5000 INJECTION INTRAVENOUS; SUBCUTANEOUS at 13:19

## 2024-02-02 RX ADMIN — LEVETIRACETAM 250 MILLIGRAM(S): 250 TABLET, FILM COATED ORAL at 23:15

## 2024-02-02 RX ADMIN — GABAPENTIN 400 MILLIGRAM(S): 400 CAPSULE ORAL at 23:14

## 2024-02-02 RX ADMIN — LEVETIRACETAM 250 MILLIGRAM(S): 250 TABLET, FILM COATED ORAL at 00:37

## 2024-02-02 RX ADMIN — CEFTRIAXONE 1000 MILLIGRAM(S): 500 INJECTION, POWDER, FOR SOLUTION INTRAMUSCULAR; INTRAVENOUS at 06:10

## 2024-02-02 RX ADMIN — HEPARIN SODIUM 5000 UNIT(S): 5000 INJECTION INTRAVENOUS; SUBCUTANEOUS at 06:10

## 2024-02-02 RX ADMIN — HEPARIN SODIUM 5000 UNIT(S): 5000 INJECTION INTRAVENOUS; SUBCUTANEOUS at 23:15

## 2024-02-02 NOTE — PROGRESS NOTE ADULT - ASSESSMENT
83-year-old female with a past medical history of Dementia, Parkinson Dz, HLD, Spinal stenosis, Trigeminal neuralgia  admitted for:       #Acute encephalopathy likely 2/2   #Acute UTI with sepsis and Seizure  Lactate <1  SIRS criteria: Tachycardia, Fever, +source  F/u BCX: NGTD  F/u UCX:  P   C/w Rocephin      # New Onset Seizures  CT head neg for acute findings: stable hydrocephalus   EEG: no epileptic activity   Seizure precautions  Out of bed with assistance/ambulate with assistance  S/p IV  Keppra in ED   D/w Dr Seay, will order MRTI brain, start PO Keppra         #Parkinson  #Dementia  supportive care  C/w Aricept  On Nuplazid  (not available as per pharmacy)    C/w Duloxetine     #Protein calorie malnutrition  dietary consult    #Hypothyroidism  c/w synthroid    # Hypothyroidism   C/w Levothyroxine       # DVT PPX: heparin SQ     # GI PPX: PPIs     Dispo; EP eval  ro evaluate PPM for MRI compatibility MRI pending. PT    83-year-old female with a past medical history of Dementia, Parkinson Dz, HLD, Spinal stenosis, Trigeminal neuralgia  admitted for:     # Acute encephalopathy likely 2/2   # Acute UTI with sepsis and Seizure  Lactate <1  SIRS criteria: Tachycardia, Fever, +source  F/u BCX: NGTD  F/u UCX:  Pending   C/w Rocephin day 2       # New Onset Seizures  CT head neg for acute findings: stable hydrocephalus   EEG: no epileptic activity   Seizure precautions  Out of bed with assistance/ambulate with assistance  S/p IV  Keppra in ED, started on  PO Keppra as per neuro   MRI brain reviewed, no acute findings   D/w Dr Seay,      #Parkinson  #Dementia  supportive care  C/w Aricept  On Nuplazid  ordered   C/w Duloxetine       # Moderate Protein calorie malnutrition  diet as tolerated, needs cut up to small pieces food   would add MVI  when Pt start taking pills       #Hypothyroidism  c/w synthroid      # Hypothyroidism   C/w Levothyroxine       # DVT PPX: heparin SQ     # GI PPX: PPIs     Spoke to Pts daughter, reports Pt is more confused when in the hospital and refusing meds, hopes to send Pt back to [facility] soon   Also Daughter reports that Pt is still RLE non weight bearing status.     Dispo: C/w IV Abxs, f/u UCX, labs in am

## 2024-02-02 NOTE — PROGRESS NOTE ADULT - ASSESSMENT
83 year old woman with dementia who had a witnessed event suspicious for seizure at [facility] and had a second event witnessed in the ED.      Seizure  -Description of events concerning for seizures  -EEG shows generalized slowing  -No clear provoking factor noted, may be secondary to dementia.   -Continue Keppra 250 mg BID  -f/u MRI brain results    Parkinson's  -Unclear when diagnosis was made  -No rigidity seen on exam at this time. Minimal tremor in left hand noted. Would not start any new meds    Dementia  -Continue donepezil 10 mg/day  -Continue Nuplazid 34 mg/day    Will f/u if needed

## 2024-02-02 NOTE — PROGRESS NOTE ADULT - SUBJECTIVE AND OBJECTIVE BOX
CC: Acute encephalopathy (01 Feb 2024 11:32)    HPI:  Patient is an 83-year-old female with a past medical history of depression, hypothyroidism, back pain, Parkinson's, GERD, UTI, anxiety disorder, dementia with behavioral disturbances, major depressive disorder, IBS, CVA, atopic conjunctivitis of right eye, who presents to ED after having witnessed seizure-like activity which lasted 2 to 3 minutes and was described as diffuse body shaking followed by lethargy.  Patient had another witnessed seizure while in ED per ED physician was also tonic-clonic.      ED course: Neurology was called and patient evaluated.  Patient is a poor historian and unable to obtain review of systems.  History obtained by family at bedside.  Per family patient just discharged to subacute rehab after being found to have encephalopathy in setting of UTI.  Patient discharged January 12.  Now presented to ED with heart rate of 104, respirations of 18, blood pressure 136/73, . Urinalysis nitrite and many bacteria. UTI identified. Later became hypotensive to 92/62, T 95, HR 50 however later tachycardic to low 100s. 1500cc IVF given. Vancomycin and Merrem given. Warming blanket placed. BP now 100s/70's, .  Patient had EEG done and CT head negative for any acute findings.    (31 Jan 2024 21:37)    INTERVAL HPI/ OVERNIGHT EVENTS:  Pt was seen and examined        Vital Signs Last 24 Hrs  T(C): 36.4 (02 Feb 2024 07:32), Max: 36.6 (01 Feb 2024 18:37)  T(F): 97.5 (02 Feb 2024 07:32), Max: 97.9 (01 Feb 2024 18:37)  HR: 81 (02 Feb 2024 07:32) (81 - 91)  BP: 145/67 (02 Feb 2024 07:32) (137/72 - 145/67)  RR: 18 (02 Feb 2024 07:32) (18 - 18)  SpO2: 98% (02 Feb 2024 07:32) (94% - 98%)    Parameters below as of 02 Feb 2024 07:32  Patient On (Oxygen Delivery Method): room air          REVIEW OF SYSTEMS:  All other review of systems is negative unless indicated above.      PHYSICAL EXAM:  General: in no acute distress  Eyes: PERRLA, conjunctiva and sclera clear  Head: Normocephalic; atraumatic  ENMT: No nasal discharge; airway clear  Respiratory:  Decreased BS,  No wheezes, rales or rhonchi  Cardiovascular: Regular rate and rhythm. S1 and S2 Normal;   Gastrointestinal: Soft non-tender non-distended; Normal bowel sounds  Genitourinary: No  suprapubic  tenderness  Extremities: No edema LLE, RLE in boot   Neurological: Alert , confused, not following commands, moved UE spontaneously   Musculoskeletal: Normal muscle tone, without deformities      LABS:                         11.8   7.50  )-----------( 145      ( 02 Feb 2024 05:59 )             36.7     02-02    138  |  106  |  8   ----------------------------<  83  3.9   |  31  |  0.64    Ca    8.9      02 Feb 2024 05:59    TPro  5.5<L>  /  Alb  2.2<L>  /  TBili  0.3  /  DBili  x   /  AST  30  /  ALT  19  /  AlkPhos  145<H>  02-01        LIVER FUNCTIONS - ( 01 Feb 2024 06:18 )  Alb: 2.2 g/dL / Pro: 5.5 gm/dL / ALK PHOS: 145 U/L / ALT: 19 U/L / AST: 30 U/L / GGT: x           PT/INR - ( 01 Feb 2024 06:18 )   PT: 11.2 sec;   INR: 0.99 ratio         PTT - ( 01 Feb 2024 06:18 )  PTT:30.3 sec  Urinalysis Basic - ( 02 Feb 2024 05:59 )    Color: x / Appearance: x / SG: x / pH: x  Gluc: 83 mg/dL / Ketone: x  / Bili: x / Urobili: x   Blood: x / Protein: x / Nitrite: x   Leuk Esterase: x / RBC: x / WBC x   Sq Epi: x / Non Sq Epi: x / Bacteria: x                          10.0   7.41  )-----------( 135      ( 01 Feb 2024 06:18 )             31.2     01 Feb 2024 06:18    140    |  109    |  14     ----------------------------<  80     3.7     |  31     |  0.67     Ca    8.8        01 Feb 2024 06:18  Mg     1.9       31 Jan 2024 11:43    TPro  5.5    /  Alb  2.2    /  TBili  0.3    /  DBili  x      /  AST  30     /  ALT  19     /  AlkPhos  145    01 Feb 2024 06:18    PT/INR - ( 01 Feb 2024 06:18 )   PT: 11.2 sec;   INR: 0.99 ratio    PTT - ( 01 Feb 2024 06:18 )  PTT:30.3 sec      LIVER FUNCTIONS - ( 01 Feb 2024 06:18 )  Alb: 2.2 g/dL / Pro: 5.5 gm/dL / ALK PHOS: 145 U/L / ALT: 19 U/L / AST: 30 U/L / GGT: x           Urinalysis Basic - ( 01 Feb 2024 06:18 )  Color: x / Appearance: x / SG: x / pH: x  Gluc: 80 mg/dL / Ketone: x  / Bili: x / Urobili: x   Blood: x / Protein: x / Nitrite: x   Leuk Esterase: x / RBC: x / WBC x   Sq Epi: x / Non Sq Epi: x / Bacteria: x        MEDICATIONS  (STANDING):  cefTRIAXone Injectable. 1000 milliGRAM(s) IV Push every 24 hours  dicyclomine 10 milliGRAM(s) Oral two times a day before meals  donepezil 10 milliGRAM(s) Oral at bedtime  DULoxetine 60 milliGRAM(s) Oral daily  gabapentin 400 milliGRAM(s) Oral two times a day  heparin   Injectable 5000 Unit(s) SubCutaneous every 8 hours  levETIRAcetam 250 milliGRAM(s) Oral two times a day  levothyroxine 25 MICROGram(s) Oral daily  pantoprazole    Tablet 40 milliGRAM(s) Oral before breakfast    MEDICATIONS  (PRN):  acetaminophen     Tablet .. 650 milliGRAM(s) Oral every 6 hours PRN Temp greater or equal to 38C (100.4F), Mild Pain (1 - 3)  LORazepam   Injectable 2 milliGRAM(s) IV Push once PRN Seizure Activity      RADIOLOGY & ADDITIONAL TESTS:      ACC: [id number] EXAM:  CT BRAIN   ORDERED BY: JOSE LUIS ARTIS     PROCEDURE DATE:  01/31/2024          INTERPRETATION:  CLINICAL INFORMATION: Seizure activity    COMPARISON: CT head 1/8/2024    TECHNIQUE:  Serial axial images were obtained from the skull base to the   vertex using multi-slice helical technique. Sagittal and coronal   reformats were obtained.    FINDINGS:    VENTRICLES AND SULCI: Prominence of cortical sulci, fissures. Moderate   ventricular dilatation with narrow callosal angle indicating component of   communicating hydrocephalus.  INTRA-AXIAL: No intracranial mass, acute hemorrhage, or midline shift.    Mild to moderate periventricular  white matter hypodensity.  EXTRA-AXIAL: No mass or fluid collection. Basal cisterns are normal in   appearance.    VISUALIZED SINUSES:  New moderate fluid levels within sphenoid sinus   interval near complete opacification of right maxillary sinus.  TYMPANOMASTOID CAVITIES:  Clear. Bilateral soft tissue opacity within the   external auditory canals likely related to cerumen VISUALIZED ORBITS:   Normal.  CALVARIUM: Intact.        IMPRESSION:    No significant interval intracranial changes.    No acute intracranial hemorrhage.    Moderate ventricular dilatation indicating component of communicating   hydrocephalus.    Progressive pansinusitis.      ACC: [id number] EXAM:  EEG AWAKE AND DROWSY   ORDERED BY: HOLLIE GOODE     PROCEDURE DATE:  01/31/2024    Impression:  The findings are suggestive of diffuse cerebral   dysfunction/encephalopathy. No epileptiform activity was seen and no  clinical events or seizures were recorded. Consider a repeat study if   clinically indicated.   CC: Acute encephalopathy (01 Feb 2024 11:32)    HPI:  Patient is an 83-year-old female with a past medical history of depression, hypothyroidism, back pain, Parkinson's, GERD, UTI, anxiety disorder, dementia with behavioral disturbances, major depressive disorder, IBS, CVA, atopic conjunctivitis of right eye, who presents to ED after having witnessed seizure-like activity which lasted 2 to 3 minutes and was described as diffuse body shaking followed by lethargy.  Patient had another witnessed seizure while in ED per ED physician was also tonic-clonic.      ED course: Neurology was called and patient evaluated.  Patient is a poor historian and unable to obtain review of systems.  History obtained by family at bedside.  Per family patient just discharged to subacute rehab after being found to have encephalopathy in setting of UTI.  Patient discharged January 12.  Now presented to ED with heart rate of 104, respirations of 18, blood pressure 136/73, . Urinalysis nitrite and many bacteria. UTI identified. Later became hypotensive to 92/62, T 95, HR 50 however later tachycardic to low 100s. 1500cc IVF given. Vancomycin and Merrem given. Warming blanket placed. BP now 100s/70's, .  Patient had EEG done and CT head negative for any acute findings.    (31 Jan 2024 21:37)    INTERVAL HPI/ OVERNIGHT EVENTS:  Pt was seen and examined, awake and alert, reports that doing well, unable to provide any other details. Denies pain. As per RN was agitated last night and received IM Haldol. Pt declined her Po Meds     Vital Signs Last 24 Hrs  T(C): 36.4 (02 Feb 2024 07:32), Max: 36.6 (01 Feb 2024 18:37)  T(F): 97.5 (02 Feb 2024 07:32), Max: 97.9 (01 Feb 2024 18:37)  HR: 81 (02 Feb 2024 07:32) (81 - 91)  BP: 145/67 (02 Feb 2024 07:32) (137/72 - 145/67)  RR: 18 (02 Feb 2024 07:32) (18 - 18)  SpO2: 98% (02 Feb 2024 07:32) (94% - 98%)    Parameters below as of 02 Feb 2024 07:32  Patient On (Oxygen Delivery Method): room air        REVIEW OF SYSTEMS:  All other review of systems is negative unless indicated above.      PHYSICAL EXAM:  General: in no acute distress  Eyes: PERRLA, conjunctiva and sclera clear  Head: Normocephalic; atraumatic  ENMT: No nasal discharge; airway clear  Respiratory:  Decreased BS,  No wheezes, rales or rhonchi  Cardiovascular: Regular rate and rhythm. S1 and S2 Normal;   Gastrointestinal: Soft non-tender non-distended; Normal bowel sounds  Genitourinary: No  suprapubic  tenderness  Extremities: No edema LLE, RLE in boot   Neurological: Alert , confused, not following commands, moves  UE, LLE  spontaneously   Musculoskeletal: Normal muscle tone, without deformities      LABS:                         11.8   7.50  )-----------( 145      ( 02 Feb 2024 05:59 )             36.7     02-02    138  |  106  |  8   ----------------------------<  83  3.9   |  31  |  0.64    Ca    8.9      02 Feb 2024 05:59    TPro  5.5<L>  /  Alb  2.2<L>  /  TBili  0.3  /  DBili  x   /  AST  30  /  ALT  19  /  AlkPhos  145<H>  02-01        LIVER FUNCTIONS - ( 01 Feb 2024 06:18 )  Alb: 2.2 g/dL / Pro: 5.5 gm/dL / ALK PHOS: 145 U/L / ALT: 19 U/L / AST: 30 U/L / GGT: x           PT/INR - ( 01 Feb 2024 06:18 )   PT: 11.2 sec;   INR: 0.99 ratio         PTT - ( 01 Feb 2024 06:18 )  PTT:30.3 sec  Urinalysis Basic - ( 02 Feb 2024 05:59 )    Color: x / Appearance: x / SG: x / pH: x  Gluc: 83 mg/dL / Ketone: x  / Bili: x / Urobili: x   Blood: x / Protein: x / Nitrite: x   Leuk Esterase: x / RBC: x / WBC x   Sq Epi: x / Non Sq Epi: x / Bacteria: x                          10.0   7.41  )-----------( 135      ( 01 Feb 2024 06:18 )             31.2     01 Feb 2024 06:18    140    |  109    |  14     ----------------------------<  80     3.7     |  31     |  0.67     Ca    8.8        01 Feb 2024 06:18  Mg     1.9       31 Jan 2024 11:43    TPro  5.5    /  Alb  2.2    /  TBili  0.3    /  DBili  x      /  AST  30     /  ALT  19     /  AlkPhos  145    01 Feb 2024 06:18    PT/INR - ( 01 Feb 2024 06:18 )   PT: 11.2 sec;   INR: 0.99 ratio    PTT - ( 01 Feb 2024 06:18 )  PTT:30.3 sec      LIVER FUNCTIONS - ( 01 Feb 2024 06:18 )  Alb: 2.2 g/dL / Pro: 5.5 gm/dL / ALK PHOS: 145 U/L / ALT: 19 U/L / AST: 30 U/L / GGT: x           Urinalysis Basic - ( 01 Feb 2024 06:18 )  Color: x / Appearance: x / SG: x / pH: x  Gluc: 80 mg/dL / Ketone: x  / Bili: x / Urobili: x   Blood: x / Protein: x / Nitrite: x   Leuk Esterase: x / RBC: x / WBC x   Sq Epi: x / Non Sq Epi: x / Bacteria: x        MEDICATIONS  (STANDING):  cefTRIAXone Injectable. 1000 milliGRAM(s) IV Push every 24 hours  dicyclomine 10 milliGRAM(s) Oral two times a day before meals  donepezil 10 milliGRAM(s) Oral at bedtime  DULoxetine 60 milliGRAM(s) Oral daily  gabapentin 400 milliGRAM(s) Oral two times a day  heparin   Injectable 5000 Unit(s) SubCutaneous every 8 hours  levETIRAcetam 250 milliGRAM(s) Oral two times a day  levothyroxine 25 MICROGram(s) Oral daily  pantoprazole    Tablet 40 milliGRAM(s) Oral before breakfast    MEDICATIONS  (PRN):  acetaminophen     Tablet .. 650 milliGRAM(s) Oral every 6 hours PRN Temp greater or equal to 38C (100.4F), Mild Pain (1 - 3)  LORazepam   Injectable 2 milliGRAM(s) IV Push once PRN Seizure Activity      RADIOLOGY & ADDITIONAL TESTS:      ACC: [id number] EXAM:  CT BRAIN   ORDERED BY: JOSE LUIS ARTIS     PROCEDURE DATE:  01/31/2024          INTERPRETATION:  CLINICAL INFORMATION: Seizure activity    COMPARISON: CT head 1/8/2024    TECHNIQUE:  Serial axial images were obtained from the skull base to the   vertex using multi-slice helical technique. Sagittal and coronal   reformats were obtained.    FINDINGS:    VENTRICLES AND SULCI: Prominence of cortical sulci, fissures. Moderate   ventricular dilatation with narrow callosal angle indicating component of   communicating hydrocephalus.  INTRA-AXIAL: No intracranial mass, acute hemorrhage, or midline shift.    Mild to moderate periventricular  white matter hypodensity.  EXTRA-AXIAL: No mass or fluid collection. Basal cisterns are normal in   appearance.    VISUALIZED SINUSES:  New moderate fluid levels within sphenoid sinus   interval near complete opacification of right maxillary sinus.  TYMPANOMASTOID CAVITIES:  Clear. Bilateral soft tissue opacity within the   external auditory canals likely related to cerumen VISUALIZED ORBITS:   Normal.  CALVARIUM: Intact.        IMPRESSION:    No significant interval intracranial changes.    No acute intracranial hemorrhage.    Moderate ventricular dilatation indicating component of communicating   hydrocephalus.    Progressive pansinusitis.      ACC: [id number] EXAM:  EEG AWAKE AND DROWSY   ORDERED BY: HOLLIE GOODE     PROCEDURE DATE:  01/31/2024    Impression:  The findings are suggestive of diffuse cerebral   dysfunction/encephalopathy. No epileptiform activity was seen and no  clinical events or seizures were recorded. Consider a repeat study if   clinically indicated.

## 2024-02-02 NOTE — CDI QUERY NOTE - NSCDIOTHERTXTBX_GEN_ALL_CORE_HH
This patient was admitted with sepsis, uti, and your clarification is needed regarding the type of encephalopathy:    Progress Note Adult Hospitalist Attending 2-1-24 2 11:57  CC: Acute encephalopathy (01 Feb 2024 11:32)  #Acute encephalopathy likely 2/2   #Acute UTI with sepsis and Seizure    Can the type of encephalopathy be specified?  -Metabolic encephalopathy  -Other type of encephalopathy, please specify:  -Other, please specify:

## 2024-02-02 NOTE — CHART NOTE - NSCHARTNOTEFT_GEN_A_CORE
Confirmed with MDT patient has Advisa PPM, RA lead [id number], RV lead [id number]  Device IS MRI compatible.  Programming recommendations pending interrogation at time of MRI

## 2024-02-02 NOTE — PROGRESS NOTE ADULT - SUBJECTIVE AND OBJECTIVE BOX
Interval History:  2/2/24: Had Haldol for MRI. She is awake and alert but confused, disoriented    MEDICATIONS  (STANDING):  cefTRIAXone Injectable. 1000 milliGRAM(s) IV Push every 24 hours  dicyclomine 10 milliGRAM(s) Oral two times a day before meals  donepezil 10 milliGRAM(s) Oral at bedtime  DULoxetine 60 milliGRAM(s) Oral daily  gabapentin 400 milliGRAM(s) Oral two times a day  heparin   Injectable 5000 Unit(s) SubCutaneous every 8 hours  levETIRAcetam   Injectable 250 milliGRAM(s) IV Push every 12 hours  levothyroxine 25 MICROGram(s) Oral daily  pantoprazole    Tablet 40 milliGRAM(s) Oral before breakfast    MEDICATIONS  (PRN):  acetaminophen     Tablet .. 650 milliGRAM(s) Oral every 6 hours PRN Temp greater or equal to 38C (100.4F), Mild Pain (1 - 3)  LORazepam   Injectable 2 milliGRAM(s) IV Push once PRN Seizure Activity      Allergies    Cleocin HCl (Unknown)  penicillins (Unknown)  latex (Unknown)  sulfa drugs (Unknown)    Intolerances        PHYSICAL EXAM:  Vital Signs Last 24 Hrs  T(F): 97.5 (02-02-24 @ 07:32)  HR: 81 (02-02-24 @ 07:32)  BP: 145/67 (02-02-24 @ 07:32)  RR: 18 (02-02-24 @ 07:32)    GENERAL: NAD  HEAD:  Atraumatic, Normocephalic  Neuro:  Awake, alert  Responds to name. Unable to answer questions appropriately. Does not follow commands  CN: PERRL, EOMI, no nystagmus, no facial weakness, tongue protrudes in the midline  motor: normal tone, moving both upper extremities well. Decreased movements in lower extremities, particularly on right leg which is in a boot  sensory: reacts to touch in all extremities  coordination: no tremors, involuntary movements  gait: unable to assess      LABS:                        11.8   7.50  )-----------( 145      ( 02 Feb 2024 05:59 )             36.7     02-02    138  |  106  |  8   ----------------------------<  83  3.9   |  31  |  0.64    Ca    8.9      02 Feb 2024 05:59    TPro  5.5<L>  /  Alb  2.2<L>  /  TBili  0.3  /  DBili  x   /  AST  30  /  ALT  19  /  AlkPhos  145<H>  02-01    PT/INR - ( 01 Feb 2024 06:18 )   PT: 11.2 sec;   INR: 0.99 ratio         PTT - ( 01 Feb 2024 06:18 )  PTT:30.3 sec  Urinalysis Basic - ( 02 Feb 2024 05:59 )    Color: x / Appearance: x / SG: x / pH: x  Gluc: 83 mg/dL / Ketone: x  / Bili: x / Urobili: x   Blood: x / Protein: x / Nitrite: x   Leuk Esterase: x / RBC: x / WBC x   Sq Epi: x / Non Sq Epi: x / Bacteria: x        RADIOLOGY & ADDITIONAL STUDIES:  CT head 1/31/24:  No significant interval intracranial changes.  No acute intracranial hemorrhage.  Moderate ventricular dilatation indicating component of communicating   hydrocephalus.  Progressive pansinusitis.    EEG 1/31/24:  Moderate generalized slowing

## 2024-02-03 LAB
-  AMOXICILLIN/CLAVULANIC ACID: SIGNIFICANT CHANGE UP
-  AMPICILLIN/SULBACTAM: SIGNIFICANT CHANGE UP
-  AMPICILLIN: SIGNIFICANT CHANGE UP
-  AZTREONAM: SIGNIFICANT CHANGE UP
-  CEFAZOLIN: SIGNIFICANT CHANGE UP
-  CEFEPIME: SIGNIFICANT CHANGE UP
-  CEFOXITIN: SIGNIFICANT CHANGE UP
-  CEFTRIAXONE: SIGNIFICANT CHANGE UP
-  CEFUROXIME: SIGNIFICANT CHANGE UP
-  CIPROFLOXACIN: SIGNIFICANT CHANGE UP
-  ERTAPENEM: SIGNIFICANT CHANGE UP
-  GENTAMICIN: SIGNIFICANT CHANGE UP
-  IMIPENEM: SIGNIFICANT CHANGE UP
-  LEVOFLOXACIN: SIGNIFICANT CHANGE UP
-  MEROPENEM: SIGNIFICANT CHANGE UP
-  NITROFURANTOIN: SIGNIFICANT CHANGE UP
-  PIPERACILLIN/TAZOBACTAM: SIGNIFICANT CHANGE UP
-  TOBRAMYCIN: SIGNIFICANT CHANGE UP
-  TRIMETHOPRIM/SULFAMETHOXAZOLE: SIGNIFICANT CHANGE UP
ANION GAP SERPL CALC-SCNC: 2 MMOL/L — LOW (ref 5–17)
BUN SERPL-MCNC: 20 MG/DL — SIGNIFICANT CHANGE UP (ref 7–23)
CALCIUM SERPL-MCNC: 9.1 MG/DL — SIGNIFICANT CHANGE UP (ref 8.5–10.1)
CHLORIDE SERPL-SCNC: 107 MMOL/L — SIGNIFICANT CHANGE UP (ref 96–108)
CO2 SERPL-SCNC: 29 MMOL/L — SIGNIFICANT CHANGE UP (ref 22–31)
CREAT SERPL-MCNC: 1.24 MG/DL — SIGNIFICANT CHANGE UP (ref 0.5–1.3)
EGFR: 43 ML/MIN/1.73M2 — LOW
GLUCOSE SERPL-MCNC: 100 MG/DL — HIGH (ref 70–99)
METHOD TYPE: SIGNIFICANT CHANGE UP
POTASSIUM SERPL-MCNC: 3.9 MMOL/L — SIGNIFICANT CHANGE UP (ref 3.5–5.3)
POTASSIUM SERPL-SCNC: 3.9 MMOL/L — SIGNIFICANT CHANGE UP (ref 3.5–5.3)
SODIUM SERPL-SCNC: 138 MMOL/L — SIGNIFICANT CHANGE UP (ref 135–145)

## 2024-02-03 PROCEDURE — 99233 SBSQ HOSP IP/OBS HIGH 50: CPT

## 2024-02-03 PROCEDURE — 99232 SBSQ HOSP IP/OBS MODERATE 35: CPT

## 2024-02-03 RX ADMIN — CEFTRIAXONE 1000 MILLIGRAM(S): 500 INJECTION, POWDER, FOR SOLUTION INTRAMUSCULAR; INTRAVENOUS at 05:56

## 2024-02-03 RX ADMIN — Medication 10 MILLIGRAM(S): at 05:55

## 2024-02-03 RX ADMIN — LEVETIRACETAM 250 MILLIGRAM(S): 250 TABLET, FILM COATED ORAL at 10:27

## 2024-02-03 RX ADMIN — LEVETIRACETAM 250 MILLIGRAM(S): 250 TABLET, FILM COATED ORAL at 23:43

## 2024-02-03 RX ADMIN — HEPARIN SODIUM 5000 UNIT(S): 5000 INJECTION INTRAVENOUS; SUBCUTANEOUS at 23:43

## 2024-02-03 RX ADMIN — PANTOPRAZOLE SODIUM 40 MILLIGRAM(S): 20 TABLET, DELAYED RELEASE ORAL at 05:55

## 2024-02-03 RX ADMIN — HEPARIN SODIUM 5000 UNIT(S): 5000 INJECTION INTRAVENOUS; SUBCUTANEOUS at 13:29

## 2024-02-03 RX ADMIN — HEPARIN SODIUM 5000 UNIT(S): 5000 INJECTION INTRAVENOUS; SUBCUTANEOUS at 05:55

## 2024-02-03 RX ADMIN — Medication 25 MICROGRAM(S): at 05:55

## 2024-02-03 NOTE — PROGRESS NOTE ADULT - SUBJECTIVE AND OBJECTIVE BOX
Interval History:  2/3/24: Somnolent this AM    MEDICATIONS  (STANDING):  dicyclomine 10 milliGRAM(s) Oral two times a day before meals  donepezil 10 milliGRAM(s) Oral at bedtime  DULoxetine 60 milliGRAM(s) Oral daily  gabapentin 400 milliGRAM(s) Oral two times a day  heparin   Injectable 5000 Unit(s) SubCutaneous every 8 hours  levETIRAcetam   Injectable 250 milliGRAM(s) IV Push every 12 hours  levothyroxine 25 MICROGram(s) Oral daily  pantoprazole    Tablet 40 milliGRAM(s) Oral before breakfast  pimavanserin Capsule 34 milliGRAM(s) Oral daily    MEDICATIONS  (PRN):  acetaminophen     Tablet .. 650 milliGRAM(s) Oral every 6 hours PRN Temp greater or equal to 38C (100.4F), Mild Pain (1 - 3)  LORazepam   Injectable 2 milliGRAM(s) IV Push once PRN Seizure Activity      Allergies    Cleocin HCl (Unknown)  penicillins (Unknown)  latex (Unknown)  sulfa drugs (Unknown)    Intolerances        PHYSICAL EXAM:  Vital Signs Last 24 Hrs  T(F): 97.5 (02-03-24 @ 07:22)  HR: 87 (02-03-24 @ 07:22)  BP: 97/49 (02-03-24 @ 07:22)  RR: 17 (02-03-24 @ 07:22)    GENERAL: NAD, well-groomed  HEAD:  Atraumatic, Normocephalic  Neuro:  Sleeping  Arouses briefly to noxious stimuli  CN: PERRL, no facial weakness noted  motor: not cooperative with exam. Minimal movement noted   sensory: unreliable  coordination: no tremors noted    LABS:                        11.8   7.50  )-----------( 145      ( 02 Feb 2024 05:59 )             36.7     02-03    138  |  107  |  20  ----------------------------<  100<H>  3.9   |  29  |  1.24    Ca    9.1      03 Feb 2024 07:26        Urinalysis Basic - ( 03 Feb 2024 07:26 )    Color: x / Appearance: x / SG: x / pH: x  Gluc: 100 mg/dL / Ketone: x  / Bili: x / Urobili: x   Blood: x / Protein: x / Nitrite: x   Leuk Esterase: x / RBC: x / WBC x   Sq Epi: x / Non Sq Epi: x / Bacteria: x        RADIOLOGY & ADDITIONAL STUDIES:    CT head 1/31/24:  No significant interval intracranial changes.  No acute intracranial hemorrhage.  Moderate ventricular dilatation indicating component of communicating   hydrocephalus.  Progressive pansinusitis.    EEG 1/31/24:  Moderate generalized slowing    MRI brain 2/2/24:  1. No evidence of acute infarct.  2. Moderate ventricular greater than mild sulcal prominence. While this   may reflect central greater than cortical atrophy, communicating   hydrocephalus may have an overlapping imaging appearance.  3. Moderate pontine and bihemispheric altered white matter signal; a   nonspecific finding which statistically reflects chronic microvascular   ischemic change.  4. Paranasal sinus disease, as above; the significance of which should be   determined on a clinical basis.

## 2024-02-03 NOTE — PROGRESS NOTE ADULT - SUBJECTIVE AND OBJECTIVE BOX
CC: Acute encephalopathy (01 Feb 2024 11:32)    HPI:  Patient is an 83-year-old female with a past medical history of depression, hypothyroidism, back pain, Parkinson's, GERD, UTI, anxiety disorder, dementia with behavioral disturbances, major depressive disorder, IBS, CVA, atopic conjunctivitis of right eye, who presents to ED after having witnessed seizure-like activity which lasted 2 to 3 minutes and was described as diffuse body shaking followed by lethargy.  Patient had another witnessed seizure while in ED per ED physician was also tonic-clonic.          REVIEW OF SYSTEMS:  Unable to obtain       PHYSICAL EXAM:  General: in no acute distress  Eyes: PERRLA, conjunctiva and sclera clear  Head: Normocephalic; atraumatic  ENMT: No nasal discharge; airway clear  Respiratory:  Decreased BS,  No wheezes, rales or rhonchi  Cardiovascular: Regular rate and rhythm. S1 and S2 Normal;   Gastrointestinal: Soft non-tender non-distended; Normal bowel sounds  Genitourinary: No  suprapubic  tenderness  Extremities: No edema LLE, RLE in boot   Neurological: Alert , confused, not following commands, moves  UE, LLE  spontaneously   Musculoskeletal: Normal muscle tone, without deformities                            11.8   7.50  )-----------( 145      ( 02 Feb 2024 05:59 )             36.7     02-03    138  |  107  |  20  ----------------------------<  100<H>  3.9   |  29  |  1.24    Ca    9.1      03 Feb 2024 07:26            Urinalysis Basic - ( 03 Feb 2024 07:26 )    Color: x / Appearance: x / SG: x / pH: x  Gluc: 100 mg/dL / Ketone: x  / Bili: x / Urobili: x   Blood: x / Protein: x / Nitrite: x   Leuk Esterase: x / RBC: x / WBC x   Sq Epi: x / Non Sq Epi: x / Bacteria: x          CAPILLARY BLOOD GLUCOSE          MEDICATIONS  (STANDING):  dicyclomine 10 milliGRAM(s) Oral two times a day before meals  donepezil 10 milliGRAM(s) Oral at bedtime  DULoxetine 60 milliGRAM(s) Oral daily  gabapentin 400 milliGRAM(s) Oral two times a day  heparin   Injectable 5000 Unit(s) SubCutaneous every 8 hours  levETIRAcetam   Injectable 250 milliGRAM(s) IV Push every 12 hours  levothyroxine 25 MICROGram(s) Oral daily  pantoprazole    Tablet 40 milliGRAM(s) Oral before breakfast  pimavanserin Capsule 34 milliGRAM(s) Oral daily    MEDICATIONS  (PRN):  acetaminophen     Tablet .. 650 milliGRAM(s) Oral every 6 hours PRN Temp greater or equal to 38C (100.4F), Mild Pain (1 - 3)  LORazepam   Injectable 2 milliGRAM(s) IV Push once PRN Seizure Activity

## 2024-02-03 NOTE — PROGRESS NOTE ADULT - ASSESSMENT
83-year-old female with a past medical history of Dementia, Parkinson Dz, HLD, Spinal stenosis, Trigeminal neuralgia  admitted for:     # Acute encephalopathy likely 2/2   # Acute UTI with sepsis and Seizure  -s/p 3 days of abx     #Seizure -ct keppra     # New Onset Seizures  -ct keppra       #Parkinson    #Dementia  supportive care  C/w Aricept  On Nuplazid    C/w Duloxetine       # Moderate Protein calorie malnutrition  diet as tolerated, needs cut up to small pieces food   would add MVI  when Pt start taking pills       #Hypothyroidism  c/w synthroid      # Hypothyroidism   C/w Levothyroxine       # DVT PPX: heparin SQ     # GI PPX: PPIs     #d/c plan

## 2024-02-03 NOTE — PROGRESS NOTE ADULT - ASSESSMENT
83 year old woman with dementia who had a witnessed event suspicious for seizure at [facility] and had a second event witnessed in the ED.      Seizure  -Description of events concerning for seizures  -EEG shows generalized slowing  -No clear provoking factor noted, may be secondary to dementia.   -Continue Keppra 250 mg BID  -MRI brain with age related changes, ventriculomegaly, microvascular ischemic disease.     Parkinson's  -Unclear when diagnosis was made  -No rigidity seen on exam at this time. Minimal tremor in left hand noted. Would not start any new meds    Dementia  -Continue donepezil 10 mg/day  -Continue Nuplazid 34 mg/day      Please call back if additional input is needed from neurology service.  Dr. Venegas will cover beginning 2/4/24.

## 2024-02-04 LAB
CULTURE RESULTS: ABNORMAL
ORGANISM # SPEC MICROSCOPIC CNT: ABNORMAL
ORGANISM # SPEC MICROSCOPIC CNT: SIGNIFICANT CHANGE UP
SPECIMEN SOURCE: SIGNIFICANT CHANGE UP

## 2024-02-04 PROCEDURE — 99233 SBSQ HOSP IP/OBS HIGH 50: CPT

## 2024-02-04 RX ADMIN — LEVETIRACETAM 250 MILLIGRAM(S): 250 TABLET, FILM COATED ORAL at 10:10

## 2024-02-04 RX ADMIN — HEPARIN SODIUM 5000 UNIT(S): 5000 INJECTION INTRAVENOUS; SUBCUTANEOUS at 21:48

## 2024-02-04 RX ADMIN — LEVETIRACETAM 250 MILLIGRAM(S): 250 TABLET, FILM COATED ORAL at 21:48

## 2024-02-04 RX ADMIN — HEPARIN SODIUM 5000 UNIT(S): 5000 INJECTION INTRAVENOUS; SUBCUTANEOUS at 05:35

## 2024-02-04 RX ADMIN — HEPARIN SODIUM 5000 UNIT(S): 5000 INJECTION INTRAVENOUS; SUBCUTANEOUS at 14:22

## 2024-02-04 NOTE — PROGRESS NOTE ADULT - NUTRITIONAL ASSESSMENT
This patient has been assessed with a concern for Malnutrition and has been determined to have a diagnosis/diagnoses of Moderate protein-calorie malnutrition.    This patient is being managed with:   Diet Regular-  Supplement Feeding Modality:  Oral  Ensure Plus High Protein Cans or Servings Per Day:  1       Frequency:  Two Times a day  Entered: Feb 1 2024  1:42PM    Diet Regular-  Entered: Jan 31 2024 10:13PM    The following pending diet order is being considered for treatment of Moderate protein-calorie malnutrition:null

## 2024-02-05 ENCOUNTER — TRANSCRIPTION ENCOUNTER (OUTPATIENT)
Age: 84
End: 2024-02-05

## 2024-02-05 VITALS
SYSTOLIC BLOOD PRESSURE: 122 MMHG | TEMPERATURE: 98 F | RESPIRATION RATE: 18 BRPM | DIASTOLIC BLOOD PRESSURE: 52 MMHG | OXYGEN SATURATION: 95 % | HEART RATE: 78 BPM

## 2024-02-05 LAB
CULTURE RESULTS: SIGNIFICANT CHANGE UP
CULTURE RESULTS: SIGNIFICANT CHANGE UP
SPECIMEN SOURCE: SIGNIFICANT CHANGE UP
SPECIMEN SOURCE: SIGNIFICANT CHANGE UP

## 2024-02-05 PROCEDURE — 93279 PRGRMG DEV EVAL PM/LDLS PM: CPT | Mod: 26

## 2024-02-05 PROCEDURE — 99239 HOSP IP/OBS DSCHRG MGMT >30: CPT

## 2024-02-05 RX ORDER — LEVETIRACETAM 250 MG/1
250 TABLET, FILM COATED ORAL
Refills: 0 | Status: DISCONTINUED | OUTPATIENT
Start: 2024-02-05 | End: 2024-02-05

## 2024-02-05 RX ORDER — LEVETIRACETAM 250 MG/1
1 TABLET, FILM COATED ORAL
Qty: 60 | Refills: 0
Start: 2024-02-05 | End: 2024-03-05

## 2024-02-05 RX ADMIN — LEVETIRACETAM 250 MILLIGRAM(S): 250 TABLET, FILM COATED ORAL at 09:17

## 2024-02-05 RX ADMIN — HEPARIN SODIUM 5000 UNIT(S): 5000 INJECTION INTRAVENOUS; SUBCUTANEOUS at 06:05

## 2024-02-05 NOTE — DISCHARGE NOTE PROVIDER - HOSPITAL COURSE
HPI:  Patient is an 83-year-old female with a past medical history of depression, hypothyroidism, back pain, Parkinson's, GERD, UTI, anxiety disorder, dementia with behavioral disturbances, major depressive disorder, IBS, CVA, atopic conjunctivitis of right eye, who presents to ED after having witnessed seizure-like activity which lasted 2 to 3 minutes and was described as diffuse body shaking followed by lethargy.  Patient had another witnessed seizure while in ED per ED physician was also tonic-clonic.      #pt has been managed here following medical conditions-     # Acute encephalopathy likely 2/2 to acute uti and seizure -resolved     # Acute UTI with sepsis   -s/p 3 days of abx     #Seizure -ct keppra     #Dementia  supportive care  C/w Aricept  On Nuplazid    C/w Duloxetine       # Moderate Protein calorie malnutrition  diet as tolerated, needs cut up to small pieces food     #Hypothyroidism   with synthroid    #Currently pt is medically stable and is being discharged with further management as an outpt, time spent 45 minutes     Vital Signs Last 24 Hrs  T(C): 36.5 (05 Feb 2024 09:11), Max: 36.7 (04 Feb 2024 15:47)  T(F): 97.7 (05 Feb 2024 09:11), Max: 98.1 (04 Feb 2024 15:47)  HR: 78 (05 Feb 2024 09:11) (78 - 88)  BP: 122/52 (05 Feb 2024 09:11) (122/52 - 128/70)  BP(mean): 78 (04 Feb 2024 15:47) (78 - 78)  RR: 18 (05 Feb 2024 09:11) (18 - 18)  SpO2: 95% (05 Feb 2024 09:11) (92% - 95%)    Parameters below as of 05 Feb 2024 09:11  Patient On (Oxygen Delivery Method): room air        General: comfortable   Head- Atraumatic, normocephalic   Neurology: Alert, follows command, appears at baseline   HEENT- PERRLA, moist muscous membrane  Neck-supple, no JVD  Respiratory: Air entry equal b/l, CTA   CVS:  S1S2, no murmurs, rubs or gallops  Abdominal: Soft, NT, ND +BS,   Genitourinary- voiding, non palpable bladder  Extremities: No edema, + peripheral pulses  Skin- no rash, no ulcer  Psychiatric- mood stable   LN- no lymphadenopathy         02-05-24 @ 09:38

## 2024-02-05 NOTE — DISCHARGE NOTE NURSING/CASE MANAGEMENT/SOCIAL WORK - NSDCVIVACCINE_GEN_ALL_CORE_FT
Tdap; 09-Dec-2023 09:29; Hafsa Hughes (RN); [employer]; [id number] (Exp. Date: 23-Nov-2025); IntraMuscular; Deltoid Right.; 0.5 milliLiter(s); VIS (VIS Published: 09-May-2013, VIS Presented: 09-Dec-2023);    urged to stop alchohol

## 2024-02-05 NOTE — DISCHARGE NOTE PROVIDER - CARE PROVIDER_API CALL
Isidro Augustin  Internal Medicine  [address] [facility], [address]  [place] [address]  Phone: [phone]  Fax: [phone]  Follow Up Time: 1 week    Leia Seay  Neurology  [address] [place] [address], [address]  [place] [address]  Phone: [phone]  Fax: [phone]  Follow Up Time: 2 weeks

## 2024-02-05 NOTE — DISCHARGE NOTE PROVIDER - NSDCCPCAREPLAN_GEN_ALL_CORE_FT
PRINCIPAL DISCHARGE DIAGNOSIS  Diagnosis: Seizure-like activity  Assessment and Plan of Treatment:       SECONDARY DISCHARGE DIAGNOSES  Diagnosis: Hypothermia  Assessment and Plan of Treatment:

## 2024-02-05 NOTE — DISCHARGE NOTE PROVIDER - PROVIDER TOKENS
PROVIDER:[TOKEN:[[id number]],FOLLOWUP:[1 week]],PROVIDER:[TOKEN:[[id number]],FOLLOWUP:[2 weeks]]

## 2024-02-05 NOTE — DISCHARGE NOTE NURSING/CASE MANAGEMENT/SOCIAL WORK - PATIENT PORTAL LINK FT
You can access the FollowMyHealth Patient Portal offered by [facility] by registering at the following website: [email or web address]. By joining [employer]’s FollowMyHealth portal, you will also be able to view your health information using other applications (apps) compatible with our system.

## 2024-02-05 NOTE — DISCHARGE NOTE PROVIDER - CARE PROVIDERS DIRECT ADDRESSES
[email or web address]

## 2024-02-05 NOTE — DISCHARGE NOTE NURSING/CASE MANAGEMENT/SOCIAL WORK - NSDCPEFALRISK_GEN_ALL_CORE
For information on Fall & Injury Prevention, visit: [email or web address] OR  [email or web address] OR  https://www.cdc.gov/steadi/patient.html

## 2024-02-05 NOTE — DISCHARGE NOTE PROVIDER - NSDCMRMEDTOKEN_GEN_ALL_CORE_FT
Angelica Cabrera is a 14 year old male presents to the Urgent Care clinic today with complaints of left ankle and foot pain    Symptom Onset: Patient states that since Monday he has been having pain around his ankle area, lateral side of left foot and pain near left big toe. No known injuries to affected areas, patient describes pain as a constant \" achy/ throbbing\" pain. No signs of swelling or discoloration at this time.    OTC Medication taken/ Treatment Tried: No OTC medication taken or treatment tried.       Patient would like communication of their results via:        Cell Phone: [phone]    Okay to leave a message containing results? Yes        Allergies and medication were reviewed and updated if necessary.    Pharmacy reviewed and updated to patient's preference     Patient advised staff will contact with positive results Only. Patient verbalized understanding. Patient instructed can also view results on [employer].   Aricept 10 mg oral tablet: 1 tab(s) orally once a day (at bedtime)  dicyclomine 10 mg oral capsule: 1 cap(s) orally 2 times a day  DULoxetine 60 mg oral delayed release capsule: 1 cap(s) orally once a day  levETIRAcetam 250 mg oral tablet: 1 tab(s) orally 2 times a day  levothyroxine 25 mcg (0.025 mg) oral tablet: 1 tab(s) orally once a day  Neurontin 400 mg oral capsule: 1 cap(s) orally 2 times a day  Nuplazid 34 mg oral capsule: 1 cap(s) orally once a day  pantoprazole 40 mg oral delayed release tablet: 1 tab(s) orally once a day (before a meal)  Tylenol Extra Strength 500 mg oral tablet: 2 tab(s) orally 2 times a day as needed for pain  Zeasorb Talc Powder: apply under breasts as needed

## 2024-02-09 NOTE — PROCEDURE NOTE - INTERROGATION NOTE: COMMENTS
[facility] - Neurosurgery ([facility])      HOME HEALTH ORDERS  FACE TO FACE ENCOUNTER    Patient Name: Jez Gardner  YOB: 1952    PCP: Bassam Graves MD   PCP Address: [id number] [place] [id number]  PCP Phone Number: [phone]  PCP Fax: [phone]    Encounter Date: 2/5/24    Admit to Home Health    Diagnoses:  Active Hospital Problems    Diagnosis  POA    *Transverse myelitis [G37.3]  Yes    Urinary incontinence [R32]  Yes    Hyponatremia [E87.1]  No    Cord compression [G95.20]  Yes    Type 2 diabetes mellitus without complication, without long-term current use of insulin [E11.9]  Yes    BPH (benign prostatic hyperplasia) [N40.0]  Yes    Hypothyroid [E03.9]  Yes    Debility [R53.81]  Yes      Resolved Hospital Problems   No resolved problems to display.       Follow Up Appointments:  Future Appointments   Date Time Provider Department Center   3/12/2024  9:00 AM David Edwards MD [facility]       Allergies:  Review of patient's allergies indicates:   Allergen Reactions    Penicillins        Medications: Review discharge medications with patient and family and provide education.    Current Facility-Administered Medications   Medication Dose Route Frequency Provider Last Rate Last Admin    atorvastatin tablet 40 mg  40 mg Oral Daily Ilya Ortega MD   40 mg at 02/09/24 0922    baclofen tablet 20 mg  20 mg Oral TID Love, Mini L, NP   20 mg at 02/09/24 0922    bisacodyL suppository 10 mg  10 mg Rectal Daily PRN Liv, Mini L, NP        dextrose 10% bolus 125 mL 125 mL  12.5 g Intravenous PRN Love, Mini L, NP        dextrose 10% bolus 250 mL 250 mL  25 g Intravenous PRN Love, Mini L, NP        enoxaparin injection 40 mg  40 mg Subcutaneous Q24H (prophylaxis, 1700) Ilya Ortega MD   40 mg at 02/08/24 1709    gabapentin capsule 600 mg  600 mg Oral TID Love, Mini L, NP   600 mg at 02/09/24 0922    glucagon (human recombinant) injection 1 mg  1 mg  Intramuscular PRN Love, Mini L, NP        insulin aspart U-100 pen 0-10 Units  0-10 Units Subcutaneous Q6H PRN Love, Mini L, NP   2 Units at 02/09/24 1136    levothyroxine tablet 25 mcg  25 mcg Oral Before breakfast Love, Mini L, NP   25 mcg at 02/09/24 0705    magnesium oxide tablet 800 mg  800 mg Oral PRN Love, Mini L, NP   800 mg at 02/09/24 0922    magnesium oxide tablet 800 mg  800 mg Oral PRN Love, Mini L, NP        ondansetron injection 4 mg  4 mg Intravenous Q8H PRN Love, Mini L, NP   4 mg at 02/08/24 0533    oxyCODONE immediate release tablet Tab 10 mg  10 mg Oral Q6H PRN Love, Mini L, NP   10 mg at 02/08/24 2037    polyethylene glycol packet 17 g  17 g Oral Daily Love, Mini L, NP   17 g at 02/07/24 1456    potassium bicarbonate disintegrating tablet 35 mEq  35 mEq Oral PRN Love, Mini L, NP        potassium bicarbonate disintegrating tablet 50 mEq  50 mEq Oral PRN Love, Mini L, NP        potassium bicarbonate disintegrating tablet 60 mEq  60 mEq Oral PRN Love, Mini L, NP        potassium, sodium phosphates 280-160-250 mg packet 2 packet  2 packet Oral PRN Love, Mini L, NP   2 packet at 02/07/24 1105    potassium, sodium phosphates 280-160-250 mg packet 2 packet  2 packet Oral PRN Love, Mini L, NP   2 packet at 02/07/24 0804    potassium, sodium phosphates 280-160-250 mg packet 2 packet  2 packet Oral PRN Love, Mini L, NP        pramipexole tablet 0.75 mg  0.75 mg Oral QHS Ilya Ortega MD   0.75 mg at 02/08/24 2044    senna-docusate 8.6-50 mg per tablet 2 tablet  2 tablet Oral BID Ilya Ortega MD   2 tablet at 02/08/24 2044    tamsulosin 24 hr capsule 0.4 mg  0.4 mg Oral Daily Love, Mini L, NP   0.4 mg at 02/09/24 0921     Current Discharge Medication List        CONTINUE these medications which have NOT CHANGED    Details   levothyroxine (SYNTHROID) 25 MCG tablet Take 25 mcg by mouth before breakfast.      NOVOLOG FLEXPEN U-100 INSULIN 100 unit/mL (3 mL) InPn pen Inject 0-15  Units into the skin 3 (three) times daily with meals.      OZEMPIC 0.25 mg or 0.5 mg (2 mg/3 mL) pen injector Inject 0.5 mg into the skin every 7 days.      TRESIBA FLEXTOUCH U-100 100 unit/mL (3 mL) insulin pen Inject into the skin.      atorvastatin (LIPITOR) 40 MG tablet Take 40 mg by mouth once daily.      baclofen (LIORESAL) 20 MG tablet Take 20 mg by mouth 4 (four) times daily.      enalapril (VASOTEC) 10 MG tablet Take 10 mg by mouth once daily.      famotidine (PEPCID) 20 MG tablet Take 20 mg by mouth 2 (two) times daily.      gabapentin (NEURONTIN) 600 MG tablet Take 600 mg by mouth 3 (three) times daily.      HYDROcodone-acetaminophen (NORCO) 7.5-325 mg per tablet Take 1 tablet by mouth every 8 (eight) hours as needed for Pain.      meloxicam (MOBIC) 15 MG tablet Take 15 mg by mouth once daily.      metFORMIN (GLUCOPHAGE) 500 MG tablet Take 500 mg by mouth 3 (three) times daily.      pramipexole (MIRAPEX) 0.75 MG tablet Take 0.75 mg by mouth once daily.      tamsulosin (FLOMAX) 0.4 mg Cap Take 0.4 mg by mouth once daily.      triamcinolone acetonide 0.1% (KENALOG) 0.1 % cream Apply 1 g topically 3 (three) times daily as needed.               I have seen and examined this patient within the last 30 days. My clinical findings that support the need for the home health skilled services and home bound status are the following:no   Weakness/numbness causing balance and gait disturbance due to Weakness/Debility making it taxing to leave home.     Diet:   diabetic diet 2000 calorie    Labs:  Report Lab results to PCP.    Referrals/ Consults  Physical Therapy to evaluate and treat. Evaluate for home safety and equipment needs; Establish/upgrade home exercise program. Perform / instruct on therapeutic exercises, gait training, transfer training, and Range of Motion.  Occupational Therapy to evaluate and treat. Evaluate home environment for safety and equipment needs. Perform/Instruct on transfers, ADL training, ROM,  and therapeutic exercises.    Activities:   activity as tolerated    Nursing:   Agency to admit patient within 24 hours of hospital discharge unless specified on physician order or at patient request    SN to complete comprehensive assessment including routine vital signs. Instruct on disease process and s/s of complications to report to MD. Review/verify medication list sent home with the patient at time of discharge  and instruct patient/caregiver as needed. Frequency may be adjusted depending on start of care date.     Skilled nurse to perform up to 3 visits PRN for symptoms related to diagnosis    Notify MD if SBP > 160 or < 90; DBP > 90 or < 50; HR > 120 or < 50; Temp > 101; O2 < 88%    Ok to schedule additional visits based on staff availability and patient request on consecutive days within the home health episode.    When multiple disciplines ordered:    Start of Care occurs on Sunday - Wednesday schedule remaining discipline evaluations as ordered on separate consecutive days following the start of care.    Thursday SOC -schedule subsequent evaluations Friday and Monday the following week.     Friday - Saturday SOC - schedule subsequent discipline evaluations on consecutive days starting Monday of the following week.    For all post-discharge communication and subsequent orders please contact patient's primary care physician.     Miscellaneous   Routine Skin for Bedridden Patients: Instruct patient/caregiver to apply moisture barrier cream to all skin folds and wet areas in perineal area daily and after baths and all bowel movements.    Home Health Aide:  Nursing Weekly, Physical Therapy Three times weekly, and Occupational Therapy Twice weekly and Three times weekly    Wound Care Orders  no    I certify that this patient is confined to his home and needs intermittent skilled nursing care, physical therapy, and occupational therapy.           SR, not pacer dependent

## 2024-02-15 DIAGNOSIS — Z91.040 LATEX ALLERGY STATUS: ICD-10-CM

## 2024-02-15 DIAGNOSIS — Z88.2 ALLERGY STATUS TO SULFONAMIDES: ICD-10-CM

## 2024-02-15 DIAGNOSIS — Z88.8 ALLERGY STATUS TO OTHER DRUGS, MEDICAMENTS AND BIOLOGICAL SUBSTANCES: ICD-10-CM

## 2024-02-15 DIAGNOSIS — K21.9 GASTRO-ESOPHAGEAL REFLUX DISEASE WITHOUT ESOPHAGITIS: ICD-10-CM

## 2024-02-15 DIAGNOSIS — Z11.52 ENCOUNTER FOR SCREENING FOR COVID-19: ICD-10-CM

## 2024-02-15 DIAGNOSIS — Z87.81 PERSONAL HISTORY OF (HEALED) TRAUMATIC FRACTURE: ICD-10-CM

## 2024-02-15 DIAGNOSIS — N39.0 URINARY TRACT INFECTION, SITE NOT SPECIFIED: ICD-10-CM

## 2024-02-15 DIAGNOSIS — Z95.0 PRESENCE OF CARDIAC PACEMAKER: ICD-10-CM

## 2024-02-15 DIAGNOSIS — G91.9 HYDROCEPHALUS, UNSPECIFIED: ICD-10-CM

## 2024-02-15 DIAGNOSIS — A41.9 SEPSIS, UNSPECIFIED ORGANISM: ICD-10-CM

## 2024-02-15 DIAGNOSIS — F02.83 DEMENTIA IN OTHER DISEASES CLASSIFIED ELSEWHERE, UNSPECIFIED SEVERITY, WITH MOOD DISTURBANCE: ICD-10-CM

## 2024-02-15 DIAGNOSIS — Z79.890 HORMONE REPLACEMENT THERAPY: ICD-10-CM

## 2024-02-15 DIAGNOSIS — Z86.73 PERSONAL HISTORY OF TRANSIENT ISCHEMIC ATTACK (TIA), AND CEREBRAL INFARCTION WITHOUT RESIDUAL DEFICITS: ICD-10-CM

## 2024-02-15 DIAGNOSIS — Z88.0 ALLERGY STATUS TO PENICILLIN: ICD-10-CM

## 2024-02-15 DIAGNOSIS — Z66 DO NOT RESUSCITATE: ICD-10-CM

## 2024-02-15 DIAGNOSIS — R56.9 UNSPECIFIED CONVULSIONS: ICD-10-CM

## 2024-02-15 DIAGNOSIS — G93.41 METABOLIC ENCEPHALOPATHY: ICD-10-CM

## 2024-02-15 DIAGNOSIS — E03.9 HYPOTHYROIDISM, UNSPECIFIED: ICD-10-CM

## 2024-02-15 DIAGNOSIS — G20.A1 PARKINSON'S DISEASE WITHOUT DYSKINESIA, WITHOUT MENTION OF FLUCTUATIONS: ICD-10-CM

## 2024-02-15 DIAGNOSIS — F02.84 DEMENTIA IN OTHER DISEASES CLASSIFIED ELSEWHERE, UNSPECIFIED SEVERITY, WITH ANXIETY: ICD-10-CM

## 2024-02-15 DIAGNOSIS — E44.0 MODERATE PROTEIN-CALORIE MALNUTRITION: ICD-10-CM

## 2024-02-15 DIAGNOSIS — E78.5 HYPERLIPIDEMIA, UNSPECIFIED: ICD-10-CM

## 2024-02-15 DIAGNOSIS — F05 DELIRIUM DUE TO KNOWN PHYSIOLOGICAL CONDITION: ICD-10-CM

## 2024-03-05 ENCOUNTER — EMERGENCY (EMERGENCY)
Facility: HOSPITAL | Age: 84
LOS: 0 days | Discharge: HOME | End: 2024-03-05
Attending: STUDENT IN AN ORGANIZED HEALTH CARE EDUCATION/TRAINING PROGRAM
Payer: MEDICARE

## 2024-03-05 VITALS
SYSTOLIC BLOOD PRESSURE: 125 MMHG | HEIGHT: 65 IN | RESPIRATION RATE: 16 BRPM | OXYGEN SATURATION: 97 % | DIASTOLIC BLOOD PRESSURE: 55 MMHG | HEART RATE: 64 BPM | WEIGHT: 169.09 LBS

## 2024-03-05 VITALS
OXYGEN SATURATION: 100 % | HEART RATE: 73 BPM | RESPIRATION RATE: 17 BRPM | DIASTOLIC BLOOD PRESSURE: 63 MMHG | SYSTOLIC BLOOD PRESSURE: 104 MMHG

## 2024-03-05 DIAGNOSIS — F03.90 UNSPECIFIED DEMENTIA WITHOUT BEHAVIORAL DISTURBANCE: ICD-10-CM

## 2024-03-05 DIAGNOSIS — Z91.040 LATEX ALLERGY STATUS: ICD-10-CM

## 2024-03-05 DIAGNOSIS — E78.5 HYPERLIPIDEMIA, UNSPECIFIED: ICD-10-CM

## 2024-03-05 DIAGNOSIS — F02.80 DEMENTIA IN OTHER DISEASES CLASSIFIED ELSEWHERE, UNSPECIFIED SEVERITY, WITHOUT BEHAVIORAL DISTURBANCE, PSYCHOTIC DISTURBANCE, MOOD DISTURBANCE, AND ANXIETY: ICD-10-CM

## 2024-03-05 DIAGNOSIS — G20.A1 PARKINSON'S DISEASE WITHOUT DYSKINESIA, WITHOUT MENTION OF FLUCTUATIONS: ICD-10-CM

## 2024-03-05 DIAGNOSIS — Z88.1 ALLERGY STATUS TO OTHER ANTIBIOTIC AGENTS STATUS: ICD-10-CM

## 2024-03-05 DIAGNOSIS — Z88.0 ALLERGY STATUS TO PENICILLIN: ICD-10-CM

## 2024-03-05 DIAGNOSIS — Z88.2 ALLERGY STATUS TO SULFONAMIDES: ICD-10-CM

## 2024-03-05 LAB
ANION GAP SERPL CALC-SCNC: 3 MMOL/L — LOW (ref 5–17)
BASOPHILS # BLD AUTO: 0.06 K/UL — SIGNIFICANT CHANGE UP (ref 0–0.2)
BASOPHILS NFR BLD AUTO: 0.7 % — SIGNIFICANT CHANGE UP (ref 0–2)
BUN SERPL-MCNC: 21 MG/DL — SIGNIFICANT CHANGE UP (ref 7–23)
CALCIUM SERPL-MCNC: 9.4 MG/DL — SIGNIFICANT CHANGE UP (ref 8.5–10.1)
CHLORIDE SERPL-SCNC: 107 MMOL/L — SIGNIFICANT CHANGE UP (ref 96–108)
CO2 SERPL-SCNC: 31 MMOL/L — SIGNIFICANT CHANGE UP (ref 22–31)
CREAT SERPL-MCNC: 0.74 MG/DL — SIGNIFICANT CHANGE UP (ref 0.5–1.3)
EGFR: 80 ML/MIN/1.73M2 — SIGNIFICANT CHANGE UP
EOSINOPHIL # BLD AUTO: 0.39 K/UL — SIGNIFICANT CHANGE UP (ref 0–0.5)
EOSINOPHIL NFR BLD AUTO: 4.7 % — SIGNIFICANT CHANGE UP (ref 0–6)
GLUCOSE SERPL-MCNC: 91 MG/DL — SIGNIFICANT CHANGE UP (ref 70–99)
HCT VFR BLD CALC: 35.3 % — SIGNIFICANT CHANGE UP (ref 34.5–45)
HGB BLD-MCNC: 11.2 G/DL — LOW (ref 11.5–15.5)
IMM GRANULOCYTES NFR BLD AUTO: 0.4 % — SIGNIFICANT CHANGE UP (ref 0–0.9)
LYMPHOCYTES # BLD AUTO: 2.29 K/UL — SIGNIFICANT CHANGE UP (ref 1–3.3)
LYMPHOCYTES # BLD AUTO: 27.4 % — SIGNIFICANT CHANGE UP (ref 13–44)
MCHC RBC-ENTMCNC: 30.4 PG — SIGNIFICANT CHANGE UP (ref 27–34)
MCHC RBC-ENTMCNC: 31.7 GM/DL — LOW (ref 32–36)
MCV RBC AUTO: 95.9 FL — SIGNIFICANT CHANGE UP (ref 80–100)
MONOCYTES # BLD AUTO: 0.77 K/UL — SIGNIFICANT CHANGE UP (ref 0–0.9)
MONOCYTES NFR BLD AUTO: 9.2 % — SIGNIFICANT CHANGE UP (ref 2–14)
NEUTROPHILS # BLD AUTO: 4.81 K/UL — SIGNIFICANT CHANGE UP (ref 1.8–7.4)
NEUTROPHILS NFR BLD AUTO: 57.6 % — SIGNIFICANT CHANGE UP (ref 43–77)
PLATELET # BLD AUTO: 159 K/UL — SIGNIFICANT CHANGE UP (ref 150–400)
POTASSIUM SERPL-MCNC: 4.2 MMOL/L — SIGNIFICANT CHANGE UP (ref 3.5–5.3)
POTASSIUM SERPL-SCNC: 4.2 MMOL/L — SIGNIFICANT CHANGE UP (ref 3.5–5.3)
RBC # BLD: 3.68 M/UL — LOW (ref 3.8–5.2)
RBC # FLD: 13.3 % — SIGNIFICANT CHANGE UP (ref 10.3–14.5)
SODIUM SERPL-SCNC: 141 MMOL/L — SIGNIFICANT CHANGE UP (ref 135–145)
WBC # BLD: 8.35 K/UL — SIGNIFICANT CHANGE UP (ref 3.8–10.5)
WBC # FLD AUTO: 8.35 K/UL — SIGNIFICANT CHANGE UP (ref 3.8–10.5)

## 2024-03-05 PROCEDURE — 93010 ELECTROCARDIOGRAM REPORT: CPT

## 2024-03-05 PROCEDURE — 99284 EMERGENCY DEPT VISIT MOD MDM: CPT

## 2024-03-05 PROCEDURE — 72125 CT NECK SPINE W/O DYE: CPT | Mod: 26,MC

## 2024-03-05 PROCEDURE — 72125 CT NECK SPINE W/O DYE: CPT | Mod: MC

## 2024-03-05 PROCEDURE — 36415 COLL VENOUS BLD VENIPUNCTURE: CPT

## 2024-03-05 PROCEDURE — 93005 ELECTROCARDIOGRAM TRACING: CPT

## 2024-03-05 PROCEDURE — 80048 BASIC METABOLIC PNL TOTAL CA: CPT

## 2024-03-05 PROCEDURE — 85025 COMPLETE CBC W/AUTO DIFF WBC: CPT

## 2024-03-05 PROCEDURE — 70450 CT HEAD/BRAIN W/O DYE: CPT | Mod: 26,MC

## 2024-03-05 PROCEDURE — 99285 EMERGENCY DEPT VISIT HI MDM: CPT | Mod: 25

## 2024-03-05 PROCEDURE — 70450 CT HEAD/BRAIN W/O DYE: CPT | Mod: MC

## 2024-03-05 NOTE — ED PROVIDER NOTE - CLINICAL SUMMARY MEDICAL DECISION MAKING FREE TEXT BOX
Patient presenting for fall out of wheel chair at nursing home. Unknown headstrike. no reported LOC. patient at baseline mental status. Does not complain of pain. no signs of trauma on exam. CTs, blood work, and ekg with no acute findings.

## 2024-03-05 NOTE — ED ADULT NURSE NOTE - CHIEF COMPLAINT QUOTE
patient brought in by EMS from [facility] assisted living s/p unwitnessed fall out of wheelchair.  patient was found by staff in room in front of wheelchair.  unknown head strike.  no anticoagulation use.  hx dementia - A&Ox1 at baseline.  patient unable to provide hx.  neuro alert initiated in triage, sent directly to CT.

## 2024-03-05 NOTE — ED ADULT TRIAGE NOTE - CHIEF COMPLAINT QUOTE
patient brought in by EMS from [facility] assisted living s/p unwitnessed fall out of wheelchair.  patient was found by staff in room in front of wheelchair.  unknown head strike.  no anticoagulation use.  hx dementia - A&Ox1 at baseline.  patient unable to provide hx.  neuro alert initiated in triage, sent directly to CT.

## 2024-03-05 NOTE — ED ADULT NURSE NOTE - NSFALLHARMRISKINTERV_ED_ALL_ED
Assistance OOB with selected safe patient handling equipment if applicable/Assistance with ambulation/Communicate risk of Fall with Harm to all staff, patient, and family/Monitor gait and stability/Monitor for mental status changes and reorient to person, place, and time, as needed/Move patient closer to nursing station/within visual sight of ED staff/Provide patient with walking aids/Provide visual cue: red socks, yellow wristband, yellow gown, etc/Reinforce activity limits and safety measures with patient and family/Toileting schedule using arm’s reach rule for commode and bathroom/Use of alarms - bed, stretcher, chair and/or video monitoring/Bed in lowest position, wheels locked, appropriate side rails in place/Call bell, personal items and telephone in reach/Instruct patient to call for assistance before getting out of bed/chair/stretcher/Non-slip footwear applied when patient is off stretcher/Frederic to call system/Physically safe environment - no spills, clutter or unnecessary equipment/Purposeful Proactive Rounding/Room/bathroom lighting operational, light cord in reach

## 2024-03-05 NOTE — ED PROVIDER NOTE - OBJECTIVE STATEMENT
84 yo femlae with hx of dementatia, parkinson's, hld presents ot the ED for unwitnessed fall. patient fell out of wheelchair. Unable to provide hx due to 82 yo female with hx of dementia, parkinson's, hld presents ot the ED for unwitnessed fall. patient fell out of wheelchair. Unable to provide hx due to mental status, which at her baseline today. unknown headstrike. No LOC. No seizure acitivity. Patient villa snot endorse complaints.

## 2024-03-05 NOTE — ED PROVIDER NOTE - PHYSICAL EXAMINATION
Const: Well appearing, NAD  Eyes: PERRL, EOM intact  Head: No injury  CV: RRR, no murmurs, no chest wall tenderness, distal pulses intact  Resp: CTAB, normal resp effort  GI: soft, nondistended, nontender  MSK: Full ROM, no muscle or bony deformity or tenderness  Neuro: AOx2, GCS 14, No focal deficits  Skin: No rash, laceration or abrasion  Psych: calm, cooperative, No SI, HI, hallucination.

## 2024-03-05 NOTE — ED ADULT NURSE NOTE - OBJECTIVE STATEMENT
patient brought in by EMS from [facility] living s/p unwitnessed fall out of wheelchair.  patient was found by staff in room in front of wheelchair.  unknown head strike.  no anticoagulation use.  hx dementia - A&Ox1 at baseline.  patient unable to provide hx.

## 2024-03-05 NOTE — ED PROVIDER NOTE - PATIENT PORTAL LINK FT
You can access the FollowMyHealth Patient Portal offered by [facility] by registering at the following website: [email or web address]. By joining "[employer]’s FollowMyHealth portal, you will also be able to view your health information using other applications (apps) compatible with our system.

## 2024-05-22 NOTE — DISCHARGE NOTE NURSING/CASE MANAGEMENT/SOCIAL WORK - MODE OF TRANSPORTATION
[facility] Internal Medicine   Progress Note              Subjective:  Patient seen and examined by me.             Neg c.diff, stool occult blood  neg  K 2.9> repleted  H/h stable     14 point Review of systems is negative except for as noted above.     I/O's    Intake/Output Summary (Last 24 hours) at 2024 1002  Last data filed at 2024 0700  Gross per 24 hour   Intake 1388.47 ml   Output 1500 ml   Net -111.53 ml         ALLERGIES:  Seasonal, Codeine, and Spironolactone     No data recorded  MAP (mmHg)  Av.3  Min: 86  Max: 110          [facility] MEDS  Current Facility-Administered Medications   Medication Dose Route Frequency Provider Last Rate Last Admin    sodium chloride 0.9 % injection 10 mL  10 mL Injection 2 times per day Nicole Sutherland MD   10 mL at 24 0916    potassium CHLORIDE (KLOR-CON) packet 40 mEq  40 mEq Oral Daily with breakfast Roseann Man CNP   40 mEq at 24 0941    WARFARIN - PHARMACIST MONITORED Misc   Does not apply See Admin Instructions Nicole Sutherland MD        insulin glargine (LANTUS) injection 10 Units  10 Units Subcutaneous Nightly Nicole Sutherland MD        insulin lispro (ADMELOG, HumaLOG) injection 3 Units  3 Units Subcutaneous TID AC Nicole Sutherland MD        cefTAZidime (FORTAZ) 2,000 mg in sodium chloride 0.9 % 100 mL IVPB  2,000 mg Intravenous Daily Bryson Arce  mL/hr at 24 0909 2,000 mg at 24 0909    Potassium Standard Replacement Protocol (Levels 3.5 and lower)   Does not apply See Admin Instructions Roseann Man CNP        Potassium Replacement (Levels 3.6 - 4)   Does not apply See Admin Instructions Roseann Man CNP        Magnesium Standard Replacement Protocol   Does not apply See Admin Instructions Roseann Man CNP        famotidine (PEPCID) tablet 40 mg  40 mg Oral Daily Roseann Man CNP   40 mg at 24 0904    hydrALAZINE (APRESOLINE) tablet 100 mg  100 mg Oral TID Roseann Man  CNP   100 mg at 05/22/24 0904    isosorbide dinitrate (ISORDIL) tablet 40 mg  40 mg Oral TID Roseann Man CNP   40 mg at 05/22/24 0904    pravastatin (PRAVACHOL) tablet 40 mg  40 mg Oral Daily Roseann Man CNP   40 mg at 05/22/24 0915    amLODIPine (NORVASC) tablet 10 mg  10 mg Oral Daily Roseann Man CNP   10 mg at 05/22/24 0904    sodium chloride 0.9 % injection 2 mL  2 mL Intracatheter 2 times per day Nicole Sutherland MD   2 mL at 05/22/24 0916    Magnesium Standard Replacement Protocol   Does not apply See Admin Instructions Nicole Sutherland MD        Phosphorus Standard Replacement Protocol   Does not apply See Admin Instructions Nicole Sutherland MD        insulin lispro (ADMELOG,HumaLOG) - Correction Dose   Subcutaneous 4x Daily AC & HS AthElver panchal APNP   2 Units at 05/22/24 0903       Current Facility-Administered Medications   Medication Dose Route Frequency Provider Last Rate Last Admin       Current Facility-Administered Medications   Medication Dose Route Frequency Provider Last Rate Last Admin    sodium chloride 0.9 % injection 10 mL  10 mL Injection PRN Nicole Sutherland MD        sodium chloride 0.9 % injection 20 mL  20 mL Injection PRN Nicole Sutherland MD        sodium chloride 0.9 % flush bag 25 mL  25 mL Intravenous PRN Roseann Man CNP        sodium chloride 0.9 % flush bag 25 mL  25 mL Intravenous PRN Nicole Sutherland MD        ondansetron (ZOFRAN) injection 4 mg  4 mg Intravenous BID PRN Nicole Sutherland MD        acetaminophen (TYLENOL) tablet 650 mg  650 mg Oral Q4H PRN Nicole Sutherland MD        polyethylene glycol (MIRALAX) packet 17 g  17 g Oral Daily PRN Nicole Sutherland MD        magnesium hydroxide (MILK OF MAGNESIA) 400 MG/5ML suspension 30 mL  30 mL Oral Daily PRN Nicole Sutherland MD        aluminum-magnesium hydroxide-simethicone (MAALOX) 200-200-20 MG/5ML suspension 30 mL  30 mL Oral Q4H PRN Nicole Sutherland MD        sodium chloride 0.9 % flush  bag 25 mL  25 mL Intravenous PRN Nicole Sutherland MD        dextrose 50 % injection 25 g  25 g Intravenous PRN Elver Brian G, APNP        dextrose 50 % injection 12.5 g  12.5 g Intravenous PRN AthElver panchal G, APNP        glucagon (GLUCAGEN) injection 1 mg  1 mg Intramuscular PRN Athabdulkadir, Elver G, APNP        dextrose (GLUTOSE) 40 % gel 15 g  15 g Oral PRN Athalexuscuate, Elver G, APNP        dextrose (GLUTOSE) 40 % gel 30 g  30 g Oral PRN Athbryannay, Elver G, APNP              Last Recorded Vitals      SpO2 Readings from Last 3 Encounters:   05/22/24 99%   05/17/24 99%   05/01/24 98%        VITAL SIGNS:     Vital Last Value 24 Hour Range   Temperature 97.9 °F (36.6 °C) (05/22/24 0800) Temp  Min: 97.5 °F (36.4 °C)  Max: 98.7 °F (37.1 °C)   Pulse 64 (05/22/24 0800) Pulse  Min: 64  Max: 80   Respiratory 17 (05/22/24 0800) Resp  Min: 16  Max: 18   Non-Invasive  Blood Pressure (!) 145/91 (05/22/24 0800) BP  Min: 99/62  Max: 150/87   Pulse Oximetry 99 % (05/22/24 0800) SpO2  Min: 97 %  Max: 100 %   Arterial   Blood Pressure   No data recorded      Vital Today Admitted   Weight (!) 43.9 kg (96 lb 12.5 oz) (05/22/24 0500) Weight: (!) 43.9 kg (96 lb 12.5 oz) (05/22/24 0500)   Height N/A Height: 5' (152.4 cm) (05/21/24 1922)   BMI N/A BMI (Calculated): 18.9 (05/22/24 0500)            Physical Exam:  General: Alert and oriented, no acute distress  Eyes: no scleral icterus, no conjunctival erythema   Cardio: S1, S2, RRR, no murmur, rub, gallop or thrills noted.   Pulm: Lungs clear to auscultation bilaterally, no wheeze or rhonchi noted. No chest wall tenderness  GI: Soft, non-tender, nondistended. Normal bowel sounds auscultated x4 quadrants  : No suprapubic Tenderness, no CVA tenderness bilaterally  Ext: No upper or lower extremity edema noted. No cords palpated.   Musculoskeletal: 5/5 strength both upper and lower extremities. No joint tenderness or erythema.  Skin: No abnormal bruising or discoloration noted. No  jaundice.   Psych: Appropriate mood and affect. Good Insight and Judgment  Neuro: No focal motor or sensory deficits noted. Pt appropriately follows commands.    Labs   Recent Labs     05/21/24  1233 05/22/24  0432   WBC 5.6 4.2   RBC 3.84* 3.22*   HGB 11.7* 10.0*   HCT 35.5* 29.9*    194   MCV 92.4 92.9   MCH 30.5 31.1   MCHC 33.0 33.4   NRBCRE 0 0         Recent Labs     05/21/24  1233 05/22/24  0432   SODIUM 134* 136   POTASSIUM 3.4 2.9*   MG 2.5* 2.2   CO2 26 30   ANIONGAP 11 8   GLUCOSE 208* 195*   BUN 29* 29*   CREATININE 1.84* 1.68*   CALCIUM 10.2 9.4   BILIRUBIN 0.5 0.4   AST 21 15   GPT 16 13   ALKPT 48 42*   GLOB 4.6* 3.7   AGR 0.7* 0.7*    178        Recent Labs   Lab 05/22/24  0432 05/21/24  1233   SODIUM 136 134*   POTASSIUM 2.9* 3.4   CHLORIDE 101 100   CO2 30 26   BUN 29* 29*   CREATININE 1.68* 1.84*   GLUCOSE 195* 208*   ALBUMIN 2.5* 3.3*   AST 15 21   BILIRUBIN 0.4 0.5       No results for input(s): \"PCT\" in the last 72 hours.     Recent Labs   Lab 05/21/24  1233   NTPROB 1,873*        Recent Labs     05/20/24  0000 05/21/24  1233 05/22/24  0432   INR 1.8 1.7 1.7   PT  --  17.6* 18.0*   PTT  --  37*  --        Recent Labs   Lab 05/21/24  2331 05/22/24  0316 05/22/24  0735   GLUCOSE BEDSIDE 433* 245* 173*       Imaging    XR CHEST AP OR PA   Final Result   Impression:       Stable exam as above. Support devices as above.      Electronically Signed by: ALAN MENDEZ MD    Signed on: 5/21/2024 10:09 PM    Workstation ID: [id number]      XR CHEST PA AND LATERAL 2 VIEWS   Final Result   1.   Improved aeration of the right lung base when compared to April 22, 2024. No lobar consolidation in the right lung   2.   Subtle left basilar opacity may represent scarring or subsegmental   atelectasis            Electronically Signed by: YOSSI EDGAR MD    Signed on: 5/21/2024 5:46 PM    Workstation ID: [id number]          Cultures  Microbiology Results       None            All imaging, labs,  vitals and related tests have been reviewed and interpreted by me.     Assessment/Plan:  Principal Problem:    Blood in stool     Systolic congestive heart failure patient is status post LVAD  -Patient is DNR..  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   -- On IV ceftazidime at home   - ID on consult     CKD  - monitor crt> 1.6  - on ivf    Dispo: pt can dc home as K get repleted        DVT Prophylaxis:   Current Active Medications for DVT Prophylaxis (From admission, onward)           Stop     WARFARIN - PHARMACIST MONITORED Misc  Does not apply,   SEE ADMIN INSTRUCTIONS        Placed in \"And\" Linked Group    --                   Diet: Nutrition Communication  Consistent Carb Moderate (45-75 Gm/Meal) Diet  Baseline Activity: Ambulates Independently    CODE STATUS:   Code Status: Selective Treatment/DNR    Physician Notification:  Consultants notified of patient via [employer].  Communication: with patient, nurse     MORE than 50 MINS WERE SPENT ON THIS PATIENTS CARE TODAY. This includes the following: Reviewed all vitals, medications, new orders, I/O, labs, micro, radiology, nurses notes, pertinent consultant notes which are reflected in assessment and plan.This does not include time spent on other items of care such as smoking cessation counseling, prolonged care time, and or advanced care planning if applicable.   (Level 1 PN: 25 min, Level 2 PN: 35 min, Level 3 PN: 50 min)     Primary Care Physician  Lexx Sahu MD Samina B Chaudhry, MD [facility]  5/22/2024 10:02 AM         Ambulance

## 2024-05-30 NOTE — ED ADULT NURSE NOTE - NS ED NOTE ABUSE RESPONSE YN
[Diabetes Foot Care] : diabetes foot care [Long Term Vascular Complications] : long term vascular complications of diabetes [Carbohydrate Consistent Diet] : carbohydrate consistent diet [Importance of Diet and Exercise] : importance of diet and exercise to improve glycemic control, achieve weight loss and improve cardiovascular health [Exercise/Effect on Glucose] : exercise/effect on glucose [Hypoglycemia Management] : hypoglycemia management [Ketone Testing] : ketone testing [Self Monitoring of Blood Glucose] : self monitoring of blood glucose [Sick-Day Management] : sick-day management [Retinopathy Screening] : Patient was referred to ophthalmology for retinopathy screening [Diabetic Medications] : Risks and benefits of diabetic medications were discussed [Levothyroxine] : The patient was instructed to take Levothyroxine on an empty stomach, separate from vitamins, and wait at least 30 minutes before eating [FreeTextEntry4] : [address]a diet, exercise [FreeTextEntry1] : 1. DM2 DIET AND EXERCISE Farxiga 10mg daily Januvia 50mg daily Pioglitazone 30 mg daily  2. Hypothyroid Levothyroxine 75 mcg daily  3. elevated Testosterone Pt. seen GYN Will monitor testosterone levels  4. Elevated Prolactin level Will repeat prolactin/ macro prolactin level Discussed psychiatric medications can cause elevated prolactin. Refer for MRI w/ WO contrast,  if prolactin level is elevated.   Yes

## 2024-08-04 ENCOUNTER — INPATIENT (INPATIENT)
Facility: HOSPITAL | Age: 84
LOS: 3 days | Discharge: HOME HEALTH | DRG: 689 | End: 2024-08-08
Attending: HOSPITALIST | Admitting: INTERNAL MEDICINE
Payer: MEDICARE

## 2024-08-04 VITALS
TEMPERATURE: 94 F | OXYGEN SATURATION: 94 % | DIASTOLIC BLOOD PRESSURE: 66 MMHG | WEIGHT: 130.51 LBS | HEIGHT: 65 IN | HEART RATE: 67 BPM | RESPIRATION RATE: 16 BRPM | SYSTOLIC BLOOD PRESSURE: 133 MMHG

## 2024-08-04 LAB
ALBUMIN SERPL ELPH-MCNC: 3 G/DL — LOW (ref 3.3–5)
ALP SERPL-CCNC: 179 U/L — HIGH (ref 40–120)
ALT FLD-CCNC: 30 U/L — SIGNIFICANT CHANGE UP (ref 12–78)
ANION GAP SERPL CALC-SCNC: 5 MMOL/L — SIGNIFICANT CHANGE UP (ref 5–17)
APTT BLD: 37.6 SEC — HIGH (ref 24.5–35.6)
AST SERPL-CCNC: 35 U/L — SIGNIFICANT CHANGE UP (ref 15–37)
BASOPHILS # BLD AUTO: 0.03 K/UL — SIGNIFICANT CHANGE UP (ref 0–0.2)
BASOPHILS NFR BLD AUTO: 0.4 % — SIGNIFICANT CHANGE UP (ref 0–2)
BILIRUB SERPL-MCNC: 0.2 MG/DL — SIGNIFICANT CHANGE UP (ref 0.2–1.2)
BUN SERPL-MCNC: 34 MG/DL — HIGH (ref 7–23)
CALCIUM SERPL-MCNC: 9.1 MG/DL — SIGNIFICANT CHANGE UP (ref 8.5–10.1)
CHLORIDE SERPL-SCNC: 107 MMOL/L — SIGNIFICANT CHANGE UP (ref 96–108)
CK SERPL-CCNC: 91 U/L — SIGNIFICANT CHANGE UP (ref 26–192)
CO2 SERPL-SCNC: 30 MMOL/L — SIGNIFICANT CHANGE UP (ref 22–31)
CREAT SERPL-MCNC: 1.04 MG/DL — SIGNIFICANT CHANGE UP (ref 0.5–1.3)
EGFR: 53 ML/MIN/1.73M2 — LOW
EOSINOPHIL # BLD AUTO: 0.14 K/UL — SIGNIFICANT CHANGE UP (ref 0–0.5)
EOSINOPHIL NFR BLD AUTO: 1.8 % — SIGNIFICANT CHANGE UP (ref 0–6)
FLUAV AG NPH QL: SIGNIFICANT CHANGE UP
FLUBV AG NPH QL: SIGNIFICANT CHANGE UP
GLUCOSE SERPL-MCNC: 89 MG/DL — SIGNIFICANT CHANGE UP (ref 70–99)
HCT VFR BLD CALC: 36.1 % — SIGNIFICANT CHANGE UP (ref 34.5–45)
HGB BLD-MCNC: 11.7 G/DL — SIGNIFICANT CHANGE UP (ref 11.5–15.5)
IMM GRANULOCYTES NFR BLD AUTO: 0.3 % — SIGNIFICANT CHANGE UP (ref 0–0.9)
INR BLD: 0.94 RATIO — SIGNIFICANT CHANGE UP (ref 0.85–1.18)
LACTATE SERPL-SCNC: 1 MMOL/L — SIGNIFICANT CHANGE UP (ref 0.7–2)
LYMPHOCYTES # BLD AUTO: 1.79 K/UL — SIGNIFICANT CHANGE UP (ref 1–3.3)
LYMPHOCYTES # BLD AUTO: 22.5 % — SIGNIFICANT CHANGE UP (ref 13–44)
MCHC RBC-ENTMCNC: 30.7 PG — SIGNIFICANT CHANGE UP (ref 27–34)
MCHC RBC-ENTMCNC: 32.4 GM/DL — SIGNIFICANT CHANGE UP (ref 32–36)
MCV RBC AUTO: 94.8 FL — SIGNIFICANT CHANGE UP (ref 80–100)
MONOCYTES # BLD AUTO: 0.49 K/UL — SIGNIFICANT CHANGE UP (ref 0–0.9)
MONOCYTES NFR BLD AUTO: 6.2 % — SIGNIFICANT CHANGE UP (ref 2–14)
NEUTROPHILS # BLD AUTO: 5.48 K/UL — SIGNIFICANT CHANGE UP (ref 1.8–7.4)
NEUTROPHILS NFR BLD AUTO: 68.8 % — SIGNIFICANT CHANGE UP (ref 43–77)
PLATELET # BLD AUTO: 140 K/UL — LOW (ref 150–400)
POTASSIUM SERPL-MCNC: 4.6 MMOL/L — SIGNIFICANT CHANGE UP (ref 3.5–5.3)
POTASSIUM SERPL-SCNC: 4.6 MMOL/L — SIGNIFICANT CHANGE UP (ref 3.5–5.3)
PROT SERPL-MCNC: 7 GM/DL — SIGNIFICANT CHANGE UP (ref 6–8.3)
PROTHROM AB SERPL-ACNC: 10.6 SEC — SIGNIFICANT CHANGE UP (ref 9.5–13)
RBC # BLD: 3.81 M/UL — SIGNIFICANT CHANGE UP (ref 3.8–5.2)
RBC # FLD: 15.6 % — HIGH (ref 10.3–14.5)
RSV RNA NPH QL NAA+NON-PROBE: SIGNIFICANT CHANGE UP
SARS-COV-2 RNA SPEC QL NAA+PROBE: SIGNIFICANT CHANGE UP
SODIUM SERPL-SCNC: 142 MMOL/L — SIGNIFICANT CHANGE UP (ref 135–145)
TSH SERPL-MCNC: 3.8 UU/ML — SIGNIFICANT CHANGE UP (ref 0.34–4.82)
WBC # BLD: 7.95 K/UL — SIGNIFICANT CHANGE UP (ref 3.8–10.5)
WBC # FLD AUTO: 7.95 K/UL — SIGNIFICANT CHANGE UP (ref 3.8–10.5)

## 2024-08-04 PROCEDURE — 93010 ELECTROCARDIOGRAM REPORT: CPT

## 2024-08-04 PROCEDURE — 71045 X-RAY EXAM CHEST 1 VIEW: CPT | Mod: 26

## 2024-08-04 PROCEDURE — 99285 EMERGENCY DEPT VISIT HI MDM: CPT

## 2024-08-04 RX ORDER — CEFEPIME HYDROCHLORIDE 1 G/1
1000 INJECTION, POWDER, FOR SOLUTION INTRAMUSCULAR; INTRAVENOUS ONCE
Refills: 0 | Status: DISCONTINUED | OUTPATIENT
Start: 2024-08-04 | End: 2024-08-05

## 2024-08-04 RX ORDER — LEVETIRACETAM 1000 MG/1
1000 TABLET, FILM COATED ORAL ONCE
Refills: 0 | Status: DISCONTINUED | OUTPATIENT
Start: 2024-08-04 | End: 2024-08-04

## 2024-08-04 RX ORDER — BACTERIOSTATIC SODIUM CHLORIDE 0.9 %
1800 VIAL (ML) INJECTION ONCE
Refills: 0 | Status: COMPLETED | OUTPATIENT
Start: 2024-08-04 | End: 2024-08-04

## 2024-08-04 RX ORDER — VANCOMYCIN HYDROCHLORIDE 5 G/100ML
1000 INJECTION, POWDER, LYOPHILIZED, FOR SOLUTION INTRAVENOUS ONCE
Refills: 0 | Status: COMPLETED | OUTPATIENT
Start: 2024-08-04 | End: 2024-08-04

## 2024-08-04 RX ADMIN — LEVETIRACETAM 1000 MILLIGRAM(S): 1000 TABLET, FILM COATED ORAL at 22:48

## 2024-08-04 RX ADMIN — Medication 1800 MILLILITER(S): at 22:54

## 2024-08-04 NOTE — ED ADULT TRIAGE NOTE - CHIEF COMPLAINT QUOTE
pt bibems from [place] c/o hypothermia and increased seizure activity since yesterday. pt is alert but disoriented, PMH seizures, dementia, hypothyroidism. allergies to clindamycin, penicillin, latex, sertraline.

## 2024-08-04 NOTE — ED PROVIDER NOTE - OBJECTIVE STATEMENT
83-year-old female with history of dementia, seizures, hypothyroidism brought in by EMS for evaluation of suspected breakthrough seizures and hypothermia noted at her skilled nursing facility prior to arrival.  The patient is currently alert and oriented to self only but denies having pain.  She is unable to provide any further details to the HPI or review of systems.  No recent falls were reported and there is no evidence of trauma on exam.

## 2024-08-04 NOTE — ED PROVIDER NOTE - CONSTITUTIONAL MENTATION
Somnolent but arousable and alert and oriented to self.  Patient able to follow simple commands/awake

## 2024-08-04 NOTE — ED ADULT TRIAGE NOTE - LOCATION:
Problem: VTE, Risk for  Intervention: # Maintain Mechanical VTE Prophylaxis at all times  SCD on at all times, tolerated well,will monitor.         Right arm;

## 2024-08-04 NOTE — ED PROVIDER NOTE - CLINICAL SUMMARY MEDICAL DECISION MAKING FREE TEXT BOX
83-year-old female with history of dementia, seizures, hypothyroidism brought in by EMS for evaluation of suspected breakthrough seizures and hypothermia noted at her skilled nursing facility prior to arrival.  The patient is currently alert and oriented to self only but denies having pain.  She is unable to provide any further details to the HPI or review of systems.  No recent falls were reported and there is no evidence of trauma on exam.    Differential diagnosis for hypothermia in this patient includes sepsis, hypothyroidism.  Will get septic workup, apply Luis A hugger, administer warm IV fluids, check TSH/free T4, check l Keppra levels, administer 1000 mg of Keppra and admit.

## 2024-08-05 DIAGNOSIS — F03.90 UNSPECIFIED DEMENTIA, UNSPECIFIED SEVERITY, WITHOUT BEHAVIORAL DISTURBANCE, PSYCHOTIC DISTURBANCE, MOOD DISTURBANCE, AND ANXIETY: ICD-10-CM

## 2024-08-05 DIAGNOSIS — A41.9 SEPSIS, UNSPECIFIED ORGANISM: ICD-10-CM

## 2024-08-05 DIAGNOSIS — T68.XXXA HYPOTHERMIA, INITIAL ENCOUNTER: ICD-10-CM

## 2024-08-05 DIAGNOSIS — G40.919 EPILEPSY, UNSPECIFIED, INTRACTABLE, WITHOUT STATUS EPILEPTICUS: ICD-10-CM

## 2024-08-05 LAB
ALBUMIN SERPL ELPH-MCNC: 2.5 G/DL — LOW (ref 3.3–5)
ALP SERPL-CCNC: 152 U/L — HIGH (ref 40–120)
ALT FLD-CCNC: 25 U/L — SIGNIFICANT CHANGE UP (ref 12–78)
ANION GAP SERPL CALC-SCNC: 4 MMOL/L — LOW (ref 5–17)
APPEARANCE UR: ABNORMAL
AST SERPL-CCNC: 30 U/L — SIGNIFICANT CHANGE UP (ref 15–37)
BACTERIA # UR AUTO: ABNORMAL /HPF
BILIRUB SERPL-MCNC: 0.3 MG/DL — SIGNIFICANT CHANGE UP (ref 0.2–1.2)
BILIRUB UR-MCNC: NEGATIVE — SIGNIFICANT CHANGE UP
BUN SERPL-MCNC: 22 MG/DL — SIGNIFICANT CHANGE UP (ref 7–23)
CALCIUM SERPL-MCNC: 8 MG/DL — LOW (ref 8.5–10.1)
CAST: 3 /LPF — SIGNIFICANT CHANGE UP (ref 0–4)
CHLORIDE SERPL-SCNC: 117 MMOL/L — HIGH (ref 96–108)
CO2 SERPL-SCNC: 24 MMOL/L — SIGNIFICANT CHANGE UP (ref 22–31)
COLOR SPEC: YELLOW — SIGNIFICANT CHANGE UP
CREAT SERPL-MCNC: 0.7 MG/DL — SIGNIFICANT CHANGE UP (ref 0.5–1.3)
DIFF PNL FLD: NEGATIVE — SIGNIFICANT CHANGE UP
EGFR: 85 ML/MIN/1.73M2 — SIGNIFICANT CHANGE UP
GLUCOSE SERPL-MCNC: 81 MG/DL — SIGNIFICANT CHANGE UP (ref 70–99)
GLUCOSE UR QL: NEGATIVE MG/DL — SIGNIFICANT CHANGE UP
HCT VFR BLD CALC: 32.5 % — LOW (ref 34.5–45)
HGB BLD-MCNC: 10.2 G/DL — LOW (ref 11.5–15.5)
KETONES UR-MCNC: NEGATIVE MG/DL — SIGNIFICANT CHANGE UP
LEUKOCYTE ESTERASE UR-ACNC: ABNORMAL
MCHC RBC-ENTMCNC: 30.2 PG — SIGNIFICANT CHANGE UP (ref 27–34)
MCHC RBC-ENTMCNC: 31.4 GM/DL — LOW (ref 32–36)
MCV RBC AUTO: 96.2 FL — SIGNIFICANT CHANGE UP (ref 80–100)
NITRITE UR-MCNC: POSITIVE
PH UR: 6 — SIGNIFICANT CHANGE UP (ref 5–8)
PLATELET # BLD AUTO: 122 K/UL — LOW (ref 150–400)
POTASSIUM SERPL-MCNC: 4.2 MMOL/L — SIGNIFICANT CHANGE UP (ref 3.5–5.3)
POTASSIUM SERPL-SCNC: 4.2 MMOL/L — SIGNIFICANT CHANGE UP (ref 3.5–5.3)
PROT SERPL-MCNC: 6.1 GM/DL — SIGNIFICANT CHANGE UP (ref 6–8.3)
PROT UR-MCNC: NEGATIVE MG/DL — SIGNIFICANT CHANGE UP
RBC # BLD: 3.38 M/UL — LOW (ref 3.8–5.2)
RBC # FLD: 15.8 % — HIGH (ref 10.3–14.5)
RBC CASTS # UR COMP ASSIST: 3 /HPF — SIGNIFICANT CHANGE UP (ref 0–4)
SODIUM SERPL-SCNC: 145 MMOL/L — SIGNIFICANT CHANGE UP (ref 135–145)
SP GR SPEC: 1.02 — SIGNIFICANT CHANGE UP (ref 1–1.03)
SQUAMOUS # UR AUTO: 10 /HPF — HIGH (ref 0–5)
UROBILINOGEN FLD QL: 1 MG/DL — SIGNIFICANT CHANGE UP (ref 0.2–1)
WBC # BLD: 6.48 K/UL — SIGNIFICANT CHANGE UP (ref 3.8–10.5)
WBC # FLD AUTO: 6.48 K/UL — SIGNIFICANT CHANGE UP (ref 3.8–10.5)
WBC UR QL: 40 /HPF — HIGH (ref 0–5)

## 2024-08-05 PROCEDURE — 70450 CT HEAD/BRAIN W/O DYE: CPT | Mod: MC

## 2024-08-05 PROCEDURE — 83735 ASSAY OF MAGNESIUM: CPT

## 2024-08-05 PROCEDURE — 95816 EEG AWAKE AND DROWSY: CPT

## 2024-08-05 PROCEDURE — 82728 ASSAY OF FERRITIN: CPT

## 2024-08-05 PROCEDURE — 82746 ASSAY OF FOLIC ACID SERUM: CPT

## 2024-08-05 PROCEDURE — 70450 CT HEAD/BRAIN W/O DYE: CPT | Mod: 26

## 2024-08-05 PROCEDURE — 84100 ASSAY OF PHOSPHORUS: CPT

## 2024-08-05 PROCEDURE — 97530 THERAPEUTIC ACTIVITIES: CPT | Mod: GP

## 2024-08-05 PROCEDURE — 80053 COMPREHEN METABOLIC PANEL: CPT

## 2024-08-05 PROCEDURE — 83550 IRON BINDING TEST: CPT

## 2024-08-05 PROCEDURE — 80048 BASIC METABOLIC PNL TOTAL CA: CPT

## 2024-08-05 PROCEDURE — 99223 1ST HOSP IP/OBS HIGH 75: CPT

## 2024-08-05 PROCEDURE — 97163 PT EVAL HIGH COMPLEX 45 MIN: CPT | Mod: GP

## 2024-08-05 PROCEDURE — 36415 COLL VENOUS BLD VENIPUNCTURE: CPT

## 2024-08-05 PROCEDURE — 85027 COMPLETE CBC AUTOMATED: CPT

## 2024-08-05 PROCEDURE — 82607 VITAMIN B-12: CPT

## 2024-08-05 PROCEDURE — 83540 ASSAY OF IRON: CPT

## 2024-08-05 RX ORDER — ONDANSETRON HCL/PF 4 MG/2 ML
4 VIAL (ML) INJECTION EVERY 6 HOURS
Refills: 0 | Status: DISCONTINUED | OUTPATIENT
Start: 2024-08-05 | End: 2024-08-08

## 2024-08-05 RX ORDER — BACTERIOSTATIC SODIUM CHLORIDE 0.9 %
1000 VIAL (ML) INJECTION ONCE
Refills: 0 | Status: COMPLETED | OUTPATIENT
Start: 2024-08-05 | End: 2024-08-05

## 2024-08-05 RX ORDER — ACETAMINOPHEN 500 MG
650 TABLET ORAL EVERY 6 HOURS
Refills: 0 | Status: DISCONTINUED | OUTPATIENT
Start: 2024-08-05 | End: 2024-08-08

## 2024-08-05 RX ORDER — LORAZEPAM 1 MG/1
2 TABLET ORAL ONCE
Refills: 0 | Status: DISCONTINUED | OUTPATIENT
Start: 2024-08-05 | End: 2024-08-06

## 2024-08-05 RX ORDER — LEVOTHYROXINE SODIUM 175 MCG
25 TABLET ORAL DAILY
Refills: 0 | Status: DISCONTINUED | OUTPATIENT
Start: 2024-08-05 | End: 2024-08-08

## 2024-08-05 RX ORDER — DONEPEZIL HCL 5 MG
10 TABLET ORAL AT BEDTIME
Refills: 0 | Status: DISCONTINUED | OUTPATIENT
Start: 2024-08-05 | End: 2024-08-08

## 2024-08-05 RX ORDER — CEFEPIME HYDROCHLORIDE 1 G/1
2000 INJECTION, POWDER, FOR SOLUTION INTRAMUSCULAR; INTRAVENOUS DAILY
Refills: 0 | Status: DISCONTINUED | OUTPATIENT
Start: 2024-08-05 | End: 2024-08-05

## 2024-08-05 RX ORDER — LEVETIRACETAM 1000 MG/1
500 TABLET, FILM COATED ORAL AT BEDTIME
Refills: 0 | Status: DISCONTINUED | OUTPATIENT
Start: 2024-08-05 | End: 2024-08-06

## 2024-08-05 RX ORDER — CEFEPIME HYDROCHLORIDE 1 G/1
2000 INJECTION, POWDER, FOR SOLUTION INTRAMUSCULAR; INTRAVENOUS EVERY 24 HOURS
Refills: 0 | Status: DISCONTINUED | OUTPATIENT
Start: 2024-08-06 | End: 2024-08-08

## 2024-08-05 RX ORDER — MAGNESIUM, ALUMINUM HYDROXIDE 200-225/5
30 SUSPENSION, ORAL (FINAL DOSE FORM) ORAL EVERY 4 HOURS
Refills: 0 | Status: DISCONTINUED | OUTPATIENT
Start: 2024-08-05 | End: 2024-08-08

## 2024-08-05 RX ORDER — LORAZEPAM 1 MG/1
0.5 TABLET ORAL ONCE
Refills: 0 | Status: DISCONTINUED | OUTPATIENT
Start: 2024-08-05 | End: 2024-08-05

## 2024-08-05 RX ORDER — LEVETIRACETAM 1000 MG/1
250 TABLET, FILM COATED ORAL
Refills: 0 | Status: DISCONTINUED | OUTPATIENT
Start: 2024-08-05 | End: 2024-08-05

## 2024-08-05 RX ORDER — CEFEPIME HYDROCHLORIDE 1 G/1
1000 INJECTION, POWDER, FOR SOLUTION INTRAMUSCULAR; INTRAVENOUS ONCE
Refills: 0 | Status: COMPLETED | OUTPATIENT
Start: 2024-08-05 | End: 2024-08-05

## 2024-08-05 RX ORDER — DULOXETINE HCL 20 MG
60 CAPSULE,DELAYED RELEASE (ENTERIC COATED) ORAL DAILY
Refills: 0 | Status: DISCONTINUED | OUTPATIENT
Start: 2024-08-05 | End: 2024-08-08

## 2024-08-05 RX ORDER — CEFEPIME HYDROCHLORIDE 1 G/1
INJECTION, POWDER, FOR SOLUTION INTRAMUSCULAR; INTRAVENOUS
Refills: 0 | Status: DISCONTINUED | OUTPATIENT
Start: 2024-08-05 | End: 2024-08-08

## 2024-08-05 RX ORDER — BACTERIOSTATIC SODIUM CHLORIDE 0.9 %
1000 VIAL (ML) INJECTION
Refills: 0 | Status: DISCONTINUED | OUTPATIENT
Start: 2024-08-05 | End: 2024-08-08

## 2024-08-05 RX ORDER — CEFEPIME HYDROCHLORIDE 1 G/1
2000 INJECTION, POWDER, FOR SOLUTION INTRAMUSCULAR; INTRAVENOUS ONCE
Refills: 0 | Status: COMPLETED | OUTPATIENT
Start: 2024-08-05 | End: 2024-08-05

## 2024-08-05 RX ORDER — PANTOPRAZOLE SODIUM 20 MG/1
40 TABLET, DELAYED RELEASE ORAL
Refills: 0 | Status: DISCONTINUED | OUTPATIENT
Start: 2024-08-05 | End: 2024-08-08

## 2024-08-05 RX ORDER — DICYCLOMINE HCL 20 MG
10 TABLET ORAL
Refills: 0 | Status: DISCONTINUED | OUTPATIENT
Start: 2024-08-05 | End: 2024-08-08

## 2024-08-05 RX ORDER — LEVETIRACETAM 1000 MG/1
250 TABLET, FILM COATED ORAL DAILY
Refills: 0 | Status: DISCONTINUED | OUTPATIENT
Start: 2024-08-06 | End: 2024-08-06

## 2024-08-05 RX ADMIN — Medication 75 MILLILITER(S): at 13:28

## 2024-08-05 RX ADMIN — Medication 10 MILLIGRAM(S): at 09:31

## 2024-08-05 RX ADMIN — PANTOPRAZOLE SODIUM 40 MILLIGRAM(S): 20 TABLET, DELAYED RELEASE ORAL at 07:27

## 2024-08-05 RX ADMIN — Medication 25 MICROGRAM(S): at 07:27

## 2024-08-05 RX ADMIN — Medication 60 MILLIGRAM(S): at 09:31

## 2024-08-05 RX ADMIN — LEVETIRACETAM 250 MILLIGRAM(S): 1000 TABLET, FILM COATED ORAL at 09:31

## 2024-08-05 RX ADMIN — VANCOMYCIN HYDROCHLORIDE 250 MILLIGRAM(S): 5 INJECTION, POWDER, LYOPHILIZED, FOR SOLUTION INTRAVENOUS at 00:32

## 2024-08-05 RX ADMIN — Medication 2000 MILLILITER(S): at 04:18

## 2024-08-05 RX ADMIN — CEFEPIME HYDROCHLORIDE 1000 MILLIGRAM(S): 1 INJECTION, POWDER, FOR SOLUTION INTRAMUSCULAR; INTRAVENOUS at 00:32

## 2024-08-05 RX ADMIN — Medication 10 MILLIGRAM(S): at 21:42

## 2024-08-05 RX ADMIN — Medication 10 MILLIGRAM(S): at 17:52

## 2024-08-05 RX ADMIN — CEFEPIME HYDROCHLORIDE 2000 MILLIGRAM(S): 1 INJECTION, POWDER, FOR SOLUTION INTRAMUSCULAR; INTRAVENOUS at 12:29

## 2024-08-05 RX ADMIN — LORAZEPAM 0.5 MILLIGRAM(S): 1 TABLET ORAL at 01:32

## 2024-08-05 RX ADMIN — LEVETIRACETAM 500 MILLIGRAM(S): 1000 TABLET, FILM COATED ORAL at 21:42

## 2024-08-05 RX ADMIN — Medication 1000 MILLILITER(S): at 02:47

## 2024-08-05 NOTE — H&P ADULT - NSHPPHYSICALEXAM_GEN_ALL_CORE
T(C): 35.9 (08-05-24 @ 03:15), Max: 35.9 (08-05-24 @ 03:15)  HR: 78 (08-05-24 @ 03:15) (60 - 84)  BP: 106/44 (08-05-24 @ 03:15) (94/59 - 142/74)  RR: 16 (08-05-24 @ 03:15) (13 - 17)  SpO2: 92% (08-05-24 @ 03:15) (89% - 94%)    General: non-toxic  HEENT: non-traumatic, perrla, eomi  Cardio: s1s2 regular rate and rhythm  Lungs: comfortable breathing, clear to auscultation  Abdomen: Soft, non-tender, non-distended  Neuro: somnolent but arousable, moves all ext,  Ext: Pulses +2

## 2024-08-05 NOTE — DIETITIAN INITIAL EVALUATION ADULT - ORAL INTAKE PTA/DIET HISTORY
Resident lethargic, able to be roused however incoherent in conversation.  Unable to obtain history PTA.  Per H&P patient brought in from SNF.

## 2024-08-05 NOTE — DIETITIAN NUTRITION RISK NOTIFICATION - TREATMENT: THE FOLLOWING DIET HAS BEEN RECOMMENDED
Diet, Regular:   Supplement Feeding Modality:  Oral  Ensure Plus High Protein Cans or Servings Per Day:  1       Frequency:  Two Times a day (08-05-24 @ 17:47) [Pending Verification By Attending]  Diet, Regular (08-05-24 @ 03:28) [Active]

## 2024-08-05 NOTE — ED ADULT NURSE REASSESSMENT NOTE - NS ED NURSE REASSESS COMMENT FT1
Pt catheterized as per order. Sterile technique maintained. Pt tolerated procedure well. LINDA Rice present as chaperone. Urine appears yellow. Catheter properly draining to gravity. UA and UC sent.

## 2024-08-05 NOTE — H&P ADULT - ASSESSMENT
Hypothermia. admit inpatient. possibly 2/2 sepsis vs other etiologies. temp. improved with warming blanket. TSH WNL. f/u Bcx and urine cx. continue cefepime, patient received one dose vanco in ED, cont. IVF  Possible seizure activity, pt received keppra 1 gm IV, will cont keppra 500mg IV q12, consult neurology, f/u CT head, EEG  Acute renal injury possibly 2/2 sepsis, cont. IVF, avoid nephrotoxins, renally adjust meds.  Dementia, cont..home meds  Hypothyroidism, cont, levothyroxine

## 2024-08-05 NOTE — ED ADULT NURSE NOTE - OBJECTIVE STATEMENT
89y female AAOx1 BIBEMS from [facility] complaining of hypothermia. PMH seizures, dementia, hypothyroidism. Pt is poor historian r/t dementia. Pt does respond to name and when asked if she is in pain replied "no." As per facility, pt was hypothermic with an oral temp and having breakthrough seizures. Upon assessment pt rectal temp as per flow sheet 94.1, MD Lynch aware, pt started on osiris hugger. No acute distress noted in the patient. 89y female AAOx1 BIBEMS from [place] complaining of hypothermia. PMH seizures, dementia, hypothyroidism. Pt is poor historian r/t dementia. Pt does respond to name and when asked if she is in pain replied "no." As per facility, pt was hypothermic with an oral temp and having breakthrough seizures. Upon assessment pt rectal temp as per flow sheet 94.1. MD Lynch aware, pt started on osiris hugger. No acute distress noted in the patient.     Incontinence care performed upon arrival. Pt perineal area cleansed and diaper changed. Skin intact to be noted. 89y female AAOx1 BIBEMS from [place] complaining of hypothermia. PMH seizures, dementia, hypothyroidism. Pt is poor historian r/t dementia. Pt does respond to name and when asked if she is in pain replied "no." As per facility, pt was hypothermic with an oral temp and having breakthrough seizures. Upon assessment pt rectal temp as per flow sheet 94.1. MD Lynch aware, pt started on osiris hugger and warm IV fluids infusing. No acute distress noted in the patient.     Incontinence care performed upon arrival. Pt perineal area cleansed and diaper changed. Skin intact to be noted.

## 2024-08-05 NOTE — CONSULT NOTE ADULT - SUBJECTIVE AND OBJECTIVE BOX
CC 83 y old  Female who presents with a chief complaint of Sent from SNF for hypothermia and possible seizure    HPI:  83-year-old F with history of dementia + behaviour disturbances,, seizures, hypothyroidism, s/p PPM, IBS, brought in by EMS for evaluation of suspected breakthrough seizures and hypothermia noted at her skilled nursing facility. History per records and ER staff, patient is alert and oriented to self only, is limited In regard to functioning, has dementia, seen in [facility] 1/2024 with possible GTC seizure lasting 2-3 minutes, in ED she had another seizure noted by the staff, patient was started on low-dose Keppra 250 Mg twice daily.  Patient was reportedly found to be hypothermic at the SNF with seizure like activity, in ED TEMP 93.8, was started on warming blanket, given 1 gm keppra IV. UA suggestive of UTI.    On exam today, patient is sitting in bed, with pill-rolling tremors, seems to be picking at things, talks gibberish but is interactive, answers questions irrelevantly.  As per the staff, no further seizures were witnessed overnight.        PAST MEDICAL & SURGICAL HISTORY:  Trigeminal neuralgia  HLD (hyperlipidemia)  Spinal stenosis  Burning mouth syndrome  Parkinson disease  Dementia        FAMILY HISTORY:  unable to obtain      Social Hx: unable to obtain reg prior smoker, drug or alcohol use      MEDICATIONS  (STANDING):  cefepime  Injectable.      dicyclomine 10 milliGRAM(s) Oral two times a day before meals  donepezil 10 milliGRAM(s) Oral at bedtime  DULoxetine 60 milliGRAM(s) Oral daily  levETIRAcetam 250 milliGRAM(s) Oral two times a day  levothyroxine 25 MICROGram(s) Oral daily  pantoprazole    Tablet 40 milliGRAM(s) Oral before breakfast  sodium chloride 0.9%. 1000 milliLiter(s) (75 mL/Hr) IV Continuous <Continuous>       Allergies    sulfa drugs (Unknown)  latex (Unknown)  Cleocin HCl (Unknown)  penicillins (Unknown)  sertraline (Unknown)    Intolerances        ROS: Pertinent positives in HPI, all other ROS were reviewed and are negative.        Vital Signs Last 24 Hrs  T(C): 36.4 (05 Aug 2024 08:30), Max: 36.4 (05 Aug 2024 08:30)  T(F): 97.5 (05 Aug 2024 08:30), Max: 97.5 (05 Aug 2024 08:30)  HR: 89 (05 Aug 2024 08:30) (60 - 89)  BP: 125/58 (05 Aug 2024 08:30) (90/40 - 142/74)  BP(mean): 71 (05 Aug 2024 04:52) (55 - 93)  RR: 17 (05 Aug 2024 08:30) (13 - 17)  SpO2: 98% (05 Aug 2024 08:30) (89% - 98%)    Parameters below as of 05 Aug 2024 08:30  Patient On (Oxygen Delivery Method): nasal cannula  O2 Flow (L/min): 3      Gen exam:  Normocephalic, in no distress, awake - inattentive.  HEENT: PERRLA, EOMI,   Neck: Supple.  Respiratory: Breath sounds are clear bilaterally  Cardiovascular: S1 and S2, regular   Extremities:  no edema  Vascular: Caritid Bruit - no  Musculoskeletal: no joint swelling/tenderness, tremors +  Skin: No rashes    Neurological exam:  HF: A x O x self only, talks jibberish and answers irrelevantly.   CN: KIMBERLY, tracks eyes well, no facial asymmetry noted  Motor: Hypertonia bilateral upper extremities more than lower extremities, intermittent tremors when hands held in extension posture.  Unable to evaluate for full strength as patient does not allow  Sensory/coordination/reflexes; limited exam as patient does not allow examinationNormal tone in upper extremities. Unable to perform confrontation testing  Gait/Balance:  unable to test        Labs:   08-05    145  |  117<H>  |  22  ----------------------------<  81  4.2   |  24  |  0.70    Ca    8.0<L>      05 Aug 2024 06:42    TPro  6.1  /  Alb  2.5<L>  /  TBili  0.3  /  DBili  x   /  AST  30  /  ALT  25  /  AlkPhos  152<H>  08-05                          10.2   6.48  )-----------( 122      ( 05 Aug 2024 06:42 )             32.5       Radiology:  -< from: CT Head No Cont (08.05.24 @ 05:48) >  IMPRESSION:  No acute intracranial hemorrhage, mass effect, or evidence of acute   vascular territorial infarction. If clinical symptoms persist or worsen,   more sensitive evaluation with brain MRI may be obtained, if no   contraindications exist.      EEG Classification / Summary:  Abnormal routine EEG in the awake, drowsy states.   -A single left frontotemporal suspected sharp-wave discharge  -Delta slowing at times more prominent in the right posterior quadrant with left hemispheric relative attenuation  -Moderate diffuse slowing  -No seizures.    Clinical Impression:  -Suspected risk of focal-onset seizures from the left frontotemporal region  -Additional findings may indicate left hemispheric focal cerebral dysfunction of structural or functional etiology.  -Moderate diffuse cerebral dysfunction is nonspecific in etiology.   -No seizures.

## 2024-08-05 NOTE — ED ADULT NURSE NOTE - CHIEF COMPLAINT QUOTE
pt bibems from [place] c/o hypothermia and increased seizure activity since yesterday. pt is alert but disoriented, PMH seizures, dementia, hypothyroidism. allergies to clindamycin, penicillin, latex, sertraline.

## 2024-08-05 NOTE — DIETITIAN INITIAL EVALUATION ADULT - ADD RECOMMEND
1) Liberalize diet to regular to maximize caloric and nutrient intake.   2) Encourage protein-rich foods, maximize food preferences  3) Add Ensure + HP shake BID to optimize nutritional needs (provides 350 kcal, 20g protein/ shake)   4) Monitor bowel movements, if no BM for >3 days, consider implementing bowel regimen.  5) MVI w/ minerals daily to ensure 100% RDA met   6) Obtain weekly wt to track/trend changes   7) Confirm goals of care regarding nutrition support. Nutrition support is not recommended due to overall declining medical status which evidenced based studies indicate EN is not effective in prolonging survival and improving quality of life. It can also increase risk of aspiration pneumonia as well as other related issues (infection, GI complications, and worsening/ non-healing PI's). However, will provide nutrition/ hydration within GOC.   RD will continue to monitor PO intake, labs, hydration, and wt prn.

## 2024-08-05 NOTE — ED ADULT NURSE REASSESSMENT NOTE - NS ED NURSE REASSESS COMMENT FT1
Pt being increasingly agitated, ripping off supplementary O2, cardiac monitor leads, and pulse ox monitor. MD Lynch made aware. Will medicate patient as per MD order. Pt in no acute distress.

## 2024-08-05 NOTE — EEG REPORT - NS EEG TEXT BOX
DIAMOND BHATIA [id number]     Study Date: 08-05-24  Duration: 20 min  --------------------------------------------------------------------------------------------------  History:  CC/ HPI Patient is a 83y old  Female who presents with a chief complaint of Sent from [facility] for hypothermia and possible seizure (05 Aug 2024 09:23)    MEDICATIONS  (STANDING):  cefepime  Injectable.      dicyclomine 10 milliGRAM(s) Oral two times a day before meals  donepezil 10 milliGRAM(s) Oral at bedtime  DULoxetine 60 milliGRAM(s) Oral daily  levETIRAcetam 250 milliGRAM(s) Oral two times a day  levothyroxine 25 MICROGram(s) Oral daily  pantoprazole    Tablet 40 milliGRAM(s) Oral before breakfast  sodium chloride 0.9%. 1000 milliLiter(s) (75 mL/Hr) IV Continuous <Continuous>    --------------------------------------------------------------------------------------------------  Study Interpretation:    [[[Abbreviation Key:  PDR=alpha rhythm/posterior dominant rhythm. A-P=anterior posterior.  Amplitude: ‘very low’:<20; ‘low’:20-49; ‘medium’:; ‘high’:>150uV.  Persistence for periodic/rhythmic patterns (% of epoch) ‘rare’:<1%; ‘occasional’:1-10%; ‘frequent’:10-50%; ‘abundant’:50-90%; ‘continuous’:>90%.  Persistence for sporadic discharges: ‘rare’:<1/hr; ‘occasional’:1/min-1/hr; ‘frequent’:>1/min; ‘abundant’:>1/10 sec.  RPP=rhythmic and periodic patterns; GRDA=generalized rhythmic delta activity; FIRDA=frontal intermittent GRDA; LRDA=lateralized rhythmic delta activity; TIRDA=temporal intermittent rhythmic delta activity;  LPD=PLED=lateralized periodic discharges; GPD=generalized periodic discharges; BIPDs =bilateral independent periodic discharges; Mf=multifocal; SIRPDs=stimulus induced rhythmic, periodic, or ictal appearing discharges; BIRDs=brief potentially ictal rhythmic discharges >4 Hz, lasting .5-10s; PFA (paroxysmal bursts >13 Hz or =8 Hz <10s).  Modifiers: +F=with fast component; +S=with spike component; +R=with rhythmic component.  S-B=burst suppression pattern.  Max=maximal. N1-drowsy; N2-stage II sleep; N3-slow wave sleep. SSS/BETS=small sharp spikes/benign epileptiform transients of sleep. HV=hyperventilation; PS=photic stimulation]]]    Daily EEG Visual Analysis    FINDINGS:      Background:  The background is continuous and symmetric. The predominant background in the most wakeful state consists of diffuse polymorphic delta and theta slowing with intermittent beta frequencies. There are very rare fragments of a 7-Hz posterior dominant rhythm. Delta activity is at times more prominent in the right posterior quadrant, with frequent left hemispheric relative attenuation.    State Changes:   Drowsiness is characterized by slowing of the background activity.  No normal stage 2 sleep.    Interictal Findings:  A single left frontotemporal (F7/T7) suspected sharp-wave discharge.    Electrographic and Electroclinical seizures:  None    Other Clinical Events:  None    Activation Procedures:   Hyperventilation is not performed.    Photic stimulation is performed and does not elicit any abnormalities.      Artifacts:  Frequent myogenic > movement artifacts are present. Occasional runs of rhythmic muscle artifact on the left or the right.    Single-lead EKG: Regular rhythm    EEG Classification / Summary:  Abnormal routine EEG in the awake, drowsy states.   -A single left frontotemporal suspected sharp-wave discharge  -Delta slowing at times more prominent in the right posterior quadrant with left hemispheric relative attenuation  -Moderate diffuse slowing  -No seizures.    Clinical Impression:  -Suspected risk of focal-onset seizures from the left frontotemporal region  -Additional findings may indicate left hemispheric focal cerebral dysfunction of structural or functional etiology.  -Moderate diffuse cerebral dysfunction is nonspecific in etiology.   -No seizures.          -------------------------------------------------------------------------------------------------------    Tamie Pastor MD  Attending Physician, [facility] [place]    -------------------------------------------------------------------------------------------------------    To reach EEG technologist:  Please use the pager number for the appropriate hospital or contact the .  At [facility] - Pager #: [phone]    To reach EEG-reading physician:  [facility] EEG Reading Room Phone #: [phone]  Epilepsy Answering Service after 5PM and before 8:30AM: Phone #: [phone]

## 2024-08-05 NOTE — DIETITIAN INITIAL EVALUATION ADULT - PERTINENT MEDS FT
MEDICATIONS  (STANDING):  cefepime  Injectable.      dicyclomine 10 milliGRAM(s) Oral two times a day before meals  donepezil 10 milliGRAM(s) Oral at bedtime  DULoxetine 60 milliGRAM(s) Oral daily  levETIRAcetam 250 milliGRAM(s) Oral two times a day  levothyroxine 25 MICROGram(s) Oral daily  pantoprazole    Tablet 40 milliGRAM(s) Oral before breakfast  sodium chloride 0.9%. 1000 milliLiter(s) (75 mL/Hr) IV Continuous <Continuous>    MEDICATIONS  (PRN):  acetaminophen     Tablet .. 650 milliGRAM(s) Oral every 6 hours PRN Mild Pain (1 - 3)  aluminum hydroxide/magnesium hydroxide/simethicone Suspension 30 milliLiter(s) Oral every 4 hours PRN Dyspepsia  LORazepam   Injectable 2 milliGRAM(s) IV Push once PRN seizure  ondansetron Injectable 4 milliGRAM(s) IV Push every 6 hours PRN Nausea and/or Vomiting

## 2024-08-05 NOTE — ED ADULT NURSE REASSESSMENT NOTE - NS ED NURSE REASSESS COMMENT FT1
MD O'Leno aware of patient's BP, will medicate as per MD order. Pt respirations are even and unlabored, in NAD.

## 2024-08-05 NOTE — ED ADULT NURSE NOTE - CAS EDN DISCHARGE INTERVENTIONS
IV intact/Indwelling catheter secured and draining/Admission wristband placed IV intact/Indwelling catheter secured and draining

## 2024-08-05 NOTE — ED ADULT NURSE NOTE - NSSUHOSCREENINGYN_ED_ALL_ED
This encounter was created in error - please disregard.   No - the patient is unable to be screened due to medical condition

## 2024-08-05 NOTE — PROGRESS NOTE ADULT - SUBJECTIVE AND OBJECTIVE BOX
< from: CT Head No Cont (08.05.24 @ 05:48) >    IMPRESSION:  No acute intracranial hemorrhage, mass effect, or evidence of acute   vascular territorial infarction. If clinical symptoms persist or worsen,   more sensitive evaluation with brain MRI may be obtained, if no   contraindications exist.        --- End of Report ---    < end of copied text >  < from: Xray Chest 1 View-PORTABLE IMMEDIATE (08.04.24 @ 23:04) >  IMPRESSION: The heart size, mediastinal and hilar contours are unchanged.   Again noted is a left-sided dual-lead pacemaker the tip the pacer wires   are unchanged and project in the region the right atrium and right   ventricle. There is mild right hemidiaphragm elevation. No pneumothorax   or large effusion or lobar consolidation. No acute osseous pathology.    --- End of Report ---    < end of copied text >  Urinalysis with Rflx Culture (08.05.24 @ 00:43)   Urine Appearance: Cloudy  Color: Yellow  Specific Gravity: 1.018  pH Urine: 6.0  Protein, Urine: Negative mg/dL  Glucose Qualitative, Urine: Negative mg/dL  Ketone - Urine: Negative mg/dL  Blood, Urine: Negative  Bilirubin: Negative  Urobilinogen: 1.0 mg/dL  Leukocyte Esterase Concentration: Moderate  Nitrite: Positive HOSPITALIST ATTENDING PROGRESS NOTE    Chart and meds reviewed.  Patient seen and examined.    CC/ HPI Patient is a 83y old  Female who presents with a chief complaint of Hypothermia, initial encounter     (05 Aug 2024 17:40)      Subjective: Patient seen lying bed comfortably with sister and sister's  at bedside. Patient answers when asked questions but does not respond appropriately.     Unable to obtain ROS.     MEDICATIONS:  MEDICATIONS  (STANDING):  cefepime  Injectable.      dicyclomine 10 milliGRAM(s) Oral two times a day before meals  donepezil 10 milliGRAM(s) Oral at bedtime  DULoxetine 60 milliGRAM(s) Oral daily  levETIRAcetam 250 milliGRAM(s) Oral two times a day  levothyroxine 25 MICROGram(s) Oral daily  pantoprazole    Tablet 40 milliGRAM(s) Oral before breakfast  sodium chloride 0.9%. 1000 milliLiter(s) (75 mL/Hr) IV Continuous <Continuous>      Vital Signs Last 24 Hrs  T(C): 36.3 (05 Aug 2024 15:48), Max: 36.4 (05 Aug 2024 08:30)  T(F): 97.3 (05 Aug 2024 15:48), Max: 97.5 (05 Aug 2024 08:30)  HR: 77 (05 Aug 2024 15:48) (60 - 89)  BP: 113/59 (05 Aug 2024 15:48) (90/40 - 142/74)  BP(mean): 71 (05 Aug 2024 04:52) (55 - 93)  RR: 17 (05 Aug 2024 15:48) (13 - 17)  SpO2: 97% (05 Aug 2024 15:48) (89% - 98%)    Parameters below as of 05 Aug 2024 15:48  Patient On (Oxygen Delivery Method): nasal cannula        I&O's Summary      CAPILLARY BLOOD GLUCOSE          PHYSICAL EXAM:    Constitutional: NAD, awake and alert,  HEENT:  EOMI, Normal Hearing, MMM  Neck: Soft and supple, No LAD, No JVD  Respiratory: Breath sounds are clear bilaterally, No wheezing, rales or rhonchi  Cardiovascular: S1 and S2, regular rate and rhythm, no Murmurs, gallops or rubs  Gastrointestinal: Bowel Sounds present, soft, nontender, nondistended, no guarding, no rebound  Extremities: No peripheral edema  Vascular: 2+ peripheral pulses  Neurological: A/O x 1, no focal deficits  Musculoskeletal: 5/5 strength b/l upper and lower extremities  Skin: No rashes        LABS: All Labs Reviewed:                        10.2   6.48  )-----------( 122      ( 05 Aug 2024 06:42 )             32.5     08-05    145  |  117<H>  |  22  ----------------------------<  81  4.2   |  24  |  0.70    Ca    8.0<L>      05 Aug 2024 06:42    TPro  6.1  /  Alb  2.5<L>  /  TBili  0.3  /  DBili  x   /  AST  30  /  ALT  25  /  AlkPhos  152<H>  08-05    PT/INR - ( 04 Aug 2024 22:44 )   PT: 10.6 sec;   INR: 0.94 ratio         PTT - ( 04 Aug 2024 22:44 )  PTT:37.6 sec  CARDIAC MARKERS ( 04 Aug 2024 22:44 )  x     / x     / 91 U/L / x     / x          Blood Culture:     RADIOLOGY/EKG:    EEG Classification / Summary:  Abnormal routine EEG in the awake, drowsy states.   -A single left frontotemporal suspected sharp-wave discharge  -Delta slowing at times more prominent in the right posterior quadrant with left hemispheric relative attenuation  -Moderate diffuse slowing  -No seizures.    Clinical Impression:  -Suspected risk of focal-onset seizures from the left frontotemporal region  -Additional findings may indicate left hemispheric focal cerebral dysfunction of structural or functional etiology.  -Moderate diffuse cerebral dysfunction is nonspecific in etiology.   -No seizures.    DVT PPX:    ADVANCED DIRECTIVE:    DISPOSITION:    Total time spent:  50 minutes     < from: CT Head No Cont (08.05.24 @ 05:48) >    IMPRESSION:  No acute intracranial hemorrhage, mass effect, or evidence of acute   vascular territorial infarction. If clinical symptoms persist or worsen,   more sensitive evaluation with brain MRI may be obtained, if no   contraindications exist.        --- End of Report ---    < end of copied text >  < from: Xray Chest 1 View-PORTABLE IMMEDIATE (08.04.24 @ 23:04) >  IMPRESSION: The heart size, mediastinal and hilar contours are unchanged.   Again noted is a left-sided dual-lead pacemaker the tip the pacer wires   are unchanged and project in the region the right atrium and right   ventricle. There is mild right hemidiaphragm elevation. No pneumothorax   or large effusion or lobar consolidation. No acute osseous pathology.    --- End of Report ---    < end of copied text >  Urinalysis with Rflx Culture (08.05.24 @ 00:43)   Urine Appearance: Cloudy  Color: Yellow  Specific Gravity: 1.018  pH Urine: 6.0  Protein, Urine: Negative mg/dL  Glucose Qualitative, Urine: Negative mg/dL  Ketone - Urine: Negative mg/dL  Blood, Urine: Negative  Bilirubin: Negative  Urobilinogen: 1.0 mg/dL  Leukocyte Esterase Concentration: Moderate  Nitrite: Positive

## 2024-08-05 NOTE — ED ADULT NURSE NOTE - NSFALLHARMRISKINTERV_ED_ALL_ED

## 2024-08-05 NOTE — DIETITIAN INITIAL EVALUATION ADULT - FEEDING SKILL
Tray set-up, open all containers; meal encouragement.  RN reports patient likes to feed self with set up help/age appropriate assistance

## 2024-08-05 NOTE — ED ADULT NURSE REASSESSMENT NOTE - NS ED NURSE REASSESS COMMENT FT1
Spoke with MD Boogie about patient's BP. Will medicate pt as per MD order and reassess. Pt to remain on osiris hugger as per MD order. Respirations are even and unlabored, in NAD.

## 2024-08-05 NOTE — H&P ADULT - HISTORY OF PRESENT ILLNESS
This is  83-year-old female with history of dementia with behaviour disturbances,, seizures, hypothyroidism, s/p PPM, IBS, UTI, brought in by EMS from SNF for evaluation of suspected breakthrough seizures and hypothermia noted at her skilled nursing facility prior to arrival. History was obtained records and ER staff because of patient mental status. Appearantly the patient is was alert and oriented to self only but denies having pain on presentation to ER and she was unable to provide any further details. On examination, patient is sleepy but arousable but can not history from her.  No recent falls were reported and there is no evidence of trauma on exam. patient was admitted 2/24 b/o possible seizure and evaluated by neurology and discharged on keppra 250mg BID.  patient was found to be hypothermic and was started on warming blanket in ED and was given 1 gm keppra IVx1. UA suggestive of UTI. pt with h/o E.coli UTI in 1/24.

## 2024-08-05 NOTE — DIETITIAN INITIAL EVALUATION ADULT - PERTINENT LABORATORY DATA
08-05    145  |  117<H>  |  22  ----------------------------<  81  4.2   |  24  |  0.70    Ca    8.0<L>      05 Aug 2024 06:42    TPro  6.1  /  Alb  2.5<L>  /  TBili  0.3  /  DBili  x   /  AST  30  /  ALT  25  /  AlkPhos  152<H>  08-05

## 2024-08-05 NOTE — H&P ADULT - NSHPLABSRESULTS_GEN_ALL_CORE
11.7   7.95  )-----------( 140      ( 04 Aug 2024 22:44 )             36.1   08-04    142  |  107  |  34<H>  ----------------------------<  89  4.6   |  30  |  1.04    Ca    9.1      04 Aug 2024 22:44    TPro  7.0  /  Alb  3.0<L>  /  TBili  0.2  /  DBili  x   /  AST  35  /  ALT  30  /  AlkPhos  179<H>  08-04    < from: Xray Chest 1 View-PORTABLE IMMEDIATE (08.04.24 @ 23:04) >    IMPRESSION: The heart size, mediastinal and hilar contours are unchanged.   Again noted is a left-sided dual-lead pacemaker the tip the pacer wires   are unchanged and project in the region the right atrium and right   ventricle. There is mild right hemidiaphragm elevation. No pneumothorax   or large effusion or lobar consolidation. No acute osseous pathology.      < end of copied text >

## 2024-08-05 NOTE — DIETITIAN INITIAL EVALUATION ADULT - OTHER INFO
This is  83-year-old female with history of dementia with behaviour disturbances,, seizures, hypothyroidism, s/p PPM, IBS, UTI, brought in by EMS from SNF for evaluation of suspected breakthrough seizures and hypothermia noted at her skilled nursing facility prior to arrival. History was obtained records and ER staff because of patient mental status. Appearantly the patient is was alert and oriented to self only but denies having pain on presentation to ER and she was unable to provide any further details. On examination, patient is sleepy but arousable but can not history from her.  No recent falls were reported and there is no evidence of trauma on exam. patient was admitted 2/24 b/o possible seizure and evaluated by neurology and discharged on keppra 250mg BID.  patient was found to be hypothermic and was started on warming blanket in ED and was given 1 gm keppra IVx1. UA suggestive of UTI. pt with h/o E.coli UTI in 1/24.  Admitted for hypothermia    Known to nutrition services, previously met criteria for moderate PCM (2/1/24, 1/11/24).  Prescribed a DASH/TLC diet at .  Based on bed scale wt obtained by RD on 8/5 Pt wt is 143#.  Weight history reviewed: 2/1/24 128.4# per RD bed scale; 1/11/24 132 per RD bed scale with noted 2+ edema.  Gain of 14.6#/10.2% x 6 months - severe but not clinically significant.  No edema noted to account for weight gain.  Patient appears frail, NFPE revealed mild to moderate muscle/fat wasting.  Recommend to Liberalize diet to regular to maximize caloric and nutrient intake. Add Ensure + HP shake BID to optimize nutritional needs (provides 350 kcal, 20g protein/ shake).  MOLST form present however tube feeding not addressed.  Confirm goals of care regarding nutrition support.  Please see recommendations below.

## 2024-08-05 NOTE — PATIENT PROFILE ADULT - FALL HARM RISK - HARM RISK INTERVENTIONS
Assistance with ambulation/Assistance OOB with selected safe patient handling equipment/Communicate Risk of Fall with Harm to all staff/Discuss with provider need for PT consult/Monitor gait and stability/Reinforce activity limits and safety measures with patient and family/Tailored Fall Risk Interventions/Visual Cue: Yellow wristband and red socks/Bed in lowest position, wheels locked, appropriate side rails in place/Call bell, personal items and telephone in reach/Instruct patient to call for assistance before getting out of bed or chair/Non-slip footwear when patient is out of bed/[place] to call system/Physically safe environment - no spills, clutter or unnecessary equipment/Purposeful Proactive Rounding/Room/bathroom lighting operational, light cord in reach

## 2024-08-05 NOTE — CONSULT NOTE ADULT - ASSESSMENT
83-year-old F with history of dementia + behaviour disturbances, hypothyroidism, s/p PPM, IBS, brought in by EMS for evaluation of suspected breakthrough seizures and hypothermia noted at her SNF. History per records and ER staff, patient is alert and oriented to self only, is limited In regard to functioning, has dementia, was seen in [facility] 1/2024 with possible GTC seizure lasting 2-3 minutes, had another seizure in ED, was started on low-dose Keppra 250 Mg twice daily.  In ED Temp 93.8, was started on warming blanket, given 1 gm keppra IV. UA suggestive of UTI.    On exam today, patient is sitting in bed, with pill-rolling tremors, seems to be picking at things, talks gibberish but is interactive, answers questions irrelevantly.  As per the staff, no further seizures were witnessed overnight.    # Possible breakthrough seizure, history of new onset seizure 1/2024, possible trigger hypothermia    – Will increase Keppra 500 Mg at bedtime, continue to 50 Mg in a.m.  – Rule out toxic metabolic causes    # Dementia - Parkinson's    -Continue donepezil 10 mg/day  -Will need follow-up with neurology as outpatient for further evaluation of Parkinson's/parkinsonism

## 2024-08-05 NOTE — ED ADULT NURSE REASSESSMENT NOTE - NS ED NURSE REASSESS COMMENT FT1
Pt O2 went to 89% on room air. Pt placed on 2L nasal cannula oxygen O2 sat improving, MD Lynch aware. Pt in no acute distress.

## 2024-08-06 LAB
ANION GAP SERPL CALC-SCNC: 0 MMOL/L — LOW (ref 5–17)
BUN SERPL-MCNC: 13 MG/DL — SIGNIFICANT CHANGE UP (ref 7–23)
CALCIUM SERPL-MCNC: 8.8 MG/DL — SIGNIFICANT CHANGE UP (ref 8.5–10.1)
CHLORIDE SERPL-SCNC: 112 MMOL/L — HIGH (ref 96–108)
CO2 SERPL-SCNC: 29 MMOL/L — SIGNIFICANT CHANGE UP (ref 22–31)
CREAT SERPL-MCNC: 0.72 MG/DL — SIGNIFICANT CHANGE UP (ref 0.5–1.3)
EGFR: 82 ML/MIN/1.73M2 — SIGNIFICANT CHANGE UP
GLUCOSE SERPL-MCNC: 82 MG/DL — SIGNIFICANT CHANGE UP (ref 70–99)
HCT VFR BLD CALC: 32.6 % — LOW (ref 34.5–45)
HGB BLD-MCNC: 10.6 G/DL — LOW (ref 11.5–15.5)
MAGNESIUM SERPL-MCNC: 1.7 MG/DL — SIGNIFICANT CHANGE UP (ref 1.6–2.6)
MCHC RBC-ENTMCNC: 30.7 PG — SIGNIFICANT CHANGE UP (ref 27–34)
MCHC RBC-ENTMCNC: 32.5 GM/DL — SIGNIFICANT CHANGE UP (ref 32–36)
MCV RBC AUTO: 94.5 FL — SIGNIFICANT CHANGE UP (ref 80–100)
PHOSPHATE SERPL-MCNC: 2 MG/DL — LOW (ref 2.5–4.5)
PLATELET # BLD AUTO: 141 K/UL — LOW (ref 150–400)
POTASSIUM SERPL-MCNC: 3.9 MMOL/L — SIGNIFICANT CHANGE UP (ref 3.5–5.3)
POTASSIUM SERPL-SCNC: 3.9 MMOL/L — SIGNIFICANT CHANGE UP (ref 3.5–5.3)
RBC # BLD: 3.45 M/UL — LOW (ref 3.8–5.2)
RBC # FLD: 15.6 % — HIGH (ref 10.3–14.5)
SODIUM SERPL-SCNC: 141 MMOL/L — SIGNIFICANT CHANGE UP (ref 135–145)
WBC # BLD: 5.84 K/UL — SIGNIFICANT CHANGE UP (ref 3.8–10.5)
WBC # FLD AUTO: 5.84 K/UL — SIGNIFICANT CHANGE UP (ref 3.8–10.5)

## 2024-08-06 PROCEDURE — 99232 SBSQ HOSP IP/OBS MODERATE 35: CPT

## 2024-08-06 RX ORDER — SOD PHOS DI, MONO/K PHOS MONO 250 MG
1 TABLET ORAL THREE TIMES A DAY
Refills: 0 | Status: COMPLETED | OUTPATIENT
Start: 2024-08-06 | End: 2024-08-07

## 2024-08-06 RX ORDER — LEVETIRACETAM 1000 MG/1
500 TABLET, FILM COATED ORAL
Refills: 0 | Status: DISCONTINUED | OUTPATIENT
Start: 2024-08-06 | End: 2024-08-08

## 2024-08-06 RX ORDER — LEVETIRACETAM 1000 MG/1
250 TABLET, FILM COATED ORAL ONCE
Refills: 0 | Status: COMPLETED | OUTPATIENT
Start: 2024-08-06 | End: 2024-08-06

## 2024-08-06 RX ADMIN — Medication 1 PACKET(S): at 12:45

## 2024-08-06 RX ADMIN — Medication 10 MILLIGRAM(S): at 14:54

## 2024-08-06 RX ADMIN — PANTOPRAZOLE SODIUM 40 MILLIGRAM(S): 20 TABLET, DELAYED RELEASE ORAL at 09:27

## 2024-08-06 RX ADMIN — LEVETIRACETAM 250 MILLIGRAM(S): 1000 TABLET, FILM COATED ORAL at 11:21

## 2024-08-06 RX ADMIN — Medication 60 MILLIGRAM(S): at 09:28

## 2024-08-06 RX ADMIN — Medication 25 MICROGRAM(S): at 05:35

## 2024-08-06 RX ADMIN — Medication 10 MILLIGRAM(S): at 21:30

## 2024-08-06 RX ADMIN — Medication 75 MILLILITER(S): at 18:58

## 2024-08-06 RX ADMIN — Medication 1 PACKET(S): at 14:54

## 2024-08-06 RX ADMIN — Medication 10 MILLIGRAM(S): at 05:36

## 2024-08-06 RX ADMIN — LEVETIRACETAM 500 MILLIGRAM(S): 1000 TABLET, FILM COATED ORAL at 21:30

## 2024-08-06 RX ADMIN — CEFEPIME HYDROCHLORIDE 2000 MILLIGRAM(S): 1 INJECTION, POWDER, FOR SOLUTION INTRAMUSCULAR; INTRAVENOUS at 05:39

## 2024-08-06 RX ADMIN — LEVETIRACETAM 250 MILLIGRAM(S): 1000 TABLET, FILM COATED ORAL at 09:27

## 2024-08-06 NOTE — PROGRESS NOTE ADULT - PROBLEM SELECTOR PLAN 1
E Coli UTI  ABX  FOllow sensitivities E Coli UTI  ABX  FOllow sensitivities  Associated acute metabolic encephalopathy due to UTI superimposed on underlying dementia

## 2024-08-06 NOTE — PROGRESS NOTE ADULT - SUBJECTIVE AND OBJECTIVE BOX
Patient is sitting in bed, more alert and interactive toady, still talks gibberish, answers questions irrelevantly.      As per the staff, no further seizures were witnessed overnight. Keppra dose was increased to 500–250 mg yesterday, however Dr. Raman has reviewed med reconciliation, patient had been on Keppra 500 mg twice daily at the time of admission. Not clear when it was increased as she was started on Keppra 250 Mg twice daily during her last admission in 1/2024      ROS: As above, other ROS Unable to obtain      MEDICATIONS  (STANDING):  cefepime  Injectable. 2000 milliGRAM(s) IV Push every 24 hours  cefepime  Injectable.      dicyclomine 10 milliGRAM(s) Oral two times a day before meals  donepezil 10 milliGRAM(s) Oral at bedtime  DULoxetine 60 milliGRAM(s) Oral daily  levETIRAcetam 500 milliGRAM(s) Oral two times a day  levothyroxine 25 MICROGram(s) Oral daily  pantoprazole    Tablet 40 milliGRAM(s) Oral before breakfast  potassium phosphate / sodium phosphate Powder (PHOS-NaK) 1 Packet(s) Oral three times a day  sodium chloride 0.9%. 1000 milliLiter(s) (75 mL/Hr) IV Continuous <Continuous>      Vital Signs Last 24 Hrs  T(C): 36.3 (06 Aug 2024 07:43), Max: 36.9 (05 Aug 2024 21:40)  T(F): 97.3 (06 Aug 2024 07:43), Max: 98.4 (05 Aug 2024 21:40)  HR: 74 (06 Aug 2024 07:43) (74 - 80)  BP: 133/60 (06 Aug 2024 07:43) (113/59 - 133/60)  BP(mean): --  RR: 16 (06 Aug 2024 07:43) (16 - 17)  SpO2: 93% (06 Aug 2024 07:43) (93% - 97%)    Parameters below as of 06 Aug 2024 07:43  Patient On (Oxygen Delivery Method): room air      Neurological exam:  HF: A x O x self only, talks jibberish and answers irrelevantly.   CN: KIMBERLY, tracks eyes well, no facial asymmetry noted  Motor: Hypertonia bilateral upper extremities more than lower extremities, intermittent tremors when hands held in extension posture.  Unable to evaluate for full strength as patient does not allow  Sensory/coordination/reflexes; limited exam as patient does not allow examinationNormal tone in upper extremities. Unable to perform confrontation testing  Gait/Balance:  unable to test                            10.6   5.84  )-----------( 141      ( 06 Aug 2024 09:12 )             32.6     08-06    141  |  112<H>  |  13  ----------------------------<  82  3.9   |  29  |  0.72    Ca    8.8      06 Aug 2024 09:12  Phos  2.0     08-06  Mg     1.7     08-06    TPro  6.1  /  Alb  2.5<L>  /  TBili  0.3  /  DBili  x   /  AST  30  /  ALT  25  /  AlkPhos  152<H>  08-05          Radiology report:  -< from: CT Head No Cont (08.05.24 @ 05:48) >  IMPRESSION:  No acute intracranial hemorrhage, mass effect, or evidence of acute   vascular territorial infarction. If clinical symptoms persist or worsen,   more sensitive evaluation with brain MRI may be obtained, if no   contraindications exist.      EEG Classification / Summary:  Abnormal routine EEG in the awake, drowsy states.   -A single left frontotemporal suspected sharp-wave discharge  -Delta slowing at times more prominent in the right posterior quadrant with left hemispheric relative attenuation  -Moderate diffuse slowing  -No seizures.    Clinical Impression:  -Suspected risk of focal-onset seizures from the left frontotemporal region  -Additional findings may indicate left hemispheric focal cerebral dysfunction of structural or functional etiology.  -Moderate diffuse cerebral dysfunction is nonspecific in etiology.   -No seizures.

## 2024-08-06 NOTE — PROGRESS NOTE ADULT - SUBJECTIVE AND OBJECTIVE BOX
HOSPITALIST ATTENDING PROGRESS NOTE    Chart and meds reviewed.  Patient seen and examined.    CC/ HPI Patient is a 83y old  Female who presents with a chief complaint of Hypothermia, initial encounter.     Subjective/INterval Hx    8/6: Patient seen lying bed comfortably  Patient answers when asked questions but does not respond appropriately. Limited ROS/CC. Urine culture positive for E. Coli. Follow sensitivities.     Unable to obtain ROS.     T(C): 37 (08-06-24 @ 21:38), Max: 37 (08-06-24 @ 15:39)  HR: 88 (08-06-24 @ 21:38) (74 - 89)  BP: 160/81 (08-06-24 @ 21:38) (133/60 - 160/81)  RR: 18 (08-06-24 @ 21:38) (16 - 18)  SpO2: 95% (08-06-24 @ 21:38) (93% - 95%)    Constitutional: NAD, awake and alert,  HEENT:  EOMI, Normal Hearing, MMM  Neck: Soft and supple, No LAD, No JVD  Respiratory: Breath sounds are clear bilaterally, No wheezing, rales or rhonchi  Cardiovascular: S1 and S2, regular rate and rhythm, no Murmurs, gallops or rubs  Gastrointestinal: Bowel Sounds present, soft, nontender, nondistended, no guarding, no rebound  Extremities: No peripheral edema  Vascular: 2+ peripheral pulses  Neurological: A/O x 1, no focal deficits  Musculoskeletal: 5/5 strength b/l upper and lower extremities  Skin: No rashes        LABS: All Labs Reviewed:                                   10.6   5.84  )-----------( 141      ( 06 Aug 2024 09:12 )             32.6     08-06    141  |  112<H>  |  13  ----------------------------<  82  3.9   |  29  |  0.72    Ca    8.8      06 Aug 2024 09:12  Phos  2.0     08-06  Mg     1.7     08-06    TPro  6.1  /  Alb  2.5<L>  /  TBili  0.3  /  DBili  x   /  AST  30  /  ALT  25  /  AlkPhos  152<H>  08-05      CAPILLARY BLOOD GLUCOSE          Urinalysis with Rflx Culture (collected 05 Aug 2024 00:43)    Culture - Urine (collected 05 Aug 2024 00:43)  Source: .Urine None  Preliminary Report (06 Aug 2024 14:32):    >100,000 CFU/ml Escherichia coli    Culture - Blood (collected 04 Aug 2024 22:44)  Source: .Blood None  Preliminary Report (06 Aug 2024 07:02):    No growth at 24 hours    Culture - Blood (collected 04 Aug 2024 22:44)  Source: .Blood None  Preliminary Report (06 Aug 2024 07:02):    No growth at 24 hours                   10.2   6.48  )-----------( 122      ( 05 Aug 2024 06:42 )             32.5     08-05    145  |  117<H>  |  22  ----------------------------<  81  4.2   |  24  |  0.70    Ca    8.0<L>      05 Aug 2024 06:42    TPro  6.1  /  Alb  2.5<L>  /  TBili  0.3  /  DBili  x   /  AST  30  /  ALT  25  /  AlkPhos  152<H>  08-05    PT/INR - ( 04 Aug 2024 22:44 )   PT: 10.6 sec;   INR: 0.94 ratio         PTT - ( 04 Aug 2024 22:44 )  PTT:37.6 sec  CARDIAC MARKERS ( 04 Aug 2024 22:44 )  x     / x     / 91 U/L / x     / x          Blood Culture:     RADIOLOGY/EKG:    EEG Classification / Summary:  Abnormal routine EEG in the awake, drowsy states.   -A single left frontotemporal suspected sharp-wave discharge  -Delta slowing at times more prominent in the right posterior quadrant with left hemispheric relative attenuation  -Moderate diffuse slowing  -No seizures.    Clinical Impression:  -Suspected risk of focal-onset seizures from the left frontotemporal region  -Additional findings may indicate left hemispheric focal cerebral dysfunction of structural or functional etiology.  -Moderate diffuse cerebral dysfunction is nonspecific in etiology.   -No seizures.    DVT PPX:    ADVANCED DIRECTIVE:    DISPOSITION:    Total time spent:  50 minutes     < from: CT Head No Cont (08.05.24 @ 05:48) >    IMPRESSION:  No acute intracranial hemorrhage, mass effect, or evidence of acute   vascular territorial infarction. If clinical symptoms persist or worsen,   more sensitive evaluation with brain MRI may be obtained, if no   contraindications exist.        --- End of Report ---    < end of copied text >  < from: Xray Chest 1 View-PORTABLE IMMEDIATE (08.04.24 @ 23:04) >  IMPRESSION: The heart size, mediastinal and hilar contours are unchanged.   Again noted is a left-sided dual-lead pacemaker the tip the pacer wires   are unchanged and project in the region the right atrium and right   ventricle. There is mild right hemidiaphragm elevation. No pneumothorax   or large effusion or lobar consolidation. No acute osseous pathology.    --- End of Report ---    < end of copied text >  Urinalysis with Rflx Culture (08.05.24 @ 00:43)   Urine Appearance: Cloudy  Color: Yellow  Specific Gravity: 1.018  pH Urine: 6.0  Protein, Urine: Negative mg/dL  Glucose Qualitative, Urine: Negative mg/dL  Ketone - Urine: Negative mg/dL  Blood, Urine: Negative  Bilirubin: Negative  Urobilinogen: 1.0 mg/dL  Leukocyte Esterase Concentration: Moderate  Nitrite: Positive

## 2024-08-06 NOTE — PHYSICAL THERAPY INITIAL EVALUATION ADULT - PERTINENT HX OF CURRENT PROBLEM, REHAB EVAL
Pt is an 83 year old female brought in by EMS from SNF for evaluation of suspected breakthrough seizures and hypothermia. PMH: Dementia with behaviour disturbances, seizures, hypothyroidism, s/p PPM, IBS, UTI.

## 2024-08-06 NOTE — PHYSICAL THERAPY INITIAL EVALUATION ADULT - LEVEL OF INDEPENDENCE: BED TO CHAIR, REHAB EVAL
Pt refused despite education/ encouragement; demo'd retropulsion and assisted self back to bed spontaneously./unable to perform

## 2024-08-06 NOTE — PHYSICAL THERAPY INITIAL EVALUATION ADULT - MODALITIES TREATMENT COMMENTS
Left in bed as found, noted to be soiled (nsg asst made aware), all lines intact and RN aware. +bed alarm

## 2024-08-06 NOTE — PHYSICAL THERAPY INITIAL EVALUATION ADULT - PATIENT PROFILE REVIEW, REHAB EVAL
constipation Chart reviewed and pertinent PMH noted. Please refer to plan of care and A&I for ongoing treatment notes./yes

## 2024-08-07 DIAGNOSIS — N39.0 URINARY TRACT INFECTION, SITE NOT SPECIFIED: ICD-10-CM

## 2024-08-07 DIAGNOSIS — G93.41 METABOLIC ENCEPHALOPATHY: ICD-10-CM

## 2024-08-07 LAB
ANION GAP SERPL CALC-SCNC: 3 MMOL/L — LOW (ref 5–17)
BUN SERPL-MCNC: 10 MG/DL — SIGNIFICANT CHANGE UP (ref 7–23)
CALCIUM SERPL-MCNC: 8.7 MG/DL — SIGNIFICANT CHANGE UP (ref 8.5–10.1)
CHLORIDE SERPL-SCNC: 108 MMOL/L — SIGNIFICANT CHANGE UP (ref 96–108)
CO2 SERPL-SCNC: 28 MMOL/L — SIGNIFICANT CHANGE UP (ref 22–31)
CREAT SERPL-MCNC: 0.64 MG/DL — SIGNIFICANT CHANGE UP (ref 0.5–1.3)
EGFR: 87 ML/MIN/1.73M2 — SIGNIFICANT CHANGE UP
FERRITIN SERPL-MCNC: 88 NG/ML — SIGNIFICANT CHANGE UP (ref 13–330)
FOLATE SERPL-MCNC: 14.2 NG/ML — SIGNIFICANT CHANGE UP
GLUCOSE SERPL-MCNC: 85 MG/DL — SIGNIFICANT CHANGE UP (ref 70–99)
HCT VFR BLD CALC: 35.7 % — SIGNIFICANT CHANGE UP (ref 34.5–45)
HGB BLD-MCNC: 11.4 G/DL — LOW (ref 11.5–15.5)
IRON SATN MFR SERPL: 112 UG/DL — SIGNIFICANT CHANGE UP (ref 30–160)
IRON SATN MFR SERPL: 40 % — SIGNIFICANT CHANGE UP (ref 14–50)
LEVETIRACETAM SERPL-MCNC: 23 UG/ML — SIGNIFICANT CHANGE UP (ref 10–40)
MAGNESIUM SERPL-MCNC: 1.6 MG/DL — SIGNIFICANT CHANGE UP (ref 1.6–2.6)
MCHC RBC-ENTMCNC: 30 PG — SIGNIFICANT CHANGE UP (ref 27–34)
MCHC RBC-ENTMCNC: 31.9 GM/DL — LOW (ref 32–36)
MCV RBC AUTO: 93.9 FL — SIGNIFICANT CHANGE UP (ref 80–100)
PHOSPHATE SERPL-MCNC: 2.2 MG/DL — LOW (ref 2.5–4.5)
PLATELET # BLD AUTO: 158 K/UL — SIGNIFICANT CHANGE UP (ref 150–400)
POTASSIUM SERPL-MCNC: 3.6 MMOL/L — SIGNIFICANT CHANGE UP (ref 3.5–5.3)
POTASSIUM SERPL-SCNC: 3.6 MMOL/L — SIGNIFICANT CHANGE UP (ref 3.5–5.3)
RBC # BLD: 3.8 M/UL — SIGNIFICANT CHANGE UP (ref 3.8–5.2)
RBC # FLD: 15 % — HIGH (ref 10.3–14.5)
SODIUM SERPL-SCNC: 139 MMOL/L — SIGNIFICANT CHANGE UP (ref 135–145)
TIBC SERPL-MCNC: 276 UG/DL — SIGNIFICANT CHANGE UP (ref 220–430)
UIBC SERPL-MCNC: 164 UG/DL — SIGNIFICANT CHANGE UP (ref 110–370)
VIT B12 SERPL-MCNC: 765 PG/ML — SIGNIFICANT CHANGE UP (ref 232–1245)
WBC # BLD: 6.09 K/UL — SIGNIFICANT CHANGE UP (ref 3.8–10.5)
WBC # FLD AUTO: 6.09 K/UL — SIGNIFICANT CHANGE UP (ref 3.8–10.5)

## 2024-08-07 PROCEDURE — 99232 SBSQ HOSP IP/OBS MODERATE 35: CPT

## 2024-08-07 RX ORDER — SOD PHOS DI, MONO/K PHOS MONO 250 MG
1 TABLET ORAL THREE TIMES A DAY
Refills: 0 | Status: DISCONTINUED | OUTPATIENT
Start: 2024-08-07 | End: 2024-08-08

## 2024-08-07 RX ADMIN — Medication 10 MILLIGRAM(S): at 22:07

## 2024-08-07 RX ADMIN — Medication 75 MILLILITER(S): at 08:53

## 2024-08-07 RX ADMIN — PANTOPRAZOLE SODIUM 40 MILLIGRAM(S): 20 TABLET, DELAYED RELEASE ORAL at 06:02

## 2024-08-07 RX ADMIN — Medication 75 MILLILITER(S): at 18:32

## 2024-08-07 RX ADMIN — Medication 1 PACKET(S): at 22:06

## 2024-08-07 RX ADMIN — Medication 10 MILLIGRAM(S): at 17:20

## 2024-08-07 RX ADMIN — Medication 10 MILLIGRAM(S): at 06:01

## 2024-08-07 RX ADMIN — LEVETIRACETAM 500 MILLIGRAM(S): 1000 TABLET, FILM COATED ORAL at 12:02

## 2024-08-07 RX ADMIN — Medication 1 PACKET(S): at 17:20

## 2024-08-07 RX ADMIN — Medication 25 MICROGRAM(S): at 06:02

## 2024-08-07 RX ADMIN — CEFEPIME HYDROCHLORIDE 2000 MILLIGRAM(S): 1 INJECTION, POWDER, FOR SOLUTION INTRAMUSCULAR; INTRAVENOUS at 06:02

## 2024-08-07 RX ADMIN — Medication 60 MILLIGRAM(S): at 12:03

## 2024-08-07 RX ADMIN — LEVETIRACETAM 500 MILLIGRAM(S): 1000 TABLET, FILM COATED ORAL at 22:06

## 2024-08-07 NOTE — CDI QUERY NOTE - NSCDIOTHERTXTBX_GEN_ALL_CORE_HH
This patient is documented to have Sepsis.      This patient does not appear to meet Northwell's sepsis criteria using SIRS (Temp. >101F or <96.8F, HR>90bpm, RR>20/min or PaCO2<32mmHg, WBC>12,000 or <4000 or Bands>10%)  Can you please clarify the status of the sepsis, after review?    -Sepsis ruled out  -Sepsis POA ruled in (please also document the criteria you are using to support the diagnosis of sepsis)  -Other (please specify)  -Not clinically significant      Clinical Indicators:    WBC Count: 6.09 K/uL (08.07.24 @ 07:30)   WBC Count: 5.84 K/uL (08.06.24 @ 09:12)   WBC Count: 6.48 K/uL (08.05.24 @ 06:42)   WBC Count: 7.95 K/uL (08.04.24 @ 22:44)     Lactate, Blood (08.04.24 @ 22:44)   Lactate, Blood: 1.0 mmol/L    Urinalysis with Rflx Culture (08.05.24 @ 00:43)   Blood, Urine: Negative      ED provider documentation on 8/4/2024:  · Heart Rate Heart Rate (beats/min): 67  · Temp site Temp Site: oral · Temp (F): 93.8  · Respiration Rate (breaths/min) Respiration Rate (breaths/min): 16    Differential diagnosis for hypothermia in this patient includes sepsis, hypothyroidism.  Will get septic workup, apply Luis A hugger, administer warm IV fluids, check TSH/free T4, check l Keppra levels, administer 1000 mg of Keppra and admit.    SEPSIS – was this patient treated for sepsis? No.       H&P documentation on 8/5/2024:  Hypothermia. admit inpatient. possibly 2/2 sepsis vs other etiologies. temp. improved with warming blanket. TSH WNL. f/u Bcx and urine cx. continue cefepime, patient received one dose vanco in ED, cont. IVF      Hospitalist progress note on 8/6/2024:  ·  Problem: Sepsis secondary to UTI.   ·  Plan: E Coli UTI  ABX  FOllow sensitivities  Associated acute metabolic encephalopathy due to UTI superimposed on underlying dementia.

## 2024-08-07 NOTE — PROGRESS NOTE ADULT - NUTRITIONAL ASSESSMENT
This patient has been assessed with a concern for Malnutrition and has been determined to have a diagnosis/diagnoses of Moderate protein-calorie malnutrition.    This patient is being managed with:   Diet Regular-  Supplement Feeding Modality:  Oral  Ensure Plus High Protein Cans or Servings Per Day:  1       Frequency:  Two Times a day  Entered: Aug  5 2024  5:47PM  
This patient has been assessed with a concern for Malnutrition and has been determined to have a diagnosis/diagnoses of Moderate protein-calorie malnutrition.    This patient is being managed with:   Diet Regular-  Supplement Feeding Modality:  Oral  Ensure Plus High Protein Cans or Servings Per Day:  1       Frequency:  Two Times a day  Entered: Aug  5 2024  5:47PM

## 2024-08-07 NOTE — PROGRESS NOTE ADULT - REASON FOR ADMISSION
Sent from SNF for hypothermia and possible seizure

## 2024-08-07 NOTE — PROGRESS NOTE ADULT - PROBLEM SELECTOR PLAN 2
- Discussed with Dr. Coronel - Neurology->Continue home keppra 500 bid  -EEG noted
Keppra 250 mg PO BID increased to 500 mg po qhs and 250 mg po in am  - Discussed with Dr. Coronel - Neurology
- Discussed with Dr. Coronel - Neurology->Continue home keppra 500 bid  -EEG noted

## 2024-08-07 NOTE — PROGRESS NOTE ADULT - NSPROGADDITIONALINFOA_GEN_ALL_CORE
Anemia  -Hgb stable  -No overt signs of bleeding  -Ferritin/Iron/B12/Folate levels grossly unremarkable.   -Outpatient follow up.
Anemia  -Hgb stable  -No overt signs of bleeding  -Ferritin/Iron/B12/Folate levels ordered

## 2024-08-07 NOTE — PROGRESS NOTE ADULT - PROBLEM SELECTOR PLAN 4
POA  Multifactorial: Due to UTI and suspected breakthrough seizure and hypothermia  Resolved.  Now with baseline dementia

## 2024-08-07 NOTE — PROGRESS NOTE ADULT - ASSESSMENT
83-year-old F with history of dementia + behaviour disturbances, hypothyroidism, s/p PPM, IBS, brought in by EMS for evaluation of suspected breakthrough seizures and hypothermia noted at her SNF. History per records and ER staff, patient is alert and oriented to self only, is limited In regard to functioning, has dementia, was seen in [facility] 1/[facility]24 with possible GTC seizure lasting 2-3 minutes, had another seizure in ED, was started on low-dose Keppra 250 Mg twice daily.  In ED Temp 93.8, was started on warming blanket, given 1 gm keppra IV. UA suggestive of UTI.  On exam today, patient is sitting in bed, with pill-rolling tremors, seems to be picking at things, talks gibberish but is interactive, answers questions irrelevantly.  As per the staff, no further seizures were witnessed overnight.    # Possible breakthrough seizure vs shivering related to hypothermia    – Will resume Keppra 500 mg bid, Dr. Raman has reviewed med reconciliation, patient had been on Keppra 500 mg twice daily at the time of admission, not clear when it was increased as she was started on Keppra 250 Mg twice daily during her last admission in 1/2024      # Dementia - Parkinson's    -Continue donepezil 10 mg/day  -Will need follow-up with neurology as outpatient for further evaluation of Parkinson's/parkinsonism    Above Plan discussed with Dr. Raman      
83-year-old female with history of dementia with behaviour disturbances,, seizures, hypothyroidism, s/p PPM, IBS, UTI, brought in by EMS from SNF for evaluation of suspected breakthrough seizures and hypothermia noted at her skilled nursing facility prior to arrival. 
MEDICATIONS  (STANDING):  cefepime  Injectable. 2000 milliGRAM(s) IV Push every 24 hours  cefepime  Injectable.      dicyclomine 10 milliGRAM(s) Oral two times a day before meals  donepezil 10 milliGRAM(s) Oral at bedtime  DULoxetine 60 milliGRAM(s) Oral daily  levETIRAcetam 500 milliGRAM(s) Oral two times a day  levothyroxine 25 MICROGram(s) Oral daily  pantoprazole    Tablet 40 milliGRAM(s) Oral before breakfast  potassium phosphate / sodium phosphate Powder (PHOS-NaK) 1 Packet(s) Oral three times a day  sodium chloride 0.9%. 1000 milliLiter(s) (75 mL/Hr) IV Continuous <Continuous>    MEDICATIONS  (PRN):  acetaminophen     Tablet .. 650 milliGRAM(s) Oral every 6 hours PRN Mild Pain (1 - 3)  aluminum hydroxide/magnesium hydroxide/simethicone Suspension 30 milliLiter(s) Oral every 4 hours PRN Dyspepsia  ondansetron Injectable 4 milliGRAM(s) IV Push every 6 hours PRN Nausea and/or Vomiting        83-year-old female with history of dementia with behaviour disturbances,, seizures, hypothyroidism, s/p PPM, IBS, UTI, brought in by EMS from SNF for evaluation of suspected breakthrough seizures and hypothermia noted at her skilled nursing facility prior to arrival. 
MEDICATIONS  (STANDING):  cefepime  Injectable. 2000 milliGRAM(s) IV Push every 24 hours  cefepime  Injectable.      dicyclomine 10 milliGRAM(s) Oral two times a day before meals  donepezil 10 milliGRAM(s) Oral at bedtime  DULoxetine 60 milliGRAM(s) Oral daily  levETIRAcetam 500 milliGRAM(s) Oral two times a day  levothyroxine 25 MICROGram(s) Oral daily  pantoprazole    Tablet 40 milliGRAM(s) Oral before breakfast  potassium phosphate / sodium phosphate Powder (PHOS-NaK) 1 Packet(s) Oral three times a day  sodium chloride 0.9%. 1000 milliLiter(s) (75 mL/Hr) IV Continuous <Continuous>    MEDICATIONS  (PRN):  acetaminophen     Tablet .. 650 milliGRAM(s) Oral every 6 hours PRN Mild Pain (1 - 3)  aluminum hydroxide/magnesium hydroxide/simethicone Suspension 30 milliLiter(s) Oral every 4 hours PRN Dyspepsia  ondansetron Injectable 4 milliGRAM(s) IV Push every 6 hours PRN Nausea and/or Vomiting      83-year-old female with history of dementia with behaviour disturbances,, seizures, hypothyroidism, s/p PPM, IBS, UTI, brought in by EMS from SNF for evaluation of suspected breakthrough seizures and hypothermia noted at her skilled nursing facility prior to arrival.

## 2024-08-07 NOTE — PROGRESS NOTE ADULT - SUBJECTIVE AND OBJECTIVE BOX
HOSPITALIST ATTENDING PROGRESS NOTE    Chart and meds reviewed.  Patient seen and examined.    CC/ HPI Patient is a 83y old  Female who presents with a chief complaint of Hypothermia, initial encounter.     Subjective/INterval Hx    8/6: Patient seen lying bed comfortably  Patient answers when asked questions but does not respond appropriately. Limited ROS/CC. Urine culture positive for E. Coli. Follow sensitivities.     8/7: Seen and examined. Clinically stable. DIscussed with daughter. IMproved clinical status. Await urine culture sensitivities prior to discharge back to [facility]>     Unable to obtain ROS.     T(C): 36.3 (08-07-24 @ 15:39), Max: 37 (08-06-24 @ 21:38)  HR: 87 (08-07-24 @ 15:39) (68 - 88)  BP: 140/57 (08-07-24 @ 15:39) (133/64 - 160/81)  RR: 18 (08-07-24 @ 15:39) (17 - 18)  SpO2: 95% (08-07-24 @ 15:39) (95% - 95%)    Constitutional: NAD, awake and alert,  HEENT:  EOMI, Normal Hearing, MMM  Neck: Soft and supple, No LAD, No JVD  Respiratory: Breath sounds are clear bilaterally, No wheezing, rales or rhonchi  Cardiovascular: S1 and S2, regular rate and rhythm, no Murmurs, gallops or rubs  Gastrointestinal: Bowel Sounds present, soft, nontender, nondistended, no guarding, no rebound  Extremities: No peripheral edema  Vascular: 2+ peripheral pulses  Neurological: A/O x 1, no focal deficits  Musculoskeletal: 5/5 strength b/l upper and lower extremities  Skin: No rashes        LABS: All Labs Reviewed:                                   11.4   6.09  )-----------( 158      ( 07 Aug 2024 07:30 )             35.7     08-07    139  |  108  |  10  ----------------------------<  85  3.6   |  28  |  0.64    Ca    8.7      07 Aug 2024 07:30  Phos  2.2     08-07  Mg     1.6     08-07        CAPILLARY BLOOD GLUCOSE          Urinalysis with Rflx Culture (collected 05 Aug 2024 00:43)    Culture - Urine (collected 05 Aug 2024 00:43)  Source: .Urine None  Preliminary Report (06 Aug 2024 14:32):    >100,000 CFU/ml Escherichia coli    Culture - Blood (collected 04 Aug 2024 22:44)  Source: .Blood None  Preliminary Report (07 Aug 2024 07:01):    No growth at 48 Hours    Culture - Blood (collected 04 Aug 2024 22:44)  Source: .Blood None  Preliminary Report (07 Aug 2024 07:01):    No growth at 48 Hours                            10.6   5.84  )-----------( 141      ( 06 Aug 2024 09:12 )             32.6     08-06    141  |  112<H>  |  13  ----------------------------<  82  3.9   |  29  |  0.72    Ca    8.8      06 Aug 2024 09:12  Phos  2.0     08-06  Mg     1.7     08-06    TPro  6.1  /  Alb  2.5<L>  /  TBili  0.3  /  DBili  x   /  AST  30  /  ALT  25  /  AlkPhos  152<H>  08-05      CAPILLARY BLOOD GLUCOSE          Urinalysis with Rflx Culture (collected 05 Aug 2024 00:43)    Culture - Urine (collected 05 Aug 2024 00:43)  Source: .Urine None  Preliminary Report (06 Aug 2024 14:32):    >100,000 CFU/ml Escherichia coli    Culture - Blood (collected 04 Aug 2024 22:44)  Source: .Blood None  Preliminary Report (06 Aug 2024 07:02):    No growth at 24 hours    Culture - Blood (collected 04 Aug 2024 22:44)  Source: .Blood None  Preliminary Report (06 Aug 2024 07:02):    No growth at 24 hours                   10.2   6.48  )-----------( 122      ( 05 Aug 2024 06:42 )             32.5     08-05    145  |  117<H>  |  22  ----------------------------<  81  4.2   |  24  |  0.70    Ca    8.0<L>      05 Aug 2024 06:42    TPro  6.1  /  Alb  2.5<L>  /  TBili  0.3  /  DBili  x   /  AST  30  /  ALT  25  /  AlkPhos  152<H>  08-05    PT/INR - ( 04 Aug 2024 22:44 )   PT: 10.6 sec;   INR: 0.94 ratio         PTT - ( 04 Aug 2024 22:44 )  PTT:37.6 sec  CARDIAC MARKERS ( 04 Aug 2024 22:44 )  x     / x     / 91 U/L / x     / x          Blood Culture:     RADIOLOGY/EKG:    EEG Classification / Summary:  Abnormal routine EEG in the awake, drowsy states.   -A single left frontotemporal suspected sharp-wave discharge  -Delta slowing at times more prominent in the right posterior quadrant with left hemispheric relative attenuation  -Moderate diffuse slowing  -No seizures.    Clinical Impression:  -Suspected risk of focal-onset seizures from the left frontotemporal region  -Additional findings may indicate left hemispheric focal cerebral dysfunction of structural or functional etiology.  -Moderate diffuse cerebral dysfunction is nonspecific in etiology.   -No seizures.    DVT PPX:    ADVANCED DIRECTIVE:    DISPOSITION:    Total time spent:  50 minutes     < from: CT Head No Cont (08.05.24 @ 05:48) >    IMPRESSION:  No acute intracranial hemorrhage, mass effect, or evidence of acute   vascular territorial infarction. If clinical symptoms persist or worsen,   more sensitive evaluation with brain MRI may be obtained, if no   contraindications exist.        --- End of Report ---    < end of copied text >  < from: Xray Chest 1 View-PORTABLE IMMEDIATE (08.04.24 @ 23:04) >  IMPRESSION: The heart size, mediastinal and hilar contours are unchanged.   Again noted is a left-sided dual-lead pacemaker the tip the pacer wires   are unchanged and project in the region the right atrium and right   ventricle. There is mild right hemidiaphragm elevation. No pneumothorax   or large effusion or lobar consolidation. No acute osseous pathology.    --- End of Report ---    < end of copied text >  Urinalysis with Rflx Culture (08.05.24 @ 00:43)   Urine Appearance: Cloudy  Color: Yellow  Specific Gravity: 1.018  pH Urine: 6.0  Protein, Urine: Negative mg/dL  Glucose Qualitative, Urine: Negative mg/dL  Ketone - Urine: Negative mg/dL  Blood, Urine: Negative  Bilirubin: Negative  Urobilinogen: 1.0 mg/dL  Leukocyte Esterase Concentration: Moderate  Nitrite: Positive

## 2024-08-07 NOTE — PROGRESS NOTE ADULT - PROBLEM SELECTOR PLAN 1
E. Coli UTI.   Correction to admission/prior documentation diagnosis. No sepsis POA  Rocephin  Follow urine cultures

## 2024-08-07 NOTE — CDI QUERY NOTE - NSCDIOTHERTXTBX2_GEN_ALL_CORE_FT
The patient is documented with seizures, metabolic encephelopathy, and UTI.    There is no documentation of change in  the patient's  baseline mental status.    According to clinical guidelines, clinical criteria must support documented clinical diagnoses.    Can you please clarify the clinical indicators supporting the diagnosis of  metabolic encephalopathy or clarify that metabolic encephalopathy has been ruled out?      - metabolic encephalopathy ruled out  - metabolic encephalopathy ruled in ( Please document mental status changes In progress notes)  - Other ( Please specify)      Clinical Indicators:      ED triage nurse on 8/4/2024:  pt bibems from [place] c/o hypothermia and increased seizure activity since yesterday. pt is alert but disoriented, PMH seizures, dementia,      ED provider documentation on 8/4/2024:  · HPI Objective Statement: 83-year-old female with history of dementia, seizures, hypothyroidism brought in by EMS for evaluation of suspected breakthrough seizures and hypothermia noted at her skilled nursing facility prior to arrival.  The patient is currently alert and oriented to self only but denies having pain.  She is unable to provide any further details     · Mentation: awake, Somnolent but arousable and alert and oriented to self.  Patient able to follow simple commands      ED nurse documentation on 8/5/2024:  Patient baseline mental status        Nursing assessment on 8//6/2024:  Cognitive/Neuro/Behavioral  Cognitive/Neuro/Behavioral [WDL Definition: Alert; opens eyes spontaneously; arouses to voice or touch; oriented x 4; follows commands; speech spontaneous, logical; purposeful motor response; behavior appropriate to situation]: WDL except  Level of Consciousness: confused  Arousal Level: arouses to voice  Orientation: person;  time      Hospitalist progress note on 8/6/2024:  Neurological: A/O x 1, no focal deficits      Problem/Plan - 1:  ·  Problem: Sepsis secondary to UTI.   ·  Plan: E Coli UTI  ABX  FOllow sensitivities  Associated acute metabolic encephalopathy due to UTI superimposed on underlying dementia.

## 2024-08-08 ENCOUNTER — TRANSCRIPTION ENCOUNTER (OUTPATIENT)
Age: 84
End: 2024-08-08

## 2024-08-08 VITALS
RESPIRATION RATE: 18 BRPM | SYSTOLIC BLOOD PRESSURE: 136 MMHG | DIASTOLIC BLOOD PRESSURE: 70 MMHG | TEMPERATURE: 98 F | OXYGEN SATURATION: 95 % | HEART RATE: 83 BPM

## 2024-08-08 PROCEDURE — 99239 HOSP IP/OBS DSCHRG MGMT >30: CPT

## 2024-08-08 RX ORDER — CEFUROXIME AXETIL 250 MG
1 TABLET ORAL
Qty: 6 | Refills: 0
Start: 2024-08-08 | End: 2024-08-10

## 2024-08-08 RX ADMIN — Medication 10 MILLIGRAM(S): at 06:00

## 2024-08-08 RX ADMIN — Medication 25 MICROGRAM(S): at 05:59

## 2024-08-08 RX ADMIN — CEFEPIME HYDROCHLORIDE 2000 MILLIGRAM(S): 1 INJECTION, POWDER, FOR SOLUTION INTRAMUSCULAR; INTRAVENOUS at 04:58

## 2024-08-08 RX ADMIN — Medication 60 MILLIGRAM(S): at 09:51

## 2024-08-08 RX ADMIN — Medication 10 MILLIGRAM(S): at 15:02

## 2024-08-08 RX ADMIN — PANTOPRAZOLE SODIUM 40 MILLIGRAM(S): 20 TABLET, DELAYED RELEASE ORAL at 06:01

## 2024-08-08 RX ADMIN — LEVETIRACETAM 500 MILLIGRAM(S): 1000 TABLET, FILM COATED ORAL at 09:51

## 2024-08-08 RX ADMIN — Medication 1 PACKET(S): at 15:02

## 2024-08-08 NOTE — DISCHARGE NOTE PROVIDER - HOSPITAL COURSE
FROM H&P:    "This is  83-year-old female with history of dementia with behaviour disturbances,, seizures, hypothyroidism, s/p PPM, IBS, UTI, brought in by EMS from SNF for evaluation of suspected breakthrough seizures and hypothermia noted at her skilled nursing facility prior to arrival. History was obtained records and ER staff because of patient mental status. Appearantly the patient is was alert and oriented to self only but denies having pain on presentation to ER and she was unable to provide any further details. On examination, patient is sleepy but arousable but can not history from her.  No recent falls were reported and there is no evidence of trauma on exam. patient was admitted 2/24 b/o possible seizure and evaluated by neurology and discharged on keppra 250mg BID.  patient was found to be hypothermic and was started on warming blanket in ED and was given 1 gm keppra IVx1. UA suggestive of UTI. pt with h/o E.coli UTI in 1/24."      Problem/Plan - 1:  ·  Problem: Acute UTI.   ·  Plan: E. Coli UTI.   Correction to admission/prior documentation diagnosis. No sepsis POA  Rocephin  Follow urine cultures.->E. Coli  Switch to PO antibiotics    Problem/Plan - 2:  ·  Problem: Breakthrough seizure.   ·  Plan: - Discussed with Dr. Coronel - Neurology->Continue home keppra 500 bid  -EEG noted.    Problem/Plan - 3:  ·  Problem: Dementia.   ·  Plan: donepezil 10 mg/day.    Problem/Plan - 4:  ·  Problem: Acute metabolic encephalopathy.   ·  Plan: POA  Multifactorial: Due to UTI and suspected breakthrough seizure and hypothermia  Resolved.  Now with baseline dementia.      Additional Information:  Additional Information: Anemia  -Hgb stable  -No overt signs of bleeding  -Ferritin/Iron/B12/Folate levels grossly unremarkable.   -Outpatient follow up.  T(C): 37.1 (08-08-24 @ 07:53), Max: 37.1 (08-08-24 @ 07:53)  HR: 82 (08-08-24 @ 07:53) (82 - 88)  BP: 137/69 (08-08-24 @ 07:53) (137/69 - 141/67)  RR: 18 (08-08-24 @ 07:53) (18 - 18)  SpO2: 93% (08-08-24 @ 07:53) (93% - 95%)  Constitutional: NAD, awake and alert,  HEENT:  EOMI, Normal Hearing, MMM  Respiratory: Breath sounds are clear bilaterally, No wheezing, rales or rhonchi  Cardiovascular: S1 and S2, regular rate and rhythm, no Murmurs, gallops or rubs  Gastrointestinal: Bowel Sounds present, soft, nontender, nondistended, no guarding, no rebound  Extremities: No peripheral edema  Vascular: 2+ peripheral pulses  Neurological: A/O x 0-1, no focal deficits  Discharge Management: 36 minutes  Date of Discharge/Service: 8/8/2024

## 2024-08-08 NOTE — DISCHARGE NOTE PROVIDER - DETAILS OF MALNUTRITION DIAGNOSIS/DIAGNOSES
This patient has been assessed with a concern for Malnutrition and was treated during this hospitalization for the following Nutrition diagnosis/diagnoses:     -  08/05/2024: Moderate protein-calorie malnutrition

## 2024-08-08 NOTE — DISCHARGE NOTE PROVIDER - CARE PROVIDER_API CALL
Isidro Augustin  Internal Medicine  [address] [place] [address], [address]  [place] [address]  Phone: [phone]  Fax: [phone]  Established Patient  Follow Up Time: 1 week

## 2024-08-08 NOTE — DISCHARGE NOTE NURSING/CASE MANAGEMENT/SOCIAL WORK - NSDCVIVACCINE_GEN_ALL_CORE_FT
Tdap; 09-Dec-2023 09:29; Hafsa Hughes (RN); [employer]; [id number] (Exp. Date: 23-Nov-2025); IntraMuscular; Deltoid Right.; 0.5 milliLiter(s); VIS (VIS Published: 09-May-2013, VIS Presented: 09-Dec-2023);

## 2024-08-08 NOTE — DISCHARGE NOTE NURSING/CASE MANAGEMENT/SOCIAL WORK - PATIENT PORTAL LINK FT
You can access the FollowMyHealth Patient Portal offered by [facility] by registering at the following website: [email or web address]. By joining [employer]’s FollowMyHealth portal, you will also be able to view your health information using other applications (apps) compatible with our system.

## 2024-08-08 NOTE — DISCHARGE NOTE PROVIDER - NSDCMRMEDTOKEN_GEN_ALL_CORE_FT
Aricept 10 mg oral tablet: 1 tab(s) orally once a day (at bedtime)  cefuroxime 250 mg oral tablet: 1 tab(s) orally 2 times a day  dicyclomine 10 mg oral capsule: 1 cap(s) orally 2 times a day  DULoxetine 60 mg oral delayed release capsule: 1 cap(s) orally once a day  levETIRAcetam 500 mg oral tablet: 1 tab(s) orally 2 times a day  levothyroxine 25 mcg (0.025 mg) oral tablet: 1 tab(s) orally once a day  loperamide 2 mg oral capsule: 2 cap(s) orally 2 times a day as needed for  diarrhea  Neurontin 400 mg oral capsule: 1 cap(s) orally 2 times a day  Nuplazid 34 mg oral capsule: 1 cap(s) orally once a day  pantoprazole 40 mg oral delayed release tablet: 1 tab(s) orally once a day (before a meal)  Tylenol Extra Strength 500 mg oral tablet: 2 tab(s) orally 2 times a day as needed for pain  Zeasorb Talc Powder: apply under breasts as needed

## 2024-08-08 NOTE — DISCHARGE NOTE PROVIDER - NSDCACTIVITY_GEN_ALL_CORE
Ambulation/Activity/Transfer as tolerated with assistance/assistive device(s)/Do not drive or operate machinery/Do not make important decisions

## 2024-08-08 NOTE — DISCHARGE NOTE PROVIDER - NSDCCPCAREPLAN_GEN_ALL_CORE_FT
PRINCIPAL DISCHARGE DIAGNOSIS  Diagnosis: Urinary tract infection  Assessment and Plan of Treatment: Continue course of oral antibiotics.  Drink plenty of water each day. This helps you urinate often, which clears bacteria from your system. (If you have kidney, heart, or liver disease and have to limit fluids, talk with your doctor before you increase the amount of fluids you drink.)  Urinate when you need to.  If you are sexually active, urinate right after you have sex.  Change sanitary pads often. (If applicable)  Avoid douches, bubble baths, feminine hygiene sprays, and other feminine hygiene products that have deodorants.  After you use the toilet, wipe from front to back.        SECONDARY DISCHARGE DIAGNOSES  Diagnosis: Breakthrough seizure  Assessment and Plan of Treatment: Likely provoked by UTI and hypothermia. Evaluated by neurologist. Continue home dosing of keppra and follow up with your neurologist outpatient

## 2024-08-16 LAB — T4 FREE SERPL-MCNC: 1.41 NG/DL — SIGNIFICANT CHANGE UP

## 2024-08-19 DIAGNOSIS — E78.5 HYPERLIPIDEMIA, UNSPECIFIED: ICD-10-CM

## 2024-08-19 DIAGNOSIS — K58.9 IRRITABLE BOWEL SYNDROME WITHOUT DIARRHEA: ICD-10-CM

## 2024-08-19 DIAGNOSIS — B96.20 UNSPECIFIED ESCHERICHIA COLI [E. COLI] AS THE CAUSE OF DISEASES CLASSIFIED ELSEWHERE: ICD-10-CM

## 2024-08-19 DIAGNOSIS — E44.0 MODERATE PROTEIN-CALORIE MALNUTRITION: ICD-10-CM

## 2024-08-19 DIAGNOSIS — Z91.040 LATEX ALLERGY STATUS: ICD-10-CM

## 2024-08-19 DIAGNOSIS — F02.80 DEMENTIA IN OTHER DISEASES CLASSIFIED ELSEWHERE, UNSPECIFIED SEVERITY, WITHOUT BEHAVIORAL DISTURBANCE, PSYCHOTIC DISTURBANCE, MOOD DISTURBANCE, AND ANXIETY: ICD-10-CM

## 2024-08-19 DIAGNOSIS — Z88.2 ALLERGY STATUS TO SULFONAMIDES: ICD-10-CM

## 2024-08-19 DIAGNOSIS — Z11.52 ENCOUNTER FOR SCREENING FOR COVID-19: ICD-10-CM

## 2024-08-19 DIAGNOSIS — Z88.0 ALLERGY STATUS TO PENICILLIN: ICD-10-CM

## 2024-08-19 DIAGNOSIS — D64.9 ANEMIA, UNSPECIFIED: ICD-10-CM

## 2024-08-19 DIAGNOSIS — M48.00 SPINAL STENOSIS, SITE UNSPECIFIED: ICD-10-CM

## 2024-08-19 DIAGNOSIS — R56.9 UNSPECIFIED CONVULSIONS: ICD-10-CM

## 2024-08-19 DIAGNOSIS — G93.41 METABOLIC ENCEPHALOPATHY: ICD-10-CM

## 2024-08-19 DIAGNOSIS — N39.0 URINARY TRACT INFECTION, SITE NOT SPECIFIED: ICD-10-CM

## 2024-08-19 DIAGNOSIS — Z88.1 ALLERGY STATUS TO OTHER ANTIBIOTIC AGENTS: ICD-10-CM

## 2024-08-19 DIAGNOSIS — R68.0 HYPOTHERMIA, NOT ASSOCIATED WITH LOW ENVIRONMENTAL TEMPERATURE: ICD-10-CM

## 2024-08-19 DIAGNOSIS — G20.A1 PARKINSON'S DISEASE WITHOUT DYSKINESIA, WITHOUT MENTION OF FLUCTUATIONS: ICD-10-CM

## 2024-08-19 DIAGNOSIS — E03.9 HYPOTHYROIDISM, UNSPECIFIED: ICD-10-CM

## 2024-08-19 DIAGNOSIS — Z95.0 PRESENCE OF CARDIAC PACEMAKER: ICD-10-CM

## 2024-08-26 ENCOUNTER — INPATIENT (INPATIENT)
Facility: HOSPITAL | Age: 84
LOS: 3 days | Discharge: HOME HEALTH | DRG: 377 | End: 2024-08-30
Attending: STUDENT IN AN ORGANIZED HEALTH CARE EDUCATION/TRAINING PROGRAM | Admitting: HOSPITALIST
Payer: MEDICARE

## 2024-08-26 VITALS
RESPIRATION RATE: 17 BRPM | DIASTOLIC BLOOD PRESSURE: 92 MMHG | HEIGHT: 65 IN | HEART RATE: 73 BPM | OXYGEN SATURATION: 97 % | SYSTOLIC BLOOD PRESSURE: 142 MMHG

## 2024-08-26 LAB
ALBUMIN SERPL ELPH-MCNC: 3.1 G/DL — LOW (ref 3.3–5)
ALP SERPL-CCNC: 173 U/L — HIGH (ref 40–120)
ALT FLD-CCNC: 30 U/L — SIGNIFICANT CHANGE UP (ref 12–78)
ANION GAP SERPL CALC-SCNC: 4 MMOL/L — LOW (ref 5–17)
APTT BLD: 37.4 SEC — HIGH (ref 24.5–35.6)
AST SERPL-CCNC: 34 U/L — SIGNIFICANT CHANGE UP (ref 15–37)
BASOPHILS # BLD AUTO: 0.03 K/UL — SIGNIFICANT CHANGE UP (ref 0–0.2)
BASOPHILS NFR BLD AUTO: 0.4 % — SIGNIFICANT CHANGE UP (ref 0–2)
BILIRUB SERPL-MCNC: 0.2 MG/DL — SIGNIFICANT CHANGE UP (ref 0.2–1.2)
BLD GP AB SCN SERPL QL: SIGNIFICANT CHANGE UP
BUN SERPL-MCNC: 32 MG/DL — HIGH (ref 7–23)
CALCIUM SERPL-MCNC: 9.4 MG/DL — SIGNIFICANT CHANGE UP (ref 8.5–10.1)
CHLORIDE SERPL-SCNC: 105 MMOL/L — SIGNIFICANT CHANGE UP (ref 96–108)
CO2 SERPL-SCNC: 31 MMOL/L — SIGNIFICANT CHANGE UP (ref 22–31)
CREAT SERPL-MCNC: 1.14 MG/DL — SIGNIFICANT CHANGE UP (ref 0.5–1.3)
EGFR: 47 ML/MIN/1.73M2 — LOW
EOSINOPHIL # BLD AUTO: 0.07 K/UL — SIGNIFICANT CHANGE UP (ref 0–0.5)
EOSINOPHIL NFR BLD AUTO: 0.9 % — SIGNIFICANT CHANGE UP (ref 0–6)
GLUCOSE SERPL-MCNC: 125 MG/DL — HIGH (ref 70–99)
HCT VFR BLD CALC: 38.1 % — SIGNIFICANT CHANGE UP (ref 34.5–45)
HGB BLD-MCNC: 12.4 G/DL — SIGNIFICANT CHANGE UP (ref 11.5–15.5)
IMM GRANULOCYTES NFR BLD AUTO: 0.2 % — SIGNIFICANT CHANGE UP (ref 0–0.9)
INR BLD: 0.97 RATIO — SIGNIFICANT CHANGE UP (ref 0.85–1.18)
LYMPHOCYTES # BLD AUTO: 1.2 K/UL — SIGNIFICANT CHANGE UP (ref 1–3.3)
LYMPHOCYTES # BLD AUTO: 14.7 % — SIGNIFICANT CHANGE UP (ref 13–44)
MCHC RBC-ENTMCNC: 30.8 PG — SIGNIFICANT CHANGE UP (ref 27–34)
MCHC RBC-ENTMCNC: 32.5 GM/DL — SIGNIFICANT CHANGE UP (ref 32–36)
MCV RBC AUTO: 94.8 FL — SIGNIFICANT CHANGE UP (ref 80–100)
MONOCYTES # BLD AUTO: 0.44 K/UL — SIGNIFICANT CHANGE UP (ref 0–0.9)
MONOCYTES NFR BLD AUTO: 5.4 % — SIGNIFICANT CHANGE UP (ref 2–14)
NEUTROPHILS # BLD AUTO: 6.39 K/UL — SIGNIFICANT CHANGE UP (ref 1.8–7.4)
NEUTROPHILS NFR BLD AUTO: 78.4 % — HIGH (ref 43–77)
PLATELET # BLD AUTO: 140 K/UL — LOW (ref 150–400)
POTASSIUM SERPL-MCNC: 4.4 MMOL/L — SIGNIFICANT CHANGE UP (ref 3.5–5.3)
POTASSIUM SERPL-SCNC: 4.4 MMOL/L — SIGNIFICANT CHANGE UP (ref 3.5–5.3)
PROT SERPL-MCNC: 7.2 GM/DL — SIGNIFICANT CHANGE UP (ref 6–8.3)
PROTHROM AB SERPL-ACNC: 11 SEC — SIGNIFICANT CHANGE UP (ref 9.5–13)
RBC # BLD: 4.02 M/UL — SIGNIFICANT CHANGE UP (ref 3.8–5.2)
RBC # FLD: 14.6 % — HIGH (ref 10.3–14.5)
SODIUM SERPL-SCNC: 140 MMOL/L — SIGNIFICANT CHANGE UP (ref 135–145)
WBC # BLD: 8.15 K/UL — SIGNIFICANT CHANGE UP (ref 3.8–10.5)
WBC # FLD AUTO: 8.15 K/UL — SIGNIFICANT CHANGE UP (ref 3.8–10.5)

## 2024-08-26 PROCEDURE — 99285 EMERGENCY DEPT VISIT HI MDM: CPT

## 2024-08-26 RX ORDER — SODIUM CHLORIDE 9 MG/ML
3 INJECTION INTRAMUSCULAR; INTRAVENOUS; SUBCUTANEOUS ONCE
Refills: 0 | Status: COMPLETED | OUTPATIENT
Start: 2024-08-26 | End: 2024-08-26

## 2024-08-26 RX ADMIN — SODIUM CHLORIDE 3 MILLILITER(S): 9 INJECTION INTRAMUSCULAR; INTRAVENOUS; SUBCUTANEOUS at 23:00

## 2024-08-26 NOTE — ED PROVIDER NOTE - OBJECTIVE STATEMENT
83 year old female with PMHx of dementia, HLD, Parkinson's disease, seizure, hypothyroid, s/p PPM, spinal stenosis; presents to ED from [facility] c/o rectal bleeding. Per EMS, patient had a BM and was being cleaned by staff when they noticed BRB while wiping her. Patient is a poor historian d/t dementia.

## 2024-08-26 NOTE — ED ADULT TRIAGE NOTE - CHIEF COMPLAINT QUOTE
Pt BIBEMS from [facility] c/o rectal bleeding. Per EMS, staff at [facility] pt had BM, noticed BRB as they were cleaning her. No active bleeding per EMS.

## 2024-08-26 NOTE — ED PROVIDER NOTE - CLINICAL SUMMARY MEDICAL DECISION MAKING FREE TEXT BOX
Plan labs, reassess. Resident of nursing facility whom staff noted blood when she was wiped. Plan labs,Ct with iv contrast, reassess.

## 2024-08-27 LAB
ALBUMIN SERPL ELPH-MCNC: 3.1 G/DL — LOW (ref 3.3–5)
ALP SERPL-CCNC: 187 U/L — HIGH (ref 40–120)
ALT FLD-CCNC: 26 U/L — SIGNIFICANT CHANGE UP (ref 12–78)
ANION GAP SERPL CALC-SCNC: 2 MMOL/L — LOW (ref 5–17)
ANION GAP SERPL CALC-SCNC: 3 MMOL/L — LOW (ref 5–17)
APPEARANCE UR: ABNORMAL
APTT BLD: 36.5 SEC — HIGH (ref 24.5–35.6)
AST SERPL-CCNC: 31 U/L — SIGNIFICANT CHANGE UP (ref 15–37)
BACTERIA # UR AUTO: ABNORMAL /HPF
BILIRUB SERPL-MCNC: 0.3 MG/DL — SIGNIFICANT CHANGE UP (ref 0.2–1.2)
BILIRUB UR-MCNC: NEGATIVE — SIGNIFICANT CHANGE UP
BUN SERPL-MCNC: 21 MG/DL — SIGNIFICANT CHANGE UP (ref 7–23)
BUN SERPL-MCNC: 24 MG/DL — HIGH (ref 7–23)
CALCIUM SERPL-MCNC: 9.6 MG/DL — SIGNIFICANT CHANGE UP (ref 8.5–10.1)
CALCIUM SERPL-MCNC: 9.8 MG/DL — SIGNIFICANT CHANGE UP (ref 8.5–10.1)
CAST: 3 /LPF — SIGNIFICANT CHANGE UP (ref 0–4)
CHLORIDE SERPL-SCNC: 104 MMOL/L — SIGNIFICANT CHANGE UP (ref 96–108)
CHLORIDE SERPL-SCNC: 105 MMOL/L — SIGNIFICANT CHANGE UP (ref 96–108)
CO2 SERPL-SCNC: 31 MMOL/L — SIGNIFICANT CHANGE UP (ref 22–31)
CO2 SERPL-SCNC: 31 MMOL/L — SIGNIFICANT CHANGE UP (ref 22–31)
COLOR SPEC: YELLOW — SIGNIFICANT CHANGE UP
CREAT SERPL-MCNC: 0.95 MG/DL — SIGNIFICANT CHANGE UP (ref 0.5–1.3)
CREAT SERPL-MCNC: 0.97 MG/DL — SIGNIFICANT CHANGE UP (ref 0.5–1.3)
DIFF PNL FLD: ABNORMAL
EGFR: 58 ML/MIN/1.73M2 — LOW
EGFR: 59 ML/MIN/1.73M2 — LOW
GLUCOSE SERPL-MCNC: 86 MG/DL — SIGNIFICANT CHANGE UP (ref 70–99)
GLUCOSE SERPL-MCNC: 87 MG/DL — SIGNIFICANT CHANGE UP (ref 70–99)
GLUCOSE UR QL: NEGATIVE MG/DL — SIGNIFICANT CHANGE UP
HCT VFR BLD CALC: 36 % — SIGNIFICANT CHANGE UP (ref 34.5–45)
HCT VFR BLD CALC: 37.5 % — SIGNIFICANT CHANGE UP (ref 34.5–45)
HCT VFR BLD CALC: 39.4 % — SIGNIFICANT CHANGE UP (ref 34.5–45)
HGB BLD-MCNC: 11.5 G/DL — SIGNIFICANT CHANGE UP (ref 11.5–15.5)
HGB BLD-MCNC: 12.1 G/DL — SIGNIFICANT CHANGE UP (ref 11.5–15.5)
HGB BLD-MCNC: 12.8 G/DL — SIGNIFICANT CHANGE UP (ref 11.5–15.5)
INR BLD: 0.95 RATIO — SIGNIFICANT CHANGE UP (ref 0.85–1.18)
KETONES UR-MCNC: NEGATIVE MG/DL — SIGNIFICANT CHANGE UP
LACTATE SERPL-SCNC: 1.5 MMOL/L — SIGNIFICANT CHANGE UP (ref 0.7–2)
LEUKOCYTE ESTERASE UR-ACNC: ABNORMAL
MCHC RBC-ENTMCNC: 30.6 PG — SIGNIFICANT CHANGE UP (ref 27–34)
MCHC RBC-ENTMCNC: 30.7 PG — SIGNIFICANT CHANGE UP (ref 27–34)
MCHC RBC-ENTMCNC: 32.3 GM/DL — SIGNIFICANT CHANGE UP (ref 32–36)
MCHC RBC-ENTMCNC: 32.5 GM/DL — SIGNIFICANT CHANGE UP (ref 32–36)
MCV RBC AUTO: 94.5 FL — SIGNIFICANT CHANGE UP (ref 80–100)
MCV RBC AUTO: 94.7 FL — SIGNIFICANT CHANGE UP (ref 80–100)
NITRITE UR-MCNC: POSITIVE
OB PNL STL: NEGATIVE — SIGNIFICANT CHANGE UP
PH UR: 6.5 — SIGNIFICANT CHANGE UP (ref 5–8)
PLATELET # BLD AUTO: 132 K/UL — LOW (ref 150–400)
PLATELET # BLD AUTO: 133 K/UL — LOW (ref 150–400)
POTASSIUM SERPL-MCNC: 4.3 MMOL/L — SIGNIFICANT CHANGE UP (ref 3.5–5.3)
POTASSIUM SERPL-MCNC: 4.4 MMOL/L — SIGNIFICANT CHANGE UP (ref 3.5–5.3)
POTASSIUM SERPL-SCNC: 4.3 MMOL/L — SIGNIFICANT CHANGE UP (ref 3.5–5.3)
POTASSIUM SERPL-SCNC: 4.4 MMOL/L — SIGNIFICANT CHANGE UP (ref 3.5–5.3)
PROT SERPL-MCNC: 7.2 GM/DL — SIGNIFICANT CHANGE UP (ref 6–8.3)
PROT UR-MCNC: SIGNIFICANT CHANGE UP MG/DL
PROTHROM AB SERPL-ACNC: 10.8 SEC — SIGNIFICANT CHANGE UP (ref 9.5–13)
RBC # BLD: 3.96 M/UL — SIGNIFICANT CHANGE UP (ref 3.8–5.2)
RBC # BLD: 4.17 M/UL — SIGNIFICANT CHANGE UP (ref 3.8–5.2)
RBC # FLD: 14.6 % — HIGH (ref 10.3–14.5)
RBC # FLD: 14.6 % — HIGH (ref 10.3–14.5)
RBC CASTS # UR COMP ASSIST: 6 /HPF — HIGH (ref 0–4)
SODIUM SERPL-SCNC: 138 MMOL/L — SIGNIFICANT CHANGE UP (ref 135–145)
SODIUM SERPL-SCNC: 138 MMOL/L — SIGNIFICANT CHANGE UP (ref 135–145)
SP GR SPEC: 1.02 — SIGNIFICANT CHANGE UP (ref 1–1.03)
SQUAMOUS # UR AUTO: >36 /HPF — HIGH (ref 0–5)
UROBILINOGEN FLD QL: 0.2 MG/DL — SIGNIFICANT CHANGE UP (ref 0.2–1)
WBC # BLD: 6.61 K/UL — SIGNIFICANT CHANGE UP (ref 3.8–10.5)
WBC # BLD: 9.73 K/UL — SIGNIFICANT CHANGE UP (ref 3.8–10.5)
WBC # FLD AUTO: 6.61 K/UL — SIGNIFICANT CHANGE UP (ref 3.8–10.5)
WBC # FLD AUTO: 9.73 K/UL — SIGNIFICANT CHANGE UP (ref 3.8–10.5)
WBC UR QL: 443 /HPF — HIGH (ref 0–5)

## 2024-08-27 PROCEDURE — 74178 CT ABD&PLV WO CNTR FLWD CNTR: CPT | Mod: 26,MC

## 2024-08-27 PROCEDURE — 71045 X-RAY EXAM CHEST 1 VIEW: CPT | Mod: 26

## 2024-08-27 RX ORDER — GUAIFENESIN 100 MG/5ML
600 LIQUID ORAL EVERY 12 HOURS
Refills: 0 | Status: DISCONTINUED | OUTPATIENT
Start: 2024-08-27 | End: 2024-08-30

## 2024-08-27 RX ORDER — LEVETIRACETAM 1000 MG/1
1 TABLET, FILM COATED ORAL
Refills: 0 | DISCHARGE

## 2024-08-27 RX ORDER — DONEPEZIL HYDROCHLORIDE 5 MG/1
10 TABLET, FILM COATED ORAL AT BEDTIME
Refills: 0 | Status: DISCONTINUED | OUTPATIENT
Start: 2024-08-27 | End: 2024-08-30

## 2024-08-27 RX ORDER — LEVETIRACETAM 1000 MG/1
500 TABLET ORAL
Refills: 0 | Status: DISCONTINUED | OUTPATIENT
Start: 2024-08-27 | End: 2024-08-30

## 2024-08-27 RX ORDER — DULOXETINE HCL 30 MG
60 CAPSULE,DELAYED RELEASE (ENTERIC COATED) ORAL DAILY
Refills: 0 | Status: DISCONTINUED | OUTPATIENT
Start: 2024-08-27 | End: 2024-08-30

## 2024-08-27 RX ORDER — ACETAMINOPHEN 325 MG/1
650 TABLET ORAL EVERY 6 HOURS
Refills: 0 | Status: DISCONTINUED | OUTPATIENT
Start: 2024-08-27 | End: 2024-08-30

## 2024-08-27 RX ORDER — MAGNESIUM, ALUMINUM HYDROXIDE 200-225/5
30 SUSPENSION, ORAL (FINAL DOSE FORM) ORAL EVERY 4 HOURS
Refills: 0 | Status: DISCONTINUED | OUTPATIENT
Start: 2024-08-27 | End: 2024-08-30

## 2024-08-27 RX ORDER — PANTOPRAZOLE SODIUM 40 MG
40 TABLET, DELAYED RELEASE (ENTERIC COATED) ORAL
Refills: 0 | Status: DISCONTINUED | OUTPATIENT
Start: 2024-08-27 | End: 2024-08-30

## 2024-08-27 RX ORDER — LOPERAMIDE HYDROCHLORIDE 2 MG/1
2 CAPSULE ORAL
Refills: 0 | DISCHARGE

## 2024-08-27 RX ORDER — GABAPENTIN 100 MG
400 CAPSULE ORAL
Refills: 0 | Status: DISCONTINUED | OUTPATIENT
Start: 2024-08-27 | End: 2024-08-30

## 2024-08-27 RX ORDER — LEVOTHYROXINE SODIUM 100 MCG
25 TABLET ORAL DAILY
Refills: 0 | Status: DISCONTINUED | OUTPATIENT
Start: 2024-08-27 | End: 2024-08-30

## 2024-08-27 RX ORDER — ONDANSETRON 2 MG/ML
4 INJECTION, SOLUTION INTRAMUSCULAR; INTRAVENOUS EVERY 8 HOURS
Refills: 0 | Status: DISCONTINUED | OUTPATIENT
Start: 2024-08-27 | End: 2024-08-30

## 2024-08-27 RX ORDER — DICYCLOMINE HCL 20 MG
10 TABLET ORAL
Refills: 0 | Status: DISCONTINUED | OUTPATIENT
Start: 2024-08-27 | End: 2024-08-30

## 2024-08-27 RX ADMIN — DONEPEZIL HYDROCHLORIDE 10 MILLIGRAM(S): 5 TABLET, FILM COATED ORAL at 22:39

## 2024-08-27 RX ADMIN — Medication 400 MILLIGRAM(S): at 22:39

## 2024-08-27 RX ADMIN — Medication 60 MILLIGRAM(S): at 12:04

## 2024-08-27 RX ADMIN — Medication 25 MICROGRAM(S): at 12:09

## 2024-08-27 RX ADMIN — Medication 400 MILLIGRAM(S): at 12:09

## 2024-08-27 RX ADMIN — Medication 500 MILLIGRAM(S): at 22:40

## 2024-08-27 RX ADMIN — LEVETIRACETAM 500 MILLIGRAM(S): 1000 TABLET ORAL at 22:39

## 2024-08-27 RX ADMIN — LEVETIRACETAM 500 MILLIGRAM(S): 1000 TABLET ORAL at 12:09

## 2024-08-27 RX ADMIN — Medication 500 MILLIGRAM(S): at 12:03

## 2024-08-27 NOTE — H&P ADULT - NSICDXNOFAMILYHX_GEN_ALL_CORE
Per Dr. Alcantar's verbal order, writer contacted patient to inform that Dr. Estrada would like to increase her levothyroxine to 125mcg PO once daily. Becky verbalized understanding and prescription was escribed to [facility] pharmacy.   
<-- Click to add NO pertinent Family History

## 2024-08-27 NOTE — ED ADULT NURSE NOTE - CHIEF COMPLAINT QUOTE
Pt BIBEMS from [facility] c/o rectal bleeding. Per EMS, staff at [facility] pt had BM, noticed BRB as they were cleaning her. No active bleeding per EMS.

## 2024-08-27 NOTE — ED ADULT NURSE NOTE - OBJECTIVE STATEMENT
pt 82 yo female with PMHx of dementia & Parkinson's disease, presents to ED from [facility] with c/o rectal bleeding. Per EMS & triage nurse report, patient had a BM and was being cleaned by staff when they noticed bright red blood while wiping her. Patient is a poor historian / dementia / unable to recall. Pt not noted to have any blood present and noted to have soft brown stool when RN changed her.

## 2024-08-27 NOTE — ED ADULT NURSE REASSESSMENT NOTE - NS ED NURSE REASSESS COMMENT FT1
report given to [facility], patient is in hallway in view of nursing station in ED but needs a 1:1 for safety.  patient attempting to get out of stretcher, redirected several times.  bed alarm in use.  spoke with nursing administration regarding 1:1 on floor.

## 2024-08-27 NOTE — PHARMACOTHERAPY INTERVENTION NOTE - COMMENTS
Medication history complete, patient is from [facility], reviewed and confirmed medication and allergies with list provided by facility.

## 2024-08-27 NOTE — H&P ADULT - HISTORY OF PRESENT ILLNESS
83 year old female with PMHx of dementia, HLD, Parkinson's disease, seizure, hypothyroid, s/p PPM, spinal stenosis BIBA from [facility] for BRBPR when wiping. unable to obtain history from pt d/t dementia. No anticoagulation per med rec. Info from chart review. hgb on admission 12.4-->11.5. CTA abdomen with no obvious source of bleeding. Of note pt recently admitted for breakthrough seizure and hypothermia. found to have UTI and currently on 7 day course of cipro to be completed tomorrow.

## 2024-08-27 NOTE — H&P ADULT - NSHPLABSRESULTS_GEN_ALL_CORE
.  LABS:                         11.5   x     )-----------( x        ( 26 Aug 2024 23:57 )             36.0     08-26    140  |  105  |  32<H>  ----------------------------<  125<H>  4.4   |  31  |  1.14    Ca    9.4      26 Aug 2024 22:12    TPro  7.2  /  Alb  3.1<L>  /  TBili  0.2  /  DBili  x   /  AST  34  /  ALT  30  /  AlkPhos  173<H>  08-26    PT/INR - ( 26 Aug 2024 22:12 )   PT: 11.0 sec;   INR: 0.97 ratio         PTT - ( 26 Aug 2024 22:12 )  PTT:37.4 sec  Urinalysis Basic - ( 26 Aug 2024 22:12 )    Color: x / Appearance: x / SG: x / pH: x  Gluc: 125 mg/dL / Ketone: x  / Bili: x / Urobili: x   Blood: x / Protein: x / Nitrite: x   Leuk Esterase: x / RBC: x / WBC x   Sq Epi: x / Non Sq Epi: x / Bacteria: x            RADIOLOGY, EKG & ADDITIONAL TESTS: Reviewed.

## 2024-08-27 NOTE — PATIENT PROFILE ADULT - FALL HARM RISK - HARM RISK INTERVENTIONS
Assistance with ambulation/Assistance OOB with selected safe patient handling equipment/Communicate Risk of Fall with Harm to all staff/Discuss with provider need for PT consult/Monitor gait and stability/Reinforce activity limits and safety measures with patient and family/Tailored Fall Risk Interventions/Visual Cue: Yellow wristband and red socks/Bed in lowest position, wheels locked, appropriate side rails in place/Call bell, personal items and telephone in reach/Instruct patient to call for assistance before getting out of bed or chair/Non-slip footwear when patient is out of bed/[place] to call system/Physically safe environment - no spills, clutter or unnecessary equipment/Purposeful Proactive Rounding/Room/bathroom lighting operational, light cord in reach

## 2024-08-27 NOTE — H&P ADULT - ASSESSMENT
83 year old female with PMHx of dementia, HLD, Parkinson's disease, seizure, hypothyroid, s/p PPM, spinal stenosis BIBA from [facility] for BRBPR placed in obs for further workup of hematochezia and acute blood loss      #BRBPR  #hematochemia   #rule out Lower GIB  trend hgb - currently at baseline  will perform rectal exam once pt in room   send fecal occult   Hold loperamide  GI consult   CLD for now   if hgb remains stable advance diet     #UTI  no sepsis present on admission  c/w cipro - due to complete tomorrow 08/28    #seizure hx  #neuropathy  c/w keppra BID   c/w gabapentin and duloxetine    #dementia with behavioral disturbances  c/w donepizil    #GERD  c/w PPI    #hypothyroidism   c/w levothyroxine    #DVT ppx; SCDs

## 2024-08-27 NOTE — ED ADULT NURSE NOTE - NS ED NURSE REPORT GIVEN DT
Per VO Dr Patel patient's bilat ears irrigated with warm water and Docusate Sodium 50 mg/5 ml with large amount of cerumen removed right ear and small amount left ear. Impacted cerumen removal took approximately 15 minutes. Eardrum visualized with otoscope after removal and patient's hearing improved. Patient tolerated procedure without incident.      27-Aug-2024 08:54

## 2024-08-27 NOTE — H&P ADULT - NSHPPHYSICALEXAM_GEN_ALL_CORE
VITALS:  T(F): 97.7 (08-27-24 @ 08:35), Max: 97.8 (08-27-24 @ 02:45)  HR: 75 (08-27-24 @ 08:35) (73 - 80)  BP: 147/65 (08-27-24 @ 08:35) (105/42 - 147/65)  RR: 18 (08-27-24 @ 08:35) (17 - 18)  SpO2: 98% (08-27-24 @ 08:35) (95% - 99%)  Wt(kg): --    I&O's Summary      CAPILLARY BLOOD GLUCOSE          PHYSICAL EXAM:  Gen: NAD  HEENT:  pupils equal and reactive, EOMI, no oropharyngeal lesions, erythema, exudates, oral thrush  NECK:   supple, no carotid bruits, no palpable lymph nodes, no thyromegaly  CV:  +S1, +S2, regular, no murmurs or rubs  RESP:   lungs clear to auscultation bilaterally, no wheezing, rales, rhonchi, good air entry bilaterally  BREAST:  not examined  GI:  abdomen soft, non-tender, non-distended, normal BS, no bruits, no abdominal masses, no palpable masses  RECTAL:  not examined due to being in the hallway  :  not examined  MSK:   normal muscle tone, no atrophy, no rigidity, no contractions  EXT:  no clubbing, no cyanosis, no edema, no calf pain, swelling or erythema  VASCULAR:  pulses equal and symmetric in the upper and lower extremities  NEURO:  AOx0 - mutters incomprehensible phrases  SKIN:  no ulcers, lesions or rashes

## 2024-08-28 DIAGNOSIS — K62.5 HEMORRHAGE OF ANUS AND RECTUM: ICD-10-CM

## 2024-08-28 LAB
ADD ON TEST-SPECIMEN IN LAB: SIGNIFICANT CHANGE UP
ALBUMIN SERPL ELPH-MCNC: 2.7 G/DL — LOW (ref 3.3–5)
ALP SERPL-CCNC: 159 U/L — HIGH (ref 40–120)
ALT FLD-CCNC: 24 U/L — SIGNIFICANT CHANGE UP (ref 12–78)
ANION GAP SERPL CALC-SCNC: 2 MMOL/L — LOW (ref 5–17)
AST SERPL-CCNC: 29 U/L — SIGNIFICANT CHANGE UP (ref 15–37)
BILIRUB SERPL-MCNC: 0.3 MG/DL — SIGNIFICANT CHANGE UP (ref 0.2–1.2)
BUN SERPL-MCNC: 30 MG/DL — HIGH (ref 7–23)
CALCIUM SERPL-MCNC: 9.3 MG/DL — SIGNIFICANT CHANGE UP (ref 8.5–10.1)
CHLORIDE SERPL-SCNC: 108 MMOL/L — SIGNIFICANT CHANGE UP (ref 96–108)
CO2 SERPL-SCNC: 30 MMOL/L — SIGNIFICANT CHANGE UP (ref 22–31)
CORTIS AM PEAK SERPL-MCNC: 9.3 UG/DL — SIGNIFICANT CHANGE UP (ref 6–18.4)
CREAT SERPL-MCNC: 1.12 MG/DL — SIGNIFICANT CHANGE UP (ref 0.5–1.3)
EGFR: 48 ML/MIN/1.73M2 — LOW
GLUCOSE SERPL-MCNC: 80 MG/DL — SIGNIFICANT CHANGE UP (ref 70–99)
HCT VFR BLD CALC: 34.5 % — SIGNIFICANT CHANGE UP (ref 34.5–45)
HGB BLD-MCNC: 11 G/DL — LOW (ref 11.5–15.5)
MCHC RBC-ENTMCNC: 30.3 PG — SIGNIFICANT CHANGE UP (ref 27–34)
MCHC RBC-ENTMCNC: 31.9 GM/DL — LOW (ref 32–36)
MCV RBC AUTO: 95 FL — SIGNIFICANT CHANGE UP (ref 80–100)
PLATELET # BLD AUTO: 118 K/UL — LOW (ref 150–400)
POTASSIUM SERPL-MCNC: 4.1 MMOL/L — SIGNIFICANT CHANGE UP (ref 3.5–5.3)
POTASSIUM SERPL-SCNC: 4.1 MMOL/L — SIGNIFICANT CHANGE UP (ref 3.5–5.3)
PROCALCITONIN SERPL-MCNC: 0.08 NG/ML — SIGNIFICANT CHANGE UP (ref 0.02–0.1)
PROT SERPL-MCNC: 6.6 GM/DL — SIGNIFICANT CHANGE UP (ref 6–8.3)
RBC # BLD: 3.63 M/UL — LOW (ref 3.8–5.2)
RBC # FLD: 14.9 % — HIGH (ref 10.3–14.5)
SODIUM SERPL-SCNC: 140 MMOL/L — SIGNIFICANT CHANGE UP (ref 135–145)
TSH SERPL-MCNC: 8.97 UU/ML — HIGH (ref 0.34–4.82)
WBC # BLD: 6.35 K/UL — SIGNIFICANT CHANGE UP (ref 3.8–10.5)
WBC # FLD AUTO: 6.35 K/UL — SIGNIFICANT CHANGE UP (ref 3.8–10.5)

## 2024-08-28 PROCEDURE — 84439 ASSAY OF FREE THYROXINE: CPT

## 2024-08-28 PROCEDURE — 85027 COMPLETE CBC AUTOMATED: CPT

## 2024-08-28 PROCEDURE — 80048 BASIC METABOLIC PNL TOTAL CA: CPT

## 2024-08-28 PROCEDURE — 99233 SBSQ HOSP IP/OBS HIGH 50: CPT

## 2024-08-28 PROCEDURE — 84480 ASSAY TRIIODOTHYRONINE (T3): CPT

## 2024-08-28 PROCEDURE — 97163 PT EVAL HIGH COMPLEX 45 MIN: CPT | Mod: GP

## 2024-08-28 PROCEDURE — 36415 COLL VENOUS BLD VENIPUNCTURE: CPT

## 2024-08-28 PROCEDURE — 99222 1ST HOSP IP/OBS MODERATE 55: CPT

## 2024-08-28 PROCEDURE — 82533 TOTAL CORTISOL: CPT

## 2024-08-28 RX ORDER — CEFEPIME 2 G/1
1000 INJECTION, POWDER, FOR SOLUTION INTRAVENOUS ONCE
Refills: 0 | Status: COMPLETED | OUTPATIENT
Start: 2024-08-28 | End: 2024-08-28

## 2024-08-28 RX ORDER — CEFEPIME 2 G/1
INJECTION, POWDER, FOR SOLUTION INTRAVENOUS
Refills: 0 | Status: DISCONTINUED | OUTPATIENT
Start: 2024-08-28 | End: 2024-08-28

## 2024-08-28 RX ORDER — MEROPENEM 500 MG/10ML
1000 INJECTION, POWDER, FOR SOLUTION INTRAVENOUS EVERY 12 HOURS
Refills: 0 | Status: DISCONTINUED | OUTPATIENT
Start: 2024-08-28 | End: 2024-08-29

## 2024-08-28 RX ORDER — POLYETHYLENE GLYCOL 3350 17 G/17G
17 POWDER, FOR SOLUTION ORAL
Refills: 0 | Status: DISCONTINUED | OUTPATIENT
Start: 2024-08-28 | End: 2024-08-30

## 2024-08-28 RX ORDER — MEROPENEM 500 MG/10ML
1000 INJECTION, POWDER, FOR SOLUTION INTRAVENOUS EVERY 12 HOURS
Refills: 0 | Status: DISCONTINUED | OUTPATIENT
Start: 2024-08-28 | End: 2024-08-28

## 2024-08-28 RX ORDER — CEFEPIME 2 G/1
1000 INJECTION, POWDER, FOR SOLUTION INTRAVENOUS EVERY 12 HOURS
Refills: 0 | Status: DISCONTINUED | OUTPATIENT
Start: 2024-08-28 | End: 2024-08-28

## 2024-08-28 RX ADMIN — LEVETIRACETAM 500 MILLIGRAM(S): 1000 TABLET ORAL at 12:03

## 2024-08-28 RX ADMIN — Medication 400 MILLIGRAM(S): at 12:03

## 2024-08-28 RX ADMIN — LEVETIRACETAM 500 MILLIGRAM(S): 1000 TABLET ORAL at 21:59

## 2024-08-28 RX ADMIN — MEROPENEM 1000 MILLIGRAM(S): 500 INJECTION, POWDER, FOR SOLUTION INTRAVENOUS at 21:59

## 2024-08-28 RX ADMIN — Medication 10 MILLIGRAM(S): at 18:37

## 2024-08-28 RX ADMIN — Medication 10 MILLIGRAM(S): at 05:26

## 2024-08-28 RX ADMIN — CEFEPIME 1000 MILLIGRAM(S): 2 INJECTION, POWDER, FOR SOLUTION INTRAVENOUS at 09:45

## 2024-08-28 RX ADMIN — Medication 400 MILLIGRAM(S): at 21:59

## 2024-08-28 RX ADMIN — GUAIFENESIN 600 MILLIGRAM(S): 100 LIQUID ORAL at 12:03

## 2024-08-28 RX ADMIN — Medication 60 MILLIGRAM(S): at 12:02

## 2024-08-28 RX ADMIN — DONEPEZIL HYDROCHLORIDE 10 MILLIGRAM(S): 5 TABLET, FILM COATED ORAL at 21:59

## 2024-08-28 RX ADMIN — Medication 25 MICROGRAM(S): at 05:26

## 2024-08-28 NOTE — DIETITIAN INITIAL EVALUATION ADULT - ADD RECOMMEND
1. C/w regular diet to maximize caloric and protein intake   2. Encourage protein-rich foods, maximize food preferences   3. Add ensure plus high protein BID to optimize PO intake (provides 350 kcal, 20g protein/ shake)   4. Monitor bowel movements, if no BM for >3 days, consider implementing bowel regimen.   5. Consider obtaining vitamin D 25OH level to assess nutriture   6. Consider adding thiamine 100 mg daily 2/2 poor PO intake/ malnutrition and MVI w/ minerals daily to ensure 100% RDA met   7. Confirm goals of care regarding nutrition support   8. If PO intake poor, consider adding appetite stimulant such as Remeron or Marinol  RD will continue to monitor PO intake, labs, hydration, and wt prn.

## 2024-08-28 NOTE — POST DISCHARGE NOTE - NOTIFICATION:
Notified Dr. Prince Epstein of patient's CT findings.  Please contact [email or web address] or [phone] or select “Cancer Care Direct” on the discharge disposition sheet when the patient is close to discharge for assistance in outpatient care.

## 2024-08-28 NOTE — DIETITIAN INITIAL EVALUATION ADULT - DIET TYPE
Detail Level: Zone Azithromycin Counseling:  I discussed with the patient the risks of azithromycin including but not limited to GI upset, allergic reaction, drug rash, diarrhea, and yeast infections. Topical Retinoid counseling:  Patient advised to apply a pea-sized amount only at bedtime and wait 30 minutes after washing their face before applying.  If too drying, patient may add a non-comedogenic moisturizer. The patient verbalized understanding of the proper use and possible adverse effects of retinoids.  All of the patient's questions and concerns were addressed. Tetracycline Pregnancy And Lactation Text: This medication is Pregnancy Category D and not consider safe during pregnancy. It is also excreted in breast milk. Topical Sulfur Applications Pregnancy And Lactation Text: This medication is Pregnancy Category C and has an unknown safety profile during pregnancy. It is unknown if this topical medication is excreted in breast milk. Dapsone Pregnancy And Lactation Text: This medication is Pregnancy Category C and is not considered safe during pregnancy or breast feeding. Spironolactone Pregnancy And Lactation Text: This medication can cause feminization of the male fetus and should be avoided during pregnancy. The active metabolite is also found in breast milk. High Dose Vitamin A Counseling: Side effects reviewed, pt to contact office should one occur. Doxycycline Pregnancy And Lactation Text: This medication is Pregnancy Category D and not consider safe during pregnancy. It is also excreted in breast milk but is considered safe for shorter treatment courses. Winlevi Pregnancy And Lactation Text: This medication is considered safe during pregnancy and breastfeeding. Use Enhanced Medication Counseling?: No Minocycline Counseling: Patient advised regarding possible photosensitivity and discoloration of the teeth, skin, lips, tongue and gums.  Patient instructed to avoid sunlight, if possible.  When exposed to sunlight, patients should wear protective clothing, sunglasses, and sunscreen.  The patient was instructed to call the office immediately if the following severe adverse effects occur:  hearing changes, easy bruising/bleeding, severe headache, or vision changes.  The patient verbalized understanding of the proper use and possible adverse effects of minocycline.  All of the patient's questions and concerns were addressed. Azelaic Acid Counseling: Patient counseled that medicine may cause skin irritation and to avoid applying near the eyes.  In the event of skin irritation, the patient was advised to reduce the amount of the drug applied or use it less frequently.   The patient verbalized understanding of the proper use and possible adverse effects of azelaic acid.  All of the patient's questions and concerns were addressed. Tazorac Pregnancy And Lactation Text: This medication is not safe during pregnancy. It is unknown if this medication is excreted in breast milk. Bactrim Pregnancy And Lactation Text: This medication is Pregnancy Category D and is known to cause fetal risk.  It is also excreted in breast milk. Erythromycin Counseling:  I discussed with the patient the risks of erythromycin including but not limited to GI upset, allergic reaction, drug rash, diarrhea, increase in liver enzymes, and yeast infections. Benzoyl Peroxide Counseling: Patient counseled that medicine may cause skin irritation and bleach clothing.  In the event of skin irritation, the patient was advised to reduce the amount of the drug applied or use it less frequently.   The patient verbalized understanding of the proper use and possible adverse effects of benzoyl peroxide.  All of the patient's questions and concerns were addressed. Topical Clindamycin Pregnancy And Lactation Text: This medication is Pregnancy Category B and is considered safe during pregnancy. It is unknown if it is excreted in breast milk. Birth Control Pills Pregnancy And Lactation Text: This medication should be avoided if pregnant and for the first 30 days post-partum. Isotretinoin Counseling: Patient should get monthly blood tests, not donate blood, not drive at night if vision affected, not share medication, and not undergo elective surgery for 6 months after tx completed. Side effects reviewed, pt to contact office should one occur. High Dose Vitamin A Pregnancy And Lactation Text: High dose vitamin A therapy is contraindicated during pregnancy and breast feeding. Doxycycline Counseling:  Patient counseled regarding possible photosensitivity and increased risk for sunburn.  Patient instructed to avoid sunlight, if possible.  When exposed to sunlight, patients should wear protective clothing, sunglasses, and sunscreen.  The patient was instructed to call the office immediately if the following severe adverse effects occur:  hearing changes, easy bruising/bleeding, severe headache, or vision changes.  The patient verbalized understanding of the proper use and possible adverse effects of doxycycline.  All of the patient's questions and concerns were addressed. Tetracycline Counseling: Patient counseled regarding possible photosensitivity and increased risk for sunburn.  Patient instructed to avoid sunlight, if possible.  When exposed to sunlight, patients should wear protective clothing, sunglasses, and sunscreen.  The patient was instructed to call the office immediately if the following severe adverse effects occur:  hearing changes, easy bruising/bleeding, severe headache, or vision changes.  The patient verbalized understanding of the proper use and possible adverse effects of tetracycline.  All of the patient's questions and concerns were addressed. Patient understands to avoid pregnancy while on therapy due to potential birth defects. Topical Sulfur Applications Counseling: Topical Sulfur Counseling: Patient counseled that this medication may cause skin irritation or allergic reactions.  In the event of skin irritation, the patient was advised to reduce the amount of the drug applied or use it less frequently.   The patient verbalized understanding of the proper use and possible adverse effects of topical sulfur application.  All of the patient's questions and concerns were addressed. Dapsone Counseling: I discussed with the patient the risks of dapsone including but not limited to hemolytic anemia, agranulocytosis, rashes, methemoglobinemia, kidney failure, peripheral neuropathy, headaches, GI upset, and liver toxicity.  Patients who start dapsone require monitoring including baseline LFTs and weekly CBCs for the first month, then every month thereafter.  The patient verbalized understanding of the proper use and possible adverse effects of dapsone.  All of the patient's questions and concerns were addressed. Aklief counseling:  Patient advised to apply a pea-sized amount only at bedtime and wait 30 minutes after washing their face before applying.  If too drying, patient may add a non-comedogenic moisturizer.  The most commonly reported side effects including irritation, redness, scaling, dryness, stinging, burning, itching, and increased risk of sunburn.  The patient verbalized understanding of the proper use and possible adverse effects of retinoids.  All of the patient's questions and concerns were addressed. Topical Retinoid Pregnancy And Lactation Text: This medication is Pregnancy Category C. It is unknown if this medication is excreted in breast milk. Winlevi Counseling:  I discussed with the patient the risks of topical clascoterone including but not limited to erythema, scaling, itching, and stinging. Patient voiced their understanding. Azithromycin Pregnancy And Lactation Text: This medication is considered safe during pregnancy and is also secreted in breast milk. Topical Clindamycin Counseling: Patient counseled that this medication may cause skin irritation or allergic reactions.  In the event of skin irritation, the patient was advised to reduce the amount of the drug applied or use it less frequently.   The patient verbalized understanding of the proper use and possible adverse effects of clindamycin.  All of the patient's questions and concerns were addressed. Birth Control Pills Counseling: Birth Control Pill Counseling: I discussed with the patient the potential side effects of OCPs including but not limited to increased risk of stroke, heart attack, thrombophlebitis, deep venous thrombosis, hepatic adenomas, breast changes, GI upset, headaches, and depression.  The patient verbalized understanding of the proper use and possible adverse effects of OCPs. All of the patient's questions and concerns were addressed. Erythromycin Pregnancy And Lactation Text: This medication is Pregnancy Category B and is considered safe during pregnancy. It is also excreted in breast milk. Sarecycline Counseling: Patient advised regarding possible photosensitivity and discoloration of the teeth, skin, lips, tongue and gums.  Patient instructed to avoid sunlight, if possible.  When exposed to sunlight, patients should wear protective clothing, sunglasses, and sunscreen.  The patient was instructed to call the office immediately if the following severe adverse effects occur:  hearing changes, easy bruising/bleeding, severe headache, or vision changes.  The patient verbalized understanding of the proper use and possible adverse effects of sarecycline.  All of the patient's questions and concerns were addressed. Bactrim Counseling:  I discussed with the patient the risks of sulfa antibiotics including but not limited to GI upset, allergic reaction, drug rash, diarrhea, dizziness, photosensitivity, and yeast infections.  Rarely, more serious reactions can occur including but not limited to aplastic anemia, agranulocytosis, methemoglobinemia, blood dyscrasias, liver or kidney failure, lung infiltrates or desquamative/blistering drug rashes. Aklief Pregnancy And Lactation Text: It is unknown if this medication is safe to use during pregnancy.  It is unknown if this medication is excreted in breast milk.  Breastfeeding women should use the topical cream on the smallest area of the skin for the shortest time needed while breastfeeding.  Do not apply to nipple and areola. Tazorac Counseling:  Patient advised that medication is irritating and drying.  Patient may need to apply sparingly and wash off after an hour before eventually leaving it on overnight.  The patient verbalized understanding of the proper use and possible adverse effects of tazorac.  All of the patient's questions and concerns were addressed. Benzoyl Peroxide Pregnancy And Lactation Text: This medication is Pregnancy Category C. It is unknown if benzoyl peroxide is excreted in breast milk. Isotretinoin Pregnancy And Lactation Text: This medication is Pregnancy Category X and is considered extremely dangerous during pregnancy. It is unknown if it is excreted in breast milk. Spironolactone Counseling: Patient advised regarding risks of diarrhea, abdominal pain, hyperkalemia, birth defects (for female patients), liver toxicity and renal toxicity. The patient may need blood work to monitor liver and kidney function and potassium levels while on therapy. The patient verbalized understanding of the proper use and possible adverse effects of spironolactone.  All of the patient's questions and concerns were addressed. supplement (specify) Azelaic Acid Pregnancy And Lactation Text: This medication is considered safe during pregnancy and breast feeding.

## 2024-08-28 NOTE — DIETITIAN INITIAL EVALUATION ADULT - PERTINENT LABORATORY DATA
08-28    140  |  108  |  30<H>  ----------------------------<  80  4.1   |  30  |  1.12    Ca    9.3      28 Aug 2024 07:22    TPro  6.6  /  Alb  2.7<L>  /  TBili  0.3  /  DBili  x   /  AST  29  /  ALT  24  /  AlkPhos  159<H>  08-28

## 2024-08-28 NOTE — DIETITIAN INITIAL EVALUATION ADULT - ORAL INTAKE PTA/DIET HISTORY
Unable to obtain diet hx 2/2 pt sleeping and unarousable upon RD visit this morning, dx dementia noted. As per paper chart, presents from [facility], was on regular diet w/ regular texture and thin liquids.

## 2024-08-28 NOTE — DIETITIAN INITIAL EVALUATION ADULT - FEEDING SKILL
Tray set-up, open all containers; meal encouragement/supervision/minimal assistance/age appropriate assistance

## 2024-08-28 NOTE — DIETITIAN INITIAL EVALUATION ADULT - OTHER INFO
83 year old female with PMHx of dementia, HLD, Parkinson's disease, seizure, hypothyroid, s/p PPM, spinal stenosis BIBA from [facility] for BRBPR when wiping. unable to obtain history from pt d/t dementia. No anticoagulation per med rec. Info from chart review. hgb on admission 12.4-->11.5. CTA abdomen with no obvious source of bleeding. Of note pt recently admitted for breakthrough seizure and hypothermia. found to have UTI and currently on 7 day course of cipro to be completed tomorrow. Admit for rectal bleeding.     Known to nutr services w/ dx PCM on multiple occasions. Unable to obtain diet/ wt hx 2/2 pt sleeping and unarousable upon RD visit this AM, observed breakfast at bedside untouched, likely consuming </= 50% ENN while in HH. Wt hx as per previous RD notes: 143# (RD bed scale) 8/5/24 (? accuracy); 128# (RD bed scale) 2/1/24; 132# (RD bed scale) 1/11/24. RD obtained new bed scale wt 130# on 8/28, appears accurate. ? Wt loss 13# / 9.0% x ~3 weeks 2/2 ? accuracy of previous bed scale wt. No significant wt changes noted x7 mo. NFPE reveals mild-moderate muscle/fat wasting. Currently on regular diet, recommend to continue to maximize caloric and protein intake. Will trial ensure plus high protein BID to optimize PO intake (provides 350 kcal, 20g protein/ shake). GI consult pending 2/2 rectal bleeding. As per [facility], DNR only - recommend to confirm goals of care regarding nutrition support. See below for other recs.

## 2024-08-28 NOTE — CONSULT NOTE ADULT - ASSESSMENT
83 year old female with history of dementia, spinal stenosis and Parkinson's disease presenting from nursing facility for hematochezia. CT imaging negative active GI bleed. Also with mild thickening of rectum suggests nonspecific proctitis and focal angiodysplasia.      Imp: Hematochezia and proctitis, likely from constipation and possibly hemorrhoids. FOBT negative. Hgb 11 today AM (baseline Hgb 11-12).     PLAN  :: Recommend bowel regimen Miralax 17g BID and dulcolax 10mg QD @ HS  :: Avoid narcotics as it can further worsen constipation   :: Encourage increasing PO hydration and ambulating OOBTC   :: Trend electrolytes and replete PRN  :: Monitor for signs of bleeding and stools

## 2024-08-28 NOTE — CONSULT NOTE ADULT - ASSESSMENT
83 year old female with PMHx of dementia, HLD, Parkinson's disease, seizure, hypothyroid, s/p PPM, spinal stenosis BIBA from [facility] for BRBPR when wiping. unable to obtain history from pt d/t dementia. No anticoagulation per med rec. Info from chart review. hgb on admission 12.4-->11.5. CTA abdomen with no obvious source of bleeding. Of note pt recently admitted for breakthrough seizure and hypothermia. found to have UTI and currently on 7 day course of cipro to be completed 8/28.     1. Hypothermia. Pyuri 83 year old female with PMHx of dementia, HLD, Parkinson's disease, seizure, hypothyroid, s/p PPM, spinal stenosis BIBA from [facility] for BRBPR when wiping. unable to obtain history from pt d/t dementia. No anticoagulation per med rec. Info from chart review. hgb on admission 12.4-->11.5. CTA abdomen with no obvious source of bleeding. Of note pt recently admitted for breakthrough seizure and hypothermia. found to have UTI and currently on 7 day course of cipro to be completed 8/28.     1. Hypothermia. Pyuria/UTI. Recent PSAE UTI. GI bleed. Parkinsons disease   - imaging reviewed  - 8/14 urine cx grew MDR PSAE S cipro/merrem I cefepime  - completed 6 days cipro prior to admit  - stop cefepime  - change abx to merrem 1gm12h while inpt  - f/u urine cx blood cx  - monitor temps  - tolerating abx well so far; no side effects noted  - reason for abx use and side effects reviewed with patient  - supportive care  - fu cbc     Clinical team may change from intravenous to oral antibiotics when the following criteria are met:   1. Patient is clinically improving/stable       a)	Improved signs and symptoms of infection from initial presentation       b)	Afebrile for 24 hours       c)	Leukocytosis trending towards normal range   2. Patient is tolerating oral intake   3. Initial/repeat blood cultures are negative    Cannot advise changing to oral antibiotic therapy until culture sensitivity is available.   83 year old female with PMHx of dementia, HLD, Parkinson's disease, seizure, hypothyroid, s/p PPM, spinal stenosis BIBA from [facility] for BRBPR when wiping. unable to obtain history from pt d/t dementia. No anticoagulation per med rec. Info from chart review. hgb on admission 12.4-->11.5. CTA abdomen with no obvious source of bleeding. Of note pt recently admitted for breakthrough seizure and hypothermia. found to have UTI and currently on 7 day course of cipro to be completed 8/28.     1. Hypothermia. Pyuria/UTI. Recent PSAE UTI. GI bleed. Parkinsons disease. PCN allergy  - imaging reviewed  - 8/14 urine cx grew MDR PSAE S cipro/merrem I cefepime  - completed 6 days cipro prior to admit  - stop cefepime  - change abx to merrem 1gm12h while inpt  - f/u urine cx blood cx  - monitor temps  - tolerating abx well so far; no side effects noted  - reason for abx use and side effects reviewed with patient  - supportive care  - fu cbc     Clinical team may change from intravenous to oral antibiotics when the following criteria are met:   1. Patient is clinically improving/stable       a)	Improved signs and symptoms of infection from initial presentation       b)	Afebrile for 24 hours       c)	Leukocytosis trending towards normal range   2. Patient is tolerating oral intake   3. Initial/repeat blood cultures are negative    Cannot advise changing to oral antibiotic therapy until culture sensitivity is available.

## 2024-08-28 NOTE — DIETITIAN INITIAL EVALUATION ADULT - PERTINENT MEDS FT
MEDICATIONS  (STANDING):  dicyclomine 10 milliGRAM(s) Oral two times a day before meals  donepezil 10 milliGRAM(s) Oral at bedtime  DULoxetine 60 milliGRAM(s) Oral daily  gabapentin 400 milliGRAM(s) Oral two times a day  guaiFENesin  milliGRAM(s) Oral every 12 hours  levETIRAcetam 500 milliGRAM(s) Oral two times a day  levothyroxine 25 MICROGram(s) Oral daily  meropenem Injectable 1000 milliGRAM(s) IV Push every 12 hours  pantoprazole    Tablet 40 milliGRAM(s) Oral before breakfast    MEDICATIONS  (PRN):  acetaminophen     Tablet .. 650 milliGRAM(s) Oral every 6 hours PRN Temp greater or equal to 38C (100.4F), Mild Pain (1 - 3)  aluminum hydroxide/magnesium hydroxide/simethicone Suspension 30 milliLiter(s) Oral every 4 hours PRN Dyspepsia  melatonin 3 milliGRAM(s) Oral at bedtime PRN Insomnia  ondansetron Injectable 4 milliGRAM(s) IV Push every 8 hours PRN Nausea and/or Vomiting

## 2024-08-28 NOTE — PROGRESS NOTE ADULT - ASSESSMENT
83 year old female with PMHx of dementia, HLD, Parkinson's disease, seizure, hypothyroid, s/p PPM, spinal stenosis BIBA from [facility] for BRBPR placed in obs for further workup of hematochezia and acute blood loss anemia now found to have hypothermia 2/2 UTI and PNA.     #Hypothermia   #acute UTI - failure of PO antibiotics  #Left lower lobe PNA  now on meropenam which should cover both UTI and PNA   f/u on blood cultures   f/u urine culture     #BRBPR  #hematochemia   #rule out Lower GIB  hgb drop 1 gram  continue to monitor   per GI, hematochezia possibly 2/2 angiodysplasia   recommend aggressive bowel regimen   advance to regular diet    #seizure hx  #neuropathy  c/w keppra BID   c/w gabapentin and duloxetine    #dementia with behavioral disturbances  c/w donepizil    #GERD  c/w PPI    #hypothyroidism   c/w levothyroxine    #moderate malnutrition  follow dietician reccs    #DVT ppx; SCDs    Discussed plan of care with Daughter Latisha - i discussed that pt has had 3 admissions within 8 months this yr and has irreversible dementia - would benefit from palliative eval - she agreed to consult for discussion 83 year old female with PMHx of dementia, HLD, Parkinson's disease, seizure, hypothyroid, s/p PPM, spinal stenosis BIBA from [facility] for BRB[facility] placed in obs for further workup of hematochezia and acute blood loss anemia now found to have hypothermia 2/2 UTI and PNA.     #Hypothermia   #acute UTI - failure of PO antibiotics  #Left lower lobe PNA  now on meropenam which should cover both UTI and PNA   f/u on blood cultures   f/u urine culture   Cortisol level wnl   TSH elevated to 8 - follow up on T3/T4     #BRBPR  #hematochemia   #rule out Lower GIB  hgb drop 1 gram  continue to monitor   per GI, hematochezia possibly 2/2 angiodysplasia   recommend aggressive bowel regimen   advance to regular diet    #seizure hx  #neuropathy  c/w keppra BID   c/w gabapentin and duloxetine    #dementia with behavioral disturbances  c/w donepizil    #GERD  c/w PPI    #hypothyroidism   TSH elevated - follow up on T3/T4 - possibly euthyroid sick syndrome  c/w levothyroxine    #moderate malnutrition  follow dietician reccs    #DVT ppx; SCDs    Discussed plan of care with Daughter Latisha - i discussed that pt has had 3 admissions within 8 months this yr and has irreversible dementia - would benefit from palliative eval - she agreed to consult for discussion

## 2024-08-28 NOTE — CONSULT NOTE ADULT - NS ATTEND AMEND GEN_ALL_CORE FT
Pt seen and evaluated at bedside this afternoon. 83 F with hx Parkinson's and dementia presenting from nursing home for bright red blood per rectum. Upon arrival here, having brown stool and no further signs of bleeding. hb overall stable. Unable to participate with additional history/physical exam due to dementia. CT with some proctitis - could be related to stercoral colitis, heavy stool burden in rectum. Also with angiodysplasia on CT. Both can contribute to bleeding but seems to have subsided.     Rec:  -Aggressive bowel regimen miralax BID and dulcolax nightly  -No plan for colonoscopy at this time, if bleeding were to be come brisk with significant drop in Hb can consider after risk/benefit discussion with patient's family  -GI will be available as needed

## 2024-08-29 DIAGNOSIS — F03.90 UNSPECIFIED DEMENTIA, UNSPECIFIED SEVERITY, WITHOUT BEHAVIORAL DISTURBANCE, PSYCHOTIC DISTURBANCE, MOOD DISTURBANCE, AND ANXIETY: ICD-10-CM

## 2024-08-29 LAB
ANION GAP SERPL CALC-SCNC: 4 MMOL/L — LOW (ref 5–17)
BUN SERPL-MCNC: 39 MG/DL — HIGH (ref 7–23)
CALCIUM SERPL-MCNC: 9.3 MG/DL — SIGNIFICANT CHANGE UP (ref 8.5–10.1)
CHLORIDE SERPL-SCNC: 111 MMOL/L — HIGH (ref 96–108)
CO2 SERPL-SCNC: 29 MMOL/L — SIGNIFICANT CHANGE UP (ref 22–31)
CREAT SERPL-MCNC: 0.94 MG/DL — SIGNIFICANT CHANGE UP (ref 0.5–1.3)
EGFR: 60 ML/MIN/1.73M2 — SIGNIFICANT CHANGE UP
GLUCOSE SERPL-MCNC: 83 MG/DL — SIGNIFICANT CHANGE UP (ref 70–99)
HCT VFR BLD CALC: 35.5 % — SIGNIFICANT CHANGE UP (ref 34.5–45)
HGB BLD-MCNC: 11.5 G/DL — SIGNIFICANT CHANGE UP (ref 11.5–15.5)
MCHC RBC-ENTMCNC: 30.7 PG — SIGNIFICANT CHANGE UP (ref 27–34)
MCHC RBC-ENTMCNC: 32.4 GM/DL — SIGNIFICANT CHANGE UP (ref 32–36)
MCV RBC AUTO: 94.7 FL — SIGNIFICANT CHANGE UP (ref 80–100)
PLATELET # BLD AUTO: 136 K/UL — LOW (ref 150–400)
POTASSIUM SERPL-MCNC: 4.1 MMOL/L — SIGNIFICANT CHANGE UP (ref 3.5–5.3)
POTASSIUM SERPL-SCNC: 4.1 MMOL/L — SIGNIFICANT CHANGE UP (ref 3.5–5.3)
RBC # BLD: 3.75 M/UL — LOW (ref 3.8–5.2)
RBC # FLD: 14.7 % — HIGH (ref 10.3–14.5)
SODIUM SERPL-SCNC: 144 MMOL/L — SIGNIFICANT CHANGE UP (ref 135–145)
T4 FREE SERPL-MCNC: 1.17 NG/DL — SIGNIFICANT CHANGE UP (ref 0.76–1.46)
WBC # BLD: 9.26 K/UL — SIGNIFICANT CHANGE UP (ref 3.8–10.5)
WBC # FLD AUTO: 9.26 K/UL — SIGNIFICANT CHANGE UP (ref 3.8–10.5)

## 2024-08-29 PROCEDURE — 99497 ADVNCD CARE PLAN 30 MIN: CPT | Mod: 25

## 2024-08-29 PROCEDURE — 99223 1ST HOSP IP/OBS HIGH 75: CPT

## 2024-08-29 PROCEDURE — 99232 SBSQ HOSP IP/OBS MODERATE 35: CPT

## 2024-08-29 RX ORDER — CEFUROXIME SODIUM 1.5 G
500 VIAL (EA) INJECTION EVERY 12 HOURS
Refills: 0 | Status: DISCONTINUED | OUTPATIENT
Start: 2024-08-29 | End: 2024-08-30

## 2024-08-29 RX ADMIN — Medication 400 MILLIGRAM(S): at 11:03

## 2024-08-29 RX ADMIN — Medication 60 MILLIGRAM(S): at 11:03

## 2024-08-29 RX ADMIN — POLYETHYLENE GLYCOL 3350 17 GRAM(S): 17 POWDER, FOR SOLUTION ORAL at 11:03

## 2024-08-29 RX ADMIN — LEVETIRACETAM 500 MILLIGRAM(S): 1000 TABLET ORAL at 11:03

## 2024-08-29 NOTE — PHYSICAL THERAPY INITIAL EVALUATION ADULT - NSPTDISCHREC_GEN_A_CORE
return to [facility] no skilled PT needs/Long Term Care/SNF/No skilled PT needs Propranolol Counseling:  I discussed with the patient the risks of propranolol including but not limited to low heart rate, low blood pressure, low blood sugar, restlessness and increased cold sensitivity. They should call the office if they experience any of these side effects.

## 2024-08-29 NOTE — CONSULT NOTE ADULT - SUBJECTIVE AND OBJECTIVE BOX
Patient is a 83y old  Female who presents with a chief complaint of Hemorrhage of rectum and anus      HPI:  This is a 83 year old female with PMH of dementia, Parkinson's disease, HLD, seizure, hypothyroid, s/p PPM, and spinal stenosis presenting from [facility] for bright red blood per rectum when wiping.     Patient with limited participation in HPI due to dementia, rest of information gathered from HIE. She had a recent admission @  earlier this month for sepsis 2/2 E.coli UTI. On examination, she is confused and unable state her name/. She reports feeling tired today due to lack of sleep. Denies abdominal pain. Today AM, Hgb 11.0 (baseline 11-12). CT imaging negative active GI bleed. Also with mild thickening of rectum suggests nonspecific proctitis and focal angiodysplasia. No leukocytosis. FOBT negative.       PAST MEDICAL & SURGICAL HISTORY:  Trigeminal neuralgia      HLD (hyperlipidemia)      Spinal stenosis      Burning mouth syndrome      Parkinson disease      Dementia      No significant past surgical history          REVIEW OF SYSTEMS:   General: Negative  HEENT: Negative  CV: Negative  Respiratory: Negative  GI: See HPI  : Negative  MSK: Negative  Hematologic: Negative  Skin: Negative    MEDICATIONS:   MEDICATIONS  (STANDING):  bisacodyl 10 milliGRAM(s) Oral at bedtime  dicyclomine 10 milliGRAM(s) Oral two times a day before meals  donepezil 10 milliGRAM(s) Oral at bedtime  DULoxetine 60 milliGRAM(s) Oral daily  gabapentin 400 milliGRAM(s) Oral two times a day  guaiFENesin  milliGRAM(s) Oral every 12 hours  levETIRAcetam 500 milliGRAM(s) Oral two times a day  levothyroxine 25 MICROGram(s) Oral daily  meropenem Injectable 1000 milliGRAM(s) IV Push every 12 hours  pantoprazole    Tablet 40 milliGRAM(s) Oral before breakfast  polyethylene glycol 3350 17 Gram(s) Oral two times a day    MEDICATIONS  (PRN):  acetaminophen     Tablet .. 650 milliGRAM(s) Oral every 6 hours PRN Temp greater or equal to 38C (100.4F), Mild Pain (1 - 3)  aluminum hydroxide/magnesium hydroxide/simethicone Suspension 30 milliLiter(s) Oral every 4 hours PRN Dyspepsia  melatonin 3 milliGRAM(s) Oral at bedtime PRN Insomnia  ondansetron Injectable 4 milliGRAM(s) IV Push every 8 hours PRN Nausea and/or Vomiting          DIET:  Diet, Regular:   Supplement Feeding Modality:  Oral  Ensure Plus High Protein Cans or Servings Per Day:  1       Frequency:  Two Times a day (24 @ 12:56) [Pending Verification By Attending]  Diet, Regular (24 @ 15:41) [Active]          ALLERGIES:   Allergies    sertraline (Unknown)  penicillins (Unknown)  Cleocin HCl (Unknown)  latex (Unknown)  sulfa drugs (Unknown)    Intolerances            VITAL SIGNS:   Vital Signs Last 24 Hrs  T(C): 36.2 (28 Aug 2024 08:28), Max: 37.1 (27 Aug 2024 22:32)  T(F): 97.2 (28 Aug 2024 08:28), Max: 98.7 (27 Aug 2024 22:32)  HR: 84 (28 Aug 2024 08:28) (66 - 84)  BP: 111/55 (28 Aug 2024 08:28) (102/59 - 111/55)  BP(mean): --  RR: 18 (28 Aug 2024 08:28) (18 - 18)  SpO2: 95% (28 Aug 2024 08:28) (94% - 95%)    Parameters below as of 28 Aug 2024 08:28  Patient On (Oxygen Delivery Method): room air      I&O's Summary      PHYSICAL EXAM:   GENERAL:  No acute distress  HEENT:  NC/AT  CHEST:  No increased effort  HEART:  Regular rate  ABDOMEN:  Soft, non-tender, non-distended, positive bowel sounds  EXTREMITIES: No edema  SKIN:  Warm, dry  NEURO:  Calm, cooperative    LABS:                        11.0   6.35  )-----------( 118      ( 28 Aug 2024 07:22 )             34.5     Hemoglobin: 11.0 g/dL (24 @ 07:22)  Hemoglobin: 12.1 g/dL (24 @ 17:15)  Hemoglobin: 12.8 g/dL (24 @ 09:20)  Hemoglobin: 11.5 g/dL (24 @ 23:57)  Hemoglobin: 12.4 g/dL (24 @ 22:12)        140  |  108  |  30<H>  ----------------------------<  80  4.1   |  30  |  1.12    Ca    9.3      28 Aug 2024 07:22    TPro  6.6  /  Alb  2.7<L>  /  TBili  0.3  /  DBili  x   /  AST  29  /  ALT  24  /  AlkPhos  159<H>  08-28    LIVER FUNCTIONS - ( 28 Aug 2024 07:22 )  Alb: 2.7 g/dL / Pro: 6.6 gm/dL / ALK PHOS: 159 U/L / ALT: 24 U/L / AST: 29 U/L / GGT: x             PT/INR - ( 27 Aug 2024 09:20 )   PT: 10.8 sec;   INR: 0.95 ratio         PTT - ( 27 Aug 2024 09:20 )  PTT:36.5 sec                                      RADIOLOGY & ADDITIONAL STUDIES:      ACC: [id number] EXAM:  CT ABDOMEN AND PELVIS WAW IC   ORDERED BY: J LUIS JACKSON     PROCEDURE DATE:  2024          INTERPRETATION:  CLINICAL INFORMATION: Rectal bleeding    ADDITIONAL CLINICAL INFORMATION: Other, Non-specified    COMPARISON: CT of 2023.    CONTRAST/COMPLICATIONS:  IV Contrast: Omnipaque 350  90 cc administered   0 cc discarded  Oral Contrast: NONE  Complications: None reported at time of study completion    PROCEDURE:  CT of the Abdomen and Pelvis was performed.  Precontrast, Arterial and Delayed phases were performed.  Sagittal and coronal reformats were performed.    FINDINGS:  LOWER CHEST: Pacemaker wires in the right ventricle. Battery pack in the   left upper thoracic wall.    LIVER: Within normal limits.  BILE DUCTS: Normal caliber.  GALLBLADDER: Within normal limits.  SPLEEN: Within normal limits.  PANCREAS: Within normal limits.  ADRENALS: Within normal limits.  KIDNEYS/URETERS: Bilateral renal cysts. Bilateral renal cortical scarring.    BLADDER: Incompletely distended.  REPRODUCTIVE ORGANS: Mild fluid distention of the vaginal cuff. There is   a 4 x 4.8 cm fundal intramural fibroid. Endometrial stripe appears   prominent, measuring about 8 mm.    BOWEL: No bowel obstruction. Appendix is unremarkable. Diffuse colonic   diverticulosis. The colon is underdistended without significant fecal   load. Mild circumferential thickening of the rectum. There is a prominent   right perirectal vessel that enters the muscle, suggesting angiodysplasia.  PERITONEUM/RETROPERITONEUM: Within normal limits.  VESSELS: Within normal limits.  LYMPH NODES: No lymphadenopathy.  ABDOMINAL WALL: Within normal limits.  BONES: Chronic compression deformities of T8, T11, T12, L1-L3 and L5.    IMPRESSION:  No evidence of active GI bleeding on this CTA.  Mild thickening of rectum suggests nonspecific proctitis and focal   angiodysplasia.  Follow up with sigmoidoscopy may be considered.    Fibroid uterus, prominent endometrium and fluid distention ofthe vagina   may be further evaluated with pelvic US.        --- End of Report ---            GRECIA MOORE MD; Attending Radiologist  This document has been electronically signed. Aug 27 2024  1:35AM  24 @ 01:05      
  HPI:  83 year old female with PMHx of dementia, HLD, Parkinson's disease, seizure, hypothyroid, s/p PPM, spinal stenosis BIBA from [facility] for [facility] when wiping. unable to obtain history from pt d/t dementia. No anticoagulation per med rec. Info from chart review. hgb on admission 12.4-->11.5. CTA abdomen with no obvious source of bleeding. Of note pt recently admitted for breakthrough seizure and hypothermia. found to have UTI and currently on 7 day course of cipro to be completed 8/28.       PMH: as above  PSH: as above  Meds: per reconciliation sheet, noted below  MEDICATIONS  (STANDING):  cefepime  Injectable. 1000 milliGRAM(s) IV Push every 12 hours  cefepime  Injectable.      dicyclomine 10 milliGRAM(s) Oral two times a day before meals  donepezil 10 milliGRAM(s) Oral at bedtime  DULoxetine 60 milliGRAM(s) Oral daily  gabapentin 400 milliGRAM(s) Oral two times a day  guaiFENesin  milliGRAM(s) Oral every 12 hours  levETIRAcetam 500 milliGRAM(s) Oral two times a day  levothyroxine 25 MICROGram(s) Oral daily  pantoprazole    Tablet 40 milliGRAM(s) Oral before breakfast      Allergies    sertraline (Unknown)  penicillins (Unknown)  Cleocin HCl (Unknown)  latex (Unknown)  sulfa drugs (Unknown)    Intolerances      Social: no smoking, no alcohol, no illegal drugs; no recent travel, no exposure to TB  FAMILY HISTORY:  No pertinent family history in first degree relatives       no history of premature cardiovascular disease in first degree relatives    ROS: unable to obtain d/t medical condition     All other systems reviewed and are negative    Vital Signs Last 24 Hrs  T(C): 36.2 (28 Aug 2024 08:28), Max: 37.1 (27 Aug 2024 22:32)  T(F): 97.2 (28 Aug 2024 08:28), Max: 98.7 (27 Aug 2024 22:32)  HR: 84 (28 Aug 2024 08:28) (66 - 84)  BP: 111/55 (28 Aug 2024 08:28) (102/59 - 111/55)  BP(mean): --  RR: 18 (28 Aug 2024 08:28) (18 - 19)  SpO2: 95% (28 Aug 2024 08:28) (94% - 95%)    Parameters below as of 28 Aug 2024 08:28  Patient On (Oxygen Delivery Method): room air      Daily     Daily     PE:  Constitutional: NAD  HEENT: NC/AT, EOMI, PERRLA, conjunctivae clear; ears and nose atraumatic; pharynx benign  Neck: supple; thyroid not palpable  Back: no tenderness  Respiratory: respiratory effort normal; clear to auscultation  Cardiovascular: S1S2 regular, no murmurs  Abdomen: soft, not tender, not distended, positive BS; liver and spleen WNL  Genitourinary: no suprapubic tenderness  Lymphatic: no LN palpable  Musculoskeletal: no muscle tenderness, no joint swelling or tenderness  Extremities: no pedal edema  Neurological/ Psychiatric: AxOx3, Judgement and insight normal;  moving all extremities  Skin: no rashes; no palpable lesions    Labs: all available labs reviewed                        11.0   6.35  )-----------( 118      ( 28 Aug 2024 07:22 )             34.5     08-28    140  |  108  |  30<H>  ----------------------------<  80  4.1   |  30  |  1.12    Ca    9.3      28 Aug 2024 07:22    TPro  6.6  /  Alb  2.7<L>  /  TBili  0.3  /  DBili  x   /  AST  29  /  ALT  24  /  AlkPhos  159<H>  08-28     LIVER FUNCTIONS - ( 28 Aug 2024 07:22 )  Alb: 2.7 g/dL / Pro: 6.6 gm/dL / ALK PHOS: 159 U/L / ALT: 24 U/L / AST: 29 U/L / GGT: x           Urinalysis Basic - ( 28 Aug 2024 07:22 )    Color: x / Appearance: x / SG: x / pH: x  Gluc: 80 mg/dL / Ketone: x  / Bili: x / Urobili: x   Blood: x / Protein: x / Nitrite: x   Leuk Esterase: x / RBC: x / WBC x   Sq Epi: x / Non Sq Epi: x / Bacteria: x    Culture - Urine (08.05.24 @ 00:43)   - Gentamicin: S <=2  - Imipenem: S <=1  - Levofloxacin: R >4  - Meropenem: S <=1  - Nitrofurantoin: S <=32 Should not be used to treat pyelonephritis  - Piperacillin/Tazobactam: S <=8  - Tobramycin: S <=2  - Trimethoprim/Sulfamethoxazole: S <=0.5/9.5  - Amoxicillin/Clavulanic Acid: S <=8/4  - Ampicillin: R >16 These ampicillin results predict results for amoxicillin  - Ampicillin/Sulbactam: I 16/8  - Aztreonam: S <=4  - Cefazolin: S 4 For uncomplicated UTI with K. pneumoniae, E. coli, or P. mirablis: ARCHIE <=16 is sensitive and ARCHIE >=32 is resistant. This also predicts results for oral agents cefaclor, cefdinir, cefpodoxime, cefprozil, cefuroxime axetil, cephalexin and locarbef for uncomplicated UTI. Note that some isolates may be susceptible to these agents while testing resistant to cefazolin.  - Cefepime: S <=2  - Cefoxitin: S <=8  - Ceftriaxone: S <=1  - Cefuroxime: S <=4  - Ciprofloxacin: R >2  - Ertapenem: S <=0.5  Specimen Source: .Urine None  Culture Results:   >100,000 CFU/ml Escherichia coli   50,000 - 99,000 CFU/mL Streptococcus constellatus "Susceptibilities not   performed"  Organism Identification: Escherichia coli  Organism: Escherichia coli  Method Type: ARCHIE      Radiology: all available radiological tests reviewed  < from: CT Abdomen and Pelvis w/wo IV Cont (08.27.24 @ 01:05) >  IMPRESSION:  No evidence of active GI bleeding on this CTA.  Mild thickening of rectum suggests nonspecific proctitis and focal   angiodysplasia.  Follow up with sigmoidoscopy may be considered.    Fibroid uterus, prominent endometrium and fluid distention ofthe vagina   may be further evaluated with pelvic US.    < end of copied text >    Advanced directives addressed: full resuscitation
  HPI:   " 83 year old female with PMH of dementia, Parkinson's disease, HLD, seizure, hypothyroid, s/p PPM, and spinal stenosis presenting from [facility] assisted [facility] for bright red blood per rectum when wiping.     Patient with limited participation in HPI due to dementia, rest of information gathered from HIE. She had a recent admission @  earlier this month for sepsis 2/2 E.coli UTI. On examination, she is confused and unable state her name/. She reports feeling tired today due to lack of sleep. Denies abdominal pain. Today AM, Hgb 11.0 (baseline 11-12). CT imaging negative active GI bleed. Also with mild thickening of rectum suggests nonspecific proctitis and focal angiodysplasia. No leukocytosis. FOBT negative.  "      pt confused  unable to participate in GOC or ROS   she attempted to answer some questions but was     PAIN: ( ) YES  ( X)  NO  Pt unable to characterise d/t clinical status     DYSPNEA ( ) Yes ( X) No  Patient unable to characterise d/t clinical status       PAST MEDICAL & SURGICAL HISTORY:  Trigeminal neuralgia      HLD (hyperlipidemia)      Spinal stenosis      Burning mouth syndrome      Parkinson disease      Dementia      No significant past surgical history          SOCIAL HX:    Hx opiate tolerance ( )YES  ( x)NO    Baseline ADLs  (Prior to Admission)  ( ) Independent   (x )Dependent    FAMILY HISTORY:  No pertinent family history in first degree relatives        Review of Systems:   Unable to obtain/Limited due to: ams      PHYSICAL EXAM:    Vital Signs Last 24 Hrs  T(C): 36.8 (29 Aug 2024 13:33), Max: 37.4 (29 Aug 2024 06:30)  T(F): 98.2 (29 Aug 2024 13:33), Max: 99.3 (29 Aug 2024 06:30)  HR: 77 (29 Aug 2024 08:12) (77 - 85)  BP: 120/52 (29 Aug 2024 08:12) (120/52 - 123/61)  BP(mean): --  RR: 18 (29 Aug 2024 08:12) (18 - 18)  SpO2: 95% (29 Aug 2024 08:12) (95% - 95%)    Parameters below as of 29 Aug 2024 08:12  Patient On (Oxygen Delivery Method): room air      Daily     Daily     PPSV2: 30  %  FAST:      General: calm in NAD  Mental Status: awake  not oriented  HEENT: eomi, perrl  Lungs: ctabl b/l bs  Cardiac: s1s2 no mgr  GI: soft nontender +BS  : voids  Ext: moves all 4 extremities spontaneously  Neuro: no gross findings     LABS:                        11.5   9.26  )-----------( 136      ( 29 Aug 2024 05:54 )             35.5         144  |  111<H>  |  39<H>  ----------------------------<  83  4.1   |  29  |  0.94    Ca    9.3      29 Aug 2024 05:54    TPro  6.6  /  Alb  2.7<L>  /  TBili  0.3  /  DBili  x   /  AST  29  /  ALT  24  /  AlkPhos  159<H>        Albumin: Albumin: 2.7 g/dL ( @ 07:22)      Allergies    sertraline (Unknown)  penicillins (Unknown)  Cleocin HCl (Unknown)  latex (Unknown)  sulfa drugs (Unknown)    Intolerances      MEDICATIONS  (STANDING):  bisacodyl 10 milliGRAM(s) Oral at bedtime  cefuroxime   Tablet 500 milliGRAM(s) Oral every 12 hours  dicyclomine 10 milliGRAM(s) Oral two times a day before meals  donepezil 10 milliGRAM(s) Oral at bedtime  DULoxetine 60 milliGRAM(s) Oral daily  gabapentin 400 milliGRAM(s) Oral two times a day  guaiFENesin  milliGRAM(s) Oral every 12 hours  levETIRAcetam 500 milliGRAM(s) Oral two times a day  levothyroxine 25 MICROGram(s) Oral daily  pantoprazole    Tablet 40 milliGRAM(s) Oral before breakfast  polyethylene glycol 3350 17 Gram(s) Oral two times a day    MEDICATIONS  (PRN):  acetaminophen     Tablet .. 650 milliGRAM(s) Oral every 6 hours PRN Temp greater or equal to 38C (100.4F), Mild Pain (1 - 3)  aluminum hydroxide/magnesium hydroxide/simethicone Suspension 30 milliLiter(s) Oral every 4 hours PRN Dyspepsia  melatonin 3 milliGRAM(s) Oral at bedtime PRN Insomnia  ondansetron Injectable 4 milliGRAM(s) IV Push every 8 hours PRN Nausea and/or Vomiting      RADIOLOGY/ADDITIONAL STUDIES:

## 2024-08-29 NOTE — GOALS OF CARE CONVERSATION - ADVANCED CARE PLANNING - CONVERSATION DETAILS
HPI: As per medical record,   83 year old female with PMHx of dementia, HLD, Parkinson's disease, seizure, hypothyroid, s/p PPM, spinal stenosis BIBA from [facility] for [facility] when wiping. unable to obtain history from pt d/t dementia. No anticoagulation per med rec. Info from chart review. hgb on admission 12.4-->11.5. CTA abdomen with no obvious source of bleeding. Of note pt recently admitted for breakthrough seizure and hypothermia. found to have UTI and currently on 7 day course of cipro to be completed tomorrow.  (27 Aug 2024 08:52)      PERTINENT PMH REVIEWED:  [ X ] YES [ ] NO           Mental Status: [ ] Alert  [  ] Oriented [  ] Confused [ X  ] Lethargic [  ]  Concerns of Depression [  ]- unable to assess, not identified by family  Anxiety [   ]- unable to assess, not identified by family   Baseline ADLs (prior to admission):  Independent [ ] moderately [ ] fully   Dependent   [ X ] moderately [ ] fully    Anticipated Grief: Patient [  ] Family [ X ]    Caregiver Sandy Assessed: Yes [ X  ] No [  ]    Restorationism: Mu-ism     Spiritual Concerns: Not identified .  available PRN.     Goals of Care: Continue with current medical management in hopes UTI will resolve    Previous Services: [facility], [facility]    ADVANCE DIRECTIVES:   - Pt lacks capacity   - Health Care Proxy located on [employer] naming  spouse Gene as primary & daughter Latisha as alternate (now primary)   - MOLST on chart reflecting DNR/DNI with trial NIV     Anticipated D/C Plan: TBD - return to [facility] with [facility]                     Summary: Palliative SW reached out to pt's daughter/HCP Latisha to introduce team and offer support. Palliative roles explained. Pt known to team from previous admission. Juan M LOPEZ met with patient who appeared confused but comfortable; unable to participate in conversation. Latisha acknowledges speaking with our team in the past. She reports that she received medical update from hospitalist who recommended palliative - dtr states "I just don't understand how hospice would relate to my mother at this point." SW explained that palliative sees patients at any point/any stage of progressive diseases to provide support and discuss wishes and options while we're not in a crisis situation. Hospice philosophy and services explained. Daughter expressed understanding. She confirms MOLST reflecting DNR/DNI trial NIV wishes. She feels that patient still has quality of life at assisted living and is okay with her coming back to the hospital for things like IV antibiotics. She is not ready to place pt on hospice at this time.     Latisha reports multiple recent losses -- pt's spouse passed in May 2023 and pt's son recently passed away from cancer. SW expressed condolences.      Emotional support provided. Plan to continue current medical management & hopefully return to [facility]. Dtr does not want JIMENA TOM on chart. Our team will continue to follow.

## 2024-08-29 NOTE — CONSULT NOTE ADULT - FAMILY/CHILD(REN)
daughter Latisha Body Location Override (Optional - Billing Will Still Be Based On Selected Body Map Location If Applicable): right cheek Detail Level: Detailed Depth Of Biopsy: dermis Was A Bandage Applied: Yes Size Of Lesion In Cm: 0.5 Biopsy Type: H and E Biopsy Method: Dermablade Anesthesia Type: 1% lidocaine with epinephrine Additional Anesthesia Volume In Cc (Will Not Render If 0): 0 Hemostasis: Electrocautery Wound Care: Petrolatum Dressing: bandage Destruction After The Procedure: No Type Of Destruction Used: Curettage Curettage Text: The wound bed was treated with curettage after the biopsy was performed. Cryotherapy Text: The wound bed was treated with cryotherapy after the biopsy was performed. Electrodesiccation Text: The wound bed was treated with electrodesiccation after the biopsy was performed. Electrodesiccation And Curettage Text: The wound bed was treated with electrodesiccation and curettage after the biopsy was performed. Silver Nitrate Text: The wound bed was treated with silver nitrate after the biopsy was performed. Lab: [id number] Lab Facility: [address] [place] [address] Path Notes (To The Dermatopathologist): R/o: BCC VS SCC \\nSize: 0.5 x 0.5 cm Consent: Written consent was obtained and risks were reviewed including but not limited to scarring, infection, bleeding, scabbing, incomplete removal, nerve damage and allergy to anesthesia. Post-Care Instructions: I reviewed with the patient in detail post-care instructions. Patient is to keep the biopsy site dry overnight, and then apply bacitracin twice daily until healed. Patient may apply hydrogen peroxide soaks to remove any crusting. Notification Instructions: Patient will be notified of biopsy results. However, patient instructed to call the office if not contacted within 2 weeks. Billing Type: [employer] Information: Selecting Yes will display possible errors in your note based on the variables you have selected. This validation is only offered as a suggestion for you. PLEASE NOTE THAT THE VALIDATION TEXT WILL BE REMOVED WHEN YOU FINALIZE YOUR NOTE. IF YOU WANT TO FAX A PRELIMINARY NOTE YOU WILL NEED TO TOGGLE THIS TO 'NO' IF YOU DO NOT WANT IT IN YOUR FAXED NOTE.

## 2024-08-29 NOTE — PROGRESS NOTE ADULT - NUTRITIONAL ASSESSMENT
This patient has been assessed with a concern for Malnutrition and has been determined to have a diagnosis/diagnoses of Moderate protein-calorie malnutrition.    This patient is being managed with:   Diet Regular-  Entered: Aug 27 2024  3:41PM    The following pending diet order is being considered for treatment of Moderate protein-calorie malnutrition:  Diet Regular-  Supplement Feeding Modality:  Oral  Ensure Plus High Protein Cans or Servings Per Day:  1       Frequency:  Two Times a day  Entered: Aug 28 2024 12:56PM  
This patient has been assessed with a concern for Malnutrition and has been determined to have a diagnosis/diagnoses of Moderate protein-calorie malnutrition.    This patient is being managed with:   Diet Regular-  Entered: Aug 27 2024  3:41PM    The following pending diet order is being considered for treatment of Moderate protein-calorie malnutrition:  Diet Regular-  Supplement Feeding Modality:  Oral  Ensure Plus High Protein Cans or Servings Per Day:  1       Frequency:  Two Times a day  Entered: Aug 28 2024 12:56PM

## 2024-08-29 NOTE — PROGRESS NOTE ADULT - ASSESSMENT
83 year old female with PMHx of dementia, HLD, Parkinson's disease, seizure, hypothyroid, s/p PPM, spinal stenosis BIBA from [facility] for [facility] when wiping. unable to obtain history from pt d/t dementia. No anticoagulation per med rec. Info from chart review. hgb on admission 12.4-->11.5. CTA abdomen with no obvious source of bleeding. Of note pt recently admitted for breakthrough seizure and hypothermia. found to have UTI and currently on 7 day course of cipro to be completed 8/28.     1. Hypothermia. Pyuria/UTI. Recent PSAE UTI. GI bleed. Parkinsons disease. PCN allergy  - imaging reviewed  - 8/14 urine cx grew MDR PSAE S cipro/merrem I cefepime  - completed 6 days cipro prior to admit  - f/u urine cx growing Ecoli blood cx no growth   - on merrem 1gm12h #2 - change to po ceftin complete 3 day course   - monitor temps  - tolerating abx well so far; no side effects noted  - reason for abx use and side effects reviewed with patient  - supportive care  - fu cbc     Clinical team may change from intravenous to oral antibiotics when the following criteria are met:   1. Patient is clinically improving/stable       a)	Improved signs and symptoms of infection from initial presentation       b)	Afebrile for 24 hours       c)	Leukocytosis trending towards normal range   2. Patient is tolerating oral intake   3. Initial/repeat blood cultures are negative    - prior urine cx grew ecoli sensitivities reviewed f/u repeat sensitivities of current cx  - change abx to po ceftin 3 day course

## 2024-08-29 NOTE — PHYSICAL THERAPY INITIAL EVALUATION ADULT - PERTINENT HX OF CURRENT PROBLEM, REHAB EVAL
83 year old female with PMHx of dementia, HLD, Parkinson's disease, seizure, hypothyroid, s/p PPM, spinal stenosis BIBA from [facility] for BRB[facility] placed in obs for further workup of hematochezia and acute blood loss anemia now found to have hypothermia 2/2 UTI and PNA.

## 2024-08-29 NOTE — PHYSICAL THERAPY INITIAL EVALUATION ADULT - ADDITIONAL COMMENTS
Pt is AAOx1 to self only; unable to provide information regarding prior level of function and living situation. Information obtained from care coordinator note. Pt admitted from [facility]. Pt uses a wheelchair at baseline since suffering from ankle fx early this year. Unsure mode of transfer. -information from Aug 6th 2024.

## 2024-08-29 NOTE — CONSULT NOTE ADULT - ASSESSMENT
Process of Care  --Reviewed dx/treatment problems and alignment with Goals of Care  hematochezia w/ proctitis  c/w agresisve bowel regimen  inccrease po hydration  oobtc  avoid constipating medications when possible    Mental status  delirium ppx   -Avoid deliriogenic agents including BZD, anticholinergics, and sedating agents if possible  -Re-orient frequently, encourage family at bedside as able.   -Early mobilization recommended if feasible.     Physical Aspects of Care  --Pain  patient denies at this time  c/w current management    --Bowel Regimen  denies constipation  risk for constipation d/t immobility  daily dulcolax    --Dyspnea  No SOB at this time  comfortable and in NAD    --Nausea Vomiting  denies    --Weakness  PT as tolerated     Psychological and Psychiatric Aspects of Care:   --Greif/Bereavment: emotional support provided  --Hx of psychiatric dx: none  -Pastoral Care Available PRN     Social Aspects of Care  -SW involved     Cultural Aspects  -Primary Language: English    Goals of Care:     We discussed Palliative Care team being a supportive team when a patient has ongoing illnesses.  We also discussed that it is not an end of life care service, but can help navigate symptoms and emotional support throughout their hospital stay here.    Hospice was explained as well  as an end of life care philosophy.  When a disease cannot be cured, or family/patient decide the treatment burdens out weigh the risk and one choses to change focus of treatment from cure to quality/comfort.     Family at this time not ready for comfort/hospice approach.        Ethical and Legal Aspects:   NA        Capacity: pt w/o capacity   Surrogate: Latisha daughter is surrogate  Code Status: dnr dni  MOLST: dnr dni  Dispo Plan: pending further treatment    Discussed With: Case coordinated with attending and SW and RN     Time Spent: 75 minutes including the care, coordination and counseling of this patient, 50% of which was spent coordinating and counseling.

## 2024-08-29 NOTE — PHYSICAL THERAPY INITIAL EVALUATION ADULT - PATIENT/FAMILY/SIGNIFICANT OTHER GOALS STATEMENT, PT EVAL
go home Composite Graft Text: The defect edges were debeveled with a #15 scalpel blade.  Given the location of the defect, shape of the defect, the proximity to free margins and the fact the defect was full thickness a composite graft was deemed most appropriate.  The defect was outline and then transferred to the donor site.  A full thickness graft was then excised from the donor site. The graft was then placed in the primary defect, oriented appropriately and then sutured into place.  The secondary defect was then repaired using a primary closure.

## 2024-08-29 NOTE — PROGRESS NOTE ADULT - SUBJECTIVE AND OBJECTIVE BOX
HOSPITALIST ATTENDING PROGRESS NOTE    Chart and meds reviewed.  Patient seen and examined.    Subjective:  Subjective:83 year old female with PMHx of dementia, HLD, Parkinson's disease, seizure, hypothyroid, s/p PPM, spinal stenosis BIBA from [facility] for BRBPR placed in obs for further workup of hematochezia and acute blood loss anemia now found to have hypothermia 2/2 UTI and PNA.     08/28: Hypothermia to 94 yesterday - required warming blanket - workup performed - lactate neg - CXR with left basilar infiltrate, UA with UTI; started on meropenam by ID    08/29: temperatures remain stable off warming blanket. IV meropenam switched to PO Ceftin    All other systems reviewed and found to be negative with the exception of what has been described above.    MEDICATIONS  (STANDING):  bisacodyl 10 milliGRAM(s) Oral at bedtime  cefuroxime   Tablet 500 milliGRAM(s) Oral every 12 hours  dicyclomine 10 milliGRAM(s) Oral two times a day before meals  donepezil 10 milliGRAM(s) Oral at bedtime  DULoxetine 60 milliGRAM(s) Oral daily  gabapentin 400 milliGRAM(s) Oral two times a day  guaiFENesin  milliGRAM(s) Oral every 12 hours  levETIRAcetam 500 milliGRAM(s) Oral two times a day  levothyroxine 25 MICROGram(s) Oral daily  pantoprazole    Tablet 40 milliGRAM(s) Oral before breakfast  polyethylene glycol 3350 17 Gram(s) Oral two times a day    MEDICATIONS  (PRN):  acetaminophen     Tablet .. 650 milliGRAM(s) Oral every 6 hours PRN Temp greater or equal to 38C (100.4F), Mild Pain (1 - 3)  aluminum hydroxide/magnesium hydroxide/simethicone Suspension 30 milliLiter(s) Oral every 4 hours PRN Dyspepsia  melatonin 3 milliGRAM(s) Oral at bedtime PRN Insomnia  ondansetron Injectable 4 milliGRAM(s) IV Push every 8 hours PRN Nausea and/or Vomiting      VITALS:  T(F): 98.4 (08-29-24 @ 15:57), Max: 99.3 (08-29-24 @ 06:30)  HR: 78 (08-29-24 @ 15:57) (77 - 85)  BP: 108/44 (08-29-24 @ 15:57) (108/44 - 123/61)  RR: 18 (08-29-24 @ 15:57) (18 - 18)  SpO2: 94% (08-29-24 @ 15:57) (94% - 95%)  Wt(kg): --    I&O's Summary      CAPILLARY BLOOD GLUCOSE          PHYSICAL EXAM:  Gen: NAD  HEENT:  pupils equal and reactive, EOMI, no oropharyngeal lesions, erythema, exudates, oral thrush  NECK:   supple, no carotid bruits, no palpable lymph nodes, no thyromegaly  CV:  +S1, +S2, regular, no murmurs or rubs  RESP:   lungs clear to auscultation bilaterally on anterolateral exam  BREAST:  not examined  GI:  abdomen soft, non-tender, non-distended, normal BS, no bruits, no abdominal masses, no palpable masses  RECTAL:  performed 08/27-  brown stool with no bright red blood - no hemorrhoids internally or externally - guaiac negative  :  not examined  MSK:   normal muscle tone, no atrophy, no rigidity, no contractions  EXT:  no clubbing, no cyanosis, no edema, no calf pain, swelling or erythema  VASCULAR:  pulses equal and symmetric in the upper and lower extremities  NEURO:  AOx0 - mutters incomprehensible phrases      LABS:                            11.5   9.26  )-----------( 136      ( 29 Aug 2024 05:54 )             35.5     08-29    144  |  111<H>  |  39<H>  ----------------------------<  83  4.1   |  29  |  0.94    Ca    9.3      29 Aug 2024 05:54    TPro  6.6  /  Alb  2.7<L>  /  TBili  0.3  /  DBili  x   /  AST  29  /  ALT  24  /  AlkPhos  159<H>  08-28        LIVER FUNCTIONS - ( 28 Aug 2024 07:22 )  Alb: 2.7 g/dL / Pro: 6.6 gm/dL / ALK PHOS: 159 U/L / ALT: 24 U/L / AST: 29 U/L / GGT: x             Urinalysis Basic - ( 29 Aug 2024 05:54 )    Color: x / Appearance: x / SG: x / pH: x  Gluc: 83 mg/dL / Ketone: x  / Bili: x / Urobili: x   Blood: x / Protein: x / Nitrite: x   Leuk Esterase: x / RBC: x / WBC x   Sq Epi: x / Non Sq Epi: x / Bacteria: x  
HOSPITALIST ATTENDING PROGRESS NOTE    Chart and meds reviewed.  Patient seen and examined.    Subjective:83 year old female with PMHx of dementia, HLD, Parkinson's disease, seizure, hypothyroid, s/p PPM, spinal stenosis BIBA from [facility] for BRBPR placed in obs for further workup of hematochezia and acute blood loss anemia now found to have hypothermia 2/2 UTI and PNA.     08/28: Hypothermia to 94 yesterday - required warming blanket - workup performed - lactate neg - CXR with left basilar infiltrate, UA with UTI; started on meropenam by ID    All other systems reviewed and found to be negative with the exception of what has been described above.    MEDICATIONS  (STANDING):  bisacodyl 10 milliGRAM(s) Oral at bedtime  dicyclomine 10 milliGRAM(s) Oral two times a day before meals  donepezil 10 milliGRAM(s) Oral at bedtime  DULoxetine 60 milliGRAM(s) Oral daily  gabapentin 400 milliGRAM(s) Oral two times a day  guaiFENesin  milliGRAM(s) Oral every 12 hours  levETIRAcetam 500 milliGRAM(s) Oral two times a day  levothyroxine 25 MICROGram(s) Oral daily  meropenem Injectable 1000 milliGRAM(s) IV Push every 12 hours  pantoprazole    Tablet 40 milliGRAM(s) Oral before breakfast  polyethylene glycol 3350 17 Gram(s) Oral two times a day    MEDICATIONS  (PRN):  acetaminophen     Tablet .. 650 milliGRAM(s) Oral every 6 hours PRN Temp greater or equal to 38C (100.4F), Mild Pain (1 - 3)  aluminum hydroxide/magnesium hydroxide/simethicone Suspension 30 milliLiter(s) Oral every 4 hours PRN Dyspepsia  melatonin 3 milliGRAM(s) Oral at bedtime PRN Insomnia  ondansetron Injectable 4 milliGRAM(s) IV Push every 8 hours PRN Nausea and/or Vomiting      VITALS:  T(F): 97.2 (08-28-24 @ 08:28), Max: 98.7 (08-27-24 @ 22:32)  HR: 84 (08-28-24 @ 08:28) (84 - 84)  BP: 111/55 (08-28-24 @ 08:28) (102/59 - 111/55)  RR: 18 (08-28-24 @ 08:28) (18 - 18)  SpO2: 95% (08-28-24 @ 08:28) (95% - 95%)  Wt(kg): --    I&O's Summary      CAPILLARY BLOOD GLUCOSE          PHYSICAL EXAM:  Gen: NAD  HEENT:  pupils equal and reactive, EOMI, no oropharyngeal lesions, erythema, exudates, oral thrush  NECK:   supple, no carotid bruits, no palpable lymph nodes, no thyromegaly  CV:  +S1, +S2, regular, no murmurs or rubs  RESP:   lungs clear to auscultation bilaterally on anterolateral exam  BREAST:  not examined  GI:  abdomen soft, non-tender, non-distended, normal BS, no bruits, no abdominal masses, no palpable masses  RECTAL:  performed yesterday -  brown stool with no bright red blood - no hemorrhoids internally or externally - guaiac negative  :  not examined  MSK:   normal muscle tone, no atrophy, no rigidity, no contractions  EXT:  no clubbing, no cyanosis, no edema, no calf pain, swelling or erythema  VASCULAR:  pulses equal and symmetric in the upper and lower extremities  NEURO:  AOx0 - mutters incomprehensible phrases  SKIN:  no ulcers, lesions or rashes    LABS:                            11.0   6.35  )-----------( 118      ( 28 Aug 2024 07:22 )             34.5     08-28    140  |  108  |  30<H>  ----------------------------<  80  4.1   |  30  |  1.12    Ca    9.3      28 Aug 2024 07:22    TPro  6.6  /  Alb  2.7<L>  /  TBili  0.3  /  DBili  x   /  AST  29  /  ALT  24  /  AlkPhos  159<H>  08-28        LIVER FUNCTIONS - ( 28 Aug 2024 07:22 )  Alb: 2.7 g/dL / Pro: 6.6 gm/dL / ALK PHOS: 159 U/L / ALT: 24 U/L / AST: 29 U/L / GGT: x           PT/INR - ( 27 Aug 2024 09:20 )   PT: 10.8 sec;   INR: 0.95 ratio         PTT - ( 27 Aug 2024 09:20 )  PTT:36.5 sec  Urinalysis Basic - ( 28 Aug 2024 07:22 )    Color: x / Appearance: x / SG: x / pH: x  Gluc: 80 mg/dL / Ketone: x  / Bili: x / Urobili: x   Blood: x / Protein: x / Nitrite: x   Leuk Esterase: x / RBC: x / WBC x   Sq Epi: x / Non Sq Epi: x / Bacteria: x    
Date of service: 08-29-24 @ 10:22    pt seen and examined  laying in bed nad  no o/n events     ROS: no fever or chills; denies dizziness, no HA, no SOB or cough, no abdominal pain, no diarrhea or constipation;  no legs pain, no rashes    MEDICATIONS  (STANDING):  bisacodyl 10 milliGRAM(s) Oral at bedtime  dicyclomine 10 milliGRAM(s) Oral two times a day before meals  donepezil 10 milliGRAM(s) Oral at bedtime  DULoxetine 60 milliGRAM(s) Oral daily  gabapentin 400 milliGRAM(s) Oral two times a day  guaiFENesin  milliGRAM(s) Oral every 12 hours  levETIRAcetam 500 milliGRAM(s) Oral two times a day  levothyroxine 25 MICROGram(s) Oral daily  meropenem Injectable 1000 milliGRAM(s) IV Push every 12 hours  pantoprazole    Tablet 40 milliGRAM(s) Oral before breakfast  polyethylene glycol 3350 17 Gram(s) Oral two times a day    Vital Signs Last 24 Hrs  T(C): 36.8 (29 Aug 2024 08:12), Max: 37.4 (29 Aug 2024 06:30)  T(F): 98.3 (29 Aug 2024 08:12), Max: 99.3 (29 Aug 2024 06:30)  HR: 77 (29 Aug 2024 08:12) (77 - 85)  BP: 120/52 (29 Aug 2024 08:12) (104/62 - 123/61)  BP(mean): --  RR: 18 (29 Aug 2024 08:12) (18 - 18)  SpO2: 95% (29 Aug 2024 08:12) (95% - 98%)    Parameters below as of 29 Aug 2024 08:12  Patient On (Oxygen Delivery Method): room air      PE:  Constitutional: NAD  HEENT: NC/AT, EOMI, PERRLA, conjunctivae clear; ears and nose atraumatic; pharynx benign  Neck: supple; thyroid not palpable  Back: no tenderness  Respiratory: respiratory effort normal; clear to auscultation  Cardiovascular: S1S2 regular, no murmurs  Abdomen: soft, not tender, not distended, positive BS; liver and spleen WNL  Genitourinary: no suprapubic tenderness  Lymphatic: no LN palpable  Musculoskeletal: no muscle tenderness, no joint swelling or tenderness  Extremities: no pedal edema  Neurological/ Psychiatric: moving all extremities  Skin: no rashes; no palpable lesions    Labs: all available labs reviewed                                   11.5 9.26  )-----------( 136      ( 29 Aug 2024 05:54 )             35.5     08-29    144  |  111<H>  |  39<H>  ----------------------------<  83  4.1   |  29  |  0.94    Ca    9.3      29 Aug 2024 05:54    TPro  6.6  /  Alb  2.7<L>  /  TBili  0.3  /  DBili  x   /  AST  29  /  ALT  24  /  AlkPhos  159<H>  08-28        Urinalysis Basic - ( 28 Aug 2024 07:22 )    Color: x / Appearance: x / SG: x / pH: x  Gluc: 80 mg/dL / Ketone: x  / Bili: x / Urobili: x   Blood: x / Protein: x / Nitrite: x   Leuk Esterase: x / RBC: x / WBC x   Sq Epi: x / Non Sq Epi: x / Bacteria: x    Culture - Urine (08.05.24 @ 00:43)   - Gentamicin: S <=2  - Imipenem: S <=1  - Levofloxacin: R >4  - Meropenem: S <=1  - Nitrofurantoin: S <=32 Should not be used to treat pyelonephritis  - Piperacillin/Tazobactam: S <=8  - Tobramycin: S <=2  - Trimethoprim/Sulfamethoxazole: S <=0.5/9.5  - Amoxicillin/Clavulanic Acid: S <=8/4  - Ampicillin: R >16 These ampicillin results predict results for amoxicillin  - Ampicillin/Sulbactam: I 16/8  - Aztreonam: S <=4  - Cefazolin: S 4 For uncomplicated UTI with K. pneumoniae, E. coli, or P. mirablis: ARCHIE <=16 is sensitive and ARCHIE >=32 is resistant. This also predicts results for oral agents cefaclor, cefdinir, cefpodoxime, cefprozil, cefuroxime axetil, cephalexin and locarbef for uncomplicated UTI. Note that some isolates may be susceptible to these agents while testing resistant to cefazolin.  - Cefepime: S <=2  - Cefoxitin: S <=8  - Ceftriaxone: S <=1  - Cefuroxime: S <=4  - Ciprofloxacin: R >2  - Ertapenem: S <=0.5  Specimen Source: .Urine None  Culture Results:   >100,000 CFU/ml Escherichia coli   50,000 - 99,000 CFU/mL Streptococcus constellatus "Susceptibilities not   performed"  Organism Identification: Escherichia coli  Organism: Escherichia coli  Method Type: ARCHIE  Culture - Urine (08.27.24 @ 19:15)   Specimen Source: Catheterized Catheterized  Culture Results:   50,000 - 99,000 CFU/mL Escherichia coli    Radiology: all available radiological tests reviewed  < from: CT Abdomen and Pelvis w/wo IV Cont (08.27.24 @ 01:05) >  IMPRESSION:  No evidence of active GI bleeding on this CTA.  Mild thickening of rectum suggests nonspecific proctitis and focal   angiodysplasia.  Follow up with sigmoidoscopy may be considered.    Fibroid uterus, prominent endometrium and fluid distention ofthe vagina   may be further evaluated with pelvic US.    < end of copied text >    Advanced directives addressed: full resuscitation

## 2024-08-29 NOTE — PROGRESS NOTE ADULT - ASSESSMENT
83 year old female with PMHx of dementia, HLD, Parkinson's disease, seizure, hypothyroid, s/p PPM, spinal stenosis BIBA from [facility] for [facility] placed in obs for further workup of hematochezia and acute blood loss anemia now found to have hypothermia 2/2 UTI and PNA.     #Hypothermia   #acute UTI - failure of PO antibiotics  #Left lower lobe PNA  AM cortisol wnl  Urine culture 50-99k E coli  CXR with left lower infiltrate  blood culture neg x 24 hours  ID on board   s/p meropenam   switched to PO ceftin 500mg po bid x 3 days   should adequately cover both PNA and UTI  monitor temps and possible dc tomorrow AM    #BRBPR  #hematochemia   #rule out Lower GIB  hgb 11.5 from 11.0 - stable  continue to monitor   per GI, hematochezia possibly 2/2 angiodysplasia   recommend aggressive bowel regimen   tolerating regular diet    #seizure hx  #neuropathy  c/w keppra BID   c/w gabapentin and duloxetine    #dementia with behavioral disturbances  c/w donepizil    #GERD  c/w PPI    #hypothyroidism with elevated TSH  TSH elevated - to 8.97  T4 normal   possibly 2/2 euthyroid sick syndrome  c/w levothyroxine at home dose   repeat TFTs in 6 weeks - adjust if necessary at that point     #moderate malnutrition  follow dietician reccs    #DVT ppx; SCDs 83 year old female with PMHx of dementia, HLD, Parkinson's disease, seizure, hypothyroid, s/p PPM, spinal stenosis BIBA from [facility] for [facility] placed in obs for further workup of hematochezia and acute blood loss anemia now found to have hypothermia 2/2 UTI and PNA.     #Hypothermia   #acute UTI - failure of PO antibiotics  #Left lower lobe PNA  AM cortisol wnl  Urine culture 50-99k E coli  CXR with left lower infiltrate  blood culture neg x 24 hours  ID on board   s/p meropenam   switched to PO ceftin 500mg po bid x 3 days   should adequately cover both PNA and UTI since received meropenem initially   monitor temps and possible dc tomorrow AM    #BRBPR  #hematochemia   #rule out Lower GIB  hgb 11.5 from 11.0 - stable  continue to monitor   per GI, hematochezia possibly 2/2 angiodysplasia   recommend aggressive bowel regimen   tolerating regular diet    #seizure hx  #neuropathy  c/w keppra BID   c/w gabapentin and duloxetine    #dementia with behavioral disturbances  c/w donepizil    #GERD  c/w PPI    #hypothyroidism with elevated TSH  TSH elevated - to 8.97  T4 normal   possibly 2/2 euthyroid sick syndrome  c/w levothyroxine at home dose   repeat TFTs in 6 weeks - adjust if necessary at that point     #moderate malnutrition  follow dietician reccs    #DVT ppx; SCDs    GOC: family discussed with palliative care team and not ready for hospice although may be a good candidate  DIspo: dc in AM back to memory care unit if temp remains stable

## 2024-08-29 NOTE — CONSULT NOTE ADULT - CONVERSATION DETAILS
Palliative roles explained. Pt known to team from previous admission. Juan M LOPEZ met with patient who appeared confused but comfortable; unable to participate in conversation. Latisha acknowledges speaking with our team in the past. She reports that she received medical update from hospitalist who recommended palliative - dtr states "I just don't understand how hospice would relate to my mother at this point." SW explained that palliative sees patients at any point/any stage of progressive diseases to provide support and discuss wishes and options while we're not in a crisis situation. Hospice philosophy and services explained. Daughter expressed understanding. She confirms [facility]GR[facility] reflecting DNR/DNI trial NIV wishes. She feels that patient still has quality of life at assisted living and is okay with her coming back to the hospital for things like IV antibiotics. She is not ready to place pt on hospice at this time.     Latisha reports multiple recent losses -- pt's spouse passed in May 2023 and pt's son recently passed away from cancer.        Emotional support provided. Plan to continue current medical management & hopefully return to [facility]. Dtr does not want JIMENA TOM on chart. Our team will continue to follow.

## 2024-08-29 NOTE — PHYSICAL THERAPY INITIAL EVALUATION ADULT - GENERAL OBSERVATIONS, REHAB EVAL
Pt seen for 15min PT eval. Pt known to this PT from previous admissions. Pt found sleeping arousable to tactile and verbal stimuli. Pt A&Ox0-1, confused, not following commands, becoming agitated with multiple questions. Pt does not make a good PT candidate. pt left semi supine all needs met, RN aware, +bed alarm.

## 2024-08-30 ENCOUNTER — TRANSCRIPTION ENCOUNTER (OUTPATIENT)
Age: 84
End: 2024-08-30

## 2024-08-30 VITALS
SYSTOLIC BLOOD PRESSURE: 112 MMHG | DIASTOLIC BLOOD PRESSURE: 69 MMHG | RESPIRATION RATE: 18 BRPM | OXYGEN SATURATION: 95 % | HEART RATE: 71 BPM | TEMPERATURE: 98 F

## 2024-08-30 LAB
-  AMOXICILLIN/CLAVULANIC ACID: SIGNIFICANT CHANGE UP
-  AMPICILLIN/SULBACTAM: SIGNIFICANT CHANGE UP
-  AMPICILLIN: SIGNIFICANT CHANGE UP
-  AZTREONAM: SIGNIFICANT CHANGE UP
-  CEFAZOLIN: SIGNIFICANT CHANGE UP
-  CEFEPIME: SIGNIFICANT CHANGE UP
-  CEFOXITIN: SIGNIFICANT CHANGE UP
-  CEFTRIAXONE: SIGNIFICANT CHANGE UP
-  CEFUROXIME: SIGNIFICANT CHANGE UP
-  CIPROFLOXACIN: SIGNIFICANT CHANGE UP
-  ERTAPENEM: SIGNIFICANT CHANGE UP
-  GENTAMICIN: SIGNIFICANT CHANGE UP
-  IMIPENEM: SIGNIFICANT CHANGE UP
-  LEVOFLOXACIN: SIGNIFICANT CHANGE UP
-  MEROPENEM: SIGNIFICANT CHANGE UP
-  NITROFURANTOIN: SIGNIFICANT CHANGE UP
-  PIPERACILLIN/TAZOBACTAM: SIGNIFICANT CHANGE UP
-  TOBRAMYCIN: SIGNIFICANT CHANGE UP
-  TRIMETHOPRIM/SULFAMETHOXAZOLE: SIGNIFICANT CHANGE UP
ANION GAP SERPL CALC-SCNC: 3 MMOL/L — LOW (ref 5–17)
BUN SERPL-MCNC: 32 MG/DL — HIGH (ref 7–23)
CALCIUM SERPL-MCNC: 9.6 MG/DL — SIGNIFICANT CHANGE UP (ref 8.5–10.1)
CHLORIDE SERPL-SCNC: 109 MMOL/L — HIGH (ref 96–108)
CO2 SERPL-SCNC: 29 MMOL/L — SIGNIFICANT CHANGE UP (ref 22–31)
CREAT SERPL-MCNC: 0.96 MG/DL — SIGNIFICANT CHANGE UP (ref 0.5–1.3)
CULTURE RESULTS: ABNORMAL
EGFR: 58 ML/MIN/1.73M2 — LOW
GLUCOSE SERPL-MCNC: 79 MG/DL — SIGNIFICANT CHANGE UP (ref 70–99)
HCT VFR BLD CALC: 36.7 % — SIGNIFICANT CHANGE UP (ref 34.5–45)
HGB BLD-MCNC: 11.8 G/DL — SIGNIFICANT CHANGE UP (ref 11.5–15.5)
MCHC RBC-ENTMCNC: 30.6 PG — SIGNIFICANT CHANGE UP (ref 27–34)
MCHC RBC-ENTMCNC: 32.2 GM/DL — SIGNIFICANT CHANGE UP (ref 32–36)
MCV RBC AUTO: 95.1 FL — SIGNIFICANT CHANGE UP (ref 80–100)
METHOD TYPE: SIGNIFICANT CHANGE UP
ORGANISM # SPEC MICROSCOPIC CNT: ABNORMAL
ORGANISM # SPEC MICROSCOPIC CNT: SIGNIFICANT CHANGE UP
PLATELET # BLD AUTO: 143 K/UL — LOW (ref 150–400)
POTASSIUM SERPL-MCNC: 4.8 MMOL/L — SIGNIFICANT CHANGE UP (ref 3.5–5.3)
POTASSIUM SERPL-SCNC: 4.8 MMOL/L — SIGNIFICANT CHANGE UP (ref 3.5–5.3)
RBC # BLD: 3.86 M/UL — SIGNIFICANT CHANGE UP (ref 3.8–5.2)
RBC # FLD: 14.7 % — HIGH (ref 10.3–14.5)
SODIUM SERPL-SCNC: 141 MMOL/L — SIGNIFICANT CHANGE UP (ref 135–145)
SPECIMEN SOURCE: SIGNIFICANT CHANGE UP
WBC # BLD: 7.46 K/UL — SIGNIFICANT CHANGE UP (ref 3.8–10.5)
WBC # FLD AUTO: 7.46 K/UL — SIGNIFICANT CHANGE UP (ref 3.8–10.5)

## 2024-08-30 PROCEDURE — 99239 HOSP IP/OBS DSCHRG MGMT >30: CPT

## 2024-08-30 RX ORDER — MEROPENEM 500 MG/10ML
1000 INJECTION, POWDER, FOR SOLUTION INTRAVENOUS ONCE
Refills: 0 | Status: DISCONTINUED | OUTPATIENT
Start: 2024-08-30 | End: 2024-08-30

## 2024-08-30 RX ORDER — CEFUROXIME SODIUM 1.5 G
1 VIAL (EA) INJECTION
Qty: 4 | Refills: 0
Start: 2024-08-30 | End: 2024-08-31

## 2024-08-30 RX ORDER — LEVETIRACETAM 1000 MG/1
500 TABLET ORAL ONCE
Refills: 0 | Status: DISCONTINUED | OUTPATIENT
Start: 2024-08-30 | End: 2024-08-30

## 2024-08-30 RX ORDER — POLYETHYLENE GLYCOL 3350 17 G/17G
17 POWDER, FOR SOLUTION ORAL
Refills: 0 | DISCHARGE
Start: 2024-08-30

## 2024-08-30 RX ORDER — MEROPENEM 500 MG/10ML
1000 INJECTION, POWDER, FOR SOLUTION INTRAVENOUS ONCE
Refills: 0 | Status: COMPLETED | OUTPATIENT
Start: 2024-08-30 | End: 2024-08-30

## 2024-08-30 RX ORDER — THIAMINE HCL 250 MG
1 TABLET ORAL
Qty: 5 | Refills: 0
Start: 2024-08-30 | End: 2024-09-03

## 2024-08-30 RX ADMIN — MEROPENEM 1000 MILLIGRAM(S): 500 INJECTION, POWDER, FOR SOLUTION INTRAVENOUS at 11:56

## 2024-08-30 RX ADMIN — LEVETIRACETAM 500 MILLIGRAM(S): 1000 TABLET ORAL at 11:56

## 2024-08-30 NOTE — DISCHARGE NOTE PROVIDER - DETAILS OF MALNUTRITION DIAGNOSIS/DIAGNOSES
This patient has been assessed with a concern for Malnutrition and was treated during this hospitalization for the following Nutrition diagnosis/diagnoses:     -  08/28/2024: Moderate protein-calorie malnutrition

## 2024-08-30 NOTE — DISCHARGE NOTE NURSING/CASE MANAGEMENT/SOCIAL WORK - NSDCVIVACCINE_GEN_ALL_CORE_FT
Tdap; 09-Dec-2023 09:29; Hafsa Hughes (RN); [employer]; [id number] (Exp. Date: 23-Nov-2025); IntraMuscular; Deltoid Right.; 0.5 milliLiter(s); VIS (VIS Published: 09-May-2013, VIS Presented: 09-Dec-2023);

## 2024-08-30 NOTE — DISCHARGE NOTE PROVIDER - NSDCCPCAREPLAN_GEN_ALL_CORE_FT
PRINCIPAL DISCHARGE DIAGNOSIS  Diagnosis: Rectal bleeding  Assessment and Plan of Treatment: You came in with rectal bleeding. we consulted gastroenterologist.  CTA abdomen with no obvious source of bleeding  Mild thickening of rectum suggests nonspecific proctitis and focal   angiodysplasia.  Follow up with sigmoidoscopy may be considered.  we monitored your blood work daily, hemoglobin stable   per GI, hematochezia possibly due to  angiodysplasia and nonspecific proctitis  likely from constipation and possibly hemorrhoids. FOBT negative.  (baseline Hgb 11-12).   Recommend aggressive bowel regimen Miralax 17g twice daily  and dulcolax 10mg DAILY   - Regular  diet  - Take home protonix as prescribed   - please follow up with your primary care physician and gastroenterologist within 1 week of discharge  [facility]  palliative consulted, Daughter confirms NEGRO reflecting DNR/DNI trial NIV wishes. She feels that patient still has quality of life at assisted living and is okay with her coming back to the hospital for things like IV antibiotics. She is not ready to place pt on hospice at this time.     Dtr does not want JIMENA TOM on chart.  [facility]      SECONDARY DISCHARGE DIAGNOSES  Diagnosis: Hypothermia  Assessment and Plan of Treatment: you came in with low temperature. We checked your blood work. You received antibiotics for UTI   cortisol  Level was normal  your vitals are stable    Diagnosis: UTI (urinary tract infection)  Assessment and Plan of Treatment: you were treated for urinary tract infection.   please take ceftin 1 tablet twice daily for next 2 days    Diagnosis: Moderate protein-calorie malnutrition  Assessment and Plan of Treatment: Continue  regular diet to maximize caloric and protein intake   Encourage protein-rich foods, maximize food preferences   ensure plus high protein BID to optimize PO intake   Monitor bowel movements, if no BM for >3 days, consider implementing bowel regimen.   Take thiamine 100 mg daily    Diagnosis: Seizure  Assessment and Plan of Treatment: continue home  keppra and  gabapentin and duloxetine    Diagnosis: Hypothyroidism  Assessment and Plan of Treatment: TSH elevated - to 8.97 T4 normal   possibly 2/2 euthyroid sick syndrome  PLEASE repeat thyroid function test  in 6 weeks - adjust if necessary at that point   continue home synthyroid    Diagnosis: Dementia, Alzheimer's, with behavior disturbance  Assessment and Plan of Treatment: continue  donepizil

## 2024-08-30 NOTE — DISCHARGE NOTE PROVIDER - INSTRUCTIONS
protein-rich foods, maximize food preferences  Add ensure plus high protein twice daily  to optimize PO intake

## 2024-08-30 NOTE — DISCHARGE NOTE PROVIDER - ATTENDING DISCHARGE PHYSICAL EXAMINATION:
PHYSICAL EXAM:  Gen: NAD  HEENT:  pupils equal and reactive, EOMI  NECK:   supple  CV:  +S1, +S2, paced, no murmurs or rubs  RESP:   lungs clear to auscultation bilaterally on anterolateral exam  GI:  abdomen soft, non-tender, non-distended, normal BS  RECTAL:  performed 08/27-  brown stool with no bright red blood - no hemorrhoids internally or externally - guaiac negative  MSK:   normal muscle tone  EXT:  no clubbing, no cyanosis, no edema, no calf pain, swelling or erythema  NEURO:  AOx1

## 2024-08-30 NOTE — DISCHARGE NOTE PROVIDER - HOSPITAL COURSE
HPI:  83 year old female with PMHx of dementia, HLD, Parkinson's disease, seizure, hypothyroid, s/p PPM, spinal stenosis BIBA from [facility] for BRBPR when wiping. unable to obtain history from pt d/t dementia. No anticoagulation per med rec. Info from chart review. hgb on admission 12.4-->11.5. CTA abdomen with no obvious source of bleeding. Of note pt recently admitted for breakthrough seizure and hypothermia. found to have UTI and currently on 7 day course of cipro to be completed tomorrow.      (27 Aug 2024 08:52)      ---  HOSPITAL COURSE: Patient is 83 year old female with PMHx of dementia, HLD, Parkinson's disease, seizure, hypothyroid, s/p PPM, spinal stenosis BIBA from [facility] for BRBPR placed in obs for further workup of hematochezia and acute blood loss anemia now found to have hypothermia 2/2 UTI and PNA.   CTA abdomen with no obvious source of bleeding. Of note pt recently admitted for breakthrough seizure and hypothermia. found to have UTI and currently on 7 day course of cipro to be completed 8/28.  CXR: Slight left base infiltrate at this time.  CT A/P : No evidence of active GI bleeding on this CTA. Mild thickening of rectum suggests nonspecific proctitis and focal   angiodysplasia.  Follow up with sigmoidoscopy may be considered.    Fibroid uterus, prominent endometrium and fluid distention of the vagina may be further evaluated with pelvic US.  BONES: Chronic compression deformities of T8, T11, T12, L1-L3 and L5.    1. Hypothermia. Pyuria/UTI. Recent PSAE UTI. GI bleed. Parkinsons disease. PCN allergy  - #Left lower lobe PNA  AM cortisol wnl  Urine culture 50-99k E coli  CXR with left lower infiltrate  blood culture neg x 24 hours  ID on board   s/p meropenam, switched to PO ceftin 500mg po bid x 3 days, Patient refused to take abx, will give 1x meropenum now. Afebrile  past 24 hrs Vital sign stable. Discharge back to the assisted facility.     # #BRBPR- hematochezia rule out Lower GIB  hgb 11.5 from 11.0 - stable  per GI, hematochezia possibly 2/2 angiodysplasia, likely from constipation and possibly hemorrhoids. FOBT negative.  (baseline Hgb 11-12).   recommend aggressive bowel regimen Miralax 17g BID and dulcolax 10mg QD @ HS  tolerating regular diet    # #seizure hx and neuropathy  c/w keppra BID , given IV push x1 dose this am as patient refused medications due to baseline worsening dementia   c/w gabapentin and duloxetine    #dementia with behavioral disturbances  c/w donepizil    #GERD  c/w PPI    #hypothyroidism with elevated TSH possibly 2/2 euthyroid sick syndrome  TSH elevated - to 8.97  T4 normal   c/w levothyroxine at home dose   repeat TFTs in 6 weeks - adjust if necessary at that point     #moderate malnutrition  follow dietician [facility]  palliative consulted, Daughter confirms [facility] reflecting DNR/DNI trial NIV wishes. She feels that patient still has quality of life at assisted living and is okay with her coming back to the hospital for things like IV antibiotics. She is not ready to place pt on hospice at this time.     Latisha reports multiple recent losses -- pt's spouse passed in May 2023 and pt's son recently passed away from cancer.    Emotional support provided. Plan to continue current medical management & hopefully return to snf. Dtr does not want JIMENA TOM on chart. Our team will continue to follow  Stable for discharge to snf     ---  CONSULTANTS:   Ambar Marquez (DO)   Marilee Reagan)  PT and palliative team   ---  TIME SPENT:  I, the attending physician, was physically present for the key portions of the evaluation and management (E/M) service provided. The total amount of time spent reviewing the hospital notes, laboratory values, imaging findings, assessing/counseling the patient, discussing with consultant physicians, social work, nursing staff was -- minutes    ---

## 2024-08-30 NOTE — DISCHARGE NOTE NURSING/CASE MANAGEMENT/SOCIAL WORK - NSDCPEFALRISK_GEN_ALL_CORE
For information on Fall & Injury Prevention, visit: [email or web address] OR  [email or web address] OR  https://www.cdc.gov/steadi/patient.html

## 2024-08-30 NOTE — DISCHARGE NOTE NURSING/CASE MANAGEMENT/SOCIAL WORK - PATIENT PORTAL LINK FT
You can access the FollowMyHealth Patient Portal offered by [facility] by registering at the following website: [email or web address]. By joining [employer]’s FollowMyHealth portal, you will also be able to view your health information using other applications (apps) compatible with our system.

## 2024-08-30 NOTE — DISCHARGE NOTE PROVIDER - CARE PROVIDER_API CALL
Marilee Carlin  Gastroenterology  [address], [address] [place] [address]  Phone: [phone]  Fax: [phone]  Established Patient  Follow Up Time: 1 week

## 2024-08-30 NOTE — DISCHARGE NOTE PROVIDER - NSDCMRMEDTOKEN_GEN_ALL_CORE_FT
Aricept 10 mg oral tablet: 1 tab(s) orally once a day (at bedtime)  bisacodyl 5 mg oral delayed release tablet: 2 tab(s) orally once a day (at bedtime)  cefuroxime 500 mg oral tablet: 1 tab(s) orally every 12 hours  dicyclomine 10 mg oral capsule: 1 cap(s) orally 2 times a day  DULoxetine 60 mg oral delayed release capsule: 1 cap(s) orally once a day  levETIRAcetam 500 mg oral tablet: 1 tab(s) orally 2 times a day  levothyroxine 25 mcg (0.025 mg) oral tablet: 1 tab(s) orally once a day  loperamide 2 mg oral capsule: 2 cap(s) orally 2 times a day as needed for  diarrhea  Neurontin 400 mg oral capsule: 1 cap(s) orally 2 times a day  Nuplazid 34 mg oral capsule: 1 cap(s) orally once a day  pantoprazole 40 mg oral delayed release tablet: 1 tab(s) orally once a day (before a meal)  polyethylene glycol 3350 oral powder for reconstitution: 17 gram(s) orally 2 times a day  thiamine 100 mg oral tablet: 1 tab(s) orally once a day  Tylenol Extra Strength 500 mg oral tablet: 2 tab(s) orally 2 times a day as needed for pain  Zeasorb Talc Powder: apply under breasts as needed

## 2024-09-01 LAB
CULTURE RESULTS: SIGNIFICANT CHANGE UP
SPECIMEN SOURCE: SIGNIFICANT CHANGE UP

## 2024-09-05 DIAGNOSIS — B96.20 UNSPECIFIED ESCHERICHIA COLI [E. COLI] AS THE CAUSE OF DISEASES CLASSIFIED ELSEWHERE: ICD-10-CM

## 2024-09-05 DIAGNOSIS — F02.818 DEMENTIA IN OTHER DISEASES CLASSIFIED ELSEWHERE, UNSPECIFIED SEVERITY, WITH OTHER BEHAVIORAL DISTURBANCE: ICD-10-CM

## 2024-09-05 DIAGNOSIS — E44.0 MODERATE PROTEIN-CALORIE MALNUTRITION: ICD-10-CM

## 2024-09-05 DIAGNOSIS — K59.00 CONSTIPATION, UNSPECIFIED: ICD-10-CM

## 2024-09-05 DIAGNOSIS — Z79.890 HORMONE REPLACEMENT THERAPY: ICD-10-CM

## 2024-09-05 DIAGNOSIS — D62 ACUTE POSTHEMORRHAGIC ANEMIA: ICD-10-CM

## 2024-09-05 DIAGNOSIS — K64.9 UNSPECIFIED HEMORRHOIDS: ICD-10-CM

## 2024-09-05 DIAGNOSIS — Z88.2 ALLERGY STATUS TO SULFONAMIDES: ICD-10-CM

## 2024-09-05 DIAGNOSIS — E78.5 HYPERLIPIDEMIA, UNSPECIFIED: ICD-10-CM

## 2024-09-05 DIAGNOSIS — G20.A1 PARKINSON'S DISEASE WITHOUT DYSKINESIA, WITHOUT MENTION OF FLUCTUATIONS: ICD-10-CM

## 2024-09-05 DIAGNOSIS — R68.0 HYPOTHERMIA, NOT ASSOCIATED WITH LOW ENVIRONMENTAL TEMPERATURE: ICD-10-CM

## 2024-09-05 DIAGNOSIS — Z88.8 ALLERGY STATUS TO OTHER DRUGS, MEDICAMENTS AND BIOLOGICAL SUBSTANCES: ICD-10-CM

## 2024-09-05 DIAGNOSIS — G40.909 EPILEPSY, UNSPECIFIED, NOT INTRACTABLE, WITHOUT STATUS EPILEPTICUS: ICD-10-CM

## 2024-09-05 DIAGNOSIS — G62.9 POLYNEUROPATHY, UNSPECIFIED: ICD-10-CM

## 2024-09-05 DIAGNOSIS — Z95.0 PRESENCE OF CARDIAC PACEMAKER: ICD-10-CM

## 2024-09-05 DIAGNOSIS — Z88.0 ALLERGY STATUS TO PENICILLIN: ICD-10-CM

## 2024-09-05 DIAGNOSIS — K55.21 ANGIODYSPLASIA OF COLON WITH HEMORRHAGE: ICD-10-CM

## 2024-09-05 DIAGNOSIS — Z91.040 LATEX ALLERGY STATUS: ICD-10-CM

## 2024-09-05 DIAGNOSIS — J18.9 PNEUMONIA, UNSPECIFIED ORGANISM: ICD-10-CM

## 2024-09-05 DIAGNOSIS — N39.0 URINARY TRACT INFECTION, SITE NOT SPECIFIED: ICD-10-CM

## 2024-09-05 DIAGNOSIS — K21.9 GASTRO-ESOPHAGEAL REFLUX DISEASE WITHOUT ESOPHAGITIS: ICD-10-CM

## 2024-09-05 DIAGNOSIS — E03.9 HYPOTHYROIDISM, UNSPECIFIED: ICD-10-CM

## 2024-09-09 LAB — T3 SERPL-MCNC: 92 NG/DL — SIGNIFICANT CHANGE UP

## 2024-09-09 NOTE — DISCHARGE NOTE NURSING/CASE MANAGEMENT/SOCIAL WORK - FLU SEASON?
Health Maintenance       DM/CKD Microalbumin Ratio (Yearly)  Never done    Shingles Vaccine (1 of 2)  Never done    DTaP/Tdap/Td Vaccine (1 - Tdap)  Overdue since 10/13/2011    COVID-19 Vaccine (1 - 2023-24 season)  Never done    Influenza Vaccine (1)  Due since 9/1/2024           Following review of the above:  Patient is not proceeding with: COVID-19, Dtap/Tdap/Td, Influenza, and Shingles    Note: Refer to final orders and clinician documentation.       Yes...

## 2024-09-17 ENCOUNTER — INPATIENT (INPATIENT)
Facility: HOSPITAL | Age: 84
LOS: 0 days | Discharge: HOSPICE | DRG: 951 | End: 2024-09-17
Attending: HOSPITALIST | Admitting: STUDENT IN AN ORGANIZED HEALTH CARE EDUCATION/TRAINING PROGRAM
Payer: MEDICARE

## 2024-09-17 VITALS
OXYGEN SATURATION: 93 % | HEART RATE: 75 BPM | HEIGHT: 65 IN | TEMPERATURE: 97 F | DIASTOLIC BLOOD PRESSURE: 53 MMHG | RESPIRATION RATE: 18 BRPM | SYSTOLIC BLOOD PRESSURE: 144 MMHG

## 2024-09-17 VITALS
OXYGEN SATURATION: 99 % | RESPIRATION RATE: 16 BRPM | SYSTOLIC BLOOD PRESSURE: 103 MMHG | HEART RATE: 107 BPM | TEMPERATURE: 96 F | DIASTOLIC BLOOD PRESSURE: 42 MMHG

## 2024-09-17 DIAGNOSIS — J69.0 PNEUMONITIS DUE TO INHALATION OF FOOD AND VOMIT: ICD-10-CM

## 2024-09-17 LAB
ADD ON TEST-SPECIMEN IN LAB: SIGNIFICANT CHANGE UP
ADD ON TEST-SPECIMEN IN LAB: SIGNIFICANT CHANGE UP
ANION GAP SERPL CALC-SCNC: 1 MMOL/L — LOW (ref 5–17)
APPEARANCE UR: ABNORMAL
BACTERIA # UR AUTO: ABNORMAL /HPF
BASOPHILS # BLD AUTO: 0.03 K/UL — SIGNIFICANT CHANGE UP (ref 0–0.2)
BASOPHILS NFR BLD AUTO: 0.3 % — SIGNIFICANT CHANGE UP (ref 0–2)
BILIRUB UR-MCNC: NEGATIVE — SIGNIFICANT CHANGE UP
BUN SERPL-MCNC: 32 MG/DL — HIGH (ref 7–23)
CALCIUM SERPL-MCNC: 9.7 MG/DL — SIGNIFICANT CHANGE UP (ref 8.5–10.1)
CAST: SIGNIFICANT CHANGE UP /LPF
CHLORIDE SERPL-SCNC: 105 MMOL/L — SIGNIFICANT CHANGE UP (ref 96–108)
CO2 SERPL-SCNC: 33 MMOL/L — HIGH (ref 22–31)
COLOR SPEC: YELLOW — SIGNIFICANT CHANGE UP
CREAT SERPL-MCNC: 1.02 MG/DL — SIGNIFICANT CHANGE UP (ref 0.5–1.3)
DIFF PNL FLD: ABNORMAL
EGFR: 54 ML/MIN/1.73M2 — LOW
EOSINOPHIL # BLD AUTO: 0.07 K/UL — SIGNIFICANT CHANGE UP (ref 0–0.5)
EOSINOPHIL NFR BLD AUTO: 0.7 % — SIGNIFICANT CHANGE UP (ref 0–6)
GLUCOSE SERPL-MCNC: 100 MG/DL — HIGH (ref 70–99)
GLUCOSE UR QL: NEGATIVE MG/DL — SIGNIFICANT CHANGE UP
HCT VFR BLD CALC: 40.5 % — SIGNIFICANT CHANGE UP (ref 34.5–45)
HGB BLD-MCNC: 12.9 G/DL — SIGNIFICANT CHANGE UP (ref 11.5–15.5)
IMM GRANULOCYTES NFR BLD AUTO: 0.4 % — SIGNIFICANT CHANGE UP (ref 0–0.9)
KETONES UR-MCNC: NEGATIVE MG/DL — SIGNIFICANT CHANGE UP
LACTATE SERPL-SCNC: 0.7 MMOL/L — SIGNIFICANT CHANGE UP (ref 0.7–2)
LEUKOCYTE ESTERASE UR-ACNC: ABNORMAL
LYMPHOCYTES # BLD AUTO: 1.03 K/UL — SIGNIFICANT CHANGE UP (ref 1–3.3)
LYMPHOCYTES # BLD AUTO: 9.8 % — LOW (ref 13–44)
MCHC RBC-ENTMCNC: 30.9 PG — SIGNIFICANT CHANGE UP (ref 27–34)
MCHC RBC-ENTMCNC: 31.9 GM/DL — LOW (ref 32–36)
MCV RBC AUTO: 97.1 FL — SIGNIFICANT CHANGE UP (ref 80–100)
MONOCYTES # BLD AUTO: 0.6 K/UL — SIGNIFICANT CHANGE UP (ref 0–0.9)
MONOCYTES NFR BLD AUTO: 5.7 % — SIGNIFICANT CHANGE UP (ref 2–14)
NEUTROPHILS # BLD AUTO: 8.71 K/UL — HIGH (ref 1.8–7.4)
NEUTROPHILS NFR BLD AUTO: 83.1 % — HIGH (ref 43–77)
NITRITE UR-MCNC: POSITIVE
NT-PROBNP SERPL-SCNC: 118 PG/ML — SIGNIFICANT CHANGE UP (ref 0–450)
PH UR: 7 — SIGNIFICANT CHANGE UP (ref 5–8)
PLATELET # BLD AUTO: 128 K/UL — LOW (ref 150–400)
POTASSIUM SERPL-MCNC: 5.1 MMOL/L — SIGNIFICANT CHANGE UP (ref 3.5–5.3)
POTASSIUM SERPL-SCNC: 5.1 MMOL/L — SIGNIFICANT CHANGE UP (ref 3.5–5.3)
PROT UR-MCNC: NEGATIVE MG/DL — SIGNIFICANT CHANGE UP
RBC # BLD: 4.17 M/UL — SIGNIFICANT CHANGE UP (ref 3.8–5.2)
RBC # FLD: 14.1 % — SIGNIFICANT CHANGE UP (ref 10.3–14.5)
RBC CASTS # UR COMP ASSIST: 34 /HPF — HIGH (ref 0–4)
SODIUM SERPL-SCNC: 139 MMOL/L — SIGNIFICANT CHANGE UP (ref 135–145)
SP GR SPEC: 1.02 — SIGNIFICANT CHANGE UP (ref 1–1.03)
SQUAMOUS # UR AUTO: 6 /HPF — HIGH (ref 0–5)
T4 FREE SERPL-MCNC: 0.92 NG/DL — SIGNIFICANT CHANGE UP (ref 0.76–1.46)
TROPONIN I, HIGH SENSITIVITY RESULT: 8.79 NG/L — SIGNIFICANT CHANGE UP
TSH SERPL-MCNC: 6.92 UU/ML — HIGH (ref 0.34–4.82)
UROBILINOGEN FLD QL: 1 MG/DL — SIGNIFICANT CHANGE UP (ref 0.2–1)
WBC # BLD: 10.48 K/UL — SIGNIFICANT CHANGE UP (ref 3.8–10.5)
WBC # FLD AUTO: 10.48 K/UL — SIGNIFICANT CHANGE UP (ref 3.8–10.5)
WBC UR QL: 31 /HPF — HIGH (ref 0–5)

## 2024-09-17 PROCEDURE — 93010 ELECTROCARDIOGRAM REPORT: CPT | Mod: GW

## 2024-09-17 PROCEDURE — 99285 EMERGENCY DEPT VISIT HI MDM: CPT

## 2024-09-17 PROCEDURE — 70450 CT HEAD/BRAIN W/O DYE: CPT | Mod: 26,MC

## 2024-09-17 PROCEDURE — 99498 ADVNCD CARE PLAN ADDL 30 MIN: CPT

## 2024-09-17 PROCEDURE — 99497 ADVNCD CARE PLAN 30 MIN: CPT | Mod: 25

## 2024-09-17 PROCEDURE — 93010 ELECTROCARDIOGRAM REPORT: CPT | Mod: 76,GW

## 2024-09-17 PROCEDURE — 83605 ASSAY OF LACTIC ACID: CPT

## 2024-09-17 PROCEDURE — 99223 1ST HOSP IP/OBS HIGH 75: CPT

## 2024-09-17 PROCEDURE — 87040 BLOOD CULTURE FOR BACTERIA: CPT

## 2024-09-17 PROCEDURE — 99236 HOSP IP/OBS SAME DATE HI 85: CPT

## 2024-09-17 PROCEDURE — 93005 ELECTROCARDIOGRAM TRACING: CPT

## 2024-09-17 PROCEDURE — 71275 CT ANGIOGRAPHY CHEST: CPT | Mod: 26,MC

## 2024-09-17 PROCEDURE — 36415 COLL VENOUS BLD VENIPUNCTURE: CPT

## 2024-09-17 RX ORDER — ACETAMINOPHEN 325 MG/1
650 TABLET ORAL EVERY 6 HOURS
Refills: 0 | Status: DISCONTINUED | OUTPATIENT
Start: 2024-09-17 | End: 2024-09-17

## 2024-09-17 RX ORDER — PIPERACILLIN SODIUM AND TAZOBACTAM SODIUM 3; .375 G/15ML; G/15ML
3.38 INJECTION, POWDER, FOR SOLUTION INTRAVENOUS EVERY 8 HOURS
Refills: 0 | Status: DISCONTINUED | OUTPATIENT
Start: 2024-09-17 | End: 2024-09-17

## 2024-09-17 RX ORDER — LORAZEPAM 4 MG/ML
0.5 INJECTION INTRAMUSCULAR; INTRAVENOUS
Qty: 30 | Refills: 0
Start: 2024-09-17 | End: 2024-09-19

## 2024-09-17 RX ORDER — CEFEPIME 2 G/1
1000 INJECTION, POWDER, FOR SOLUTION INTRAVENOUS ONCE
Refills: 0 | Status: DISCONTINUED | OUTPATIENT
Start: 2024-09-17 | End: 2024-09-17

## 2024-09-17 RX ORDER — CEFUROXIME SODIUM 1.5 G
500 VIAL (EA) INJECTION ONCE
Refills: 0 | Status: COMPLETED | OUTPATIENT
Start: 2024-09-17 | End: 2024-09-17

## 2024-09-17 RX ORDER — PIPERACILLIN SODIUM AND TAZOBACTAM SODIUM 3; .375 G/15ML; G/15ML
3.38 INJECTION, POWDER, FOR SOLUTION INTRAVENOUS ONCE
Refills: 0 | Status: COMPLETED | OUTPATIENT
Start: 2024-09-17 | End: 2024-09-17

## 2024-09-17 RX ORDER — HALOPERIDOL 1 MG
1 TABLET ORAL
Qty: 12 | Refills: 0
Start: 2024-09-17 | End: 2024-09-19

## 2024-09-17 RX ORDER — SODIUM CHLORIDE 9 MG/ML
500 INJECTION INTRAMUSCULAR; INTRAVENOUS; SUBCUTANEOUS ONCE
Refills: 0 | Status: COMPLETED | OUTPATIENT
Start: 2024-09-17 | End: 2024-09-17

## 2024-09-17 RX ORDER — PANTOPRAZOLE SODIUM 40 MG
40 TABLET, DELAYED RELEASE (ENTERIC COATED) ORAL DAILY
Refills: 0 | Status: DISCONTINUED | OUTPATIENT
Start: 2024-09-17 | End: 2024-09-17

## 2024-09-17 RX ORDER — MAGNESIUM, ALUMINUM HYDROXIDE 200-225/5
30 SUSPENSION, ORAL (FINAL DOSE FORM) ORAL EVERY 4 HOURS
Refills: 0 | Status: DISCONTINUED | OUTPATIENT
Start: 2024-09-17 | End: 2024-09-17

## 2024-09-17 RX ORDER — HYOSCYAMINE SULFATE 0.125 MG
1 TABLET, SUBLINGUAL SUBLINGUAL
Qty: 120 | Refills: 0
Start: 2024-09-17 | End: 2024-10-16

## 2024-09-17 RX ORDER — CYANOCOBALAMIN (VITAMIN B-12) 500MCG/0.1
1 GEL (ML) NASAL
Refills: 0 | DISCHARGE

## 2024-09-17 RX ORDER — IPRATROPIUM BROMIDE AND ALBUTEROL SULFATE .5; 3 MG/3ML; MG/3ML
3 SOLUTION RESPIRATORY (INHALATION) ONCE
Refills: 0 | Status: COMPLETED | OUTPATIENT
Start: 2024-09-17 | End: 2024-09-17

## 2024-09-17 RX ORDER — LEVETIRACETAM 1000 MG/1
500 TABLET ORAL EVERY 12 HOURS
Refills: 0 | Status: DISCONTINUED | OUTPATIENT
Start: 2024-09-17 | End: 2024-09-17

## 2024-09-17 RX ORDER — GLYCOPYRROLATE 0.2 MG/ML
0.2 INJECTION INTRAMUSCULAR; INTRAVENOUS
Qty: 0 | Refills: 0 | DISCHARGE
Start: 2024-09-17

## 2024-09-17 RX ORDER — SODIUM CHLORIDE 9 MG/ML
1000 INJECTION INTRAMUSCULAR; INTRAVENOUS; SUBCUTANEOUS ONCE
Refills: 0 | Status: COMPLETED | OUTPATIENT
Start: 2024-09-17 | End: 2024-09-17

## 2024-09-17 RX ORDER — NYSTATIN 100000/G
1 CREAM (GRAM) TOPICAL
Refills: 0 | DISCHARGE

## 2024-09-17 RX ORDER — TIZANIDINE 4 MG/1
2 TABLET ORAL
Refills: 0 | DISCHARGE

## 2024-09-17 RX ORDER — DEXAMETHASONE 0.75 MG
6 TABLET ORAL ONCE
Refills: 0 | Status: COMPLETED | OUTPATIENT
Start: 2024-09-17 | End: 2024-09-17

## 2024-09-17 RX ORDER — LORAZEPAM 4 MG/ML
1 INJECTION INTRAMUSCULAR; INTRAVENOUS
Qty: 12 | Refills: 0
Start: 2024-09-17 | End: 2024-09-19

## 2024-09-17 RX ORDER — GLYCOPYRROLATE 0.2 MG/ML
0.2 INJECTION INTRAMUSCULAR; INTRAVENOUS EVERY 6 HOURS
Refills: 0 | Status: DISCONTINUED | OUTPATIENT
Start: 2024-09-17 | End: 2024-09-17

## 2024-09-17 RX ORDER — PANTOPRAZOLE SODIUM 40 MG
40 TABLET, DELAYED RELEASE (ENTERIC COATED) ORAL
Qty: 0 | Refills: 0 | DISCHARGE
Start: 2024-09-17

## 2024-09-17 RX ORDER — LEVOTHYROXINE SODIUM 100 MCG
17.5 TABLET ORAL AT BEDTIME
Refills: 0 | Status: DISCONTINUED | OUTPATIENT
Start: 2024-09-17 | End: 2024-09-17

## 2024-09-17 RX ORDER — NYSTATIN 100000/G
1 CREAM (GRAM) TOPICAL
Refills: 0 | Status: DISCONTINUED | OUTPATIENT
Start: 2024-09-17 | End: 2024-09-17

## 2024-09-17 RX ORDER — LEVOTHYROXINE SODIUM 100 MCG
17.5 TABLET ORAL
Qty: 0 | Refills: 0 | DISCHARGE
Start: 2024-09-17

## 2024-09-17 RX ORDER — ONDANSETRON 2 MG/ML
4 INJECTION, SOLUTION INTRAMUSCULAR; INTRAVENOUS EVERY 6 HOURS
Refills: 0 | Status: DISCONTINUED | OUTPATIENT
Start: 2024-09-17 | End: 2024-09-17

## 2024-09-17 RX ORDER — CEFEPIME 2 G/1
1000 INJECTION, POWDER, FOR SOLUTION INTRAVENOUS ONCE
Refills: 0 | Status: COMPLETED | OUTPATIENT
Start: 2024-09-17 | End: 2024-09-17

## 2024-09-17 RX ORDER — PIPERACILLIN SODIUM AND TAZOBACTAM SODIUM 3; .375 G/15ML; G/15ML
3.38 INJECTION, POWDER, FOR SOLUTION INTRAVENOUS ONCE
Refills: 0 | Status: DISCONTINUED | OUTPATIENT
Start: 2024-09-17 | End: 2024-09-17

## 2024-09-17 RX ORDER — LEVETIRACETAM 1000 MG/1
5 TABLET ORAL
Qty: 0 | Refills: 0 | DISCHARGE
Start: 2024-09-17

## 2024-09-17 RX ORDER — VANCOMYCIN/0.9 % SOD CHLORIDE 1.75G/25
1000 PLASTIC BAG, INJECTION (ML) INTRAVENOUS ONCE
Refills: 0 | Status: COMPLETED | OUTPATIENT
Start: 2024-09-17 | End: 2024-09-17

## 2024-09-17 RX ORDER — CEFUROXIME SODIUM 1.5 G
1 VIAL (EA) INJECTION
Qty: 20 | Refills: 0
Start: 2024-09-17 | End: 2024-09-26

## 2024-09-17 RX ORDER — ACETAMINOPHEN 325 MG/1
1 TABLET ORAL
Qty: 12 | Refills: 0
Start: 2024-09-17 | End: 2024-09-19

## 2024-09-17 RX ORDER — TALC/CELLULOS/CHLOROXY/ALDIOXA
0 POWDER (GRAM) TOPICAL
Refills: 0 | DISCHARGE

## 2024-09-17 RX ADMIN — Medication 1 MILLIGRAM(S): at 14:46

## 2024-09-17 RX ADMIN — GLYCOPYRROLATE 0.2 MILLIGRAM(S): 0.2 INJECTION INTRAMUSCULAR; INTRAVENOUS at 14:15

## 2024-09-17 RX ADMIN — IPRATROPIUM BROMIDE AND ALBUTEROL SULFATE 3 MILLILITER(S): .5; 3 SOLUTION RESPIRATORY (INHALATION) at 04:46

## 2024-09-17 RX ADMIN — CEFEPIME 1000 MILLIGRAM(S): 2 INJECTION, POWDER, FOR SOLUTION INTRAVENOUS at 06:15

## 2024-09-17 RX ADMIN — Medication 6 MILLIGRAM(S): at 04:44

## 2024-09-17 RX ADMIN — Medication 100 MILLILITER(S): at 08:56

## 2024-09-17 RX ADMIN — ONDANSETRON 4 MILLIGRAM(S): 2 INJECTION, SOLUTION INTRAMUSCULAR; INTRAVENOUS at 06:15

## 2024-09-17 RX ADMIN — SODIUM CHLORIDE 1000 MILLILITER(S): 9 INJECTION INTRAMUSCULAR; INTRAVENOUS; SUBCUTANEOUS at 08:07

## 2024-09-17 RX ADMIN — SODIUM CHLORIDE 1000 MILLILITER(S): 9 INJECTION INTRAMUSCULAR; INTRAVENOUS; SUBCUTANEOUS at 01:09

## 2024-09-17 RX ADMIN — PIPERACILLIN SODIUM AND TAZOBACTAM SODIUM 200 GRAM(S): 3; .375 INJECTION, POWDER, FOR SOLUTION INTRAVENOUS at 09:19

## 2024-09-17 RX ADMIN — Medication 500 MILLIGRAM(S): at 04:08

## 2024-09-17 RX ADMIN — SODIUM CHLORIDE 1000 MILLILITER(S): 9 INJECTION INTRAMUSCULAR; INTRAVENOUS; SUBCUTANEOUS at 08:25

## 2024-09-17 RX ADMIN — Medication 250 MILLIGRAM(S): at 06:59

## 2024-09-17 RX ADMIN — Medication 40 MILLIGRAM(S): at 11:13

## 2024-09-17 RX ADMIN — LEVETIRACETAM 500 MILLIGRAM(S): 1000 TABLET ORAL at 11:12

## 2024-09-17 NOTE — ED PROVIDER NOTE - CLINICAL SUMMARY MEDICAL DECISION MAKING FREE TEXT BOX
83-year-old female with history of dementia, hyperlipidemia, Parkinson's, trigeminal neuralgia brought in by EMS from [facility] for evaluation of unwitnessed fall in his bed.  The patient was placed back in her bed prior to arrival of EMS.  It is unclear if the patient hit her head and at baseline she is alert and oriented to self only.  The patient typically walks short distances with a walker but a lot of times will stand up on her own and attempt to ambulate without a walker.  The patient has been recently in this emergency department and diagnosed with a urinary tract infection.  Patient denies any pain at this time.  On exam, patient does not have any hematoma, tenderness or laceration of the head and denies any cervical spine tenderness.  Will get CT of the head to rule out intracranial hemorrhage, CBC, BMP, urinalysis with reflex to culture and EKG.  If workup is negative, will perform ambulation trial.  If patient has evidence of UTI, will start on antibiotics, perform ambulation trial and discharged to facility on antibiotics if able to ambulate with a walker.

## 2024-09-17 NOTE — CONSULT NOTE ADULT - SUBJECTIVE AND OBJECTIVE BOX
Patient is a 83y old  Female who presents with a chief complaint of acute hypoxic respiratory failure; hypothermia (17 Sep 2024 08:00)    HPI:  83 year old female with PMHx of parkinson's dementia with behavioral disturbances, HLD, seizure, hypothyroidism, s/p PPM, spinal stenosis from [facility] unit presenting for unwitnessed fall. unclear if there was head trauma. pt not on any AC. Pt unable to assist with history given dementia. AOx0. notably pt recently admitted with UTI, PNA,  and hypothermia and was initially on meropenam and switched to ceftin. In the ED, pt's CTA shows no PE but does show b/l pna. Previously infiltrate was only on the left. Now it appears infiltrate is on the right as well. UA with pyuria and bacteria concerning for UTI.  ED course: received vanc, cefepime, 500cc bolus, DUO neb, Dexamethasone 6mg.       PMH: as above  PSH: as above    Meds: per reconciliation sheet, noted below  MEDICATIONS  (STANDING):  dextrose 5% + sodium chloride 0.45%. 1000 milliLiter(s) (100 mL/Hr) IV Continuous <Continuous>  levETIRAcetam   Injectable 500 milliGRAM(s) IV Push every 12 hours  levothyroxine Injectable 17.5 MICROGram(s) IV Push at bedtime  nystatin Ointment 1 Application(s) Topical two times a day  pantoprazole  Injectable 40 milliGRAM(s) IV Push daily  piperacillin/tazobactam IVPB.- 3.375 Gram(s) IV Intermittent once  piperacillin/tazobactam IVPB.. 3.375 Gram(s) IV Intermittent every 8 hours    Allergies    Cleocin HCl (Unknown)  sertraline (Unknown)  penicillins (Unknown)  latex (Unknown)  sulfa drugs (Unknown)    Intolerances      Social: no smoking, no alcohol, no illegal drugs; no recent travel, no exposure to TB  FAMILY HISTORY:  No pertinent family history in first degree relatives       no history of premature cardiovascular disease in first degree relatives    ROS: unable to obtain d/t medical condition     All other systems reviewed and are negative    Vital Signs Last 24 Hrs  T(C): 35 (17 Sep 2024 09:00), Max: 37.2 (17 Sep 2024 06:50)  T(F): 95 (17 Sep 2024 09:00), Max: 98.9 (17 Sep 2024 06:50)  HR: 60 (17 Sep 2024 10:30) (60 - 89)  BP: 77/39 (17 Sep 2024 10:30) (74/45 - 144/53)  BP(mean): 51 (17 Sep 2024 10:30) (48 - 91)  RR: 12 (17 Sep 2024 10:30) (8 - 18)  SpO2: 100% (17 Sep 2024 10:30) (87% - 100%)    Parameters below as of 17 Sep 2024 10:30  Patient On (Oxygen Delivery Method): mask, nonrebreather  O2 Flow (L/min): 15    Daily Height in cm: 165.1 (17 Sep 2024 00:23)    Daily     PE:  Constitutional: NAD  HEENT: NC/AT, EOMI, PERRLA, conjunctivae clear; ears and nose atraumatic; pharynx benign  Neck: supple; thyroid not palpable  Back: no tenderness  Respiratory: decreased breath sounds rhonchi   Cardiovascular: S1S2 regular, no murmurs  Abdomen: soft, not tender, not distended, positive BS; liver and spleen WNL  Genitourinary: no suprapubic tenderness  Lymphatic: no LN palpable  Musculoskeletal: no muscle tenderness, no joint swelling or tenderness  Extremities: no pedal edema  Neurological/ Psychiatric: no focal deficits   Skin: no rashes; no palpable lesions    Labs: all available labs reviewed                        12.9   10.48 )-----------( 128      ( 17 Sep 2024 01:05 )             40.5     09-17    139  |  105  |  32[H]  ----------------------------<  100[H]  5.1   |  33[H]  |  1.02    Ca    9.7      17 Sep 2024 01:05         Urinalysis Basic - ( 17 Sep 2024 03:35 )    Color: Yellow / Appearance: Turbid / S.016 / pH: x  Gluc: x / Ketone: Negative mg/dL  / Bili: Negative / Urobili: 1.0 mg/dL   Blood: x / Protein: Negative mg/dL / Nitrite: Positive   Leuk Esterase: Large / RBC: 34 /HPF / WBC 31 /HPF   Sq Epi: x / Non Sq Epi: 6 /HPF / Bacteria: Too Numerous to count /HPF          Radiology: all available radiological tests reviewed  < from: CT Angio Chest PE Protocol w/ IV Cont (24 @ 05:25) >    IMPRESSION:  1.  No evidence of pulmonary embolism.  2.  Filling defects involving bronchi in the lingulaand both lower lobes   likely represent the sequela of aspiration.  3.  Mild groundglass densities in the lower lobes are suspicious for   developing aspiration pneumonia.    < end of copied text >    Advanced directives addressed: full resuscitation
HPI: Pt is a 83y old Female with hx of parkinson's dementia with behavioral disturbances, HLD, seizure, hypothyroidism, s/p PPM, spinal stenosis from [facility] unit presenting for unwitnessed fall. unclear if there was head trauma. pt not on any AC. Pt unable to assist with history given dementia. AOx0. notably pt recently admitted with UTI, PNA,  and hypothermia and was initially on meropenam and switched to ceftin. In the ED, pt's CTA shows no PE but does show b/l pna. Previously infiltrate was only on the left. Now it appears infiltrate is on the right as well. UA with pyuria and bacteria concerning for UTI. Palliative medicine consulted to help establish GOC and advance care planning     9/17/2024 pt seen and examined with family at bedside, patient no responsive at this time, patient on NRB mask. GOC meeting help with family - please see note       PAIN: ( )Yes   (x )No  pt appears comfortable at this time     DYSPNEA: (x ) Yes  ( ) No  Level: on NRB     PAST MEDICAL & SURGICAL HISTORY:  Trigeminal neuralgia  HLD (hyperlipidemia)  Spinal stenosis  Burning mouth syndrome  Parkinson disease  Dementia    No significant past surgical history    SOCIAL HX: [facility]     Hx opiate tolerance ( )YES  (x )NO    Baseline ADLs  (Prior to Admission)  ( ) Independent   (x )Dependent    FAMILY HISTORY:  No pertinent family history in first degree relatives    Review of Systems:    Unable to obtain/Limited due to: Obtunded       PHYSICAL EXAM:    Vital Signs Last 24 Hrs  T(C): 35 (17 Sep 2024 09:00), Max: 37.2 (17 Sep 2024 06:50)  T(F): 95 (17 Sep 2024 09:00), Max: 98.9 (17 Sep 2024 06:50)  HR: 60 (17 Sep 2024 10:30) (60 - 89)  BP: 77/39 (17 Sep 2024 10:30) (74/45 - 144/53)  BP(mean): 51 (17 Sep 2024 10:30) (48 - 91)  RR: 12 (17 Sep 2024 10:30) (8 - 18)  SpO2: 100% (17 Sep 2024 10:30) (87% - 100%)    Parameters below as of 17 Sep 2024 10:30  Patient On (Oxygen Delivery Method): mask, nonrebreather  O2 Flow (L/min): 15    Daily Height in cm: 165.1 (17 Sep 2024 00:23)      PPSV2:  10%    General: elderly female in bed, NAD   Mental Status: obtunded   HEENT:  NRB mask in place   Lungs: diminished b/l   Cardiac: s1s2 +   GI: nontender, nondistended +BS   : +voiding   Ext: no edema   Neuro: history of dementia - unable to obtain     LABS:                        12.9   10.48 )-----------( 128      ( 17 Sep 2024 01:05 )             40.5     09-17    139  |  105  |  32[H]  ----------------------------<  100[H]  5.1   |  33[H]  |  1.02    Ca    9.7      17 Sep 2024 01:05        Albumin:     Allergies    Cleocin HCl (Unknown)  sertraline (Unknown)  penicillins (Unknown)  latex (Unknown)  sulfa drugs (Unknown)    Intolerances      MEDICATIONS  (STANDING):  dextrose 5% + sodium chloride 0.45%. 1000 milliLiter(s) (100 mL/Hr) IV Continuous <Continuous>  levETIRAcetam   Injectable 500 milliGRAM(s) IV Push every 12 hours  levothyroxine Injectable 17.5 MICROGram(s) IV Push at bedtime  nystatin Ointment 1 Application(s) Topical two times a day  pantoprazole  Injectable 40 milliGRAM(s) IV Push daily  piperacillin/tazobactam IVPB.- 3.375 Gram(s) IV Intermittent once  piperacillin/tazobactam IVPB.. 3.375 Gram(s) IV Intermittent every 8 hours    MEDICATIONS  (PRN):  ondansetron Injectable 4 milliGRAM(s) IV Push every 6 hours PRN Nausea and/or Vomiting      RADIOLOGY/ADDITIONAL STUDIES:    ACC: [id number] EXAM:  CT ANGIO CHEST PULM [facility]   ORDERED BY: FLACO MILLS   PROCEDURE DATE:  09/17/2024    INTERPRETATION:  CLINICAL INFORMATION: Sudden onset of hypoxia/shortness of breath.  COMPARISON: 12/9/2023  CONTRAST/COMPLICATIONS:  IV Contrast: Omnipaque 350  70 cc administered   0 cc discarded  Oral Contrast: NONE  Complications: None reported at time of study completion  PROCEDURE:  CT Angiography of the Chest.  Sagittal and coronal reformats were performed as well as 3D (MIP)   reconstructions.  FINDINGS:  LUNGS AND LARGE AIRWAYS: Patent central airways. Mild groundglass   densities noted in the lower lobes superimposed over linear atelectasis.   No suspicious lung nodules. No evidence of a pneumothorax.Filling   defects with luminal obliteration noted involving some bronchi of the   lingula, some of the right lower lobe, and most in the left lower lobe;   worrisome for aspiration.  PLEURA: No pleural effusion.  VESSELS: Calcified atherosclerosis ofthe coronary arteries. No pulmonary   emboli to the segmental levels calcified atherosclerosis of the aorta and   some of its branches.  HEART: Mildly enlarged heart. No pericardial effusion.  MEDIASTINUM AND AGGIE: No lymphadenopathy.  CHEST WALL ANDLOWER NECK: Anterior left upper thoracic subcutaneous   electronic cardiac device without CT evident complications.  VISUALIZED UPPER ABDOMEN: Diverticulosis of the visible splenic flexure   colon.  BONES: Degenerative changes. Diffuse osseous demineralization. Subacute   mild compression fracture deformity of T3 with normal alignment and no   retropulsion; likely related to insufficiency.    IMPRESSION:  1.  No evidence of pulmonary embolism.  2.  Filling defects involving bronchi in the lingulaand both lower lobes   likely represent the sequela of aspiration.  3.  Mild groundglass densities in the lower lobes are suspicious for   developing aspiration pneumonia.

## 2024-09-17 NOTE — ED ADULT NURSE NOTE - CAS DISCH TRANSFER METHOD
Patient admitted for IOL. After failed induction patient had a primary  section. Postpartum course was uncomplicated. She voided, ambulated, passed flatus and tolerated a regular diet. Discharged home on POD 3. Ambulance

## 2024-09-17 NOTE — H&P ADULT - NSHPPHYSICALEXAM_GEN_ALL_CORE
VITALS:  T(F): 94.3 (09-17-24 @ 07:49), Max: 98.9 (09-17-24 @ 06:50)  HR: 76 (09-17-24 @ 07:49) (73 - 88)  BP: 104/61 (09-17-24 @ 07:49) (104/61 - 144/53)  RR: 18 (09-17-24 @ 07:49) (16 - 18)  SpO2: 94% (09-17-24 @ 07:49) (87% - 98%)  Wt(kg): --    I&O's Summary      CAPILLARY BLOOD GLUCOSE          PHYSICAL EXAM:  Gen: NAD  HEENT:  NCAT; pupils equal and reactive, EOMI, no oropharyngeal lesions, erythema, exudates, oral thrush  NECK:   supple, no carotid bruits, no palpable lymph nodes, no thyromegaly  CV:  +S1, +S2, regular, no murmurs or rubs  RESP:   on anterolateral exam, no obvious crackles or wheezes  BREAST:  not examined  GI:  abdomen soft, non-tender, non-distended, normal BS, no bruits, no abdominal masses, no palpable masses  RECTAL:  not examined  :  not examined  MSK:   normal muscle tone, no atrophy, no rigidity, no contractions  EXT:  no clubbing, no cyanosis, no edema, no calf pain, swelling or erythema  VASCULAR:  pulses equal and symmetric in the upper and lower extremities  NEURO:  AOx0; awake and alert; makes unintelligible sounds; cannot follow commands  SKIN:  per RN assessment

## 2024-09-17 NOTE — ED PROVIDER NOTE - NSFOLLOWUPINSTRUCTIONS_ED_ALL_ED_FT
Urinary Tract Infection    A urinary tract infection (UTI) is an infection of any part of the urinary tract, which includes the kidneys, ureters, bladder, and urethra. Risk factors include ignoring your need to urinate, wiping back to front if female, being an uncircumcised male, and having diabetes or a weak immune system. Symptoms include frequent urination, pain or burning with urination, foul smelling urine, cloudy urine, pain in the lower abdomen, blood in the urine, and fever. If you were prescribed an antibiotic medicine, take it as told by your health care provider. Do not stop taking the antibiotic even if you start to feel better.    SEEK IMMEDIATE MEDICAL CARE IF YOU HAVE ANY OF THE FOLLOWING SYMPTOMS: severe back or abdominal pain, fever, inability to keep fluids or medicine down, dizziness/lightheadedness, or a change in mental status.      Follow up with your doctor in 3-5 days

## 2024-09-17 NOTE — ED ADULT NURSE NOTE - CHIEF COMPLAINT QUOTE
PT BIBEMS from [place] s/p unwitnessed fall. Per EMS pt was found on ground. Unknown head strike or LOC. -blood thinners. Pt AOx1 to self, baseline mental status. Pt denies any complaints. Pt in no acute distress, NA called 0030.

## 2024-09-17 NOTE — PHARMACOTHERAPY INTERVENTION NOTE - COMMENTS
Medication history complete, patient is from [facility], reviewed and confirmed medication with list provided by facility.

## 2024-09-17 NOTE — DISCHARGE NOTE PROVIDER - NSDCMRMEDTOKEN_GEN_ALL_CORE_FT
Aricept 10 mg oral tablet: 1 tab(s) orally once a day (at bedtime)  cyanocobalamin 1000 mcg oral tablet: 1 tab(s) orally once a day  dicyclomine 10 mg oral capsule: 1 cap(s) orally 2 times a day  DULoxetine 60 mg oral delayed release capsule: 1 cap(s) orally once a day  levETIRAcetam 500 mg oral tablet: 1 tab(s) orally 2 times a day  levothyroxine 25 mcg (0.025 mg) oral tablet: 1 tab(s) orally once a day  loperamide 2 mg oral tablet: 2 tab(s) orally 2 times a day as needed for  diarrhea MDD: 4 tabs  Neurontin 400 mg oral capsule: 1 cap(s) orally 2 times a day  Nuplazid 34 mg oral capsule: 1 cap(s) orally once a day  nystatin 100,000 units/g topical powder: Apply topically to affected area once a day under breast for redness  pantoprazole 40 mg oral delayed release tablet: 1 tab(s) orally once a day (before a meal)  polyethylene glycol 3350 oral powder for reconstitution: 17 gram(s) orally once a day  Tylenol Extra Strength 500 mg oral tablet: 2 tab(s) orally 2 times a day as needed for pain  ZeaSORB 0.5%-0.22% topical powder: Apply topically to affected area under breasts as needed

## 2024-09-17 NOTE — ED ADULT TRIAGE NOTE - CHIEF COMPLAINT QUOTE
PT BIBEMS from [place] s/p unwitnessed fall. Per EMS pt was found on ground. Unknown head strike or LOC. -blood thinners. Pt AOx1 to self, baseline mental status. Pt denies any complaints. Pt in no acute distress, PT BIBEMS from [place] s/p unwitnessed fall. Per EMS pt was found on ground. Unknown head strike or LOC. -blood thinners. Pt AOx1 to self, baseline mental status. Pt denies any complaints. Pt in no acute distress, NA called 0030.

## 2024-09-17 NOTE — DISCHARGE NOTE PROVIDER - HOSPITAL COURSE
83 year old female with PMHx of parkinson's dementia with behavioral disturbances, HLD, seizure, hypothyroidism, s/p PPM, spinal stenosis from [facility] care unit presenting for unwitnessed fall. unclear if there was head trauma. pt not on any AC. Pt unable to assist with history given dementia. AOx0. notably pt recently admitted with UTI, PNA,  and hypothermia and was initially on meropenam and switched to ceftin. In the ED, pt's CTA shows no PE but does show b/l pna. Previously infiltrate was only on the left. Now it appears infiltrate is on the right as well. UA with pyuria and bacteria concerning for UTI. Pt acutely deteriorated shortly after accepting admission. Received call from RN stating pt is hypotensive with MAPs in 50s, and desaturated on 6L NC and required nonrebreather. pt reassessed at this time. slow shallow breaths. Initiated sepsis bolus of 2L which was given via pressure bag. Antibiotics were expanded and ID was consulted agreed with Xi. however there was minimal to no response in blood pressure. MAPS remains in 50s. I spoke to Latisha, pt's daughter and POA and explained the situation. I offered ICU care with central line and pressors. She stated she wanted to come ot hospital 5 minutes away and talk in person before making a decision. I met with Latisha along with her siblings at bedside. I explained pt is in septic shock likely 2/2 multiple infections - aspiration pna and UTI. hypothermia and hypotension combination means a very poor prognosis. I consulted palliative care who also discussed poor prognosis and that ICU level care with pressors would not guarantee a healthy outcome. Per NINA Price from palliative, pt would qualify for inpatient hospice if the family decided. this would involve discontinuing IV antibiotics and continuing only medically necessary meds like Keppra. After discussion amongst all 3 siblings, family agreed to discharge to inpatient hospice without IV antibiotics. MOLST filled by pal team. morphine ordered by Pal team.

## 2024-09-17 NOTE — GOALS OF CARE CONVERSATION - ADVANCED CARE PLANNING - CONVERSATION DETAILS
Received call from RN stating pt is hypotensive with MAPs in 50s, and desaturated on 6L NC and required nonrebreather. pt reassessed at this time. slow shallow breaths. Initiated sepsis bolus of 2L which was given via pressure bag. however there was minimal to no response in blood pressure. MAPS remains in 50s. I spoke to Latisha, pt's daughter and POA and explained the situation. I offered ICU care with central line and pressors. She stated she wanted to come ot hospital 5 minutes away and talk in person before making a decision. I met with Latisha along with her siblings at bedside. I explained pt is in septic shock likely 2/2 multiple infections - aspiration pna and UTI. hypothermia and hypotension combination means a very poor prognosis. I consulted palliative care who also discussed poor prognosis and that ICU level care with pressors would not guarantee a healthy outcome. Per NINA Price from palliative, pt would qualify for inpatient hospice if the family decided. this would involve discontinuing IV antibiotics and continuing only medically necessary meds like Keppra. After discussion amongst all 3 siblings, family agreed to discharge to inpatient hospice without IV antibiotics. MOLST filled by pal team. morphine ordered by Pal team. Received call from RN stating pt is hypotensive with MAPs in 50s, and desaturated on 6L NC and required nonrebreather. pt reassessed at this time. slow shallow breaths. Initiated sepsis bolus of 2L which was given via pressure bag. however there was minimal to no response in blood pressure. MAPS remains in 50s. I spoke to aLtisha, pt's daughter and POA and explained the situation. I offered ICU care with central line and pressors. She stated she wanted to come ot hospital 5 minutes away and talk in person before making a decision. I met with Latisha along with her siblings at bedside. I explained pt is in septic shock likely 2/2 multiple infections - aspiration pna and UTI. hypothermia and hypotension combination means a very poor prognosis. I consulted palliative care who also discussed poor prognosis and that ICU level care with pressors would not guarantee a healthy outcome. Per NINA Price from palliative, pt would qualify for inpatient hospice if the family decided. this would involve discontinuing IV antibiotics and continuing only medically necessary meds like Keppra. After discussion amongst all 3 siblings, family agreed to discharge to inpatient hospice without IV antibiotics. MOLST filled by pal team. morphine to be ordered by Pal team. Xrays of hip and knees refused by family.

## 2024-09-17 NOTE — ED ADULT NURSE NOTE - OBJECTIVE STATEMENT
PT BIBEMS from [place] s/p unwitnessed fall. Per EMS pt was found on ground. Unknown head strike or LOC. -blood thinners. Pt AOx1 to self, baseline mental status. pt poor historian. pt does not appear in any distress. no other complaints at this time. safety and comfort measures maintained. md at bedside

## 2024-09-17 NOTE — ED PROVIDER NOTE - OBJECTIVE STATEMENT
83-year-old female with history of dementia, hyperlipidemia, Parkinson's, trigeminal neuralgia brought in by EMS from [facility] for evaluation of unwitnessed fall in his bed.  The patient was placed back in her bed prior to arrival of EMS.  It is unclear if the patient hit her head and at baseline she is alert and oriented to self only.  The patient typically walks short distances with a walker but a lot of times will stand up on her own and attempt to ambulate without a walker.  The patient has been recently in this emergency department and diagnosed with a urinary tract infection.  Patient denies any pain at this time.  On exam, patient does not have any hematoma, tenderness or laceration of the head and denies any cervical spine tenderness.  Will get CT of the head to rule out intracranial hemorrhage, CBC, BMP, urinalysis with reflex to culture and EKG.  If workup is negative, will perform ambulation trial.  If patient has evidence of UTI, will start on antibiotics, perform ambulation trial and discharged to facility on antibiotics if able to ambulate with a walker.

## 2024-09-17 NOTE — DISCHARGE NOTE PROVIDER - NSDCCPCAREPLAN_GEN_ALL_CORE_FT
PRINCIPAL DISCHARGE DIAGNOSIS  Diagnosis: Pneumonia, aspiration  Assessment and Plan of Treatment: You will be discharged to inpatient hospice per discussion with your daughter and POA Latisha

## 2024-09-17 NOTE — H&P ADULT - ASSESSMENT
83 year old female with PMHx of parkinson's dementia with behavioral disturbances, HLD, seizure, hypothyroidism, s/p PPM, spinal stenosis from [facility] unit presenting for unwitnessed fall admitted for acute hypoxic respiratory failure and hypothermia in the setting of likely aspiration pneumonia and UTI.    #acute hypoxic respiratory failure 2/2 b/l pneumonia  #aspiration pneumonia  #hypothermia   # Acute uti  #hypotension  pt currently on osiris hugger for rectal temp 94 degrees  satting 93% on 2L  previous admission pt had hypothermia with LLL infiltrate; CT now picks up right basilar infiltrate   BP on admission 107/50 - while on osiris hugger, BP dropped to MAPs < 65. will initiate 2L sepsis bolus and monitor response  will keep pt strict NPO until S&S eval  received vanc and cefepime in ED  previous UTIs E coli - all sensitive to zosyn  Will switch to zosyn fo anaerobic coverage   check mrsa, legionella and RVP  check stat lactic   f/u blood cx  f/u urine cx  check sputum cx if possible  Will consult ID   regarding hypothermia, likely 2/2 acute infection; previous workup included normal AM cortisol. pt did receive dexamethasone in ED which may skew repeat AM cortisol; TSH on previous admission elevated to 8.97 with normal free thyroxine - will recheck TSH and free t4 but suspect euthyroid sick syndrome in the setting of hypothermia with acute infection    #unwitnessed fall   head ct negative   will complete trauma workup with xray of hips and knees    #hypothyroidism  TSH on previous admission elevated to 8.97 with normal free thyroxine - will recheck TSH and free t4 but suspect euthyroid sick syndrome in the setting of hypothermia with acute infection  will convert po levothyroxine to IV    #seizure hx   switch po keppra to IV given hx of breakthrough seizure     Dementia with behavioral disturbances   hold po meds for now    #GERD  can switch PPI to IV    SCDs given hx of GI bleed and thrombocytopenia

## 2024-09-17 NOTE — CONSULT NOTE ADULT - NS ATTEND AMEND GEN_ALL_CORE FT
case d/w above np  pt w/ active symptoms would likely benefit from in patient hospice facility  case d/w family   comfort and hospice approach preffered  pt accepted to [facility] and transferred out 3pm time set.

## 2024-09-17 NOTE — CONSULT NOTE ADULT - CONVERSATION DETAILS
Met with family at bedside and reviewed the role of palliative medicine Patient is unable to participate in conversation as she is obtunded and requiring NRB.     We spoke about how patient was doing before coming ot the hospital and family shared that they saw her this week and that she was doing "ok" and reading people magazine. They did share that patient has had more frequent readmission this year from UTIs. Family spoke with attending and at this time has decided not to pursue admission or pressors. family has requested to focus more on patients comfort. Hospice was explained as well  as an end of life care philosophy.  When a disease cannot be cured, or family/patient decide the treatment burdens out weigh the risk and one choses to change focus of treatment from cure to quality/comfort.  Prognosis: Death can occur at anytime, but if disease continues to progress naturally patient likely has days to weeks.    NEGRO on chart reflecting DNR/I was validates and family requested inpatient hospice referral to [facility], discussed with KRISS Aquino

## 2024-09-17 NOTE — CHART NOTE - NSCHARTNOTEFT_GEN_A_CORE
Pt is a 84 y/o female with a past medical hx of Parkinson Dementia with behavioral disturbances, HLD, seizures, hypothyroidism, PPM, spinal stenosis.  Pt presented to the ER after an unwitnessed fall at [facility] where she resides.  Pt was found to be in Acute hypoxic respiratory failure with b/l PNA and was admitted. Pt was placed on a venti mask.  Pt was recently admitted to  from 8/28/24 to 8/30/24 and d/c back to [facility].  Pt had a Palliative care consult in the ER and the family opted for inpatient hospice. Saida sent a referral to [facility] at family request and pt was accepted for bed available today. SAIDA arranged for the pt to be transported for a 3:00pm  by ambulance to [facility].  Support provided to pts family. Pts family agreeable to d/c plan.  Case discussed with RN/MD.

## 2024-09-17 NOTE — ED ADULT NURSE REASSESSMENT NOTE - NS ED NURSE REASSESS COMMENT FT1
Patient remains hypotensive with MAP <65, MD Raymond made aware, warm pressured bag warm Normal Saline IVF initiated, continuos monitoring in place. Safety and comfort maintained. 48

## 2024-09-17 NOTE — ED ADULT NURSE REASSESSMENT NOTE - NS ED NURSE REASSESS COMMENT FT1
Received bedside report from previous RN Genaro Salazar. Patient is lying on stretcher on semi-fowlers position. Patient has no complaints at this time. Call bell within reach, bed in lowest position, safety and comfort maintained. Patient noted to be hypothermic (34.6C), admitting physician made aware, osiris huggeer placed. Pt is currently awake, A/OX1-2 per baseline.

## 2024-09-17 NOTE — CONSULT NOTE ADULT - ASSESSMENT
83 year old female with PMHx of parkinson's dementia with behavioral disturbances, HLD, seizure, hypothyroidism, s/p PPM, spinal stenosis from [facility] care unit presenting for unwitnessed fall. unclear if there was head trauma. pt not on any AC. Pt unable to assist with history given dementia. AOx0. notably pt recently admitted with UTI, PNA,  and hypothermia and was initially on meropenam and switched to ceftin. In the ED, pt's CTA shows no PE but does show b/l pna. Previously infiltrate was only on the left. Now it appears infiltrate is on the right as well. UA with pyuria and bacteria concerning for UTI.  ED course: received vanc, cefepime, 500cc bolus, DUO neb, Dexamethasone 6mg.     1. Acute respiratory failure. Hypothermia. Hypotension. Pyuria. UTI. Aspiration pneumonia. Parkinsons disease. PCN allergy   - imaging reviewed  - prior cultures noted  - agree with IV zosyn [id number]  - continue with antibiotic coverage  - hold vancomycin  - f/u urine cx blood cx   - aspiration precautions  - fu cbc  - tolerating abx well so far; no side effects noted  - reason for abx use and side effects reviewed with patient  - supportive care    Clinical team may change from intravenous to oral antibiotics when the following criteria are met:   1. Patient is clinically improving/stable       a)	Improved signs and symptoms of infection from initial presentation       b)	Afebrile for 24 hours       c)	Leukocytosis trending towards normal range   2. Patient is tolerating oral intake   3. Initial/repeat blood cultures are negative     Cannot advise changing to oral antibiotic therapy until culture sensitivity is available.    
 Pt is a 83y old Female with hx of parkinson's dementia with behavioral disturbances, HLD, seizure, hypothyroidism, s/p PPM, spinal stenosis from [facility] care unit presenting for unwitnessed fall. unclear if there was head trauma. pt not on any AC. Pt unable to assist with history given dementia. AOx0. notably pt recently admitted with UTI, PNA,  and hypothermia and was initially on meropenam and switched to ceftin. In the ED, pt's CTA shows no PE but does show b/l pna. Previously infiltrate was only on the left. Now it appears infiltrate is on the right as well. UA with pyuria and bacteria concerning for UTI. Palliative medicine consulted to help establish GOC and advance care planning     1) Acute hypoxic respiratory failure   - Aspiration PNA   - on NRB   - morphine 1mg IV q3hrs PRN for dyspnea or pain  - Rubinol 0.2mg IV push q6hrs PRN for secretions     2) sepsis   - UTI and PNA  - c/w bear hugger     Process of Care  --Reviewed dx/treatment problems and alignment with Goals of Care    Physical Aspects of Care  --Pain  patient denies at this time  c/w current management    --Bowel Regimen  denies constipation  risk for constipation d/t immobility  daily dulcolax    --Dyspnea  No SOB at this time  comfortable and in NAD    --Nausea Vomiting  denies    --Weakness  PT as tolerated     Psychological and Psychiatric Aspects of Care:   --Greif/Bereavment: emotional support provided  -Pastoral Care Available PRN     Social Aspects of Care  -SW involved     Cultural Aspects  -Primary Language: English    Goals of Care:     We discussed Palliative Care team being a supportive team when a patient has ongoing illnesses.  We also discussed that it is not an end of life care service, but can help navigate symptoms and emotional support througout their hospital stay here.    Ethical and Legal Aspects: NA    Capacity- pt does not have capacity     HCP/Surrogate: HCP Latisha      Code Status- DNR/I   MOLST- on chart and validated   Dispo Plan- inpatient hospice     Discussed With: Dr. Balbuena coordinated with attending and SW and RN     Time Spent: 60 minutes including the care, coordination and counseling of this patient, 50% of which was spent coordinating and counseling.

## 2024-09-17 NOTE — ED PROVIDER NOTE - PATIENT PORTAL LINK FT
You can access the FollowMyHealth Patient Portal offered by [facility] by registering at the following website: [email or web address]. By joining [employer]’s FollowMyHealth portal, you will also be able to view your health information using other applications (apps) compatible with our system.

## 2024-09-17 NOTE — H&P ADULT - HISTORY OF PRESENT ILLNESS
83 year old female with PMHx of parkinson's dementia with behavioral disturbances, HLD, seizure, hypothyroidism, s/p PPM, spinal stenosis from [facility] care unit presenting for unwitnessed fall. unclear if there was head trauma. pt not on any AC. Pt unable to assist with history given dementia. AOx0. notably pt recently admitted with UTI, PNA,  and hypothermia and was initially on meropenam and switched to ceftin. In the ED, pt's CTA shows no PE but does show b/l pna. Previously infiltrate was only on the left. Now it appears infiltrate is on the right as well. UA with pyuria and bacteria concerning for UTI.     ED course: received vanc, cefepime, 500cc bolus, DUO neb, Dexamethasone 6mg

## 2024-09-17 NOTE — H&P ADULT - NSHPLABSRESULTS_GEN_ALL_CORE
.  LABS:                         12.9   10.48 )-----------( 128      ( 17 Sep 2024 01:05 )             40.5     -    139  |  105  |  32<H>  ----------------------------<  100<H>  5.1   |  33<H>  |  1.02    Ca    9.7      17 Sep 2024 01:05        Urinalysis Basic - ( 17 Sep 2024 03:35 )    Color: Yellow / Appearance: Turbid / S.016 / pH: x  Gluc: x / Ketone: Negative mg/dL  / Bili: Negative / Urobili: 1.0 mg/dL   Blood: x / Protein: Negative mg/dL / Nitrite: Positive   Leuk Esterase: Large / RBC: 34 /HPF / WBC 31 /HPF   Sq Epi: x / Non Sq Epi: 6 /HPF / Bacteria: Too Numerous to count /HPF            RADIOLOGY, EKG & ADDITIONAL TESTS: Reviewed.

## 2024-09-17 NOTE — ED ADULT NURSE NOTE - NSFALLRISKINTERV_ED_ALL_ED

## 2024-09-19 LAB
CULTURE RESULTS: SIGNIFICANT CHANGE UP
SPECIMEN SOURCE: SIGNIFICANT CHANGE UP

## 2024-09-22 LAB
CULTURE RESULTS: SIGNIFICANT CHANGE UP
SPECIMEN SOURCE: SIGNIFICANT CHANGE UP

## 2024-09-25 DIAGNOSIS — R56.9 UNSPECIFIED CONVULSIONS: ICD-10-CM

## 2024-09-25 DIAGNOSIS — I95.9 HYPOTENSION, UNSPECIFIED: ICD-10-CM

## 2024-09-25 DIAGNOSIS — G20.A1 PARKINSON'S DISEASE WITHOUT DYSKINESIA, WITHOUT MENTION OF FLUCTUATIONS: ICD-10-CM

## 2024-09-25 DIAGNOSIS — Z95.0 PRESENCE OF CARDIAC PACEMAKER: ICD-10-CM

## 2024-09-25 DIAGNOSIS — A41.9 SEPSIS, UNSPECIFIED ORGANISM: ICD-10-CM

## 2024-09-25 DIAGNOSIS — R65.21 SEVERE SEPSIS WITH SEPTIC SHOCK: ICD-10-CM

## 2024-09-25 DIAGNOSIS — Z91.040 LATEX ALLERGY STATUS: ICD-10-CM

## 2024-09-25 DIAGNOSIS — K14.6 GLOSSODYNIA: ICD-10-CM

## 2024-09-25 DIAGNOSIS — E03.9 HYPOTHYROIDISM, UNSPECIFIED: ICD-10-CM

## 2024-09-25 DIAGNOSIS — Z88.2 ALLERGY STATUS TO SULFONAMIDES: ICD-10-CM

## 2024-09-25 DIAGNOSIS — R68.0 HYPOTHERMIA, NOT ASSOCIATED WITH LOW ENVIRONMENTAL TEMPERATURE: ICD-10-CM

## 2024-09-25 DIAGNOSIS — Z79.890 HORMONE REPLACEMENT THERAPY: ICD-10-CM

## 2024-09-25 DIAGNOSIS — N39.0 URINARY TRACT INFECTION, SITE NOT SPECIFIED: ICD-10-CM

## 2024-09-25 DIAGNOSIS — Z66 DO NOT RESUSCITATE: ICD-10-CM

## 2024-09-25 DIAGNOSIS — J69.0 PNEUMONITIS DUE TO INHALATION OF FOOD AND VOMIT: ICD-10-CM

## 2024-09-25 DIAGNOSIS — J96.01 ACUTE RESPIRATORY FAILURE WITH HYPOXIA: ICD-10-CM

## 2024-09-25 DIAGNOSIS — Z51.5 ENCOUNTER FOR PALLIATIVE CARE: ICD-10-CM

## 2024-09-25 DIAGNOSIS — E78.5 HYPERLIPIDEMIA, UNSPECIFIED: ICD-10-CM

## 2024-09-25 DIAGNOSIS — F02.818 DEMENTIA IN OTHER DISEASES CLASSIFIED ELSEWHERE, UNSPECIFIED SEVERITY, WITH OTHER BEHAVIORAL DISTURBANCE: ICD-10-CM

## 2024-09-25 DIAGNOSIS — G50.0 TRIGEMINAL NEURALGIA: ICD-10-CM

## 2024-11-20 NOTE — ED ADULT TRIAGE NOTE - NS ED TRIAGE AVPU SCALE
Alert-The patient is alert, awake and responds to voice. The patient is oriented to time, place, and person. The triage nurse is able to obtain subjective information.
49 y/o male pmhx afib on eliquis. Presents to the ED c/o palpitations [id number] PTA with discomfort radiating to L shoulder. Denies CP, SOB, HA, F/C, N/V/D, weakness, dizziness, and any other complaints at this time. On exam A&Ox4, RA, ambulatory, NAD. Speaking in clear coherent sentences, respirations are spontaneous and unlabored.

## 2025-01-17 NOTE — ED PROVIDER NOTE - NS_EDPROVIDERDISPOUSERTYPE_ED_A_ED
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
## 2025-05-19 NOTE — ED PROVIDER NOTE - NS ED NOTE AC HIGH RISK COUNTRIES
The sensitive parts of the examination were performed with Floridalma Perry MA as a chaperone.  Floridalma was present during the entirety of the sensitive parts of the examination.   cyst.    Component      Latest Ref Rng 11/1/2023   TSH Reflex FT4      0.27 - 4.20 uIU/mL 2.16    Follicle Stimulating Hormone      mIU/mL 7.9    LH      mIU/mL 3.5    Prolactin      ng/mL 18.0        Component      Latest Ref Rng 11/1/2023   WBC      4.0 - 11.0 K/uL 5.8    RBC      4.00 - 5.20 M/uL 4.62    Hemoglobin Quant      12.0 - 16.0 g/dL 13.8    Hematocrit      36.0 - 48.0 % 40.8    MCV      80.0 - 100.0 fL 88.3    MCH      26.0 - 34.0 pg 29.9    MCHC      31.0 - 36.0 g/dL 33.9    RDW      12.4 - 15.4 % 13.2    Platelet Count      135 - 450 K/uL 235      Never followed up.    Review of systems:  No complaints of symptoms involving:  Constitutional: negative for fever, chills.  Eyes: No change in vision, double vision, or scotomata.  HENT: No sore throat, ear pain or nasal congestion.  Respiratory: No cough, shortness of breath or hemoptysis.  Cardiovascular: No chest pain, orthopnea, fainting.  Skin: No pruritus or generalized rash.  Neurologic: No focal weakness or sensory changes.   Gynecologic: See HPI    Patient Active Problem List   Diagnosis    Trigeminal neuralgia of left side of face    Nonintractable headache    Abscess    Immunization due     Current Outpatient Medications   Medication Sig Dispense Refill    medroxyPROGESTERone (PROVERA) 10 MG tablet Take 2 tablets by mouth daily 60 tablet 0    tranexamic acid (LYSTEDA) 650 MG TABS tablet Take 2 tablets by mouth 3 times daily 30 tablet 0    OXcarbazepine (TRILEPTAL) 300 MG tablet TAKE 2 TABLETS(600 MG) BY MOUTH TWICE DAILY      verapamil (CALAN SR) 120 MG extended release tablet Take 1 tablet by mouth 2 times daily      pregabalin (LYRICA) 100 MG capsule TAKE 1 CAPSULE(100 MG) BY MOUTH TWICE DAILY      SUMAtriptan (IMITREX) 20 MG/ACT nasal spray 20mg once in a single nostril. May repeat dose in 2 hours. Max dose 40mg in 24 hours. Indications: migraine (Patient not taking: Reported on 5/19/2025)       No current facility-administered medications for  No